# Patient Record
Sex: FEMALE | Race: BLACK OR AFRICAN AMERICAN | NOT HISPANIC OR LATINO | RURAL
[De-identification: names, ages, dates, MRNs, and addresses within clinical notes are randomized per-mention and may not be internally consistent; named-entity substitution may affect disease eponyms.]

---

## 2020-10-15 ENCOUNTER — HISTORICAL (OUTPATIENT)
Dept: ADMINISTRATIVE | Facility: HOSPITAL | Age: 45
End: 2020-10-15

## 2020-10-15 LAB
BASOPHILS # BLD AUTO: 0.05 X10E3/UL (ref 0–0.2)
BASOPHILS NFR BLD AUTO: 1 % (ref 0–1)
EOSINOPHIL # BLD AUTO: 0.08 X10E3/UL (ref 0–0.5)
EOSINOPHIL NFR BLD AUTO: 1.6 % (ref 1–4)
ERYTHROCYTE [DISTWIDTH] IN BLOOD BY AUTOMATED COUNT: 13.3 % (ref 11.5–14.5)
HCT VFR BLD AUTO: 38.8 % (ref 38–47)
HGB BLD-MCNC: 12.5 G/DL (ref 12–16)
IMM GRANULOCYTES # BLD AUTO: 0.01 X10E3/UL (ref 0–0.04)
IMM GRANULOCYTES NFR BLD: 0.2 % (ref 0–0.4)
LYMPHOCYTES # BLD AUTO: 2.16 X10E3/UL (ref 1–4.8)
LYMPHOCYTES NFR BLD AUTO: 44.1 % (ref 27–41)
MCH RBC QN AUTO: 26.9 PG (ref 27–31)
MCHC RBC AUTO-ENTMCNC: 32.2 G/DL (ref 32–36)
MCV RBC AUTO: 83.6 FL (ref 80–96)
MONOCYTES # BLD AUTO: 0.47 X10E3/UL (ref 0–0.8)
MONOCYTES NFR BLD AUTO: 9.6 % (ref 2–6)
MPC BLD CALC-MCNC: 11 FL (ref 9.4–12.4)
NEUTROPHILS # BLD AUTO: 2.13 X10E3/UL (ref 1.8–7.7)
NEUTROPHILS NFR BLD AUTO: 43.5 % (ref 53–65)
NRBC # BLD AUTO: 0 X10E3/UL (ref 0–0)
NRBC, AUTO (.00): 0 /100 (ref 0–0)
PLATELET # BLD AUTO: 320 X10E3/UL (ref 150–400)
RBC # BLD AUTO: 4.64 X10E6/UL (ref 4.2–5.4)
WBC # BLD AUTO: 4.9 X10E3/UL (ref 4.5–11)

## 2020-10-19 ENCOUNTER — HISTORICAL (OUTPATIENT)
Dept: ADMINISTRATIVE | Facility: HOSPITAL | Age: 45
End: 2020-10-19

## 2020-10-19 LAB — OCCULT BLOOD: NEGATIVE

## 2021-02-15 ENCOUNTER — HISTORICAL (OUTPATIENT)
Dept: ADMINISTRATIVE | Facility: HOSPITAL | Age: 46
End: 2021-02-15

## 2021-02-15 LAB
ALBUMIN SERPL BCP-MCNC: 3.9 G/DL (ref 3.5–5)
ALBUMIN/GLOB SERPL: 0.9 {RATIO}
ALP SERPL-CCNC: 77 U/L (ref 39–100)
ALT SERPL W P-5'-P-CCNC: 31 U/L (ref 13–56)
AMYLASE SERPL-CCNC: 57 U/L (ref 25–115)
ANION GAP SERPL CALCULATED.3IONS-SCNC: 12 MMOL/L
AST SERPL W P-5'-P-CCNC: 26 U/L (ref 15–37)
BASOPHILS # BLD AUTO: 0.04 X10E3/UL (ref 0–0.2)
BASOPHILS NFR BLD AUTO: 0.6 % (ref 0–1)
BILIRUB SERPL-MCNC: 0.8 MG/DL (ref 0–1.2)
BILIRUB UR QL STRIP: NEGATIVE MG/DL
BUN SERPL-MCNC: 11 MG/DL (ref 7–18)
BUN/CREAT SERPL: 9.7
CALCIUM SERPL-MCNC: 10 MG/DL (ref 8.5–10.1)
CHLORIDE SERPL-SCNC: 95 MMOL/L (ref 98–107)
CLARITY UR: CLEAR
CO2 SERPL-SCNC: 34 MMOL/L (ref 21–32)
COLOR UR: YELLOW
CREAT SERPL-MCNC: 1.13 MG/DL (ref 0.55–1.02)
EOSINOPHIL # BLD AUTO: 0.16 X10E3/UL (ref 0–0.5)
EOSINOPHIL NFR BLD AUTO: 2.5 % (ref 1–4)
ERYTHROCYTE [DISTWIDTH] IN BLOOD BY AUTOMATED COUNT: 13.8 % (ref 11.5–14.5)
GLOBULIN SER-MCNC: 4.3 G/DL (ref 2–4)
GLUCOSE SERPL-MCNC: 170 MG/DL (ref 74–106)
GLUCOSE UR STRIP-MCNC: NEGATIVE MG/DL
HCT VFR BLD AUTO: 39.7 % (ref 38–47)
HGB BLD-MCNC: 13 G/DL (ref 12–16)
KETONES UR STRIP-SCNC: NEGATIVE MG/DL
LEUKOCYTE ESTERASE UR QL STRIP: NEGATIVE LEU/UL
LIPASE SERPL-CCNC: 95 U/L (ref 73–393)
LYMPHOCYTES # BLD AUTO: 2.08 X10E3/UL (ref 1–4.8)
LYMPHOCYTES NFR BLD AUTO: 32.8 % (ref 27–41)
MCH RBC QN AUTO: 27.8 PG (ref 27–31)
MCHC RBC AUTO-ENTMCNC: 32.7 G/DL (ref 32–36)
MCV RBC AUTO: 85 FL (ref 80–96)
MONOCYTES # BLD AUTO: 0.64 X10E3/UL (ref 0–0.8)
MONOCYTES NFR BLD AUTO: 10.1 % (ref 2–6)
MPC BLD CALC-MCNC: 10.3 FL (ref 9.4–12.4)
NEUTROPHILS # BLD AUTO: 3.42 X10E3/UL (ref 1.8–7.7)
NEUTROPHILS NFR BLD AUTO: 54 % (ref 53–65)
NITRITE UR QL STRIP: NEGATIVE
PH UR STRIP: 7 PH UNITS (ref 5–8)
PLATELET # BLD AUTO: 329 X10E3/UL (ref 150–400)
POTASSIUM SERPL-SCNC: 4 MMOL/L (ref 3.5–5.1)
PROT SERPL-MCNC: 8.2 G/DL (ref 6.4–8.2)
PROT UR QL STRIP: NEGATIVE MG/DL
RBC # BLD AUTO: 4.67 X10E6/UL (ref 4.2–5.4)
RBC # UR STRIP: NEGATIVE ERY/UL
SODIUM SERPL-SCNC: 137 MMOL/L (ref 136–145)
SP GR UR STRIP: 1.01 (ref 1–1.03)
UROBILINOGEN UR STRIP-ACNC: 0.2 MG/DL
WBC # BLD AUTO: 6.34 X10E3/UL (ref 4.5–11)

## 2021-04-19 ENCOUNTER — OFFICE VISIT (OUTPATIENT)
Dept: GASTROENTEROLOGY | Facility: CLINIC | Age: 46
End: 2021-04-19
Payer: COMMERCIAL

## 2021-04-19 VITALS
WEIGHT: 200 LBS | HEIGHT: 64 IN | SYSTOLIC BLOOD PRESSURE: 134 MMHG | OXYGEN SATURATION: 100 % | DIASTOLIC BLOOD PRESSURE: 85 MMHG | RESPIRATION RATE: 17 BRPM | HEART RATE: 90 BPM | BODY MASS INDEX: 34.15 KG/M2

## 2021-04-19 DIAGNOSIS — T40.2X5A CONSTIPATION DUE TO OPIOID THERAPY: Primary | ICD-10-CM

## 2021-04-19 DIAGNOSIS — R10.9 ABDOMINAL PAIN, UNSPECIFIED ABDOMINAL LOCATION: ICD-10-CM

## 2021-04-19 DIAGNOSIS — K59.03 CONSTIPATION DUE TO OPIOID THERAPY: Primary | ICD-10-CM

## 2021-04-19 PROCEDURE — 99213 OFFICE O/P EST LOW 20 MIN: CPT | Mod: ,,, | Performed by: NURSE PRACTITIONER

## 2021-04-19 PROCEDURE — 99213 PR OFFICE/OUTPT VISIT, EST, LEVL III, 20-29 MIN: ICD-10-PCS | Mod: ,,, | Performed by: NURSE PRACTITIONER

## 2021-04-19 RX ORDER — GABAPENTIN 300 MG/1
300 CAPSULE ORAL 3 TIMES DAILY
COMMUNITY
Start: 2021-03-25 | End: 2021-08-23 | Stop reason: SDUPTHER

## 2021-04-19 RX ORDER — TRAZODONE HYDROCHLORIDE 100 MG/1
100 TABLET ORAL NIGHTLY
COMMUNITY
Start: 2021-03-11 | End: 2022-02-03 | Stop reason: SDUPTHER

## 2021-04-19 RX ORDER — RISPERIDONE 3 MG/1
3 TABLET ORAL DAILY
COMMUNITY
Start: 2021-03-11 | End: 2022-02-03 | Stop reason: SDUPTHER

## 2021-04-19 RX ORDER — PANTOPRAZOLE SODIUM 40 MG/1
40 TABLET, DELAYED RELEASE ORAL DAILY
Qty: 90 TABLET | Refills: 3 | Status: SHIPPED | OUTPATIENT
Start: 2021-04-19 | End: 2021-08-23 | Stop reason: SDUPTHER

## 2021-04-19 RX ORDER — HYDROXYZINE PAMOATE 25 MG/1
25 CAPSULE ORAL 3 TIMES DAILY
COMMUNITY
Start: 2021-02-16 | End: 2022-02-03 | Stop reason: SDUPTHER

## 2021-04-19 RX ORDER — PHENAZOPYRIDINE HYDROCHLORIDE 100 MG/1
TABLET, FILM COATED ORAL
Status: ON HOLD | COMMUNITY
Start: 2021-02-09 | End: 2021-12-21

## 2021-04-19 RX ORDER — OXYCODONE AND ACETAMINOPHEN 10; 325 MG/1; MG/1
1 TABLET ORAL EVERY 6 HOURS PRN
COMMUNITY
Start: 2021-03-22

## 2021-04-19 RX ORDER — OMEPRAZOLE 20 MG/1
20 CAPSULE, DELAYED RELEASE ORAL DAILY
Status: ON HOLD | COMMUNITY
Start: 2021-02-03 | End: 2021-12-21

## 2021-04-19 RX ORDER — DULOXETIN HYDROCHLORIDE 60 MG/1
60 CAPSULE, DELAYED RELEASE ORAL 2 TIMES DAILY
COMMUNITY
Start: 2021-03-11 | End: 2021-08-23 | Stop reason: SDUPTHER

## 2021-04-19 RX ORDER — LISINOPRIL 5 MG/1
TABLET ORAL
COMMUNITY
Start: 2021-03-27 | End: 2021-06-01 | Stop reason: SDUPTHER

## 2021-04-19 RX ORDER — DIAZEPAM 5 MG/1
5 TABLET ORAL EVERY 12 HOURS PRN
COMMUNITY
Start: 2021-02-16 | End: 2022-04-26

## 2021-04-19 RX ORDER — NALOXEGOL OXALATE 25 MG/1
25 TABLET, FILM COATED ORAL DAILY
Qty: 30 TABLET | Refills: 11 | Status: SHIPPED | OUTPATIENT
Start: 2021-04-19 | End: 2022-02-03 | Stop reason: SDUPTHER

## 2021-04-19 RX ORDER — TIZANIDINE 4 MG/1
4 TABLET ORAL 4 TIMES DAILY
COMMUNITY
Start: 2021-04-12 | End: 2022-02-03 | Stop reason: SDUPTHER

## 2021-04-19 RX ORDER — GLIPIZIDE 10 MG/1
10 TABLET ORAL 2 TIMES DAILY WITH MEALS
COMMUNITY
Start: 2021-03-25 | End: 2021-08-23 | Stop reason: SDUPTHER

## 2021-05-12 ENCOUNTER — TELEPHONE (OUTPATIENT)
Dept: GASTROENTEROLOGY | Facility: CLINIC | Age: 46
End: 2021-05-12

## 2021-06-01 RX ORDER — LISINOPRIL 5 MG/1
5 TABLET ORAL DAILY
Qty: 90 TABLET | Refills: 1 | Status: SHIPPED | OUTPATIENT
Start: 2021-06-01 | End: 2021-08-23 | Stop reason: SDUPTHER

## 2021-06-22 DIAGNOSIS — K59.00 CONSTIPATION, UNSPECIFIED CONSTIPATION TYPE: Primary | ICD-10-CM

## 2021-08-23 ENCOUNTER — OFFICE VISIT (OUTPATIENT)
Dept: FAMILY MEDICINE | Facility: CLINIC | Age: 46
End: 2021-08-23
Payer: COMMERCIAL

## 2021-08-23 VITALS
DIASTOLIC BLOOD PRESSURE: 72 MMHG | HEIGHT: 64 IN | TEMPERATURE: 97 F | BODY MASS INDEX: 34.15 KG/M2 | WEIGHT: 200 LBS | SYSTOLIC BLOOD PRESSURE: 128 MMHG | OXYGEN SATURATION: 99 % | HEART RATE: 97 BPM

## 2021-08-23 DIAGNOSIS — J32.9 SINUSITIS, UNSPECIFIED CHRONICITY, UNSPECIFIED LOCATION: ICD-10-CM

## 2021-08-23 DIAGNOSIS — R07.9 CHRONIC CHEST PAIN: ICD-10-CM

## 2021-08-23 DIAGNOSIS — G89.29 CHRONIC CHEST PAIN: ICD-10-CM

## 2021-08-23 DIAGNOSIS — I10 HYPERTENSION, UNSPECIFIED TYPE: Primary | ICD-10-CM

## 2021-08-23 DIAGNOSIS — K21.9 GASTROESOPHAGEAL REFLUX DISEASE, UNSPECIFIED WHETHER ESOPHAGITIS PRESENT: ICD-10-CM

## 2021-08-23 DIAGNOSIS — E11.40 TYPE 2 DIABETES MELLITUS WITH DIABETIC NEUROPATHY, WITHOUT LONG-TERM CURRENT USE OF INSULIN: ICD-10-CM

## 2021-08-23 LAB
ALBUMIN SERPL BCP-MCNC: 3.4 G/DL (ref 3.5–5)
ALBUMIN/GLOB SERPL: 0.9 {RATIO}
ALP SERPL-CCNC: 57 U/L (ref 39–100)
ALT SERPL W P-5'-P-CCNC: 37 U/L (ref 13–56)
ANION GAP SERPL CALCULATED.3IONS-SCNC: 11 MMOL/L (ref 7–16)
AST SERPL W P-5'-P-CCNC: 18 U/L (ref 15–37)
BASOPHILS # BLD AUTO: 0.06 K/UL (ref 0–0.2)
BASOPHILS NFR BLD AUTO: 0.7 % (ref 0–1)
BILIRUB SERPL-MCNC: 0.4 MG/DL (ref 0–1.2)
BUN SERPL-MCNC: 11 MG/DL (ref 7–18)
BUN/CREAT SERPL: 11 (ref 6–20)
CALCIUM SERPL-MCNC: 9.5 MG/DL (ref 8.5–10.1)
CHLORIDE SERPL-SCNC: 103 MMOL/L (ref 98–107)
CHOLEST SERPL-MCNC: 151 MG/DL (ref 0–200)
CHOLEST/HDLC SERPL: 2.8 {RATIO}
CO2 SERPL-SCNC: 28 MMOL/L (ref 21–32)
CREAT SERPL-MCNC: 0.98 MG/DL (ref 0.55–1.02)
DIFFERENTIAL METHOD BLD: ABNORMAL
EOSINOPHIL # BLD AUTO: 0.12 K/UL (ref 0–0.5)
EOSINOPHIL NFR BLD AUTO: 1.4 % (ref 1–4)
ERYTHROCYTE [DISTWIDTH] IN BLOOD BY AUTOMATED COUNT: 13.8 % (ref 11.5–14.5)
EST. AVERAGE GLUCOSE BLD GHB EST-MCNC: 184 MG/DL
GLOBULIN SER-MCNC: 3.9 G/DL (ref 2–4)
GLUCOSE SERPL-MCNC: 140 MG/DL (ref 74–106)
HBA1C MFR BLD HPLC: 8.1 % (ref 4.5–6.6)
HCT VFR BLD AUTO: 35.8 % (ref 38–47)
HDLC SERPL-MCNC: 54 MG/DL (ref 40–60)
HGB BLD-MCNC: 11.5 G/DL (ref 12–16)
IMM GRANULOCYTES # BLD AUTO: 0.02 K/UL (ref 0–0.04)
IMM GRANULOCYTES NFR BLD: 0.2 % (ref 0–0.4)
LDLC SERPL CALC-MCNC: 85 MG/DL
LDLC/HDLC SERPL: 1.6 {RATIO}
LYMPHOCYTES # BLD AUTO: 2.98 K/UL (ref 1–4.8)
LYMPHOCYTES NFR BLD AUTO: 33.6 % (ref 27–41)
MCH RBC QN AUTO: 27.3 PG (ref 27–31)
MCHC RBC AUTO-ENTMCNC: 32.1 G/DL (ref 32–36)
MCV RBC AUTO: 84.8 FL (ref 80–96)
MONOCYTES # BLD AUTO: 0.72 K/UL (ref 0–0.8)
MONOCYTES NFR BLD AUTO: 8.1 % (ref 2–6)
MPC BLD CALC-MCNC: 11.1 FL (ref 9.4–12.4)
NEUTROPHILS # BLD AUTO: 4.97 K/UL (ref 1.8–7.7)
NEUTROPHILS NFR BLD AUTO: 56 % (ref 53–65)
NONHDLC SERPL-MCNC: 97 MG/DL
NRBC # BLD AUTO: 0 X10E3/UL
NRBC, AUTO (.00): 0 %
PLATELET # BLD AUTO: 341 K/UL (ref 150–400)
POTASSIUM SERPL-SCNC: 4 MMOL/L (ref 3.5–5.1)
PROT SERPL-MCNC: 7.3 G/DL (ref 6.4–8.2)
RBC # BLD AUTO: 4.22 M/UL (ref 4.2–5.4)
SODIUM SERPL-SCNC: 138 MMOL/L (ref 136–145)
TRIGL SERPL-MCNC: 62 MG/DL (ref 35–150)
VLDLC SERPL-MCNC: 12 MG/DL
WBC # BLD AUTO: 8.87 K/UL (ref 4.5–11)

## 2021-08-23 PROCEDURE — 99214 OFFICE O/P EST MOD 30 MIN: CPT | Mod: 25,,, | Performed by: NURSE PRACTITIONER

## 2021-08-23 PROCEDURE — 83036 HEMOGLOBIN A1C: ICD-10-PCS | Mod: ,,, | Performed by: CLINICAL MEDICAL LABORATORY

## 2021-08-23 PROCEDURE — 85025 COMPLETE CBC W/AUTO DIFF WBC: CPT | Mod: ,,, | Performed by: CLINICAL MEDICAL LABORATORY

## 2021-08-23 PROCEDURE — 99214 PR OFFICE/OUTPT VISIT, EST, LEVL IV, 30-39 MIN: ICD-10-PCS | Mod: 25,,, | Performed by: NURSE PRACTITIONER

## 2021-08-23 PROCEDURE — 80053 COMPREHEN METABOLIC PANEL: CPT | Mod: ,,, | Performed by: CLINICAL MEDICAL LABORATORY

## 2021-08-23 PROCEDURE — 85025 CBC WITH DIFFERENTIAL: ICD-10-PCS | Mod: ,,, | Performed by: CLINICAL MEDICAL LABORATORY

## 2021-08-23 PROCEDURE — 96372 THER/PROPH/DIAG INJ SC/IM: CPT | Mod: ,,, | Performed by: NURSE PRACTITIONER

## 2021-08-23 PROCEDURE — 83036 HEMOGLOBIN GLYCOSYLATED A1C: CPT | Mod: ,,, | Performed by: CLINICAL MEDICAL LABORATORY

## 2021-08-23 PROCEDURE — 80061 LIPID PANEL: ICD-10-PCS | Mod: ,,, | Performed by: CLINICAL MEDICAL LABORATORY

## 2021-08-23 PROCEDURE — 80061 LIPID PANEL: CPT | Mod: ,,, | Performed by: CLINICAL MEDICAL LABORATORY

## 2021-08-23 PROCEDURE — 80053 COMPREHENSIVE METABOLIC PANEL: ICD-10-PCS | Mod: ,,, | Performed by: CLINICAL MEDICAL LABORATORY

## 2021-08-23 PROCEDURE — 96372 PR INJECTION,THERAP/PROPH/DIAG2ST, IM OR SUBCUT: ICD-10-PCS | Mod: ,,, | Performed by: NURSE PRACTITIONER

## 2021-08-23 RX ORDER — GABAPENTIN 400 MG/1
400 CAPSULE ORAL 3 TIMES DAILY
Qty: 270 CAPSULE | Refills: 0 | Status: SHIPPED | OUTPATIENT
Start: 2021-08-23 | End: 2022-02-03 | Stop reason: SDUPTHER

## 2021-08-23 RX ORDER — DULOXETIN HYDROCHLORIDE 60 MG/1
60 CAPSULE, DELAYED RELEASE ORAL 2 TIMES DAILY
Qty: 180 CAPSULE | Refills: 1 | Status: SHIPPED | OUTPATIENT
Start: 2021-08-23 | End: 2022-02-03 | Stop reason: SDUPTHER

## 2021-08-23 RX ORDER — PANTOPRAZOLE SODIUM 40 MG/1
40 TABLET, DELAYED RELEASE ORAL DAILY
Qty: 90 TABLET | Refills: 1 | Status: SHIPPED | OUTPATIENT
Start: 2021-08-23 | End: 2022-02-03 | Stop reason: SDUPTHER

## 2021-08-23 RX ORDER — LISINOPRIL 5 MG/1
5 TABLET ORAL DAILY
Qty: 90 TABLET | Refills: 1 | Status: SHIPPED | OUTPATIENT
Start: 2021-08-23 | End: 2022-02-03 | Stop reason: DRUGHIGH

## 2021-08-23 RX ORDER — GLIPIZIDE 10 MG/1
10 TABLET ORAL 2 TIMES DAILY WITH MEALS
Qty: 180 TABLET | Refills: 1 | Status: SHIPPED | OUTPATIENT
Start: 2021-08-23 | End: 2022-02-03 | Stop reason: SDUPTHER

## 2021-08-23 RX ORDER — CEFTRIAXONE 1 G/1
1 INJECTION, POWDER, FOR SOLUTION INTRAMUSCULAR; INTRAVENOUS
Status: COMPLETED | OUTPATIENT
Start: 2021-08-23 | End: 2021-08-23

## 2021-08-23 RX ORDER — GABAPENTIN 300 MG/1
300 CAPSULE ORAL 3 TIMES DAILY
Qty: 270 CAPSULE | Refills: 1 | Status: SHIPPED | OUTPATIENT
Start: 2021-08-23 | End: 2021-08-23 | Stop reason: DRUGHIGH

## 2021-08-23 RX ADMIN — CEFTRIAXONE 1 G: 1 INJECTION, POWDER, FOR SOLUTION INTRAMUSCULAR; INTRAVENOUS at 02:08

## 2021-08-24 ENCOUNTER — TELEPHONE (OUTPATIENT)
Dept: FAMILY MEDICINE | Facility: CLINIC | Age: 46
End: 2021-08-24

## 2021-08-24 RX ORDER — INSULIN GLARGINE 100 [IU]/ML
15 INJECTION, SOLUTION SUBCUTANEOUS NIGHTLY
COMMUNITY
End: 2022-02-03 | Stop reason: SDUPTHER

## 2021-08-24 RX ORDER — SEMAGLUTIDE 1.34 MG/ML
0.5 INJECTION, SOLUTION SUBCUTANEOUS
COMMUNITY
End: 2022-02-03 | Stop reason: SDUPTHER

## 2021-08-25 PROBLEM — E11.40 TYPE 2 DIABETES MELLITUS WITH DIABETIC NEUROPATHY, WITHOUT LONG-TERM CURRENT USE OF INSULIN: Status: ACTIVE | Noted: 2021-08-25

## 2021-08-25 PROBLEM — K21.9 GASTROESOPHAGEAL REFLUX DISEASE: Status: ACTIVE | Noted: 2021-08-25

## 2021-08-25 PROBLEM — I10 HYPERTENSION: Status: ACTIVE | Noted: 2021-08-25

## 2021-09-01 DIAGNOSIS — Z11.59 SCREENING FOR VIRAL DISEASE: Primary | ICD-10-CM

## 2021-09-02 ENCOUNTER — HOSPITAL ENCOUNTER (OUTPATIENT)
Dept: RADIOLOGY | Facility: HOSPITAL | Age: 46
Discharge: HOME | End: 2021-09-02
Attending: FAMILY MEDICINE
Payer: COMMERCIAL

## 2021-09-02 ENCOUNTER — OFFICE VISIT (OUTPATIENT)
Dept: OBSTETRICS AND GYNECOLOGY | Facility: CLINIC | Age: 46
End: 2021-09-02
Payer: COMMERCIAL

## 2021-09-02 VITALS
SYSTOLIC BLOOD PRESSURE: 124 MMHG | WEIGHT: 199.63 LBS | HEIGHT: 64 IN | BODY MASS INDEX: 34.08 KG/M2 | DIASTOLIC BLOOD PRESSURE: 84 MMHG

## 2021-09-02 DIAGNOSIS — N89.8 VAGINA ITCHING: ICD-10-CM

## 2021-09-02 DIAGNOSIS — Z01.419 WELL WOMAN EXAM WITH ROUTINE GYNECOLOGICAL EXAM: Primary | ICD-10-CM

## 2021-09-02 DIAGNOSIS — N90.89 LABIAL LESION: ICD-10-CM

## 2021-09-02 DIAGNOSIS — Z11.3 SCREENING FOR STD (SEXUALLY TRANSMITTED DISEASE): ICD-10-CM

## 2021-09-02 DIAGNOSIS — Z12.31 VISIT FOR SCREENING MAMMOGRAM: ICD-10-CM

## 2021-09-02 DIAGNOSIS — Z12.4 SCREENING FOR MALIGNANT NEOPLASM OF THE CERVIX: ICD-10-CM

## 2021-09-02 LAB
CANDIDA SPECIES: NEGATIVE
GARDNERELLA: POSITIVE
TRICHOMONAS: NEGATIVE

## 2021-09-02 PROCEDURE — 99215 OFFICE O/P EST HI 40 MIN: CPT | Mod: PBBFAC | Performed by: OBSTETRICS & GYNECOLOGY

## 2021-09-02 PROCEDURE — 88142 CYTOPATH C/V THIN LAYER: CPT | Mod: GCY | Performed by: OBSTETRICS & GYNECOLOGY

## 2021-09-02 PROCEDURE — 87510 GARDNER VAG DNA DIR PROBE: CPT | Mod: ,,, | Performed by: CLINICAL MEDICAL LABORATORY

## 2021-09-02 PROCEDURE — 87660 TRICHOMONAS VAGIN DIR PROBE: CPT | Mod: ,,, | Performed by: CLINICAL MEDICAL LABORATORY

## 2021-09-02 PROCEDURE — 87491 CHLMYD TRACH DNA AMP PROBE: CPT | Mod: ,,, | Performed by: CLINICAL MEDICAL LABORATORY

## 2021-09-02 PROCEDURE — 87591 N.GONORRHOEAE DNA AMP PROB: CPT | Mod: ,,, | Performed by: CLINICAL MEDICAL LABORATORY

## 2021-09-02 PROCEDURE — 87624 HPV HI-RISK TYP POOLED RSLT: CPT | Mod: ,,, | Performed by: CLINICAL MEDICAL LABORATORY

## 2021-09-02 PROCEDURE — 87491 CHLAMYDIA/GONORRHOEAE(GC), PCR: ICD-10-PCS | Mod: ,,, | Performed by: CLINICAL MEDICAL LABORATORY

## 2021-09-02 PROCEDURE — 87624 HUMAN PAPILLOMAVIRUS (HPV): ICD-10-PCS | Mod: ,,, | Performed by: CLINICAL MEDICAL LABORATORY

## 2021-09-02 PROCEDURE — 87660 BACTERIAL VAGINOSIS: ICD-10-PCS | Mod: ,,, | Performed by: CLINICAL MEDICAL LABORATORY

## 2021-09-02 PROCEDURE — 87480 BACTERIAL VAGINOSIS: ICD-10-PCS | Mod: ,,, | Performed by: CLINICAL MEDICAL LABORATORY

## 2021-09-02 PROCEDURE — 99396 PR PREVENTIVE VISIT,EST,40-64: ICD-10-PCS | Mod: S$PBB,,, | Performed by: OBSTETRICS & GYNECOLOGY

## 2021-09-02 PROCEDURE — 99396 PREV VISIT EST AGE 40-64: CPT | Mod: S$PBB,,, | Performed by: OBSTETRICS & GYNECOLOGY

## 2021-09-02 PROCEDURE — 87510 BACTERIAL VAGINOSIS: ICD-10-PCS | Mod: ,,, | Performed by: CLINICAL MEDICAL LABORATORY

## 2021-09-02 PROCEDURE — 87591 CHLAMYDIA/GONORRHOEAE(GC), PCR: ICD-10-PCS | Mod: ,,, | Performed by: CLINICAL MEDICAL LABORATORY

## 2021-09-02 PROCEDURE — 87480 CANDIDA DNA DIR PROBE: CPT | Mod: ,,, | Performed by: CLINICAL MEDICAL LABORATORY

## 2021-09-02 RX ORDER — HYDROCHLOROTHIAZIDE 25 MG/1
25 TABLET ORAL DAILY
COMMUNITY
Start: 2021-06-04 | End: 2021-10-13 | Stop reason: SDUPTHER

## 2021-09-03 LAB
CHLAMYDIA BY PCR: NEGATIVE
N. GONORRHOEAE (GC) BY PCR: NEGATIVE

## 2021-09-07 DIAGNOSIS — N90.89 LABIAL LESION: Primary | ICD-10-CM

## 2021-09-08 ENCOUNTER — ANESTHESIA EVENT (OUTPATIENT)
Dept: PAIN MEDICINE | Facility: HOSPITAL | Age: 46
End: 2021-09-08
Payer: COMMERCIAL

## 2021-09-08 ENCOUNTER — ANESTHESIA (OUTPATIENT)
Dept: PAIN MEDICINE | Facility: HOSPITAL | Age: 46
End: 2021-09-08
Payer: COMMERCIAL

## 2021-09-08 ENCOUNTER — HOSPITAL ENCOUNTER (OUTPATIENT)
Facility: HOSPITAL | Age: 46
Discharge: HOME | End: 2021-09-08
Attending: ANESTHESIOLOGY | Admitting: ANESTHESIOLOGY
Payer: COMMERCIAL

## 2021-09-08 VITALS
HEIGHT: 64 IN | BODY MASS INDEX: 34.31 KG/M2 | RESPIRATION RATE: 15 BRPM | DIASTOLIC BLOOD PRESSURE: 93 MMHG | HEART RATE: 83 BPM | OXYGEN SATURATION: 100 % | WEIGHT: 201 LBS | TEMPERATURE: 97 F | SYSTOLIC BLOOD PRESSURE: 153 MMHG

## 2021-09-08 DIAGNOSIS — M47.817 LUMBOSACRAL SPONDYLOSIS WITHOUT MYELOPATHY: ICD-10-CM

## 2021-09-08 LAB
B-HCG UR QL: NEGATIVE
CTP QC/QA: YES
GH SERPL-MCNC: NORMAL NG/ML
GLUCOSE SERPL-MCNC: 106 MG/DL (ref 70–105)
GLUCOSE SERPL-MCNC: 106 MG/DL (ref 70–110)
INSULIN SERPL-ACNC: NORMAL U[IU]/ML
LAB AP CLINICAL INFORMATION: NORMAL
LAB AP GYN INTERPRETATION: NEGATIVE
LAB AP PAP DISCLAIMER COMMENTS: NORMAL
RENIN PLAS-CCNC: NORMAL NG/ML/H

## 2021-09-08 PROCEDURE — 81025 URINE PREGNANCY TEST: CPT | Performed by: ANESTHESIOLOGY

## 2021-09-08 PROCEDURE — D9220A PRA ANESTHESIA: ICD-10-PCS | Mod: ,,, | Performed by: NURSE ANESTHETIST, CERTIFIED REGISTERED

## 2021-09-08 PROCEDURE — 82962 GLUCOSE BLOOD TEST: CPT

## 2021-09-08 PROCEDURE — 37000008 HC ANESTHESIA 1ST 15 MINUTES: Performed by: ANESTHESIOLOGY

## 2021-09-08 PROCEDURE — 37000009 HC ANESTHESIA EA ADD 15 MINS: Performed by: ANESTHESIOLOGY

## 2021-09-08 PROCEDURE — 64495 INJ PARAVERT F JNT L/S 3 LEV: CPT | Mod: 50 | Performed by: ANESTHESIOLOGY

## 2021-09-08 PROCEDURE — 64494 INJ PARAVERT F JNT L/S 2 LEV: CPT | Mod: 50 | Performed by: ANESTHESIOLOGY

## 2021-09-08 PROCEDURE — 27000284 HC CANNULA NASAL: Performed by: NURSE ANESTHETIST, CERTIFIED REGISTERED

## 2021-09-08 PROCEDURE — 27201423 OPTIME MED/SURG SUP & DEVICES STERILE SUPPLY: Performed by: ANESTHESIOLOGY

## 2021-09-08 PROCEDURE — 25000003 PHARM REV CODE 250: Performed by: NURSE ANESTHETIST, CERTIFIED REGISTERED

## 2021-09-08 PROCEDURE — 25000003 PHARM REV CODE 250: Performed by: ANESTHESIOLOGY

## 2021-09-08 PROCEDURE — 63600175 PHARM REV CODE 636 W HCPCS: Performed by: ANESTHESIOLOGY

## 2021-09-08 PROCEDURE — D9220A PRA ANESTHESIA: Mod: ,,, | Performed by: NURSE ANESTHETIST, CERTIFIED REGISTERED

## 2021-09-08 PROCEDURE — 64493 INJ PARAVERT F JNT L/S 1 LEV: CPT | Mod: 50 | Performed by: ANESTHESIOLOGY

## 2021-09-08 PROCEDURE — 63600175 PHARM REV CODE 636 W HCPCS: Performed by: NURSE ANESTHETIST, CERTIFIED REGISTERED

## 2021-09-08 RX ORDER — SODIUM CHLORIDE 9 MG/ML
INJECTION, SOLUTION INTRAVENOUS CONTINUOUS
Status: DISCONTINUED | OUTPATIENT
Start: 2021-09-08 | End: 2021-09-08 | Stop reason: HOSPADM

## 2021-09-08 RX ORDER — PROPOFOL 10 MG/ML
VIAL (ML) INTRAVENOUS
Status: DISCONTINUED | OUTPATIENT
Start: 2021-09-08 | End: 2021-09-08

## 2021-09-08 RX ORDER — TRIAMCINOLONE ACETONIDE 40 MG/ML
INJECTION, SUSPENSION INTRA-ARTICULAR; INTRAMUSCULAR
Status: DISCONTINUED | OUTPATIENT
Start: 2021-09-08 | End: 2021-09-08 | Stop reason: HOSPADM

## 2021-09-08 RX ORDER — BUPIVACAINE HYDROCHLORIDE 2.5 MG/ML
INJECTION, SOLUTION INFILTRATION; PERINEURAL
Status: DISCONTINUED | OUTPATIENT
Start: 2021-09-08 | End: 2021-09-08 | Stop reason: HOSPADM

## 2021-09-08 RX ORDER — LIDOCAINE HYDROCHLORIDE 20 MG/ML
INJECTION, SOLUTION EPIDURAL; INFILTRATION; INTRACAUDAL; PERINEURAL
Status: DISCONTINUED | OUTPATIENT
Start: 2021-09-08 | End: 2021-09-08

## 2021-09-08 RX ADMIN — LIDOCAINE HYDROCHLORIDE 80 MG: 20 INJECTION, SOLUTION INTRAVENOUS at 09:09

## 2021-09-08 RX ADMIN — PROPOFOL 30 MG: 10 INJECTION, EMULSION INTRAVENOUS at 09:09

## 2021-09-08 RX ADMIN — PROPOFOL 70 MG: 10 INJECTION, EMULSION INTRAVENOUS at 09:09

## 2021-09-08 RX ADMIN — SODIUM CHLORIDE: 9 INJECTION, SOLUTION INTRAVENOUS at 09:09

## 2021-09-10 LAB
HPV 16: NEGATIVE
HPV 18: NEGATIVE
HPV OTHER: NEGATIVE

## 2021-10-07 ENCOUNTER — OFFICE VISIT (OUTPATIENT)
Dept: OBSTETRICS AND GYNECOLOGY | Facility: CLINIC | Age: 46
End: 2021-10-07
Payer: COMMERCIAL

## 2021-10-07 VITALS
DIASTOLIC BLOOD PRESSURE: 82 MMHG | BODY MASS INDEX: 35.05 KG/M2 | SYSTOLIC BLOOD PRESSURE: 122 MMHG | WEIGHT: 204.19 LBS

## 2021-10-07 DIAGNOSIS — Z01.818 PRE-OP EXAMINATION: Primary | ICD-10-CM

## 2021-10-07 DIAGNOSIS — Z30.2 ENCOUNTER FOR FEMALE STERILIZATION PROCEDURE: ICD-10-CM

## 2021-10-07 DIAGNOSIS — Z01.810 PREOP CARDIOVASCULAR EXAM: ICD-10-CM

## 2021-10-07 DIAGNOSIS — Z64.1 MULTIPARITY: ICD-10-CM

## 2021-10-07 DIAGNOSIS — Z30.9 ENCOUNTER FOR CONTRACEPTIVE MANAGEMENT, UNSPECIFIED TYPE: Primary | ICD-10-CM

## 2021-10-07 PROCEDURE — 99212 PR OFFICE/OUTPT VISIT, EST, LEVL II, 10-19 MIN: ICD-10-PCS | Mod: S$PBB,,, | Performed by: OBSTETRICS & GYNECOLOGY

## 2021-10-07 PROCEDURE — 99212 OFFICE O/P EST SF 10 MIN: CPT | Mod: S$PBB,,, | Performed by: OBSTETRICS & GYNECOLOGY

## 2021-10-07 PROCEDURE — 99214 OFFICE O/P EST MOD 30 MIN: CPT | Mod: PBBFAC | Performed by: OBSTETRICS & GYNECOLOGY

## 2021-10-07 RX ORDER — KETOCONAZOLE 20 MG/G
CREAM TOPICAL
Status: ON HOLD | COMMUNITY
Start: 2021-09-02 | End: 2021-12-21

## 2021-10-07 RX ORDER — TRIAMCINOLONE ACETONIDE 1 MG/G
1 OINTMENT TOPICAL 2 TIMES DAILY
Status: ON HOLD | COMMUNITY
Start: 2021-09-02 | End: 2021-12-21

## 2021-10-13 RX ORDER — HYDROCHLOROTHIAZIDE 25 MG/1
25 TABLET ORAL DAILY
Qty: 90 TABLET | Refills: 1 | Status: SHIPPED | OUTPATIENT
Start: 2021-10-13 | End: 2021-10-14 | Stop reason: SDUPTHER

## 2021-10-14 RX ORDER — HYDROCHLOROTHIAZIDE 25 MG/1
25 TABLET ORAL DAILY
Qty: 90 TABLET | Refills: 1 | Status: ON HOLD | OUTPATIENT
Start: 2021-10-14 | End: 2021-12-21

## 2021-11-11 DIAGNOSIS — R09.81 HEAD CONGESTION: Primary | ICD-10-CM

## 2021-11-22 ENCOUNTER — HOSPITAL ENCOUNTER (OUTPATIENT)
Dept: RADIOLOGY | Facility: HOSPITAL | Age: 46
Discharge: HOME | End: 2021-11-22
Payer: COMMERCIAL

## 2021-11-22 ENCOUNTER — OFFICE VISIT (OUTPATIENT)
Dept: CARDIOLOGY | Facility: CLINIC | Age: 46
End: 2021-11-22
Payer: COMMERCIAL

## 2021-11-22 VITALS
WEIGHT: 208 LBS | SYSTOLIC BLOOD PRESSURE: 117 MMHG | DIASTOLIC BLOOD PRESSURE: 82 MMHG | HEART RATE: 86 BPM | BODY MASS INDEX: 30.81 KG/M2 | HEIGHT: 69 IN | OXYGEN SATURATION: 95 %

## 2021-11-22 VITALS — HEIGHT: 64 IN | WEIGHT: 204 LBS | BODY MASS INDEX: 34.83 KG/M2

## 2021-11-22 DIAGNOSIS — I10 PRIMARY HYPERTENSION: ICD-10-CM

## 2021-11-22 DIAGNOSIS — R07.9 CHRONIC CHEST PAIN: ICD-10-CM

## 2021-11-22 DIAGNOSIS — G89.29 CHRONIC CHEST PAIN: ICD-10-CM

## 2021-11-22 DIAGNOSIS — G47.39 OTHER SLEEP APNEA: ICD-10-CM

## 2021-11-22 DIAGNOSIS — Z12.31 OTHER SCREENING MAMMOGRAM: ICD-10-CM

## 2021-11-22 DIAGNOSIS — I10 HYPERTENSION, UNSPECIFIED TYPE: Primary | ICD-10-CM

## 2021-11-22 PROCEDURE — 93005 ELECTROCARDIOGRAM TRACING: CPT | Mod: PBBFAC | Performed by: INTERNAL MEDICINE

## 2021-11-22 PROCEDURE — 99215 OFFICE O/P EST HI 40 MIN: CPT | Mod: PBBFAC | Performed by: INTERNAL MEDICINE

## 2021-11-22 PROCEDURE — 77067 SCR MAMMO BI INCL CAD: CPT | Mod: TC

## 2021-11-22 PROCEDURE — 93010 EKG 12-LEAD: ICD-10-PCS | Mod: S$PBB,,, | Performed by: INTERNAL MEDICINE

## 2021-11-22 PROCEDURE — 99205 OFFICE O/P NEW HI 60 MIN: CPT | Mod: S$PBB,,, | Performed by: INTERNAL MEDICINE

## 2021-11-22 PROCEDURE — 99205 PR OFFICE/OUTPT VISIT, NEW, LEVL V, 60-74 MIN: ICD-10-PCS | Mod: S$PBB,,, | Performed by: INTERNAL MEDICINE

## 2021-11-22 PROCEDURE — 93010 ELECTROCARDIOGRAM REPORT: CPT | Mod: S$PBB,,, | Performed by: INTERNAL MEDICINE

## 2021-11-30 PROBLEM — R07.9 CHRONIC CHEST PAIN: Status: ACTIVE | Noted: 2021-11-30

## 2021-11-30 PROBLEM — G47.39 OTHER SLEEP APNEA: Status: ACTIVE | Noted: 2021-11-30

## 2021-11-30 PROBLEM — G89.29 CHRONIC CHEST PAIN: Status: ACTIVE | Noted: 2021-11-30

## 2021-12-02 DIAGNOSIS — Z01.811 PREOP RESPIRATORY EXAM: Primary | ICD-10-CM

## 2021-12-06 ENCOUNTER — HOSPITAL ENCOUNTER (OUTPATIENT)
Dept: CARDIOLOGY | Facility: HOSPITAL | Age: 46
Discharge: HOME | End: 2021-12-06
Attending: INTERNAL MEDICINE
Payer: COMMERCIAL

## 2021-12-06 VITALS — BODY MASS INDEX: 30.81 KG/M2 | WEIGHT: 208 LBS | HEIGHT: 69 IN

## 2021-12-06 VITALS
HEART RATE: 82 BPM | SYSTOLIC BLOOD PRESSURE: 126 MMHG | WEIGHT: 204 LBS | HEIGHT: 64 IN | BODY MASS INDEX: 34.83 KG/M2 | DIASTOLIC BLOOD PRESSURE: 86 MMHG

## 2021-12-06 DIAGNOSIS — R07.9 CHRONIC CHEST PAIN: ICD-10-CM

## 2021-12-06 DIAGNOSIS — G47.39 OTHER SLEEP APNEA: ICD-10-CM

## 2021-12-06 DIAGNOSIS — G89.29 CHRONIC CHEST PAIN: ICD-10-CM

## 2021-12-06 PROCEDURE — 93306 TTE W/DOPPLER COMPLETE: CPT

## 2021-12-06 PROCEDURE — 93306 TTE W/DOPPLER COMPLETE: CPT | Mod: 26,,, | Performed by: INTERNAL MEDICINE

## 2021-12-06 PROCEDURE — 93016 EXERCISE STRESS - EKG (CUPID ONLY): ICD-10-PCS | Mod: ,,, | Performed by: NURSE PRACTITIONER

## 2021-12-06 PROCEDURE — 93017 CV STRESS TEST TRACING ONLY: CPT

## 2021-12-06 PROCEDURE — 93016 CV STRESS TEST SUPVJ ONLY: CPT | Mod: ,,, | Performed by: NURSE PRACTITIONER

## 2021-12-06 PROCEDURE — 93018 CV STRESS TEST I&R ONLY: CPT | Mod: ,,, | Performed by: INTERNAL MEDICINE

## 2021-12-06 PROCEDURE — 93018 EXERCISE STRESS - EKG (CUPID ONLY): ICD-10-PCS | Mod: ,,, | Performed by: INTERNAL MEDICINE

## 2021-12-06 PROCEDURE — 93306 ECHO (CUPID ONLY): ICD-10-PCS | Mod: 26,,, | Performed by: INTERNAL MEDICINE

## 2021-12-07 ENCOUNTER — OFFICE VISIT (OUTPATIENT)
Dept: OBSTETRICS AND GYNECOLOGY | Facility: CLINIC | Age: 46
End: 2021-12-07
Payer: COMMERCIAL

## 2021-12-07 ENCOUNTER — HOSPITAL ENCOUNTER (OUTPATIENT)
Dept: RADIOLOGY | Facility: HOSPITAL | Age: 46
Discharge: HOME | End: 2021-12-07
Attending: OBSTETRICS & GYNECOLOGY
Payer: COMMERCIAL

## 2021-12-07 VITALS
BODY MASS INDEX: 35.58 KG/M2 | WEIGHT: 208.38 LBS | HEIGHT: 64 IN | HEART RATE: 70 BPM | TEMPERATURE: 98 F | DIASTOLIC BLOOD PRESSURE: 82 MMHG | SYSTOLIC BLOOD PRESSURE: 122 MMHG

## 2021-12-07 DIAGNOSIS — Z01.811 PREOP RESPIRATORY EXAM: ICD-10-CM

## 2021-12-07 DIAGNOSIS — Z01.818 PREOP EXAMINATION: Primary | ICD-10-CM

## 2021-12-07 DIAGNOSIS — Z01.818 PRE-OP EXAMINATION: Primary | ICD-10-CM

## 2021-12-07 LAB
AORTIC VALVE CUSP SEPERATION: 17.3 CM
AV INDEX (PROSTH): 0.67
AV MEAN GRADIENT: 2 MMHG
AV PEAK GRADIENT: 4 MMHG
AV VALVE AREA: 2.53 CM2
AV VELOCITY RATIO: 0.7
BSA FOR ECHO PROCEDURE: 2.14 M2
CV ECHO LV RWT: 0.88 CM
CV STRESS BASE HR: 82 BPM
DIASTOLIC BLOOD PRESSURE: 86 MMHG
DOP CALC AO PEAK VEL: 1 M/S
DOP CALC AO VTI: 20.16 CM
DOP CALC LVOT AREA: 3.8 CM2
DOP CALC LVOT DIAMETER: 2.2 CM
DOP CALC LVOT PEAK VEL: 0.7 M/S
DOP CALC LVOT STROKE VOLUME: 51.06 CM3
DOP CALC MV VTI: 16.4 CM
DOP CALCLVOT PEAK VEL VTI: 13.44 CM
E WAVE DECELERATION TIME: 175 MSEC
ECHO EF ESTIMATED: 34 %
ECHO LV POSTERIOR WALL: 1.41 CM (ref 0.6–1.1)
EJECTION FRACTION: 55 %
FRACTIONAL SHORTENING: 16 % (ref 28–44)
HR MV ECHO: 80 BPM
INTERVENTRICULAR SEPTUM: 1.23 CM (ref 0.6–1.1)
LEFT ATRIUM SIZE: 3.1 CM
LEFT INTERNAL DIMENSION IN SYSTOLE: 2.7 CM (ref 2.1–4)
LEFT VENTRICLE DIASTOLIC VOLUME INDEX: 19.5 ML/M2
LEFT VENTRICLE DIASTOLIC VOLUME: 40.96 ML
LEFT VENTRICLE MASS INDEX: 66 G/M2
LEFT VENTRICLE SYSTOLIC VOLUME INDEX: 12.9 ML/M2
LEFT VENTRICLE SYSTOLIC VOLUME: 27.02 ML
LEFT VENTRICULAR INTERNAL DIMENSION IN DIASTOLE: 3.2 CM (ref 3.5–6)
LEFT VENTRICULAR MASS: 139.05 G
LVOT MG: 1 MMHG
MV MEAN GRADIENT: 1 MMHG
MV PEAK E VEL: 0.77 M/S
MV STENOSIS PRESSURE HALF TIME: 71 MS
MV VALVE AREA BY CONTINUITY EQUATION: 3.11 CM2
MV VALVE AREA P 1/2 METHOD: 3.1 CM2
OHS CV CPX 1 MINUTE RECOVERY HEART RATE: 75 BPM
OHS CV CPX 85 PERCENT MAX PREDICTED HEART RATE MALE: 140
OHS CV CPX ESTIMATED METS: 5
OHS CV CPX MAX PREDICTED HEART RATE: 165
OHS CV CPX PATIENT IS FEMALE: 1
OHS CV CPX PATIENT IS MALE: 0
OHS CV CPX PEAK DIASTOLIC BLOOD PRESSURE: 107 MMHG
OHS CV CPX PEAK HEAR RATE: 128 BPM
OHS CV CPX PEAK RATE PRESSURE PRODUCT: NORMAL
OHS CV CPX PEAK SYSTOLIC BLOOD PRESSURE: 147 MMHG
OHS CV CPX PERCENT MAX PREDICTED HEART RATE ACHIEVED: 78
OHS CV CPX RATE PRESSURE PRODUCT PRESENTING: NORMAL
STRESS ECHO POST EXERCISE DUR MIN: 3 MINUTES
SYSTOLIC BLOOD PRESSURE: 126 MMHG

## 2021-12-07 PROCEDURE — 99024 PR POST-OP FOLLOW-UP VISIT: ICD-10-PCS | Mod: ,,, | Performed by: OBSTETRICS & GYNECOLOGY

## 2021-12-07 PROCEDURE — 99214 OFFICE O/P EST MOD 30 MIN: CPT | Mod: PBBFAC,25 | Performed by: OBSTETRICS & GYNECOLOGY

## 2021-12-07 PROCEDURE — 99024 POSTOP FOLLOW-UP VISIT: CPT | Mod: ,,, | Performed by: OBSTETRICS & GYNECOLOGY

## 2021-12-07 PROCEDURE — 71046 XR CHEST PA AND LATERAL: ICD-10-PCS | Mod: 26,,, | Performed by: RADIOLOGY

## 2021-12-07 PROCEDURE — 71046 X-RAY EXAM CHEST 2 VIEWS: CPT | Mod: 26,,, | Performed by: RADIOLOGY

## 2021-12-07 PROCEDURE — 71046 X-RAY EXAM CHEST 2 VIEWS: CPT | Mod: TC

## 2021-12-08 DIAGNOSIS — R07.89 OTHER CHEST PAIN: ICD-10-CM

## 2021-12-09 ENCOUNTER — DOCUMENTATION ONLY (OUTPATIENT)
Dept: CARDIOLOGY | Facility: CLINIC | Age: 46
End: 2021-12-09
Payer: COMMERCIAL

## 2021-12-10 ENCOUNTER — HOSPITAL ENCOUNTER (OUTPATIENT)
Dept: RADIOLOGY | Facility: HOSPITAL | Age: 46
Discharge: HOME | End: 2021-12-10
Attending: INTERNAL MEDICINE
Payer: COMMERCIAL

## 2021-12-10 ENCOUNTER — HOSPITAL ENCOUNTER (OUTPATIENT)
Dept: CARDIOLOGY | Facility: HOSPITAL | Age: 46
Discharge: HOME | End: 2021-12-10
Attending: INTERNAL MEDICINE
Payer: COMMERCIAL

## 2021-12-10 ENCOUNTER — DOCUMENTATION ONLY (OUTPATIENT)
Dept: CARDIOLOGY | Facility: CLINIC | Age: 46
End: 2021-12-10
Payer: COMMERCIAL

## 2021-12-10 VITALS
BODY MASS INDEX: 34.83 KG/M2 | HEIGHT: 64 IN | HEART RATE: 88 BPM | WEIGHT: 204 LBS | DIASTOLIC BLOOD PRESSURE: 80 MMHG | SYSTOLIC BLOOD PRESSURE: 120 MMHG

## 2021-12-10 DIAGNOSIS — R07.89 OTHER CHEST PAIN: ICD-10-CM

## 2021-12-10 PROCEDURE — 93018 NUCLEAR STRESS TEST (CUPID ONLY): ICD-10-PCS | Mod: ,,, | Performed by: INTERNAL MEDICINE

## 2021-12-10 PROCEDURE — 63600175 PHARM REV CODE 636 W HCPCS: Performed by: INTERNAL MEDICINE

## 2021-12-10 PROCEDURE — A9500 TC99M SESTAMIBI: HCPCS

## 2021-12-10 PROCEDURE — 78452 HT MUSCLE IMAGE SPECT MULT: CPT | Mod: TC

## 2021-12-10 PROCEDURE — 78452 NM MYOCARDIAL PERFUSION SPECT MULTI STUDY: ICD-10-PCS | Mod: 26,,, | Performed by: INTERNAL MEDICINE

## 2021-12-10 PROCEDURE — 93017 CV STRESS TEST TRACING ONLY: CPT

## 2021-12-10 PROCEDURE — 93016 NUCLEAR STRESS TEST (CUPID ONLY): ICD-10-PCS | Mod: ,,, | Performed by: NURSE PRACTITIONER

## 2021-12-10 PROCEDURE — 93018 CV STRESS TEST I&R ONLY: CPT | Mod: ,,, | Performed by: INTERNAL MEDICINE

## 2021-12-10 PROCEDURE — 78452 HT MUSCLE IMAGE SPECT MULT: CPT | Mod: 26,,, | Performed by: INTERNAL MEDICINE

## 2021-12-10 PROCEDURE — 93016 CV STRESS TEST SUPVJ ONLY: CPT | Mod: ,,, | Performed by: NURSE PRACTITIONER

## 2021-12-10 RX ORDER — REGADENOSON 0.08 MG/ML
0.4 INJECTION, SOLUTION INTRAVENOUS ONCE
Status: COMPLETED | OUTPATIENT
Start: 2021-12-10 | End: 2021-12-10

## 2021-12-10 RX ADMIN — REGADENOSON 0.4 MG: 0.08 INJECTION, SOLUTION INTRAVENOUS at 10:12

## 2021-12-13 ENCOUNTER — DOCUMENTATION ONLY (OUTPATIENT)
Dept: CARDIOLOGY | Facility: CLINIC | Age: 46
End: 2021-12-13
Payer: COMMERCIAL

## 2021-12-13 ENCOUNTER — OFFICE VISIT (OUTPATIENT)
Dept: CARDIOLOGY | Facility: CLINIC | Age: 46
End: 2021-12-13
Payer: COMMERCIAL

## 2021-12-13 VITALS
BODY MASS INDEX: 35.51 KG/M2 | OXYGEN SATURATION: 97 % | HEIGHT: 64 IN | HEART RATE: 102 BPM | WEIGHT: 208 LBS | SYSTOLIC BLOOD PRESSURE: 126 MMHG | DIASTOLIC BLOOD PRESSURE: 78 MMHG

## 2021-12-13 DIAGNOSIS — R94.39 ABNORMAL STRESS TEST: Primary | ICD-10-CM

## 2021-12-13 DIAGNOSIS — Z01.810 PRE-OPERATIVE CARDIOVASCULAR EXAMINATION: Primary | ICD-10-CM

## 2021-12-13 PROCEDURE — 99205 OFFICE O/P NEW HI 60 MIN: CPT | Mod: S$PBB,,, | Performed by: INTERNAL MEDICINE

## 2021-12-13 PROCEDURE — 99205 PR OFFICE/OUTPT VISIT, NEW, LEVL V, 60-74 MIN: ICD-10-PCS | Mod: S$PBB,,, | Performed by: INTERNAL MEDICINE

## 2021-12-13 PROCEDURE — 99215 OFFICE O/P EST HI 40 MIN: CPT | Mod: PBBFAC | Performed by: INTERNAL MEDICINE

## 2021-12-13 RX ORDER — SODIUM CHLORIDE 0.9 % (FLUSH) 0.9 %
2 SYRINGE (ML) INJECTION
Status: CANCELLED | OUTPATIENT
Start: 2021-12-21

## 2021-12-14 LAB
CV STRESS BASE HR: 88 BPM
DIASTOLIC BLOOD PRESSURE: 80 MMHG
OHS CV CPX 1 MINUTE RECOVERY HEART RATE: 106 BPM
OHS CV CPX 85 PERCENT MAX PREDICTED HEART RATE MALE: 140
OHS CV CPX MAX PREDICTED HEART RATE: 165
OHS CV CPX PATIENT IS FEMALE: 1
OHS CV CPX PATIENT IS MALE: 0
OHS CV CPX PEAK DIASTOLIC BLOOD PRESSURE: 92 MMHG
OHS CV CPX PEAK HEAR RATE: 115 BPM
OHS CV CPX PEAK RATE PRESSURE PRODUCT: NORMAL
OHS CV CPX PEAK SYSTOLIC BLOOD PRESSURE: 141 MMHG
OHS CV CPX PERCENT MAX PREDICTED HEART RATE ACHIEVED: 70
OHS CV CPX RATE PRESSURE PRODUCT PRESENTING: NORMAL
SYSTOLIC BLOOD PRESSURE: 120 MMHG

## 2021-12-21 ENCOUNTER — HOSPITAL ENCOUNTER (OUTPATIENT)
Facility: HOSPITAL | Age: 46
Discharge: HOME | End: 2021-12-21
Attending: INTERNAL MEDICINE | Admitting: INTERNAL MEDICINE
Payer: COMMERCIAL

## 2021-12-21 VITALS
HEART RATE: 109 BPM | DIASTOLIC BLOOD PRESSURE: 82 MMHG | OXYGEN SATURATION: 99 % | RESPIRATION RATE: 18 BRPM | WEIGHT: 209 LBS | HEIGHT: 64 IN | SYSTOLIC BLOOD PRESSURE: 150 MMHG | BODY MASS INDEX: 35.68 KG/M2 | TEMPERATURE: 98 F

## 2021-12-21 DIAGNOSIS — R94.39 ABNORMAL STRESS TEST: ICD-10-CM

## 2021-12-21 LAB
CATH EF QUANTITATIVE: 55 %
GLUCOSE SERPL-MCNC: 133 MG/DL (ref 70–105)

## 2021-12-21 PROCEDURE — 25500020 PHARM REV CODE 255: Performed by: INTERNAL MEDICINE

## 2021-12-21 PROCEDURE — 27100168 OPTIME MED/SURG SUP & DEVICES NON-STERILE SUPPLY: Performed by: INTERNAL MEDICINE

## 2021-12-21 PROCEDURE — 99152 MOD SED SAME PHYS/QHP 5/>YRS: CPT | Performed by: INTERNAL MEDICINE

## 2021-12-21 PROCEDURE — 93458 L HRT ARTERY/VENTRICLE ANGIO: CPT | Performed by: INTERNAL MEDICINE

## 2021-12-21 PROCEDURE — 27000080 OPTIME MED/SURG SUP & DEVICES GENERAL CLASSIFICATION: Performed by: INTERNAL MEDICINE

## 2021-12-21 PROCEDURE — C1894 INTRO/SHEATH, NON-LASER: HCPCS | Performed by: INTERNAL MEDICINE

## 2021-12-21 PROCEDURE — 99153 MOD SED SAME PHYS/QHP EA: CPT | Performed by: INTERNAL MEDICINE

## 2021-12-21 PROCEDURE — 63600175 PHARM REV CODE 636 W HCPCS: Performed by: INTERNAL MEDICINE

## 2021-12-21 PROCEDURE — 27201423 OPTIME MED/SURG SUP & DEVICES STERILE SUPPLY: Performed by: INTERNAL MEDICINE

## 2021-12-21 PROCEDURE — 27800903 OPTIME MED/SURG SUP & DEVICES OTHER IMPLANTS: Performed by: INTERNAL MEDICINE

## 2021-12-21 PROCEDURE — 82962 GLUCOSE BLOOD TEST: CPT

## 2021-12-21 PROCEDURE — 93458 L HRT ARTERY/VENTRICLE ANGIO: CPT | Mod: 26,,, | Performed by: INTERNAL MEDICINE

## 2021-12-21 PROCEDURE — 93458 PR CATH PLACE/CORON ANGIO, IMG SUPER/INTERP,W LEFT HEART VENTRICULOGRAPHY: ICD-10-PCS | Mod: 26,,, | Performed by: INTERNAL MEDICINE

## 2021-12-21 PROCEDURE — C1760 CLOSURE DEV, VASC: HCPCS | Performed by: INTERNAL MEDICINE

## 2021-12-21 PROCEDURE — 25000003 PHARM REV CODE 250: Performed by: INTERNAL MEDICINE

## 2021-12-21 RX ORDER — ONDANSETRON 4 MG/1
8 TABLET, ORALLY DISINTEGRATING ORAL EVERY 8 HOURS PRN
Status: DISCONTINUED | OUTPATIENT
Start: 2021-12-21 | End: 2021-12-21 | Stop reason: HOSPADM

## 2021-12-21 RX ORDER — SODIUM CHLORIDE 450 MG/100ML
125 INJECTION, SOLUTION INTRAVENOUS CONTINUOUS
Status: DISCONTINUED | OUTPATIENT
Start: 2021-12-21 | End: 2021-12-21 | Stop reason: HOSPADM

## 2021-12-21 RX ORDER — HYDRALAZINE HYDROCHLORIDE 20 MG/ML
INJECTION INTRAMUSCULAR; INTRAVENOUS
Status: DISCONTINUED | OUTPATIENT
Start: 2021-12-21 | End: 2021-12-21 | Stop reason: HOSPADM

## 2021-12-21 RX ORDER — MIDAZOLAM HYDROCHLORIDE 1 MG/ML
INJECTION INTRAMUSCULAR; INTRAVENOUS
Status: DISCONTINUED | OUTPATIENT
Start: 2021-12-21 | End: 2021-12-21 | Stop reason: HOSPADM

## 2021-12-21 RX ORDER — FENTANYL CITRATE 50 UG/ML
INJECTION, SOLUTION INTRAMUSCULAR; INTRAVENOUS
Status: DISCONTINUED | OUTPATIENT
Start: 2021-12-21 | End: 2021-12-21 | Stop reason: HOSPADM

## 2021-12-21 RX ORDER — ACETAMINOPHEN 325 MG/1
650 TABLET ORAL EVERY 4 HOURS PRN
Status: DISCONTINUED | OUTPATIENT
Start: 2021-12-21 | End: 2021-12-21 | Stop reason: HOSPADM

## 2021-12-21 RX ORDER — LIDOCAINE HYDROCHLORIDE 10 MG/ML
INJECTION INFILTRATION; PERINEURAL
Status: DISCONTINUED | OUTPATIENT
Start: 2021-12-21 | End: 2021-12-21 | Stop reason: HOSPADM

## 2021-12-21 RX ORDER — SODIUM CHLORIDE 0.9 % (FLUSH) 0.9 %
2 SYRINGE (ML) INJECTION
Status: DISCONTINUED | OUTPATIENT
Start: 2021-12-21 | End: 2021-12-21 | Stop reason: HOSPADM

## 2021-12-21 RX ORDER — SODIUM CHLORIDE 450 MG/100ML
INJECTION, SOLUTION INTRAVENOUS
Status: DISCONTINUED | OUTPATIENT
Start: 2021-12-21 | End: 2021-12-21 | Stop reason: HOSPADM

## 2021-12-23 ENCOUNTER — TELEPHONE (OUTPATIENT)
Dept: MEDSURG UNIT | Facility: HOSPITAL | Age: 46
End: 2021-12-23
Payer: COMMERCIAL

## 2021-12-23 NOTE — TELEPHONE ENCOUNTER
Post Cardiac Cath Follow up      - Were you able to get your prescriptions filled?  - Are you taking your medications as prescribed by your doctor?  - Are you taking any other medications that are not on your discharge list?  - Do you have any questions about your medications?  - Are you aware of your follow up appointments?  - Is there any reason you may not be able to keep your follow up appointment?  - Do you have any questions about the follow up process or any instructions that we have provided?  - Do you know which symptoms to watch for that would indicate you needing to call your doctor right away?    Attempted T/C to patient. No answer. Voice mail box full.

## 2022-01-11 NOTE — PROGRESS NOTES
Cardiology Clinic Note:    PCP: Elisa Mcdaniel MD    REFERRING PHYSICIAN: Elisa Mcdaniel MD    CHIEF COMPLAINT:   No chief complaint on file.       HISTORY OF PRESENT ILLNESS:  Georgiana Zayas is a 47 y.o. female who presents for evaluation of chest pain following LHC, notes her chest pain has resolved, has resumed activites of daily living without provocation of chest pain, pressure or shortness of breath..             Review of Systems   Constitutional: Negative for diaphoresis, malaise/fatigue, night sweats and weight gain.   HENT: Negative for congestion, ear pain, hearing loss, nosebleeds and sore throat.    Eyes: Negative for blurred vision, double vision, pain, photophobia and visual disturbance.   Cardiovascular: Positive for chest pain and palpitations. Negative for claudication, dyspnea on exertion, irregular heartbeat, leg swelling, near-syncope, orthopnea and syncope.   Respiratory: Positive for shortness of breath. Negative for cough, sleep disturbances due to breathing, snoring and wheezing.    Endocrine: Negative for cold intolerance, heat intolerance, polydipsia, polyphagia and polyuria.   Hematologic/Lymphatic: Negative for bleeding problem. Does not bruise/bleed easily.   Skin: Negative for dry skin, flushing, itching, rash and skin cancer.   Musculoskeletal: Positive for back pain. Negative for arthritis, falls, joint pain, muscle cramps, muscle weakness and myalgias.   Gastrointestinal: Negative for abdominal pain, change in bowel habit, constipation, diarrhea, dysphagia, heartburn, nausea and vomiting.   Genitourinary: Negative for bladder incontinence, dysuria, flank pain, frequency and nocturia.   Neurological: Positive for headaches. Negative for dizziness, focal weakness, light-headedness, loss of balance, numbness, paresthesias and seizures.   Psychiatric/Behavioral: Negative for depression, memory loss and substance abuse. The patient is not nervous/anxious.     Allergic/Immunologic: Negative for environmental allergies.          PAST MEDICAL HISTORY:  Past Medical History:   Diagnosis Date    Arthritis     Diabetes mellitus     Hearing difficulty     Hypertension     Liver disease     Sleep apnea        PAST SURGICAL HISTORY:  Past Surgical History:   Procedure Laterality Date    CARPAL TUNNEL RELEASE Bilateral      SECTION      INJECTION OF ANESTHETIC AGENT AROUND MEDIAL BRANCH NERVES INNERVATING LUMBAR FACET JOINT Bilateral 2021    Procedure: BLOCK, NERVE, FACET JOINT, LUMBAR, MEDIAL BRANCH;  Surgeon: Amari Razo MD;  Location: [facility] PAIN MGMT;  Service: Pain Management;  Laterality: Bilateral;  Bilateral L3-S1 Facet Injection    LEFT HEART CATHETERIZATION Left 2021    Procedure: Left heart cath;  Surgeon: Jeremy Rojo DO;  Location: [facility] CATH LAB;  Service: Cardiology;  Laterality: Left;    STAPEDES SURGERY Bilateral     TONSILLECTOMY         SOCIAL HISTORY:  Social History     Socioeconomic History    Marital status: Single   Tobacco Use    Smoking status: Never Smoker    Smokeless tobacco: Never Used   Substance and Sexual Activity    Alcohol use: Not Currently    Drug use: Never    Sexual activity: Yes     Birth control/protection: Condom       FAMILY HISTORY:  Family History   Problem Relation Age of Onset    Cancer Mother     Diabetes Mother     Hypertension Mother        ALLERGIES:  Allergies as of 2022 - Reviewed 2021   Allergen Reaction Noted    Codeine Itching 2021         MEDICATIONS:  Current Outpatient Medications on File Prior to Visit   Medication Sig Dispense Refill    carvediloL (COREG) 3.125 MG tablet Take 3.125 mg by mouth 2 (two) times daily.      diazePAM (VALIUM) 5 MG tablet Take 5 mg by mouth every 12 (twelve) hours as needed.       DULoxetine (CYMBALTA) 60 MG capsule Take 1 capsule (60 mg total) by mouth 2 (two) times daily. 180 capsule 1    gabapentin (NEURONTIN)  400 MG capsule Take 1 capsule (400 mg total) by mouth 3 (three) times daily. 270 capsule 0    glipiZIDE (GLUCOTROL) 10 MG tablet Take 1 tablet (10 mg total) by mouth 2 (two) times daily with meals. 180 tablet 1    hydrOXYzine pamoate (VISTARIL) 25 MG Cap 25 mg 3 (three) times daily.       insulin (LANTUS SOLOSTAR U-100 INSULIN) glargine 100 units/mL (3mL) SubQ pen Inject 15 Units into the skin every evening.      lisinopriL (PRINIVIL,ZESTRIL) 5 MG tablet Take 1 tablet (5 mg total) by mouth once daily. (Patient taking differently: Take 2.5 mg by mouth once daily.) 90 tablet 1    naloxegoL (MOVANTIK) 25 mg tablet Take 25 mg by mouth once daily. 30 tablet 11    nitroGLYCERIN (NITROSTAT) 0.4 MG SL tablet Place 0.4 mg under the tongue every 5 (five) minutes as needed for Chest pain.      oxyCODONE-acetaminophen (PERCOCET)  mg per tablet Take 1 tablet by mouth every 6 (six) hours as needed.       pantoprazole (PROTONIX) 40 MG tablet Take 1 tablet (40 mg total) by mouth once daily. 90 tablet 1    risperiDONE (RISPERDAL) 3 MG Tab 3 mg once daily.       semaglutide (OZEMPIC) 0.25 mg or 0.5 mg(2 mg/1.5 mL) pen injector Inject 0.5 mg into the skin every 7 days.      tiZANidine (ZANAFLEX) 4 MG tablet Take 4 mg by mouth 4 (four) times daily.       traZODone (DESYREL) 100 MG tablet Take 100 mg by mouth every evening.        No current facility-administered medications on file prior to visit.          PHYSICAL EXAM:  Last menstrual period 12/21/2021.  Wt Readings from Last 3 Encounters:   12/21/21 94.8 kg (209 lb)   12/13/21 94.3 kg (208 lb)   12/10/21 92.5 kg (204 lb)      There is no height or weight on file to calculate BMI.    Physical Exam  Vitals and nursing note reviewed.   Constitutional:       Appearance: Normal appearance. She is normal weight.   HENT:      Head: Normocephalic and atraumatic.      Right Ear: External ear normal.      Left Ear: External ear normal.   Eyes:      General: No scleral  icterus.        Right eye: No discharge.         Left eye: No discharge.      Extraocular Movements: Extraocular movements intact.      Conjunctiva/sclera: Conjunctivae normal.      Pupils: Pupils are equal, round, and reactive to light.   Cardiovascular:      Rate and Rhythm: Normal rate and regular rhythm.      Pulses: Normal pulses.      Heart sounds: Normal heart sounds. No murmur heard.  No friction rub. No gallop.    Pulmonary:      Effort: Pulmonary effort is normal.      Breath sounds: Normal breath sounds. No wheezing, rhonchi or rales.   Chest:      Chest wall: No tenderness.   Abdominal:      General: Abdomen is flat. Bowel sounds are normal. There is no distension.      Palpations: Abdomen is soft.      Tenderness: There is no abdominal tenderness. There is no guarding or rebound.   Musculoskeletal:         General: No swelling or tenderness. Normal range of motion.      Cervical back: Normal range of motion and neck supple.   Skin:     General: Skin is warm and dry.      Findings: No erythema or rash.   Neurological:      General: No focal deficit present.      Mental Status: She is alert and oriented to person, place, and time.      Cranial Nerves: No cranial nerve deficit.      Motor: No weakness.      Gait: Gait normal.   Psychiatric:         Mood and Affect: Mood normal.         Behavior: Behavior normal.         Thought Content: Thought content normal.         Judgment: Judgment normal.          LABS REVIEWED:  Lab Results   Component Value Date    WBC 4.75 12/13/2021    RBC 4.47 12/13/2021    HGB 12.3 12/13/2021    HCT 39.2 12/13/2021    MCV 87.7 12/13/2021    MCH 27.5 12/13/2021    MCHC 31.4 (L) 12/13/2021    RDW 13.3 12/13/2021     12/13/2021    MPV 10.8 12/13/2021    NRBC 0.0 12/13/2021     Lab Results   Component Value Date     12/13/2021    K 4.5 12/13/2021     12/13/2021    CO2 30 12/13/2021    BUN 10 12/13/2021     Lab Results   Component Value Date    AST 12 (L)  2021    ALT 23 2021     Lab Results   Component Value Date     (H) 2021    HGBA1C 8.1 (H) 2021     Lab Results   Component Value Date    CHOL 151 2021    HDL 54 2021    TRIG 62 2021    CHOLHDL 2.8 2021       CARDIAC STUDIES REVIEWED:  EK21- Normal sinus rhythm, sinus arrhythmia  Stress test 12/10/21: Moderate size anteroseptal partially reperfusing defect consistent with scar and otoniel-infarct ischemia.   ECHO 21:  The left ventricle is normal in size with concentric remodeling and normal systolic function. EF 55%.                                Right ventricle not well visualized due to poor acoustic window. Right atrium is normal size.   LHC 21 -  non-obstructive coronary artery disease..      OTHER IMAGING STUDIES REVIEWED:    ASSESSMENT:   There are no diagnoses linked to this encounter.  PLAN:   1.  Chest pain, LHC showed nonobstructive CAD.         on lisinopril to 2.5 mg daily.   Coreg 3.125 mg BID. Nitroglycerin 0.4 mg 1 qs for chest pain.  aspirin 81 mg daily, will discontinue NTG..    3.  Hypertension, controlled on current meds.   4.  Diabetes mellitus, uncontrolled, A1c 8.1 21, discussed healthy lifestyle changes.   5.  GERD, controlled   6.  Morbid obesity, dicussed lifestyle changes.

## 2022-01-12 ENCOUNTER — HOSPITAL ENCOUNTER (OUTPATIENT)
Dept: RADIOLOGY | Facility: HOSPITAL | Age: 47
Discharge: HOME | End: 2022-01-12
Attending: INTERNAL MEDICINE
Payer: COMMERCIAL

## 2022-01-12 ENCOUNTER — OFFICE VISIT (OUTPATIENT)
Dept: CARDIOLOGY | Facility: CLINIC | Age: 47
End: 2022-01-12
Payer: COMMERCIAL

## 2022-01-12 VITALS
WEIGHT: 198 LBS | SYSTOLIC BLOOD PRESSURE: 126 MMHG | DIASTOLIC BLOOD PRESSURE: 90 MMHG | BODY MASS INDEX: 33.8 KG/M2 | HEIGHT: 64 IN | HEART RATE: 98 BPM | OXYGEN SATURATION: 98 %

## 2022-01-12 DIAGNOSIS — K21.9 GASTROESOPHAGEAL REFLUX DISEASE, UNSPECIFIED WHETHER ESOPHAGITIS PRESENT: ICD-10-CM

## 2022-01-12 DIAGNOSIS — I10 ESSENTIAL HYPERTENSION: ICD-10-CM

## 2022-01-12 DIAGNOSIS — R07.9 CHEST PAIN, UNSPECIFIED TYPE: ICD-10-CM

## 2022-01-12 DIAGNOSIS — I72.4 FEMORAL ARTERY PSEUDO-ANEURYSM, RIGHT: Primary | ICD-10-CM

## 2022-01-12 DIAGNOSIS — E11.9 DIABETES MELLITUS WITHOUT COMPLICATION: ICD-10-CM

## 2022-01-12 DIAGNOSIS — E66.01 MORBID OBESITY: ICD-10-CM

## 2022-01-12 DIAGNOSIS — I72.4 FEMORAL ARTERY PSEUDO-ANEURYSM, RIGHT: ICD-10-CM

## 2022-01-12 PROCEDURE — 99214 OFFICE O/P EST MOD 30 MIN: CPT | Mod: S$PBB,,, | Performed by: INTERNAL MEDICINE

## 2022-01-12 PROCEDURE — 99214 PR OFFICE/OUTPT VISIT, EST, LEVL IV, 30-39 MIN: ICD-10-PCS | Mod: S$PBB,,, | Performed by: INTERNAL MEDICINE

## 2022-01-12 PROCEDURE — 93926 LOWER EXTREMITY STUDY: CPT | Mod: TC,RT

## 2022-01-12 PROCEDURE — 93926 US PSEUDOANEURYSM EVALUATION RIGHT: ICD-10-PCS | Mod: 26,RT,, | Performed by: RADIOLOGY

## 2022-01-12 PROCEDURE — 99215 OFFICE O/P EST HI 40 MIN: CPT | Mod: PBBFAC,25 | Performed by: INTERNAL MEDICINE

## 2022-01-12 PROCEDURE — 93926 LOWER EXTREMITY STUDY: CPT | Mod: 26,RT,, | Performed by: RADIOLOGY

## 2022-01-14 ENCOUNTER — TELEPHONE (OUTPATIENT)
Dept: CARDIOLOGY | Facility: CLINIC | Age: 47
End: 2022-01-14
Payer: COMMERCIAL

## 2022-01-17 PROBLEM — E66.01 MORBID OBESITY: Status: ACTIVE | Noted: 2022-01-17

## 2022-01-17 PROBLEM — I72.4 FEMORAL ARTERY PSEUDO-ANEURYSM, RIGHT: Status: ACTIVE | Noted: 2022-01-17

## 2022-01-17 PROBLEM — E11.9 DIABETES MELLITUS WITHOUT COMPLICATION: Status: ACTIVE | Noted: 2022-01-17

## 2022-02-03 ENCOUNTER — OFFICE VISIT (OUTPATIENT)
Dept: FAMILY MEDICINE | Facility: CLINIC | Age: 47
End: 2022-02-03
Payer: COMMERCIAL

## 2022-02-03 VITALS
DIASTOLIC BLOOD PRESSURE: 76 MMHG | HEART RATE: 107 BPM | RESPIRATION RATE: 20 BRPM | TEMPERATURE: 97 F | OXYGEN SATURATION: 99 % | SYSTOLIC BLOOD PRESSURE: 110 MMHG | BODY MASS INDEX: 34.62 KG/M2 | HEIGHT: 64 IN | WEIGHT: 202.81 LBS

## 2022-02-03 DIAGNOSIS — F41.9 ANXIETY: ICD-10-CM

## 2022-02-03 DIAGNOSIS — E11.40 TYPE 2 DIABETES MELLITUS WITH DIABETIC NEUROPATHY, WITH LONG-TERM CURRENT USE OF INSULIN: Primary | ICD-10-CM

## 2022-02-03 DIAGNOSIS — M79.674 GREAT TOE PAIN, RIGHT: ICD-10-CM

## 2022-02-03 DIAGNOSIS — M62.838 MUSCLE SPASM: ICD-10-CM

## 2022-02-03 DIAGNOSIS — F32.89 OTHER DEPRESSION: ICD-10-CM

## 2022-02-03 DIAGNOSIS — K59.09 CHRONIC CONSTIPATION: ICD-10-CM

## 2022-02-03 DIAGNOSIS — I10 ESSENTIAL HYPERTENSION: ICD-10-CM

## 2022-02-03 DIAGNOSIS — K21.9 GASTROESOPHAGEAL REFLUX DISEASE WITHOUT ESOPHAGITIS: ICD-10-CM

## 2022-02-03 DIAGNOSIS — Z23 IMMUNIZATION DUE: ICD-10-CM

## 2022-02-03 DIAGNOSIS — I25.10 CORONARY ARTERY DISEASE INVOLVING NATIVE CORONARY ARTERY OF NATIVE HEART, UNSPECIFIED WHETHER ANGINA PRESENT: ICD-10-CM

## 2022-02-03 DIAGNOSIS — Z79.4 TYPE 2 DIABETES MELLITUS WITH DIABETIC NEUROPATHY, WITH LONG-TERM CURRENT USE OF INSULIN: Primary | ICD-10-CM

## 2022-02-03 DIAGNOSIS — M54.31 SCIATICA OF RIGHT SIDE: ICD-10-CM

## 2022-02-03 LAB
ALBUMIN SERPL BCP-MCNC: 3.5 G/DL (ref 3.5–5)
ALBUMIN/GLOB SERPL: 0.9 {RATIO}
ALP SERPL-CCNC: 68 U/L (ref 39–100)
ALT SERPL W P-5'-P-CCNC: 25 U/L (ref 13–56)
ANION GAP SERPL CALCULATED.3IONS-SCNC: 8 MMOL/L (ref 7–16)
AST SERPL W P-5'-P-CCNC: 15 U/L (ref 15–37)
BASOPHILS # BLD AUTO: 0.06 K/UL (ref 0–0.2)
BASOPHILS NFR BLD AUTO: 1.2 % (ref 0–1)
BILIRUB SERPL-MCNC: 0.6 MG/DL (ref 0–1.2)
BUN SERPL-MCNC: 17 MG/DL (ref 7–18)
BUN/CREAT SERPL: 19 (ref 6–20)
CALCIUM SERPL-MCNC: 9 MG/DL (ref 8.5–10.1)
CHLORIDE SERPL-SCNC: 98 MMOL/L (ref 98–107)
CHOLEST SERPL-MCNC: 152 MG/DL (ref 0–200)
CHOLEST/HDLC SERPL: 3.2 {RATIO}
CO2 SERPL-SCNC: 30 MMOL/L (ref 21–32)
CREAT SERPL-MCNC: 0.89 MG/DL (ref 0.55–1.02)
CREAT UR-MCNC: 283 MG/DL (ref 28–219)
DIFFERENTIAL METHOD BLD: ABNORMAL
EOSINOPHIL # BLD AUTO: 0.15 K/UL (ref 0–0.5)
EOSINOPHIL NFR BLD AUTO: 3.1 % (ref 1–4)
ERYTHROCYTE [DISTWIDTH] IN BLOOD BY AUTOMATED COUNT: 14.1 % (ref 11.5–14.5)
EST. AVERAGE GLUCOSE BLD GHB EST-MCNC: 257 MG/DL
GLOBULIN SER-MCNC: 3.9 G/DL (ref 2–4)
GLUCOSE SERPL-MCNC: 253 MG/DL (ref 74–106)
HBA1C MFR BLD HPLC: 10.3 % (ref 4.5–6.6)
HCT VFR BLD AUTO: 38.4 % (ref 38–47)
HDLC SERPL-MCNC: 47 MG/DL (ref 40–60)
HGB BLD-MCNC: 12.1 G/DL (ref 12–16)
IMM GRANULOCYTES # BLD AUTO: 0.01 K/UL (ref 0–0.04)
IMM GRANULOCYTES NFR BLD: 0.2 % (ref 0–0.4)
LDLC SERPL CALC-MCNC: 91 MG/DL
LDLC/HDLC SERPL: 1.9 {RATIO}
LYMPHOCYTES # BLD AUTO: 2.08 K/UL (ref 1–4.8)
LYMPHOCYTES NFR BLD AUTO: 42.8 % (ref 27–41)
MCH RBC QN AUTO: 27 PG (ref 27–31)
MCHC RBC AUTO-ENTMCNC: 31.5 G/DL (ref 32–36)
MCV RBC AUTO: 85.7 FL (ref 80–96)
MICROALBUMIN UR-MCNC: 2.5 MG/DL (ref 0–2.8)
MICROALBUMIN/CREAT RATIO PNL UR: 8.8 MG/G (ref 0–30)
MONOCYTES # BLD AUTO: 0.52 K/UL (ref 0–0.8)
MONOCYTES NFR BLD AUTO: 10.7 % (ref 2–6)
MPC BLD CALC-MCNC: 10.7 FL (ref 9.4–12.4)
NEUTROPHILS # BLD AUTO: 2.04 K/UL (ref 1.8–7.7)
NEUTROPHILS NFR BLD AUTO: 42 % (ref 53–65)
NONHDLC SERPL-MCNC: 105 MG/DL
NRBC # BLD AUTO: 0 X10E3/UL
NRBC, AUTO (.00): 0 %
PLATELET # BLD AUTO: 336 K/UL (ref 150–400)
POTASSIUM SERPL-SCNC: 4.2 MMOL/L (ref 3.5–5.1)
PROT SERPL-MCNC: 7.4 G/DL (ref 6.4–8.2)
RBC # BLD AUTO: 4.48 M/UL (ref 4.2–5.4)
SODIUM SERPL-SCNC: 132 MMOL/L (ref 136–145)
TRIGL SERPL-MCNC: 72 MG/DL (ref 35–150)
URATE SERPL-MCNC: 4.2 MG/DL (ref 2.6–6)
VLDLC SERPL-MCNC: 14 MG/DL
WBC # BLD AUTO: 4.86 K/UL (ref 4.5–11)

## 2022-02-03 PROCEDURE — 83036 HEMOGLOBIN A1C: ICD-10-PCS | Mod: ,,, | Performed by: CLINICAL MEDICAL LABORATORY

## 2022-02-03 PROCEDURE — 90471 IMMUNIZATION ADMIN: CPT | Mod: 59,,, | Performed by: FAMILY MEDICINE

## 2022-02-03 PROCEDURE — 80061 LIPID PANEL: ICD-10-PCS | Mod: ,,, | Performed by: CLINICAL MEDICAL LABORATORY

## 2022-02-03 PROCEDURE — 96372 THER/PROPH/DIAG INJ SC/IM: CPT | Mod: ,,, | Performed by: FAMILY MEDICINE

## 2022-02-03 PROCEDURE — 84550 URIC ACID: ICD-10-PCS | Mod: ,,, | Performed by: CLINICAL MEDICAL LABORATORY

## 2022-02-03 PROCEDURE — 99214 OFFICE O/P EST MOD 30 MIN: CPT | Mod: 25,,, | Performed by: FAMILY MEDICINE

## 2022-02-03 PROCEDURE — 82043 UR ALBUMIN QUANTITATIVE: CPT | Mod: ,,, | Performed by: CLINICAL MEDICAL LABORATORY

## 2022-02-03 PROCEDURE — 96372 PR INJECTION,THERAP/PROPH/DIAG2ST, IM OR SUBCUT: ICD-10-PCS | Mod: ,,, | Performed by: FAMILY MEDICINE

## 2022-02-03 PROCEDURE — 80053 COMPREHENSIVE METABOLIC PANEL: ICD-10-PCS | Mod: ,,, | Performed by: CLINICAL MEDICAL LABORATORY

## 2022-02-03 PROCEDURE — 90471 PNEUMOCOCCAL CONJUGATE VACCINE 13-VALENT LESS THAN 5YO & GREATER THAN: ICD-10-PCS | Mod: 59,,, | Performed by: FAMILY MEDICINE

## 2022-02-03 PROCEDURE — 80061 LIPID PANEL: CPT | Mod: ,,, | Performed by: CLINICAL MEDICAL LABORATORY

## 2022-02-03 PROCEDURE — 80053 COMPREHEN METABOLIC PANEL: CPT | Mod: ,,, | Performed by: CLINICAL MEDICAL LABORATORY

## 2022-02-03 PROCEDURE — 90670 PCV13 VACCINE IM: CPT | Mod: ,,, | Performed by: FAMILY MEDICINE

## 2022-02-03 PROCEDURE — 85025 CBC WITH DIFFERENTIAL: ICD-10-PCS | Mod: ,,, | Performed by: CLINICAL MEDICAL LABORATORY

## 2022-02-03 PROCEDURE — 85025 COMPLETE CBC W/AUTO DIFF WBC: CPT | Mod: ,,, | Performed by: CLINICAL MEDICAL LABORATORY

## 2022-02-03 PROCEDURE — 82043 MICROALBUMIN / CREATININE RATIO URINE: ICD-10-PCS | Mod: ,,, | Performed by: CLINICAL MEDICAL LABORATORY

## 2022-02-03 PROCEDURE — 99214 PR OFFICE/OUTPT VISIT, EST, LEVL IV, 30-39 MIN: ICD-10-PCS | Mod: 25,,, | Performed by: FAMILY MEDICINE

## 2022-02-03 PROCEDURE — 82570 MICROALBUMIN / CREATININE RATIO URINE: ICD-10-PCS | Mod: ,,, | Performed by: CLINICAL MEDICAL LABORATORY

## 2022-02-03 PROCEDURE — 90670 PNEUMOCOCCAL CONJUGATE VACCINE 13-VALENT LESS THAN 5YO & GREATER THAN: ICD-10-PCS | Mod: ,,, | Performed by: FAMILY MEDICINE

## 2022-02-03 PROCEDURE — 83036 HEMOGLOBIN GLYCOSYLATED A1C: CPT | Mod: ,,, | Performed by: CLINICAL MEDICAL LABORATORY

## 2022-02-03 PROCEDURE — 84550 ASSAY OF BLOOD/URIC ACID: CPT | Mod: ,,, | Performed by: CLINICAL MEDICAL LABORATORY

## 2022-02-03 PROCEDURE — 82570 ASSAY OF URINE CREATININE: CPT | Mod: ,,, | Performed by: CLINICAL MEDICAL LABORATORY

## 2022-02-03 RX ORDER — TIZANIDINE 4 MG/1
4 TABLET ORAL 4 TIMES DAILY
Qty: 360 TABLET | Refills: 1 | Status: SHIPPED | OUTPATIENT
Start: 2022-02-03 | End: 2022-04-26 | Stop reason: ALTCHOICE

## 2022-02-03 RX ORDER — PANTOPRAZOLE SODIUM 40 MG/1
40 TABLET, DELAYED RELEASE ORAL 2 TIMES DAILY
Qty: 180 TABLET | Refills: 1 | Status: SHIPPED | OUTPATIENT
Start: 2022-02-03 | End: 2022-04-11

## 2022-02-03 RX ORDER — NALOXEGOL OXALATE 25 MG/1
25 TABLET, FILM COATED ORAL DAILY
Qty: 90 TABLET | Refills: 1 | Status: SHIPPED | OUTPATIENT
Start: 2022-02-03 | End: 2022-02-22 | Stop reason: CLARIF

## 2022-02-03 RX ORDER — GLIPIZIDE 10 MG/1
10 TABLET ORAL 2 TIMES DAILY WITH MEALS
Qty: 180 TABLET | Refills: 1 | Status: SHIPPED | OUTPATIENT
Start: 2022-02-03 | End: 2022-07-07 | Stop reason: SDUPTHER

## 2022-02-03 RX ORDER — CARVEDILOL 3.12 MG/1
3.12 TABLET ORAL 2 TIMES DAILY
Qty: 180 TABLET | Refills: 1 | Status: SHIPPED | OUTPATIENT
Start: 2022-02-03 | End: 2022-07-07 | Stop reason: SDUPTHER

## 2022-02-03 RX ORDER — INSULIN GLARGINE 100 [IU]/ML
15 INJECTION, SOLUTION SUBCUTANEOUS NIGHTLY
Qty: 15 ML | Refills: 1 | Status: SHIPPED | OUTPATIENT
Start: 2022-02-03 | End: 2022-02-07 | Stop reason: SDUPTHER

## 2022-02-03 RX ORDER — SEMAGLUTIDE 1.34 MG/ML
0.5 INJECTION, SOLUTION SUBCUTANEOUS
Qty: 3 PEN | Refills: 1 | Status: SHIPPED | OUTPATIENT
Start: 2022-02-03 | End: 2022-02-07 | Stop reason: DRUGHIGH

## 2022-02-03 RX ORDER — HYDROXYZINE PAMOATE 25 MG/1
25 CAPSULE ORAL 3 TIMES DAILY
Qty: 270 CAPSULE | Refills: 1 | Status: SHIPPED | OUTPATIENT
Start: 2022-02-03 | End: 2023-01-19 | Stop reason: SDUPTHER

## 2022-02-03 RX ORDER — LISINOPRIL 2.5 MG/1
2.5 TABLET ORAL DAILY
Qty: 90 TABLET | Refills: 1 | Status: SHIPPED | OUTPATIENT
Start: 2022-02-03 | End: 2022-07-07 | Stop reason: SDUPTHER

## 2022-02-03 RX ORDER — DULOXETIN HYDROCHLORIDE 60 MG/1
60 CAPSULE, DELAYED RELEASE ORAL 2 TIMES DAILY
Qty: 180 CAPSULE | Refills: 1 | Status: SHIPPED | OUTPATIENT
Start: 2022-02-03 | End: 2022-07-07 | Stop reason: SDUPTHER

## 2022-02-03 RX ORDER — TRAZODONE HYDROCHLORIDE 100 MG/1
100 TABLET ORAL NIGHTLY
Qty: 90 TABLET | Refills: 1 | Status: SHIPPED | OUTPATIENT
Start: 2022-02-03 | End: 2023-01-19 | Stop reason: SDUPTHER

## 2022-02-03 RX ORDER — RISPERIDONE 3 MG/1
3 TABLET ORAL DAILY
Qty: 90 TABLET | Refills: 1 | Status: SHIPPED | OUTPATIENT
Start: 2022-02-03 | End: 2023-01-19 | Stop reason: SDUPTHER

## 2022-02-03 RX ORDER — KETOROLAC TROMETHAMINE 30 MG/ML
30 INJECTION, SOLUTION INTRAMUSCULAR; INTRAVENOUS
Status: COMPLETED | OUTPATIENT
Start: 2022-02-03 | End: 2022-02-03

## 2022-02-03 RX ORDER — GABAPENTIN 400 MG/1
400 CAPSULE ORAL 3 TIMES DAILY
Qty: 270 CAPSULE | Refills: 1 | Status: SHIPPED | OUTPATIENT
Start: 2022-02-03 | End: 2022-04-26 | Stop reason: DRUGHIGH

## 2022-02-03 RX ORDER — POLYETHYLENE GLYCOL 3350 17 G/17G
POWDER, FOR SOLUTION ORAL
Qty: 1530 G | Refills: 3 | Status: SHIPPED | OUTPATIENT
Start: 2022-02-03 | End: 2022-04-26 | Stop reason: CLARIF

## 2022-02-03 RX ORDER — LISINOPRIL 2.5 MG/1
2.5 TABLET ORAL DAILY
COMMUNITY
Start: 2022-01-07 | End: 2022-02-03 | Stop reason: DRUGHIGH

## 2022-02-03 RX ADMIN — KETOROLAC TROMETHAMINE 30 MG: 30 INJECTION, SOLUTION INTRAMUSCULAR; INTRAVENOUS at 08:02

## 2022-02-03 NOTE — PROGRESS NOTES
VIS given. Pt waited 15 minutes after receiving  immunization. No reaction noted at time of discharge.  Pt tolerated well.

## 2022-02-03 NOTE — PROGRESS NOTES
Clinic Note    Patient Name: Georgiana Zayas  : 1975  MRN: [id number]    HPI:    Chief Complaint   Patient presents with    Medication Refill    Follow-up    Leg Pain     Right leg pain- burning/stinging (7 on 0-10 scale)     Ms. Georgiana Zayas is a 47 y.o. female who present to clinic today with CC of follow up on chronic disease processes including Type II DM, HTN, MATT, migraines, neuroapthy, GERD, anxiety, and chronic pain.  Patient reports chronic issues are well controlled on current medication regimen. Reports blood glucose was elevated yesterday but states that she ate something she should not have eaten.  Patient denies any problems or side effects with medications.  Patient reports RLE pain - burning and stinging. She follows with Pain Management (Dr. Bonner/Nola Kenny, [facility]). Reports, however, she has not been to see them recently.   Patient reports she recently seen her Cardiologist (Dr. Rojo) at [place]. Reports he changed up some of her medications.  Patient also reports a lot of issues with acid reflux. She requests referral to GI. She has never had an EGD.  Patient reports pain in R great toe. Reports her brother has gout, and she would like to make sure she does not have this issue.  Patient is, otherwise, without complaints.     Medications:  Current Outpatient Medications on File Prior to Visit   Medication Sig Dispense Refill    carvediloL (COREG) 3.125 MG tablet Take 3.125 mg by mouth 2 (two) times daily.      diazePAM (VALIUM) 5 MG tablet Take 5 mg by mouth every 12 (twelve) hours as needed.       DULoxetine (CYMBALTA) 60 MG capsule Take 1 capsule (60 mg total) by mouth 2 (two) times daily. 180 capsule 1    gabapentin (NEURONTIN) 400 MG capsule Take 1 capsule (400 mg total) by mouth 3 (three) times daily. 270 capsule 0    glipiZIDE (GLUCOTROL) 10 MG tablet Take 1 tablet (10 mg total) by mouth 2 (two) times daily with meals. 180 tablet 1    hydrOXYzine pamoate (VISTARIL) 25 MG  Cap 25 mg 3 (three) times daily.       insulin (LANTUS SOLOSTAR U-100 INSULIN) glargine 100 units/mL (3mL) SubQ pen Inject 15 Units into the skin every evening.      lisinopriL (PRINIVIL,ZESTRIL) 2.5 MG tablet Take 2.5 mg by mouth once daily.      naloxegoL (MOVANTIK) 25 mg tablet Take 25 mg by mouth once daily. 30 tablet 11    nitroGLYCERIN (NITROSTAT) 0.4 MG SL tablet Place 0.4 mg under the tongue every 5 (five) minutes as needed for Chest pain.      oxyCODONE-acetaminophen (PERCOCET)  mg per tablet Take 1 tablet by mouth every 6 (six) hours as needed.       pantoprazole (PROTONIX) 40 MG tablet Take 1 tablet (40 mg total) by mouth once daily. 90 tablet 1    risperiDONE (RISPERDAL) 3 MG Tab 3 mg once daily.       semaglutide (OZEMPIC) 0.25 mg or 0.5 mg(2 mg/1.5 mL) pen injector Inject 0.5 mg into the skin every 7 days.      tiZANidine (ZANAFLEX) 4 MG tablet Take 4 mg by mouth 4 (four) times daily.       traZODone (DESYREL) 100 MG tablet Take 100 mg by mouth every evening.       [DISCONTINUED] lisinopriL (PRINIVIL,ZESTRIL) 5 MG tablet Take 1 tablet (5 mg total) by mouth once daily. (Patient not taking: Reported on 2/3/2022) 90 tablet 1     No current facility-administered medications on file prior to visit.         Allergies: Codeine      Past Medical History:    Past Medical History:   Diagnosis Date    Arthritis     Diabetes mellitus     Hearing difficulty     Hypertension     Liver disease     Sleep apnea        Past Surgical History:    Past Surgical History:   Procedure Laterality Date    CARPAL TUNNEL RELEASE Bilateral      SECTION      INJECTION OF ANESTHETIC AGENT AROUND MEDIAL BRANCH NERVES INNERVATING LUMBAR FACET JOINT Bilateral 2021    Procedure: BLOCK, NERVE, FACET JOINT, LUMBAR, MEDIAL BRANCH;  Surgeon: Amari Razo MD;  Location: [facility];  Service: Pain Management;  Laterality: Bilateral;  Bilateral L3-S1 Facet Injection    LEFT HEART  "CATHETERIZATION Left 12/21/2021    Procedure: Left heart cath;  Surgeon: Jeremy Rojo DO;  Location: [facility] CATH LAB;  Service: Cardiology;  Laterality: Left;    STAPEDES SURGERY Bilateral     TONSILLECTOMY           Social History:    Social History     Tobacco Use   Smoking Status Never Smoker   Smokeless Tobacco Never Used     Social History     Substance and Sexual Activity   Alcohol Use Not Currently     Social History     Substance and Sexual Activity   Drug Use Never         Family History:    Family History   Problem Relation Age of Onset    Cancer Mother     Diabetes Mother     Hypertension Mother        Review of Systems:    Review of Systems   Constitutional: Positive for fatigue. Negative for appetite change, chills, fever and unexpected weight change.   Eyes: Positive for visual disturbance.   Respiratory: Negative for cough and shortness of breath.    Cardiovascular: Negative for chest pain and leg swelling.   Gastrointestinal: Positive for constipation. Negative for abdominal pain, change in bowel habit, diarrhea, nausea, vomiting and change in bowel habit.        Reports chronic, intermittent issues with constipation - reports she was previously prescribed linzess but could not tolerate it because it caused cramping and diarrhea; reports chronic issues with acid reflux   Musculoskeletal: Positive for arthralgias.   Integumentary:  Negative for rash.   Neurological: Negative for dizziness and headaches.        + neuropathy/nerve pain  Reports chronic, intermittent issues with HA - denies HA currently   Psychiatric/Behavioral: The patient is not nervous/anxious.         Vitals:    Vitals:    02/03/22 0750   BP: 110/76   BP Location: Left arm   Patient Position: Sitting   BP Method: Large (Manual)   Pulse: 107   Resp: 20   Temp: 97.4 °F (36.3 °C)   TempSrc: Temporal   SpO2: 99%   Weight: 92 kg (202 lb 12.8 oz)   Height: 5' 4" (1.626 m)          Physical Exam:    Physical Exam  Constitutional: "       General: She is not in acute distress.     Appearance: Normal appearance.   HENT:      Nose: Nose normal.      Mouth/Throat:      Mouth: Mucous membranes are moist.      Pharynx: Oropharynx is clear.   Eyes:      Conjunctiva/sclera: Conjunctivae normal.   Cardiovascular:      Rate and Rhythm: Normal rate and regular rhythm.      Heart sounds: Normal heart sounds. No murmur heard.      Pulmonary:      Effort: Pulmonary effort is normal. No respiratory distress.      Breath sounds: Normal breath sounds. No wheezing, rhonchi or rales.   Abdominal:      General: Bowel sounds are normal.      Palpations: Abdomen is soft.      Tenderness: There is no abdominal tenderness.   Musculoskeletal:         General: No swelling, tenderness, deformity or signs of injury. Normal range of motion.      Cervical back: Neck supple.      Right lower leg: No edema.      Left lower leg: No edema.      Right foot: Normal range of motion. No deformity, bunion, Charcot foot, foot drop or prominent metatarsal heads.      Left foot: Normal range of motion. No deformity, bunion, Charcot foot, foot drop or prominent metatarsal heads.   Feet:      Right foot:      Protective Sensation: 10 sites tested. 10 sites sensed.      Skin integrity: No ulcer, blister, skin breakdown, erythema, warmth, callus, dry skin or fissure.      Toenail Condition: Right toenails are normal.      Left foot:      Protective Sensation: 10 sites tested. 10 sites sensed.      Skin integrity: No ulcer, blister, skin breakdown, erythema, warmth, callus, dry skin or fissure.      Toenail Condition: Left toenails are normal.   Skin:     Findings: No rash.   Neurological:      General: No focal deficit present.      Mental Status: She is alert. Mental status is at baseline.   Psychiatric:         Mood and Affect: Mood normal.     Protective Sensation (w/ 10 gram monofilament):  Right: Intact  Left: Intact    Visual Inspection:  Normal -  Bilateral    Pedal Pulses:    Right: Present  Left: Present    Posterior tibialis:   Right:Present  Left: Present        Assessment/Plan:   Type 2 diabetes mellitus with diabetic neuropathy, with long-term current use of insulin  -     DULoxetine (CYMBALTA) 60 MG capsule; Take 1 capsule (60 mg total) by mouth 2 (two) times daily.  Dispense: 180 capsule; Refill: 1  -     gabapentin (NEURONTIN) 400 MG capsule; Take 1 capsule (400 mg total) by mouth 3 (three) times daily.  Dispense: 270 capsule; Refill: 1  -     glipiZIDE (GLUCOTROL) 10 MG tablet; Take 1 tablet (10 mg total) by mouth 2 (two) times daily with meals.  Dispense: 180 tablet; Refill: 1  -     insulin (LANTUS SOLOSTAR U-100 INSULIN) glargine 100 units/mL (3mL) SubQ pen; Inject 15 Units into the skin every evening.  Dispense: 15 mL; Refill: 1  -     semaglutide (OZEMPIC) 0.25 mg or 0.5 mg(2 mg/1.5 mL) pen injector; Inject 0.5 mg into the skin every 7 days.  Dispense: 3 pen; Refill: 1  -     Comprehensive Metabolic Panel; Future; Expected date: 02/03/2022  -     CBC Auto Differential; Future; Expected date: 02/03/2022  -     Lipid Panel; Future; Expected date: 02/03/2022  -     Hemoglobin A1C; Future; Expected date: 02/03/2022  -     Microalbumin/Creatinine Ratio, Urine    Gastroesophageal reflux disease without esophagitis   -     pantoprazole (PROTONIX) 40 MG tablet; Take 1 tablet (40 mg total) by mouth 2 (two) times daily.  Dispense: 180 tablet; Refill: 1 - increase to BID from daily for now. GI referral placed.  -     Ambulatory referral/consult to Gastroenterology; Future; Expected date: 02/10/2022    Great toe pain, right  -     Uric Acid; Future; Expected date: 02/03/2022    Essential hypertension  -     carvediloL (COREG) 3.125 MG tablet; Take 1 tablet (3.125 mg total) by mouth 2 (two) times daily.  Dispense: 180 tablet; Refill: 1  -     lisinopriL (PRINIVIL,ZESTRIL) 2.5 MG tablet; Take 1 tablet (2.5 mg total) by mouth once daily.  Dispense: 90 tablet; Refill: 1  -      Comprehensive Metabolic Panel; Future; Expected date: 02/03/2022  -     CBC Auto Differential; Future; Expected date: 02/03/2022  -     Lipid Panel; Future; Expected date: 02/03/2022  -     Microalbumin/Creatinine Ratio, Urine    Coronary artery disease involving native coronary artery of native heart, unspecified whether angina present       - Continue current medications and plan. Follow up with Cardiology (Dr. Rojo) as scheduled.    Sciatica of right side  -     ketorolac injection 30 mg    Muscle spasm  -     tiZANidine (ZANAFLEX) 4 MG tablet; Take 1 tablet (4 mg total) by mouth 4 (four) times daily.  Dispense: 360 tablet; Refill: 1    Chronic constipation  -     naloxegoL (MOVANTIK) 25 mg tablet; Take 25 mg by mouth once daily.  Dispense: 90 tablet; Refill: 1  -     Ambulatory referral/consult to Gastroenterology; Future; Expected date: 02/10/2022  -     polyethylene glycol (GLYCOLAX) 17 gram/dose powder; Mix 1 capful (17 grams) with 8 ounces of water and drink daily as needed for constipation.  Dispense: 1530 g; Refill: 3  UPDATE: insurance would not cover movantik so patient was changed to symproic as this is on pharmacy formulary. However, patient called back to report she could not tolerate this medication due to cramping. She has also previously tried linzess but could not tolerate due to cramping and diarrhea. She requests to try movantik as she states she has never tried this one before.     Anxiety  -     hydrOXYzine pamoate (VISTARIL) 25 MG Cap; Take 1 capsule (25 mg total) by mouth 3 (three) times daily.  Dispense: 270 capsule; Refill: 1  -     risperiDONE (RISPERDAL) 3 MG Tab; Take 1 tablet (3 mg total) by mouth once daily.  Dispense: 90 tablet; Refill: 1  -     traZODone (DESYREL) 100 MG tablet; Take 1 tablet (100 mg total) by mouth every evening.  Dispense: 90 tablet; Refill: 1    Other depression  -     risperiDONE (RISPERDAL) 3 MG Tab; Take 1 tablet (3 mg total) by mouth once daily.  Dispense:  90 tablet; Refill: 1  -     traZODone (DESYREL) 100 MG tablet; Take 1 tablet (100 mg total) by mouth every evening.  Dispense: 90 tablet; Refill: 1    Immunization due  -     (In Office Administered) Pneumococcal Conjugate Vaccine (13 Valent) (IM)    RTC in 3 months for follow up on chronic disease processes.  RTC sooner if needed.   Patient voiced understanding and is agreeable to plan.      Lisandra Mcdaniel MD    Family Medicine

## 2022-02-07 DIAGNOSIS — E78.49 OTHER HYPERLIPIDEMIA: Primary | ICD-10-CM

## 2022-02-07 DIAGNOSIS — E11.40 TYPE 2 DIABETES MELLITUS WITH DIABETIC NEUROPATHY, WITH LONG-TERM CURRENT USE OF INSULIN: ICD-10-CM

## 2022-02-07 DIAGNOSIS — Z79.4 TYPE 2 DIABETES MELLITUS WITH DIABETIC NEUROPATHY, WITH LONG-TERM CURRENT USE OF INSULIN: ICD-10-CM

## 2022-02-07 RX ORDER — PRAVASTATIN SODIUM 20 MG/1
20 TABLET ORAL NIGHTLY
Qty: 90 TABLET | Refills: 1 | Status: SHIPPED | OUTPATIENT
Start: 2022-02-07 | End: 2022-07-06

## 2022-02-07 RX ORDER — INSULIN GLARGINE 100 [IU]/ML
20 INJECTION, SOLUTION SUBCUTANEOUS NIGHTLY
Qty: 18 ML | Refills: 1 | Status: SHIPPED | OUTPATIENT
Start: 2022-02-07 | End: 2022-04-11

## 2022-02-07 RX ORDER — PRAVASTATIN SODIUM 20 MG/1
20 TABLET ORAL NIGHTLY
COMMUNITY
End: 2022-02-07 | Stop reason: SDUPTHER

## 2022-02-07 RX ORDER — SEMAGLUTIDE 1.34 MG/ML
1 INJECTION, SOLUTION SUBCUTANEOUS
Qty: 2 PEN | Refills: 5 | Status: SHIPPED | OUTPATIENT
Start: 2022-02-07 | End: 2022-07-07 | Stop reason: SDUPTHER

## 2022-02-07 RX ORDER — SEMAGLUTIDE 1.34 MG/ML
1 INJECTION, SOLUTION SUBCUTANEOUS
Qty: 1.5 PEN | Refills: 3 | Status: CANCELLED | OUTPATIENT
Start: 2022-02-07

## 2022-02-14 ENCOUNTER — TELEPHONE (OUTPATIENT)
Dept: FAMILY MEDICINE | Facility: CLINIC | Age: 47
End: 2022-02-14
Payer: COMMERCIAL

## 2022-02-14 NOTE — TELEPHONE ENCOUNTER
Spoke with Insurance Rep, regarding Ozempic. Stated that this medication should be approved due to pt hx of other medications tried. Called and spoke with pharmacy, stated the problem was pharmacy was running co-pay first and not the primary insurance. Stated that was now straightened out and Ozempic no longer requires PA.     Also, asked about Movantik, stated it was a non-formulary drug, due to pt having issues with other medications listed on plan, stated he would fax over an appeal form.

## 2022-02-21 ENCOUNTER — TELEPHONE (OUTPATIENT)
Dept: FAMILY MEDICINE | Facility: CLINIC | Age: 47
End: 2022-02-21
Payer: COMMERCIAL

## 2022-02-21 DIAGNOSIS — K59.09 CHRONIC CONSTIPATION: Primary | ICD-10-CM

## 2022-02-21 NOTE — TELEPHONE ENCOUNTER
[facility] sent back information regarding pt PA request. PA request is for Movantik 25mg tablets. PT has already tried two of the required drugs, Linzess and Mirlax.     They wanted to know if pt has tried and failed symproic?   If pt has not tried this they want her to try this before, they will look at the Movantik.

## 2022-02-22 RX ORDER — NALDEMEDINE 0.2 MG/1
0.2 TABLET ORAL DAILY
COMMUNITY
End: 2022-02-22 | Stop reason: SDUPTHER

## 2022-02-22 RX ORDER — NALDEMEDINE 0.2 MG/1
0.2 TABLET ORAL DAILY
Qty: 90 TABLET | Refills: 1 | Status: SHIPPED | OUTPATIENT
Start: 2022-02-22 | End: 2022-07-07

## 2022-02-22 RX ORDER — NALDEMEDINE 0.2 MG/1
0.2 TABLET ORAL DAILY
Qty: 30 TABLET | Refills: 0 | Status: SHIPPED | OUTPATIENT
Start: 2022-02-22 | End: 2022-04-26

## 2022-02-22 NOTE — TELEPHONE ENCOUNTER
Pt requested for small supply be sent to [facility] so she could start taking it now  and then rest of medication be sent to mail service

## 2022-03-09 ENCOUNTER — TELEPHONE (OUTPATIENT)
Dept: FAMILY MEDICINE | Facility: CLINIC | Age: 47
End: 2022-03-09
Payer: COMMERCIAL

## 2022-03-09 NOTE — TELEPHONE ENCOUNTER
Patient called stated she cannot take new  medicine Symproic for constipation , makes her have extremely bad stomach cramps. She tried linzess  before caused stomach cramps. Patient states needs something else, she take oxycodone cause bad constipation. PATIENT also mention that you suggested another medication, but insurance will not cover it.

## 2022-03-24 RX ORDER — NALOXEGOL OXALATE 25 MG/1
25 TABLET, FILM COATED ORAL DAILY
Qty: 30 TABLET | Refills: 0 | Status: SHIPPED | OUTPATIENT
Start: 2022-03-24 | End: 2022-07-07

## 2022-03-24 RX ORDER — NALOXEGOL OXALATE 25 MG/1
25 TABLET, FILM COATED ORAL DAILY
COMMUNITY
End: 2022-03-24 | Stop reason: SDUPTHER

## 2022-03-24 RX ORDER — NALOXEGOL OXALATE 25 MG/1
25 TABLET, FILM COATED ORAL DAILY
Qty: 90 TABLET | Refills: 1 | Status: SHIPPED | OUTPATIENT
Start: 2022-03-24 | End: 2022-04-26 | Stop reason: SDUPTHER

## 2022-04-26 ENCOUNTER — OFFICE VISIT (OUTPATIENT)
Dept: FAMILY MEDICINE | Facility: CLINIC | Age: 47
End: 2022-04-26
Payer: COMMERCIAL

## 2022-04-26 VITALS
TEMPERATURE: 97 F | RESPIRATION RATE: 18 BRPM | OXYGEN SATURATION: 99 % | WEIGHT: 208.63 LBS | HEART RATE: 94 BPM | BODY MASS INDEX: 35.62 KG/M2 | SYSTOLIC BLOOD PRESSURE: 138 MMHG | HEIGHT: 64 IN | DIASTOLIC BLOOD PRESSURE: 62 MMHG

## 2022-04-26 DIAGNOSIS — G89.29 CHRONIC BILATERAL LOW BACK PAIN WITHOUT SCIATICA: Primary | Chronic | ICD-10-CM

## 2022-04-26 DIAGNOSIS — M62.838 MUSCLE SPASM: ICD-10-CM

## 2022-04-26 DIAGNOSIS — M54.50 CHRONIC BILATERAL LOW BACK PAIN WITHOUT SCIATICA: Primary | Chronic | ICD-10-CM

## 2022-04-26 PROBLEM — G47.39 OTHER SLEEP APNEA: Chronic | Status: ACTIVE | Noted: 2021-11-30

## 2022-04-26 PROBLEM — K21.9 GASTROESOPHAGEAL REFLUX DISEASE: Chronic | Status: ACTIVE | Noted: 2021-08-25

## 2022-04-26 PROBLEM — E11.40 TYPE 2 DIABETES MELLITUS WITH DIABETIC NEUROPATHY, WITHOUT LONG-TERM CURRENT USE OF INSULIN: Chronic | Status: ACTIVE | Noted: 2021-08-25

## 2022-04-26 PROBLEM — I10 ESSENTIAL HYPERTENSION: Chronic | Status: ACTIVE | Noted: 2021-08-25

## 2022-04-26 PROBLEM — I72.4 FEMORAL ARTERY PSEUDO-ANEURYSM, RIGHT: Chronic | Status: ACTIVE | Noted: 2022-01-17

## 2022-04-26 PROBLEM — E66.01 MORBID OBESITY: Chronic | Status: ACTIVE | Noted: 2022-01-17

## 2022-04-26 PROBLEM — E11.9 DIABETES MELLITUS WITHOUT COMPLICATION: Status: RESOLVED | Noted: 2022-01-17 | Resolved: 2022-04-26

## 2022-04-26 PROBLEM — M47.817 LUMBOSACRAL SPONDYLOSIS WITHOUT MYELOPATHY: Chronic | Status: ACTIVE | Noted: 2021-09-08

## 2022-04-26 PROCEDURE — 99213 PR OFFICE/OUTPT VISIT, EST, LEVL III, 20-29 MIN: ICD-10-PCS | Mod: 25,,, | Performed by: FAMILY MEDICINE

## 2022-04-26 PROCEDURE — 96372 THER/PROPH/DIAG INJ SC/IM: CPT | Mod: ,,, | Performed by: FAMILY MEDICINE

## 2022-04-26 PROCEDURE — 96372 PR INJECTION,THERAP/PROPH/DIAG2ST, IM OR SUBCUT: ICD-10-PCS | Mod: ,,, | Performed by: FAMILY MEDICINE

## 2022-04-26 PROCEDURE — 99213 OFFICE O/P EST LOW 20 MIN: CPT | Mod: 25,,, | Performed by: FAMILY MEDICINE

## 2022-04-26 RX ORDER — METHOCARBAMOL 500 MG/1
500 TABLET, FILM COATED ORAL 4 TIMES DAILY
Qty: 45 TABLET | Refills: 1 | Status: SHIPPED | OUTPATIENT
Start: 2022-04-26 | End: 2022-05-06

## 2022-04-26 RX ORDER — KETOROLAC TROMETHAMINE 30 MG/ML
30 INJECTION, SOLUTION INTRAMUSCULAR; INTRAVENOUS
Status: COMPLETED | OUTPATIENT
Start: 2022-04-26 | End: 2022-04-26

## 2022-04-26 RX ORDER — MOMETASONE FUROATE 50 UG/1
2 SPRAY, METERED NASAL DAILY
COMMUNITY
Start: 2022-04-14 | End: 2022-07-07 | Stop reason: SDUPTHER

## 2022-04-26 RX ORDER — IBUPROFEN 600 MG/1
600 TABLET ORAL EVERY 8 HOURS PRN
Qty: 30 TABLET | Refills: 1 | Status: SHIPPED | OUTPATIENT
Start: 2022-04-26 | End: 2022-07-07 | Stop reason: SDUPTHER

## 2022-04-26 RX ORDER — GABAPENTIN 600 MG/1
600 TABLET ORAL 3 TIMES DAILY
Qty: 90 TABLET | Refills: 1 | Status: SHIPPED | OUTPATIENT
Start: 2022-04-26 | End: 2022-06-08 | Stop reason: SDUPTHER

## 2022-04-26 RX ADMIN — KETOROLAC TROMETHAMINE 30 MG: 30 INJECTION, SOLUTION INTRAMUSCULAR; INTRAVENOUS at 04:04

## 2022-04-26 NOTE — PROGRESS NOTES
Clinic Note    Patient Name: Georgiana Zayas  : 1975  MRN: [id number]    HPI:    Chief Complaint   Patient presents with    Back Pain     MsMatthew Zayas is a 47 y.o. female who present to clinic today with CC of back pain. She has chronic back pain but reports flare started 2 weeks ago. Reports this is from an old injury. Reports this is just like her chronic pain but is worse. Reports she has not had an injection with pain management in years but is scheduled for one with Dr. Bonner in the next few weeks. States she needs something to help her get by until she can have this injection. Reports she has muscle spasms and pain in her mid to lower back as well as nerve stinging/burning pain. Denies loss of control of bowel or bladder. Denies numbness. Denies dysuria.   Otherwise, without complaints.     Medications:  Current Outpatient Medications on File Prior to Visit   Medication Sig Dispense Refill    carvediloL (COREG) 3.125 MG tablet Take 1 tablet (3.125 mg total) by mouth 2 (two) times daily. 180 tablet 1    DULoxetine (CYMBALTA) 60 MG capsule Take 1 capsule (60 mg total) by mouth 2 (two) times daily. 180 capsule 1    gabapentin (NEURONTIN) 400 MG capsule Take 1 capsule (400 mg total) by mouth 3 (three) times daily. 270 capsule 1    glipiZIDE (GLUCOTROL) 10 MG tablet Take 1 tablet (10 mg total) by mouth 2 (two) times daily with meals. 180 tablet 1    hydroCHLOROthiazide (HYDRODIURIL) 25 MG tablet Take 1 tablet (25 mg total) by mouth once daily. 90 tablet 1    hydrOXYzine pamoate (VISTARIL) 25 MG Cap Take 1 capsule (25 mg total) by mouth 3 (three) times daily. 270 capsule 1    insulin (LANTUS SOLOSTAR U-100 INSULIN) glargine 100 units/mL (3mL) SubQ pen Inject 10 Units into the skin every evening. 15 each 1    lisinopriL (PRINIVIL,ZESTRIL) 2.5 MG tablet Take 1 tablet (2.5 mg total) by mouth once daily. 90 tablet 1    mometasone (NASONEX) 50 mcg/actuation nasal spray 2 sprays by Nasal route  once daily.      naldemedine (SYMPROIC) 0.2 mg Tab Take 0.2 mg by mouth once daily. 90 tablet 1    naloxegoL (MOVANTIK) 25 mg tablet Take 25 mg by mouth once daily. 30 tablet 0    oxyCODONE-acetaminophen (PERCOCET)  mg per tablet Take 1 tablet by mouth every 6 (six) hours as needed.       pantoprazole (PROTONIX) 40 MG tablet TAKE 1 TABLET (40 MG TOTAL) BY MOUTH 2 (TWO) TIMES DAILY. 180 tablet 1    pravastatin (PRAVACHOL) 20 MG tablet Take 1 tablet (20 mg total) by mouth every evening. 90 tablet 1    risperiDONE (RISPERDAL) 3 MG Tab Take 1 tablet (3 mg total) by mouth once daily. 90 tablet 1    semaglutide (OZEMPIC) 1 mg/dose (2 mg/1.5 mL) PnIj Inject 1 mg into the skin every 7 days. 2 pen 5    tiZANidine (ZANAFLEX) 4 MG tablet Take 1 tablet (4 mg total) by mouth 4 (four) times daily. 360 tablet 1    traZODone (DESYREL) 100 MG tablet Take 1 tablet (100 mg total) by mouth every evening. 90 tablet 1    diazePAM (VALIUM) 5 MG tablet Take 5 mg by mouth every 12 (twelve) hours as needed.       hydroCHLOROthiazide (HYDRODIURIL) 25 MG tablet Take 1 tablet (25 mg total) by mouth once daily. (Patient not taking: Reported on 4/26/2022) 30 tablet 0    LANTUS SOLOSTAR U-100 INSULIN glargine 100 units/mL (3mL) SubQ pen Inject 10 units Sub-Q once daily at bedtime or as directed for diabetes (Patient not taking: Reported on 4/26/2022) 15 each 0    naloxegoL (MOVANTIK) 25 mg tablet Take 25 mg by mouth once daily. (Patient not taking: Reported on 4/26/2022) 90 tablet 1    nitroGLYCERIN (NITROSTAT) 0.4 MG SL tablet Place 0.4 mg under the tongue every 5 (five) minutes as needed for Chest pain.      polyethylene glycol (GLYCOLAX) 17 gram/dose powder Mix 1 capful (17 grams) with 8 ounces of water and drink daily as needed for constipation. (Patient not taking: Reported on 4/26/2022) 1530 g 3    [DISCONTINUED] naldemedine (SYMPROIC) 0.2 mg Tab Take 0.2 mg by mouth once daily. 30 tablet 0     No current  facility-administered medications on file prior to visit.         Allergies: Codeine      Past Medical History:    Past Medical History:   Diagnosis Date    Arthritis     Diabetes mellitus     Hearing difficulty     Hypertension     Liver disease     Sleep apnea        Past Surgical History:    Past Surgical History:   Procedure Laterality Date    CARPAL TUNNEL RELEASE Bilateral      SECTION      INJECTION OF ANESTHETIC AGENT AROUND MEDIAL BRANCH NERVES INNERVATING LUMBAR FACET JOINT Bilateral 2021    Procedure: BLOCK, NERVE, FACET JOINT, LUMBAR, MEDIAL BRANCH;  Surgeon: Amari Razo MD;  Location: [facility]IN [facility];  Service: Pain Management;  Laterality: Bilateral;  Bilateral L3-S1 Facet Injection    LEFT HEART CATHETERIZATION Left 2021    Procedure: Left heart cath;  Surgeon: Jeremy Rojo DO;  Location: [facility] CATH LAB;  Service: Cardiology;  Laterality: Left;    STAPEDES SURGERY Bilateral     TONSILLECTOMY           Social History:    Social History     Tobacco Use   Smoking Status Never Smoker   Smokeless Tobacco Never Used     Social History     Substance and Sexual Activity   Alcohol Use Not Currently     Social History     Substance and Sexual Activity   Drug Use Never         Family History:    Family History   Problem Relation Age of Onset    Cancer Mother     Diabetes Mother     Hypertension Mother        Review of Systems:    Review of Systems   Constitutional: Positive for fatigue. Negative for appetite change, chills, fever and unexpected weight change.   Eyes: Negative for visual disturbance.   Respiratory: Negative for cough and shortness of breath.    Cardiovascular: Negative for chest pain and leg swelling.   Gastrointestinal: Positive for constipation. Negative for abdominal pain, change in bowel habit, diarrhea, nausea, vomiting and change in bowel habit.        Chronic constipation - improved with movantik   Musculoskeletal: Positive for back pain.  "Negative for arthralgias.   Integumentary:  Negative for rash.   Neurological: Negative for dizziness.        Reports chronic, intermittent issues with headaches    Psychiatric/Behavioral: The patient is not nervous/anxious.         Vitals:    /62 (BP Location: Left arm, Patient Position: Sitting, BP Method: Large (Manual))   Pulse 94   Temp 97 °F (36.1 °C) (Oral)   Resp 18   Ht 5' 4" (1.626 m)   Wt 94.6 kg (208 lb 9.6 oz)   SpO2 99%   BMI 35.81 kg/m²        Physical Exam:    Physical Exam  Constitutional:       General: She is not in acute distress.     Appearance: Normal appearance. She is obese.   HENT:      Nose: Nose normal.      Mouth/Throat:      Mouth: Mucous membranes are moist.      Pharynx: Oropharynx is clear.   Eyes:      Conjunctiva/sclera: Conjunctivae normal.   Cardiovascular:      Rate and Rhythm: Normal rate and regular rhythm.      Heart sounds: Normal heart sounds. No murmur heard.  Pulmonary:      Effort: Pulmonary effort is normal. No respiratory distress.      Breath sounds: Normal breath sounds. No wheezing, rhonchi or rales.   Abdominal:      General: Bowel sounds are normal.      Palpations: Abdomen is soft.      Tenderness: There is no abdominal tenderness.   Musculoskeletal:         General: Tenderness present. No swelling, deformity or signs of injury. Normal range of motion.      Cervical back: Neck supple.      Right lower leg: No edema.      Left lower leg: No edema.      Comments: + muscle spasm   Skin:     Findings: No rash.   Neurological:      General: No focal deficit present.      Mental Status: She is alert. Mental status is at baseline.   Psychiatric:         Mood and Affect: Mood normal.         Assessment/Plan:   Chronic bilateral low back pain without sciatica  -     ketorolac injection 30 mg  -     gabapentin (NEURONTIN) 600 MG tablet; Take 1 tablet (600 mg total) by mouth 3 (three) times daily.  Dispense: 90 tablet; Refill: 1 - dose increase  -     ibuprofen " (ADVIL,MOTRIN) 600 MG tablet; Take 1 tablet (600 mg total) by mouth every 8 (eight) hours as needed for Pain.  Dispense: 30 tablet; Refill: 1 - new medication    Muscle spasm  -     methocarbamoL (ROBAXIN) 500 MG Tab; Take 1 tablet (500 mg total) by mouth 4 (four) times daily. for 10 days  Dispense: 45 tablet; Refill: 1 - medication change from tizanidine.    RTC as scheduled for follow up on chronic disease processes.  RTC sooner if needed.   Patient voiced understanding and is agreeable to plan.      Lisandra Mcdaniel MD    Family Medicine

## 2022-05-02 DIAGNOSIS — Z11.59 SCREENING FOR VIRAL DISEASE: Primary | ICD-10-CM

## 2022-05-04 ENCOUNTER — ANESTHESIA EVENT (OUTPATIENT)
Dept: PAIN MEDICINE | Facility: HOSPITAL | Age: 47
End: 2022-05-04
Payer: COMMERCIAL

## 2022-05-04 ENCOUNTER — ANESTHESIA (OUTPATIENT)
Dept: PAIN MEDICINE | Facility: HOSPITAL | Age: 47
End: 2022-05-04
Payer: COMMERCIAL

## 2022-05-04 ENCOUNTER — HOSPITAL ENCOUNTER (OUTPATIENT)
Facility: HOSPITAL | Age: 47
Discharge: HOME | End: 2022-05-04
Attending: ANESTHESIOLOGY | Admitting: ANESTHESIOLOGY
Payer: COMMERCIAL

## 2022-05-04 VITALS
DIASTOLIC BLOOD PRESSURE: 89 MMHG | OXYGEN SATURATION: 100 % | WEIGHT: 202 LBS | BODY MASS INDEX: 34.49 KG/M2 | RESPIRATION RATE: 17 BRPM | HEIGHT: 64 IN | TEMPERATURE: 99 F | SYSTOLIC BLOOD PRESSURE: 144 MMHG | HEART RATE: 95 BPM

## 2022-05-04 DIAGNOSIS — M47.816 LUMBAR SPONDYLOSIS: ICD-10-CM

## 2022-05-04 LAB
GLUCOSE SERPL-MCNC: 194 MG/DL (ref 70–105)
GLUCOSE SERPL-MCNC: 194 MG/DL (ref 70–110)

## 2022-05-04 PROCEDURE — 27201423 OPTIME MED/SURG SUP & DEVICES STERILE SUPPLY: Performed by: ANESTHESIOLOGY

## 2022-05-04 PROCEDURE — 82962 GLUCOSE BLOOD TEST: CPT

## 2022-05-04 PROCEDURE — 25000003 PHARM REV CODE 250: Performed by: ANESTHESIOLOGY

## 2022-05-04 PROCEDURE — 27000284 HC CANNULA NASAL: Performed by: NURSE ANESTHETIST, CERTIFIED REGISTERED

## 2022-05-04 PROCEDURE — D9220A PRA ANESTHESIA: ICD-10-PCS | Mod: ,,, | Performed by: NURSE ANESTHETIST, CERTIFIED REGISTERED

## 2022-05-04 PROCEDURE — 64494 INJ PARAVERT F JNT L/S 2 LEV: CPT | Mod: 50 | Performed by: ANESTHESIOLOGY

## 2022-05-04 PROCEDURE — 64493 INJ PARAVERT F JNT L/S 1 LEV: CPT | Mod: 50 | Performed by: ANESTHESIOLOGY

## 2022-05-04 PROCEDURE — D9220A PRA ANESTHESIA: Mod: ,,, | Performed by: NURSE ANESTHETIST, CERTIFIED REGISTERED

## 2022-05-04 PROCEDURE — 37000008 HC ANESTHESIA 1ST 15 MINUTES: Performed by: ANESTHESIOLOGY

## 2022-05-04 PROCEDURE — 64495 INJ PARAVERT F JNT L/S 3 LEV: CPT | Mod: 50 | Performed by: ANESTHESIOLOGY

## 2022-05-04 PROCEDURE — 63600175 PHARM REV CODE 636 W HCPCS: Performed by: ANESTHESIOLOGY

## 2022-05-04 PROCEDURE — 63600175 PHARM REV CODE 636 W HCPCS: Performed by: NURSE ANESTHETIST, CERTIFIED REGISTERED

## 2022-05-04 RX ORDER — PROPOFOL 10 MG/ML
VIAL (ML) INTRAVENOUS
Status: DISCONTINUED | OUTPATIENT
Start: 2022-05-04 | End: 2022-05-04

## 2022-05-04 RX ORDER — SODIUM CHLORIDE 9 MG/ML
500 INJECTION, SOLUTION INTRAVENOUS CONTINUOUS
Status: DISCONTINUED | OUTPATIENT
Start: 2022-05-04 | End: 2022-05-04 | Stop reason: HOSPADM

## 2022-05-04 RX ORDER — BUPIVACAINE HYDROCHLORIDE 2.5 MG/ML
INJECTION, SOLUTION INFILTRATION; PERINEURAL
Status: DISCONTINUED | OUTPATIENT
Start: 2022-05-04 | End: 2022-05-04 | Stop reason: HOSPADM

## 2022-05-04 RX ORDER — TRIAMCINOLONE ACETONIDE 40 MG/ML
INJECTION, SUSPENSION INTRA-ARTICULAR; INTRAMUSCULAR
Status: DISCONTINUED | OUTPATIENT
Start: 2022-05-04 | End: 2022-05-04 | Stop reason: HOSPADM

## 2022-05-04 RX ADMIN — SODIUM CHLORIDE: 9 INJECTION, SOLUTION INTRAVENOUS at 09:05

## 2022-05-04 RX ADMIN — PROPOFOL 20 MG: 10 INJECTION, EMULSION INTRAVENOUS at 09:05

## 2022-05-04 RX ADMIN — PROPOFOL 25 MG: 10 INJECTION, EMULSION INTRAVENOUS at 09:05

## 2022-05-04 NOTE — PLAN OF CARE
Plan:  D/c pt via wheelchair at 1010  Informed pt if does not void in 8 hours to go to ER. Notify if redness, drainage, from injection site or fever over next 3-4 days. Rest and drink plenty of fluids for the remainder of the day. No lifting over 5 lbs. For the remainder of the day. Continue regular medications as prescribed. May take pain medications as prescribed.     Pain improved 100%

## 2022-05-04 NOTE — ANESTHESIA POSTPROCEDURE EVALUATION
Anesthesia Post Evaluation    Patient: Georgiana Zayas    Procedure(s) Performed: Procedure(s) (LRB):  BLOCK, NERVE, FACET JOINT, LUMBAR, MEDIAL BRANCH (Bilateral)    Final Anesthesia Type: general      Patient location during evaluation: PACU  Patient participation: Yes- Able to Participate  Level of consciousness: awake and alert  Post-procedure vital signs: reviewed and stable  Pain management: adequate  Airway patency: patent  MATT mitigation strategies: Multimodal analgesia  PONV status at discharge: No PONV  Anesthetic complications: no      Cardiovascular status: stable  Respiratory status: unassisted  Hydration status: euvolemic  Follow-up not needed.          Vitals Value Taken Time   /92 05/04/22 0950   Temp 37c 05/04/22 0951   Pulse 93 05/04/22 0950   Resp 12 05/04/22 0950   SpO2 100 % 05/04/22 0950   Vitals shown include unvalidated device data.      No case tracking events are documented in the log.      Pain/Ralph Score: Ralph Score: 10 (5/4/2022  9:50 AM)

## 2022-05-04 NOTE — ANESTHESIA PREPROCEDURE EVALUATION
2022  Georgiana Zayas is a 47 y.o., female.    Past Medical History:   Diagnosis Date    Arthritis     Diabetes mellitus     Hearing difficulty     Hypertension     Liver disease     Sleep apnea        Past Surgical History:   Procedure Laterality Date    CARPAL TUNNEL RELEASE Bilateral      SECTION      INJECTION OF ANESTHETIC AGENT AROUND MEDIAL BRANCH NERVES INNERVATING LUMBAR FACET JOINT Bilateral 2021    Procedure: BLOCK, NERVE, FACET JOINT, LUMBAR, MEDIAL BRANCH;  Surgeon: Amari Razo MD;  Location: [facility] PAIN [facility];  Service: Pain Management;  Laterality: Bilateral;  Bilateral L3-S1 Facet Injection    LEFT HEART CATHETERIZATION Left 2021    Procedure: Left heart cath;  Surgeon: Jeremy Rojo DO;  [facility] CATH LAB;  Service: Cardiology;  Laterality: Left;    STAPEDES SURGERY Bilateral     TONSILLECTOMY         Family History   Problem Relation Age of Onset    Cancer Mother     Diabetes Mother     Hypertension Mother        Social History     Socioeconomic History    Marital status: Single   Tobacco Use    Smoking status: Never Smoker    Smokeless tobacco: Never Used   Substance and Sexual Activity    Alcohol use: Not Currently    Drug use: Never    Sexual activity: Yes     Birth control/protection: Condom       Current Facility-Administered Medications   Medication Dose Route Frequency Provider Last Rate Last Admin    0.9%  NaCl infusion  500 mL Intravenous Continuous Amari Razo MD           Review of patient's allergies indicates:   Allergen Reactions    Codeine Itching       Pre-op Assessment    I have reviewed the Patient Summary Reports.     I have reviewed the Nursing Notes. I have reviewed the NPO Status.   I have reviewed the Medications.     Review of Systems  Anesthesia Hx:  No problems with previous Anesthesia     Cardiovascular:   Hypertension    Pulmonary:   Sleep Apnea    Hepatic/GI:   GERD Liver Disease,    Musculoskeletal:   Arthritis     Endocrine:   Diabetes           Anesthesia Plan  Type of Anesthesia, risks & benefits discussed:    Anesthesia Type: Gen Natural Airway  Intra-op Monitoring Plan: Standard ASA Monitors  Post Op Pain Control Plan: multimodal analgesia  Induction:  IV  Informed Consent: Informed consent signed with the Patient and all parties understand the risks and agree with anesthesia plan.  All questions answered. Patient consented to blood products? Yes  ASA Score: 3  Day of Surgery Review of History & Physical: H&P Update referred to the surgeon/provider.    Ready For Surgery From Anesthesia Perspective.     .

## 2022-05-04 NOTE — OP NOTE
PREOPERATIVE DIAGNOSIS:     Lumbar Spondylosis without Myelopathy                                                                        POSTOPERATIVE DIAGNOSIS:   Lumbar Spondylosis without Myelopathy                                                                        PROCEDURE:  L3-4, L4-5 and L5-S1 Bilateral  Lumbar Facet Injections under Fluoroscopic Guidance         COMPLICATIONS:  None                              DRAINS AND PACKS:  None    ANESTHESIA:  MAC                                      BLOOD LOSS:  None         The patient was identified in the holding area.  The risk and benefits were again explained to the patient.  The patient agreed and consent obtained.   The site was marked with a skin pen.  The patient was taken to the procedure room and placed in the prone position on the C-Arm table.  All pressure points were checked and padded comfortably while the patient was awake.  The patients back was prepped and draped in the usual sterile fashion.  Anesthesia was initiated.   The patients facet joints at L3-4, L4-5 and L5-S1 were identified under direct fluoroscopic guidance.  A skin wheal was raised over each of the targeted areas with 0.25% Marcaine. A 22 gauge 3 ½ inch needle was advanced into the interarticular surface of each of those levels on the left side.  Then 1.5 milliliters of a solution that contained 40 milligrams of Kenalog diluted into 11 milliliters of 0.25% Marcaine was injected at each level for a total of 9 milliliters.  The needles were removed with its tip intact.  The procedure was repeated on the right side. There was adequate hemostasis at the conclusion of the procedure.  The patient tolerated the procedure well with no adverse events.  There was no paresthesia with needle placement or injection.  The patient was taken in stable condition to the holding area and was monitored for the appropriate time of convalescence and discharged to the care of the patients .       Preoperative pain was   9 /10    Postoperative pain was   /10.Pipa   %

## 2022-05-23 ENCOUNTER — OFFICE VISIT (OUTPATIENT)
Dept: GASTROENTEROLOGY | Facility: CLINIC | Age: 47
End: 2022-05-23
Payer: COMMERCIAL

## 2022-05-23 VITALS
OXYGEN SATURATION: 95 % | SYSTOLIC BLOOD PRESSURE: 143 MMHG | WEIGHT: 204 LBS | DIASTOLIC BLOOD PRESSURE: 86 MMHG | HEART RATE: 95 BPM | BODY MASS INDEX: 34.83 KG/M2 | RESPIRATION RATE: 16 BRPM | HEIGHT: 64 IN

## 2022-05-23 DIAGNOSIS — K58.1 IRRITABLE BOWEL SYNDROME WITH CONSTIPATION: Primary | ICD-10-CM

## 2022-05-23 DIAGNOSIS — K21.9 GASTROESOPHAGEAL REFLUX DISEASE, UNSPECIFIED WHETHER ESOPHAGITIS PRESENT: ICD-10-CM

## 2022-05-23 DIAGNOSIS — Z12.11 ENCOUNTER FOR SCREENING COLONOSCOPY: ICD-10-CM

## 2022-05-23 PROCEDURE — 99214 OFFICE O/P EST MOD 30 MIN: CPT | Mod: ,,, | Performed by: NURSE PRACTITIONER

## 2022-05-23 PROCEDURE — 99214 PR OFFICE/OUTPT VISIT, EST, LEVL IV, 30-39 MIN: ICD-10-PCS | Mod: ,,, | Performed by: NURSE PRACTITIONER

## 2022-05-23 RX ORDER — OMEPRAZOLE 40 MG/1
40 CAPSULE, DELAYED RELEASE ORAL
Qty: 180 CAPSULE | Refills: 3 | Status: SHIPPED | OUTPATIENT
Start: 2022-05-23 | End: 2022-05-23 | Stop reason: SDUPTHER

## 2022-05-23 RX ORDER — OMEPRAZOLE 40 MG/1
40 CAPSULE, DELAYED RELEASE ORAL
Qty: 60 CAPSULE | Refills: 0 | Status: SHIPPED | OUTPATIENT
Start: 2022-05-23 | End: 2022-07-07 | Stop reason: SDUPTHER

## 2022-05-23 RX ORDER — TENAPANOR HYDROCHLORIDE 53.2 MG/1
50 TABLET ORAL 2 TIMES DAILY
Qty: 180 TABLET | Refills: 3 | Status: SHIPPED | OUTPATIENT
Start: 2022-05-23 | End: 2022-05-24 | Stop reason: SINTOL

## 2022-05-23 NOTE — PROGRESS NOTES
Pt here for f/u of GERD and constipation.  Still having reflux/burning in throat/chest/CP. Pt has seen cardiology and was told she had a 30% blockage but cannot do anything about it until at 70%. She was on Prilosec and changed to Protonix. Pt states the Prilosec worked better. Has never had EGD. Hx MATT.  Still having constipation. Has tried Symproic and Linzess 290&145 for OIC and CIC but gave cramps/diarrhea. Having a lot of abd pain/bloating with the constipation. H/o diarrhea in past. Has never tried Trulance.     Past Medical History:   Diagnosis Date    Arthritis     Diabetes mellitus     Hearing difficulty     Hypertension     Liver disease     Sleep apnea        Review of Systems   Constitutional: Negative for chills and fever.   Respiratory: Negative for shortness of breath.    Cardiovascular: Negative for chest pain.   Gastrointestinal: Positive for abdominal pain and constipation. Negative for blood in stool, diarrhea, melena, nausea and vomiting.   Skin: Negative for rash.   Neurological: Negative for weakness.     Physical Exam  Vitals reviewed. Exam conducted with a chaperone present.   Constitutional:       General: She is not in acute distress.     Appearance: Normal appearance.   HENT:      Head: Normocephalic.   Eyes:      General: No scleral icterus.     Pupils: Pupils are equal, round, and reactive to light.   Cardiovascular:      Rate and Rhythm: Normal rate.   Pulmonary:      Effort: Pulmonary effort is normal.   Abdominal:      General: There is no distension.      Palpations: Abdomen is soft.      Tenderness: There is no abdominal tenderness. There is no guarding or rebound.   Skin:     General: Skin is warm and dry.   Neurological:      General: No focal deficit present.      Mental Status: She is alert and oriented to person, place, and time. Mental status is at baseline.   Psychiatric:         Mood and Affect: Mood normal.         Behavior: Behavior normal.         Thought Content:  Thought content normal.         Judgment: Judgment normal.       Plan  -antireflux measures  -Prilosec 40 mg BID (pt requests [employer] mail order and 1st month at local pharmacy so she can get today)  -EGD for chronic GERD  -screening colonoscopy  -Ibsrela 50 mg BID for constipation  -RTC 3 months, sooner PRN

## 2022-05-24 ENCOUNTER — TELEPHONE (OUTPATIENT)
Dept: GASTROENTEROLOGY | Facility: CLINIC | Age: 47
End: 2022-05-24
Payer: COMMERCIAL

## 2022-05-24 RX ORDER — PLECANATIDE 3 MG/1
3 TABLET ORAL DAILY
Qty: 90 TABLET | Refills: 3 | Status: SHIPPED | OUTPATIENT
Start: 2022-05-24 | End: 2022-07-07

## 2022-05-24 NOTE — TELEPHONE ENCOUNTER
Pt wants to retry trulance and until it can get approved, she will do miralax daily. Instructed pt to not take both together. Pt voiced understanding.

## 2022-05-24 NOTE — TELEPHONE ENCOUNTER
Pt called and states after taking ibsrela twice, she had severe diarrhea, she is now weak and very dehydrated. Instructed pt to push fluids, she voiced understanding. Pt states ibsrela did help the abd pain but she cant deal with the diarrhea, what do you recommend ?

## 2022-07-07 ENCOUNTER — OFFICE VISIT (OUTPATIENT)
Dept: FAMILY MEDICINE | Facility: CLINIC | Age: 47
End: 2022-07-07
Payer: COMMERCIAL

## 2022-07-07 VITALS
HEIGHT: 64 IN | SYSTOLIC BLOOD PRESSURE: 134 MMHG | OXYGEN SATURATION: 99 % | HEART RATE: 93 BPM | TEMPERATURE: 97 F | RESPIRATION RATE: 20 BRPM | BODY MASS INDEX: 34.38 KG/M2 | DIASTOLIC BLOOD PRESSURE: 86 MMHG | WEIGHT: 201.38 LBS

## 2022-07-07 DIAGNOSIS — Z79.4 TYPE 2 DIABETES MELLITUS WITH DIABETIC NEUROPATHY, WITH LONG-TERM CURRENT USE OF INSULIN: Primary | Chronic | ICD-10-CM

## 2022-07-07 DIAGNOSIS — J30.2 SEASONAL ALLERGIES: Chronic | ICD-10-CM

## 2022-07-07 DIAGNOSIS — M54.41 CHRONIC BILATERAL LOW BACK PAIN WITH RIGHT-SIDED SCIATICA: Chronic | ICD-10-CM

## 2022-07-07 DIAGNOSIS — G89.29 CHRONIC BILATERAL LOW BACK PAIN WITH RIGHT-SIDED SCIATICA: Chronic | ICD-10-CM

## 2022-07-07 DIAGNOSIS — E78.49 OTHER HYPERLIPIDEMIA: Chronic | ICD-10-CM

## 2022-07-07 DIAGNOSIS — M62.838 MUSCLE SPASM: Chronic | ICD-10-CM

## 2022-07-07 DIAGNOSIS — E11.40 TYPE 2 DIABETES MELLITUS WITH DIABETIC NEUROPATHY, WITH LONG-TERM CURRENT USE OF INSULIN: Primary | Chronic | ICD-10-CM

## 2022-07-07 DIAGNOSIS — I10 ESSENTIAL HYPERTENSION: Chronic | ICD-10-CM

## 2022-07-07 DIAGNOSIS — K21.9 GASTROESOPHAGEAL REFLUX DISEASE WITHOUT ESOPHAGITIS: Chronic | ICD-10-CM

## 2022-07-07 LAB
ALBUMIN SERPL BCP-MCNC: 3.8 G/DL (ref 3.5–5)
ALBUMIN/GLOB SERPL: 0.8 {RATIO}
ALP SERPL-CCNC: 67 U/L (ref 39–100)
ALT SERPL W P-5'-P-CCNC: 27 U/L (ref 13–56)
ANION GAP SERPL CALCULATED.3IONS-SCNC: 10 MMOL/L (ref 7–16)
AST SERPL W P-5'-P-CCNC: 15 U/L (ref 15–37)
BASOPHILS # BLD AUTO: 0.05 K/UL (ref 0–0.2)
BASOPHILS NFR BLD AUTO: 0.8 % (ref 0–1)
BILIRUB SERPL-MCNC: 0.4 MG/DL (ref 0–1.2)
BUN SERPL-MCNC: 10 MG/DL (ref 7–18)
BUN/CREAT SERPL: 11 (ref 6–20)
CALCIUM SERPL-MCNC: 9.6 MG/DL (ref 8.5–10.1)
CHLORIDE SERPL-SCNC: 98 MMOL/L (ref 98–107)
CHOLEST SERPL-MCNC: 139 MG/DL (ref 0–200)
CHOLEST/HDLC SERPL: 2.1 {RATIO}
CO2 SERPL-SCNC: 31 MMOL/L (ref 21–32)
CREAT SERPL-MCNC: 0.88 MG/DL (ref 0.55–1.02)
DIFFERENTIAL METHOD BLD: ABNORMAL
EOSINOPHIL # BLD AUTO: 0.13 K/UL (ref 0–0.5)
EOSINOPHIL NFR BLD AUTO: 2 % (ref 1–4)
ERYTHROCYTE [DISTWIDTH] IN BLOOD BY AUTOMATED COUNT: 14.3 % (ref 11.5–14.5)
EST. AVERAGE GLUCOSE BLD GHB EST-MCNC: 204 MG/DL
GLOBULIN SER-MCNC: 4.6 G/DL (ref 2–4)
GLUCOSE SERPL-MCNC: 98 MG/DL (ref 74–106)
HBA1C MFR BLD HPLC: 8.7 % (ref 4.5–6.6)
HCT VFR BLD AUTO: 40.9 % (ref 38–47)
HDLC SERPL-MCNC: 65 MG/DL (ref 40–60)
HGB BLD-MCNC: 13 G/DL (ref 12–16)
IMM GRANULOCYTES # BLD AUTO: 0.02 K/UL (ref 0–0.04)
IMM GRANULOCYTES NFR BLD: 0.3 % (ref 0–0.4)
LDLC SERPL CALC-MCNC: 58 MG/DL
LDLC/HDLC SERPL: 0.9 {RATIO}
LYMPHOCYTES # BLD AUTO: 2.88 K/UL (ref 1–4.8)
LYMPHOCYTES NFR BLD AUTO: 43.8 % (ref 27–41)
MCH RBC QN AUTO: 27.5 PG (ref 27–31)
MCHC RBC AUTO-ENTMCNC: 31.8 G/DL (ref 32–36)
MCV RBC AUTO: 86.5 FL (ref 80–96)
MONOCYTES # BLD AUTO: 0.56 K/UL (ref 0–0.8)
MONOCYTES NFR BLD AUTO: 8.5 % (ref 2–6)
MPC BLD CALC-MCNC: 10.4 FL (ref 9.4–12.4)
NEUTROPHILS # BLD AUTO: 2.93 K/UL (ref 1.8–7.7)
NEUTROPHILS NFR BLD AUTO: 44.6 % (ref 53–65)
NONHDLC SERPL-MCNC: 74 MG/DL
NRBC # BLD AUTO: 0 X10E3/UL
NRBC, AUTO (.00): 0 %
PLATELET # BLD AUTO: 380 K/UL (ref 150–400)
POTASSIUM SERPL-SCNC: 4.8 MMOL/L (ref 3.5–5.1)
PROT SERPL-MCNC: 8.4 G/DL (ref 6.4–8.2)
RBC # BLD AUTO: 4.73 M/UL (ref 4.2–5.4)
SODIUM SERPL-SCNC: 134 MMOL/L (ref 136–145)
TRIGL SERPL-MCNC: 82 MG/DL (ref 35–150)
VLDLC SERPL-MCNC: 16 MG/DL
WBC # BLD AUTO: 6.57 K/UL (ref 4.5–11)

## 2022-07-07 PROCEDURE — 80061 LIPID PANEL: ICD-10-PCS | Mod: ,,, | Performed by: CLINICAL MEDICAL LABORATORY

## 2022-07-07 PROCEDURE — 96372 THER/PROPH/DIAG INJ SC/IM: CPT | Mod: ,,, | Performed by: FAMILY MEDICINE

## 2022-07-07 PROCEDURE — 80053 COMPREHEN METABOLIC PANEL: CPT | Mod: ,,, | Performed by: CLINICAL MEDICAL LABORATORY

## 2022-07-07 PROCEDURE — 80061 LIPID PANEL: CPT | Mod: ,,, | Performed by: CLINICAL MEDICAL LABORATORY

## 2022-07-07 PROCEDURE — 85025 COMPLETE CBC W/AUTO DIFF WBC: CPT | Mod: ,,, | Performed by: CLINICAL MEDICAL LABORATORY

## 2022-07-07 PROCEDURE — 83036 HEMOGLOBIN A1C: ICD-10-PCS | Mod: ,,, | Performed by: CLINICAL MEDICAL LABORATORY

## 2022-07-07 PROCEDURE — 99214 PR OFFICE/OUTPT VISIT, EST, LEVL IV, 30-39 MIN: ICD-10-PCS | Mod: 25,,, | Performed by: FAMILY MEDICINE

## 2022-07-07 PROCEDURE — 83036 HEMOGLOBIN GLYCOSYLATED A1C: CPT | Mod: ,,, | Performed by: CLINICAL MEDICAL LABORATORY

## 2022-07-07 PROCEDURE — 85025 CBC WITH DIFFERENTIAL: ICD-10-PCS | Mod: ,,, | Performed by: CLINICAL MEDICAL LABORATORY

## 2022-07-07 PROCEDURE — 96372 PR INJECTION,THERAP/PROPH/DIAG2ST, IM OR SUBCUT: ICD-10-PCS | Mod: ,,, | Performed by: FAMILY MEDICINE

## 2022-07-07 PROCEDURE — 99214 OFFICE O/P EST MOD 30 MIN: CPT | Mod: 25,,, | Performed by: FAMILY MEDICINE

## 2022-07-07 PROCEDURE — 80053 COMPREHENSIVE METABOLIC PANEL: ICD-10-PCS | Mod: ,,, | Performed by: CLINICAL MEDICAL LABORATORY

## 2022-07-07 RX ORDER — OMEPRAZOLE 40 MG/1
40 CAPSULE, DELAYED RELEASE ORAL
Qty: 180 CAPSULE | Refills: 1 | Status: SHIPPED | OUTPATIENT
Start: 2022-07-07 | End: 2022-08-04 | Stop reason: SDUPTHER

## 2022-07-07 RX ORDER — HYDROCHLOROTHIAZIDE 25 MG/1
25 TABLET ORAL DAILY
Qty: 90 TABLET | Refills: 1 | Status: SHIPPED | OUTPATIENT
Start: 2022-07-07 | End: 2022-08-04 | Stop reason: SDUPTHER

## 2022-07-07 RX ORDER — CARVEDILOL 3.12 MG/1
3.12 TABLET ORAL 2 TIMES DAILY
Qty: 180 TABLET | Refills: 1 | Status: SHIPPED | OUTPATIENT
Start: 2022-07-07 | End: 2022-10-13 | Stop reason: SDUPTHER

## 2022-07-07 RX ORDER — METHOCARBAMOL 500 MG/1
500 TABLET, FILM COATED ORAL 3 TIMES DAILY
COMMUNITY
Start: 2022-06-06 | End: 2022-07-07 | Stop reason: SDUPTHER

## 2022-07-07 RX ORDER — LISINOPRIL 2.5 MG/1
2.5 TABLET ORAL DAILY
Qty: 90 TABLET | Refills: 1 | Status: SHIPPED | OUTPATIENT
Start: 2022-07-07 | End: 2022-10-13 | Stop reason: SDUPTHER

## 2022-07-07 RX ORDER — INSULIN GLARGINE 100 [IU]/ML
10 INJECTION, SOLUTION SUBCUTANEOUS NIGHTLY
Qty: 15 EACH | Refills: 1 | Status: SHIPPED | OUTPATIENT
Start: 2022-07-07 | End: 2022-10-13 | Stop reason: SDUPTHER

## 2022-07-07 RX ORDER — IBUPROFEN 600 MG/1
600 TABLET ORAL EVERY 8 HOURS PRN
Qty: 90 TABLET | Refills: 2 | Status: SHIPPED | OUTPATIENT
Start: 2022-07-07 | End: 2022-10-13 | Stop reason: SDUPTHER

## 2022-07-07 RX ORDER — PANTOPRAZOLE SODIUM 40 MG/1
40 TABLET, DELAYED RELEASE ORAL 2 TIMES DAILY
COMMUNITY
Start: 2022-07-06 | End: 2022-07-07

## 2022-07-07 RX ORDER — DULOXETIN HYDROCHLORIDE 60 MG/1
60 CAPSULE, DELAYED RELEASE ORAL 2 TIMES DAILY
Qty: 180 CAPSULE | Refills: 1 | Status: SHIPPED | OUTPATIENT
Start: 2022-07-07 | End: 2022-10-13 | Stop reason: SDUPTHER

## 2022-07-07 RX ORDER — SEMAGLUTIDE 1.34 MG/ML
1 INJECTION, SOLUTION SUBCUTANEOUS
Qty: 2 PEN | Refills: 5 | Status: SHIPPED | OUTPATIENT
Start: 2022-07-07 | End: 2022-10-13 | Stop reason: SDUPTHER

## 2022-07-07 RX ORDER — PRAVASTATIN SODIUM 20 MG/1
20 TABLET ORAL NIGHTLY
Qty: 90 TABLET | Refills: 1 | Status: SHIPPED | OUTPATIENT
Start: 2022-07-07 | End: 2022-10-13 | Stop reason: SDUPTHER

## 2022-07-07 RX ORDER — KETOROLAC TROMETHAMINE 30 MG/ML
30 INJECTION, SOLUTION INTRAMUSCULAR; INTRAVENOUS
Status: COMPLETED | OUTPATIENT
Start: 2022-07-07 | End: 2022-07-07

## 2022-07-07 RX ORDER — GLIPIZIDE 10 MG/1
10 TABLET ORAL 2 TIMES DAILY WITH MEALS
Qty: 180 TABLET | Refills: 1 | Status: SHIPPED | OUTPATIENT
Start: 2022-07-07 | End: 2022-10-13 | Stop reason: SDUPTHER

## 2022-07-07 RX ORDER — METHOCARBAMOL 500 MG/1
500 TABLET, FILM COATED ORAL 3 TIMES DAILY PRN
Qty: 180 TABLET | Refills: 1 | Status: SHIPPED | OUTPATIENT
Start: 2022-07-07 | End: 2022-10-13 | Stop reason: SDUPTHER

## 2022-07-07 RX ORDER — MOMETASONE FUROATE 50 UG/1
2 SPRAY, METERED NASAL DAILY
Qty: 17 G | Refills: 3 | Status: SHIPPED | OUTPATIENT
Start: 2022-07-07 | End: 2022-10-13 | Stop reason: SDUPTHER

## 2022-07-07 RX ORDER — GABAPENTIN 600 MG/1
600 TABLET ORAL 3 TIMES DAILY
Qty: 270 TABLET | Refills: 1 | Status: SHIPPED | OUTPATIENT
Start: 2022-07-07 | End: 2022-10-13 | Stop reason: DRUGHIGH

## 2022-07-07 RX ADMIN — KETOROLAC TROMETHAMINE 30 MG: 30 INJECTION, SOLUTION INTRAMUSCULAR; INTRAVENOUS at 10:07

## 2022-07-07 NOTE — PROGRESS NOTES
"Clinic Note    Patient Name: Georgiana Zayas  : 1975  MRN: [id number]    HPI:    Chief Complaint   Patient presents with    Medication Refill     Lab work    Back Pain     Lower back pain radiating to right leg. Requesting a "pain shot"       Ms. Georgiana Zayas is a 47 y.o. female who present to clinic today with CC of follow up on chronic disease processes including Type II DM, HTN, HLD, GERD, MATT, and arthritis/chronic pain.  Patient reports chronic issues are well controlled on current medication regimen as long as she takes it. Patient admits she sometimes forgets to take her medications as prescribed.   Denies problems or side effects with medications.  Patient does report flare with lower back pain stating she has some pain radiating down RLE. Requesting shot for pain.  Patient is, otherwise, without complaints.     Medications:  Current Outpatient Medications on File Prior to Visit   Medication Sig Dispense Refill    hydrOXYzine pamoate (VISTARIL) 25 MG Cap Take 1 capsule (25 mg total) by mouth 3 (three) times daily. 270 capsule 1    nitroGLYCERIN (NITROSTAT) 0.4 MG SL tablet Place 0.4 mg under the tongue every 5 (five) minutes as needed for Chest pain.      oxyCODONE-acetaminophen (PERCOCET)  mg per tablet Take 1 tablet by mouth every 6 (six) hours as needed.       risperiDONE (RISPERDAL) 3 MG Tab Take 1 tablet (3 mg total) by mouth once daily. 90 tablet 1    traZODone (DESYREL) 100 MG tablet Take 1 tablet (100 mg total) by mouth every evening. 90 tablet 1    [DISCONTINUED] carvediloL (COREG) 3.125 MG tablet Take 1 tablet (3.125 mg total) by mouth 2 (two) times daily. 180 tablet 1    [DISCONTINUED] DULoxetine (CYMBALTA) 60 MG capsule Take 1 capsule (60 mg total) by mouth 2 (two) times daily. 180 capsule 1    [DISCONTINUED] gabapentin (NEURONTIN) 600 MG tablet Take 1 tablet (600 mg total) by mouth 3 (three) times daily. 90 tablet 1    [DISCONTINUED] glipiZIDE (GLUCOTROL) 10 MG tablet " Take 1 tablet (10 mg total) by mouth 2 (two) times daily with meals. 180 tablet 1    [DISCONTINUED] hydroCHLOROthiazide (HYDRODIURIL) 25 MG tablet Take 1 tablet (25 mg total) by mouth once daily. 30 tablet 0    [DISCONTINUED] ibuprofen (ADVIL,MOTRIN) 600 MG tablet Take 1 tablet (600 mg total) by mouth every 8 (eight) hours as needed for Pain. 30 tablet 1    [DISCONTINUED] insulin (LANTUS SOLOSTAR U-100 INSULIN) glargine 100 units/mL (3mL) SubQ pen Inject 10 Units into the skin every evening. 15 each 1    [DISCONTINUED] lisinopriL (PRINIVIL,ZESTRIL) 2.5 MG tablet Take 1 tablet (2.5 mg total) by mouth once daily. 90 tablet 1    [DISCONTINUED] methocarbamoL (ROBAXIN) 500 MG Tab Take 500 mg by mouth 3 (three) times daily.      [DISCONTINUED] mometasone (NASONEX) 50 mcg/actuation nasal spray 2 sprays by Nasal route once daily.      [DISCONTINUED] omeprazole (PRILOSEC) 40 MG capsule Take 1 capsule (40 mg total) by mouth 2 (two) times daily before meals. 60 capsule 0    [DISCONTINUED] pantoprazole (PROTONIX) 40 MG tablet Take 40 mg by mouth 2 (two) times daily.      [DISCONTINUED] pravastatin (PRAVACHOL) 20 MG tablet TAKE 1 TABLET (20 MG TOTAL) BY MOUTH EVERY EVENING. 90 tablet 1    [DISCONTINUED] semaglutide (OZEMPIC) 1 mg/dose (2 mg/1.5 mL) PnIj Inject 1 mg into the skin every 7 days. 2 pen 5    [DISCONTINUED] naldemedine (SYMPROIC) 0.2 mg Tab Take 0.2 mg by mouth once daily. (Patient not taking: No sig reported) 90 tablet 1    [DISCONTINUED] naloxegoL (MOVANTIK) 25 mg tablet Take 25 mg by mouth once daily. (Patient not taking: Reported on 7/7/2022) 30 tablet 0    [DISCONTINUED] plecanatide (TRULANCE) 3 mg Tab Take 3 mg by mouth once daily at 6am. (Patient not taking: Reported on 7/7/2022) 90 tablet 3     No current facility-administered medications on file prior to visit.         Allergies: Codeine      Past Medical History:    Past Medical History:   Diagnosis Date    Arthritis     Diabetes mellitus      Diabetes mellitus, type 2     GERD (gastroesophageal reflux disease)     Hearing difficulty     Hypertension     Liver disease     Sleep apnea        Past Surgical History:    Past Surgical History:   Procedure Laterality Date    CARPAL TUNNEL RELEASE Bilateral      SECTION      INJECTION OF ANESTHETIC AGENT AROUND MEDIAL BRANCH NERVES INNERVATING LUMBAR FACET JOINT Bilateral 2021    Procedure: BLOCK, NERVE, FACET JOINT, LUMBAR, MEDIAL BRANCH;  Surgeon: Amari Razo MD;  Location: [facility] PAIN [facility];  Service: Pain Management;  Laterality: Bilateral;  Bilateral L3-S1 Facet Injection    INJECTION OF ANESTHETIC AGENT AROUND MEDIAL BRANCH NERVES INNERVATING LUMBAR FACET JOINT Bilateral 2022    Procedure: BLOCK, NERVE, FACET JOINT, LUMBAR, MEDIAL BRANCH;  Surgeon: Amari Razo MD;  Location: [facility] PAIN [facility]MT;  Service: Pain Management;  Laterality: Bilateral;  Bilateral L3-S1 FI    LEFT HEART CATHETERIZATION Left 2021    Procedure: Left heart cath;  Surgeon: Jeremy Rojo DO;  Location: [facility] CATH LAB;  Service: Cardiology;  Laterality: Left;    STAPEDES SURGERY Bilateral     TONSILLECTOMY           Social History:    Social History     Tobacco Use   Smoking Status Never Smoker   Smokeless Tobacco Never Used     Social History     Substance and Sexual Activity   Alcohol Use Not Currently     Social History     Substance and Sexual Activity   Drug Use Never         Family History:    Family History   Problem Relation Age of Onset    Cancer Mother     Diabetes Mother     Hypertension Mother        Review of Systems:    Review of Systems   Constitutional: Positive for fatigue. Negative for appetite change, chills, fever and unexpected weight change.   Eyes: Positive for visual disturbance.   Respiratory: Negative for cough and shortness of breath.    Cardiovascular: Negative for chest pain and leg swelling.        Denies any chest pain currently but reports she  "has had chest pain recently. She has been evaluated by Cardiology and was cleared. States she was told it was likely related to GERD. She is scheduled for EGD later this month   Gastrointestinal: Positive for constipation. Negative for abdominal pain, change in bowel habit, diarrhea, nausea, vomiting and change in bowel habit.        + GERD   Musculoskeletal: Positive for arthralgias and back pain.   Integumentary:  Negative for rash.   Neurological: Negative for dizziness and headaches.   Psychiatric/Behavioral: The patient is not nervous/anxious.         Vitals:    /86 (BP Location: Right arm, Patient Position: Sitting, BP Method: Medium (Manual))   Pulse 93   Temp 97.4 °F (36.3 °C) (Skin)   Resp 20   Ht 5' 4" (1.626 m)   Wt 91.4 kg (201 lb 6.4 oz)   SpO2 99%   BMI 34.57 kg/m²        Physical Exam:    Physical Exam  Constitutional:       General: She is not in acute distress.     Appearance: Normal appearance. She is obese.   HENT:      Nose: Nose normal.      Mouth/Throat:      Mouth: Mucous membranes are moist.      Pharynx: Oropharynx is clear.   Eyes:      Conjunctiva/sclera: Conjunctivae normal.   Cardiovascular:      Rate and Rhythm: Normal rate and regular rhythm.      Heart sounds: Normal heart sounds. No murmur heard.  Pulmonary:      Effort: Pulmonary effort is normal. No respiratory distress.      Breath sounds: Normal breath sounds. No wheezing, rhonchi or rales.   Abdominal:      General: Bowel sounds are normal.      Palpations: Abdomen is soft.      Tenderness: There is no abdominal tenderness.   Musculoskeletal:      Cervical back: Neck supple.   Skin:     Findings: No rash.   Neurological:      General: No focal deficit present.      Mental Status: She is alert. Mental status is at baseline.   Psychiatric:         Mood and Affect: Mood normal.         Assessment/Plan:   Type 2 diabetes mellitus with diabetic neuropathy, with long-term current use of insulin  -     Comprehensive " Metabolic Panel; Future; Expected date: 07/07/2022  -     CBC Auto Differential; Future; Expected date: 07/07/2022  -     Lipid Panel; Future; Expected date: 07/07/2022  -     Hemoglobin A1C; Future; Expected date: 07/07/2022  -     glipiZIDE (GLUCOTROL) 10 MG tablet; Take 1 tablet (10 mg total) by mouth 2 (two) times daily with meals.  Dispense: 180 tablet; Refill: 1  -     insulin (LANTUS SOLOSTAR U-100 INSULIN) glargine 100 units/mL SubQ pen; Inject 10 Units into the skin every evening.  Dispense: 15 each; Refill: 1  -     semaglutide (OZEMPIC) 1 mg/dose (2 mg/1.5 mL) PnIj; Inject 1 mg into the skin every 7 days.  Dispense: 2 pen; Refill: 5    Essential hypertension  -     Comprehensive Metabolic Panel; Future; Expected date: 07/07/2022  -     CBC Auto Differential; Future; Expected date: 07/07/2022  -     Lipid Panel; Future; Expected date: 07/07/2022  -     carvediloL (COREG) 3.125 MG tablet; Take 1 tablet (3.125 mg total) by mouth 2 (two) times daily.  Dispense: 180 tablet; Refill: 1  -     hydroCHLOROthiazide (HYDRODIURIL) 25 MG tablet; Take 1 tablet (25 mg total) by mouth once daily.  Dispense: 90 tablet; Refill: 1  -     lisinopriL (PRINIVIL,ZESTRIL) 2.5 MG tablet; Take 1 tablet (2.5 mg total) by mouth once daily.  Dispense: 90 tablet; Refill: 1    Chronic bilateral low back pain with right-sided sciatica  -     DULoxetine (CYMBALTA) 60 MG capsule; Take 1 capsule (60 mg total) by mouth 2 (two) times daily.  Dispense: 180 capsule; Refill: 1  -     gabapentin (NEURONTIN) 600 MG tablet; Take 1 tablet (600 mg total) by mouth 3 (three) times daily.  Dispense: 270 tablet; Refill: 1  -     ibuprofen (ADVIL,MOTRIN) 600 MG tablet; Take 1 tablet (600 mg total) by mouth every 8 (eight) hours as needed for Pain.  Dispense: 90 tablet; Refill: 2  -     ketorolac injection 30 mg    Other hyperlipidemia  -     Comprehensive Metabolic Panel; Future; Expected date: 07/07/2022  -     CBC Auto Differential; Future; Expected  date: 07/07/2022  -     Lipid Panel; Future; Expected date: 07/07/2022  -     pravastatin (PRAVACHOL) 20 MG tablet; Take 1 tablet (20 mg total) by mouth every evening.  Dispense: 90 tablet; Refill: 1    Muscle spasm  -     methocarbamoL (ROBAXIN) 500 MG Tab; Take 1 tablet (500 mg total) by mouth 3 (three) times daily as needed (muscle spasm/back pain. May cause drowsiness.).  Dispense: 180 tablet; Refill: 1    Seasonal allergies  -     mometasone (NASONEX) 50 mcg/actuation nasal spray; 2 sprays by Nasal route once daily.  Dispense: 17 g; Refill: 3    Gastroesophageal reflux disease without esophagitis  -     omeprazole (PRILOSEC) 40 MG capsule; Take 1 capsule (40 mg total) by mouth 2 (two) times daily before meals.  Dispense: 180 capsule; Refill: 1         RTC in 3 months for chronic follow up.  RTC sooner if needed.   Patient voiced understanding and is agreeable to plan.      Lisandra Mcdaniel MD    Family Medicine

## 2022-07-13 NOTE — PROGRESS NOTES
Notified and advised patient of lab results, states understanding the importance of getting her numbers under control.

## 2022-07-21 ENCOUNTER — ANESTHESIA (OUTPATIENT)
Dept: GASTROENTEROLOGY | Facility: HOSPITAL | Age: 47
End: 2022-07-21
Payer: COMMERCIAL

## 2022-07-21 ENCOUNTER — ANESTHESIA EVENT (OUTPATIENT)
Dept: GASTROENTEROLOGY | Facility: HOSPITAL | Age: 47
End: 2022-07-21
Payer: COMMERCIAL

## 2022-07-21 ENCOUNTER — HOSPITAL ENCOUNTER (OUTPATIENT)
Dept: GASTROENTEROLOGY | Facility: HOSPITAL | Age: 47
Discharge: HOME | End: 2022-07-21
Attending: NURSE PRACTITIONER
Payer: COMMERCIAL

## 2022-07-21 VITALS
HEIGHT: 64 IN | BODY MASS INDEX: 34.83 KG/M2 | OXYGEN SATURATION: 96 % | RESPIRATION RATE: 13 BRPM | DIASTOLIC BLOOD PRESSURE: 98 MMHG | HEART RATE: 80 BPM | WEIGHT: 204 LBS | TEMPERATURE: 98 F | SYSTOLIC BLOOD PRESSURE: 143 MMHG

## 2022-07-21 DIAGNOSIS — K21.9 GASTROESOPHAGEAL REFLUX DISEASE, UNSPECIFIED WHETHER ESOPHAGITIS PRESENT: ICD-10-CM

## 2022-07-21 LAB
B-HCG UR QL: NEGATIVE
CTP QC/QA: YES
GLUCOSE SERPL-MCNC: 136 MG/DL (ref 70–105)
GLUCOSE SERPL-MCNC: 136 MG/DL (ref 70–110)

## 2022-07-21 PROCEDURE — 27201423 OPTIME MED/SURG SUP & DEVICES STERILE SUPPLY

## 2022-07-21 PROCEDURE — 43239 PR EGD, FLEX, W/BIOPSY, SGL/MULTI: ICD-10-PCS | Mod: 59,,, | Performed by: STUDENT IN AN ORGANIZED HEALTH CARE EDUCATION/TRAINING PROGRAM

## 2022-07-21 PROCEDURE — 43248 EGD GUIDE WIRE INSERTION: CPT | Mod: ,,, | Performed by: STUDENT IN AN ORGANIZED HEALTH CARE EDUCATION/TRAINING PROGRAM

## 2022-07-21 PROCEDURE — 82962 GLUCOSE BLOOD TEST: CPT

## 2022-07-21 PROCEDURE — 43248 EGD GUIDE WIRE INSERTION: CPT

## 2022-07-21 PROCEDURE — 25000003 PHARM REV CODE 250: Performed by: STUDENT IN AN ORGANIZED HEALTH CARE EDUCATION/TRAINING PROGRAM

## 2022-07-21 PROCEDURE — D9220A PRA ANESTHESIA: ICD-10-PCS | Mod: ,,, | Performed by: NURSE ANESTHETIST, CERTIFIED REGISTERED

## 2022-07-21 PROCEDURE — 43239 EGD BIOPSY SINGLE/MULTIPLE: CPT | Mod: 59

## 2022-07-21 PROCEDURE — 37000008 HC ANESTHESIA 1ST 15 MINUTES

## 2022-07-21 PROCEDURE — 63600175 PHARM REV CODE 636 W HCPCS: Performed by: NURSE ANESTHETIST, CERTIFIED REGISTERED

## 2022-07-21 PROCEDURE — 43239 EGD BIOPSY SINGLE/MULTIPLE: CPT | Mod: 59,,, | Performed by: STUDENT IN AN ORGANIZED HEALTH CARE EDUCATION/TRAINING PROGRAM

## 2022-07-21 PROCEDURE — D9220A PRA ANESTHESIA: Mod: ,,, | Performed by: NURSE ANESTHETIST, CERTIFIED REGISTERED

## 2022-07-21 PROCEDURE — 43248 PR EGD, FLEX, W/DILATION OVER GUIDEWIRE: ICD-10-PCS | Mod: ,,, | Performed by: STUDENT IN AN ORGANIZED HEALTH CARE EDUCATION/TRAINING PROGRAM

## 2022-07-21 PROCEDURE — C1889 IMPLANT/INSERT DEVICE, NOC: HCPCS

## 2022-07-21 PROCEDURE — 81025 URINE PREGNANCY TEST: CPT | Performed by: STUDENT IN AN ORGANIZED HEALTH CARE EDUCATION/TRAINING PROGRAM

## 2022-07-21 PROCEDURE — 25000003 PHARM REV CODE 250: Performed by: NURSE ANESTHETIST, CERTIFIED REGISTERED

## 2022-07-21 RX ORDER — SODIUM CHLORIDE 9 MG/ML
INJECTION, SOLUTION INTRAVENOUS CONTINUOUS
Status: DISCONTINUED | OUTPATIENT
Start: 2022-07-21 | End: 2022-07-22 | Stop reason: HOSPADM

## 2022-07-21 RX ORDER — LIDOCAINE HYDROCHLORIDE 20 MG/ML
INJECTION, SOLUTION EPIDURAL; INFILTRATION; INTRACAUDAL; PERINEURAL
Status: DISCONTINUED | OUTPATIENT
Start: 2022-07-21 | End: 2022-07-21

## 2022-07-21 RX ORDER — PROPOFOL 10 MG/ML
VIAL (ML) INTRAVENOUS
Status: DISCONTINUED | OUTPATIENT
Start: 2022-07-21 | End: 2022-07-21

## 2022-07-21 RX ORDER — PROCHLORPERAZINE EDISYLATE 5 MG/ML
5 INJECTION INTRAMUSCULAR; INTRAVENOUS ONCE AS NEEDED
Status: DISCONTINUED | OUTPATIENT
Start: 2022-07-21 | End: 2022-07-22 | Stop reason: HOSPADM

## 2022-07-21 RX ORDER — ONDANSETRON 2 MG/ML
4 INJECTION INTRAMUSCULAR; INTRAVENOUS ONCE AS NEEDED
Status: DISCONTINUED | OUTPATIENT
Start: 2022-07-21 | End: 2022-07-22 | Stop reason: HOSPADM

## 2022-07-21 RX ORDER — SODIUM CHLORIDE 0.9 % (FLUSH) 0.9 %
10 SYRINGE (ML) INJECTION
Status: DISCONTINUED | OUTPATIENT
Start: 2022-07-21 | End: 2022-07-22 | Stop reason: HOSPADM

## 2022-07-21 RX ADMIN — SODIUM CHLORIDE: 9 INJECTION, SOLUTION INTRAVENOUS at 10:07

## 2022-07-21 RX ADMIN — LIDOCAINE HYDROCHLORIDE 50 MG: 20 INJECTION, SOLUTION INTRAVENOUS at 10:07

## 2022-07-21 RX ADMIN — PROPOFOL 25 MG: 10 INJECTION, EMULSION INTRAVENOUS at 10:07

## 2022-07-21 RX ADMIN — PROPOFOL 100 MG: 10 INJECTION, EMULSION INTRAVENOUS at 10:07

## 2022-07-21 NOTE — ANESTHESIA POSTPROCEDURE EVALUATION
Anesthesia Post Evaluation    Patient: Georgiana Zayas    Procedure(s) Performed: *egd *    Final Anesthesia Type: general      Patient location during evaluation: GI PACU  Patient participation: Yes- Able to Participate  Level of consciousness: awake and alert  Post-procedure vital signs: reviewed and stable  Pain management: adequate  Airway patency: patent    PONV status at discharge: No PONV  Anesthetic complications: no      Cardiovascular status: blood pressure returned to baseline and hemodynamically stable  Respiratory status: spontaneous ventilation  Hydration status: euvolemic  Follow-up not needed.          Vitals Value Taken Time   /98 07/21/22 1140   Temp 36.8 °C (98.2 °F) 07/21/22 1106   Pulse 85 07/21/22 1135   Resp 13 07/21/22 1140   SpO2 96 % 07/21/22 1135   Vitals shown include unvalidated device data.      Event Time   Out of Recovery 11:45:22         Pain/Ralph Score: Ralph Score: 10 (7/21/2022 11:14 AM)

## 2022-07-21 NOTE — ANESTHESIA PREPROCEDURE EVALUATION
2022  Georgiana Zayas is a 47 y.o., female.      Pre-op Assessment    I have reviewed the Patient Summary Reports.     I have reviewed the Nursing Notes. I have reviewed the NPO Status.   I have reviewed the Medications.     Review of Systems  Anesthesia Hx:  No problems with previous Anesthesia      Past Medical History:   Diagnosis Date    Arthritis     Diabetes mellitus     Diabetes mellitus, type 2     GERD (gastroesophageal reflux disease)     Hearing difficulty     Hypertension     Liver disease     Sleep apnea        Past Surgical History:   Procedure Laterality Date    CARPAL TUNNEL RELEASE Bilateral      SECTION      INJECTION OF ANESTHETIC AGENT AROUND MEDIAL BRANCH NERVES INNERVATING LUMBAR FACET JOINT Bilateral 2021    Procedure: BLOCK, NERVE, FACET JOINT, LUMBAR, MEDIAL BRANCH;  Surgeon: Amari Razo MD;  Location: [facility] PAIN [facility];  Service: Pain Management;  Laterality: Bilateral;  Bilateral L3-S1 Facet Injection    INJECTION OF ANESTHETIC AGENT AROUND MEDIAL BRANCH NERVES INNERVATING LUMBAR FACET JOINT Bilateral 2022    Procedure: BLOCK, NERVE, FACET JOINT, LUMBAR, MEDIAL BRANCH;  Surgeon: Amari Razo MD;  Location: [facility] PAIN [facility]MT;  Service: Pain Management;  Laterality: Bilateral;  Bilateral L3-S1 FI    LEFT HEART CATHETERIZATION Left 2021    Procedure: Left heart cath;  Surgeon: Jeremy Rojo DO;  Location: [facility] CATH LAB;  Service: Cardiology;  Laterality: Left;    STAPEDES SURGERY Bilateral     TONSILLECTOMY         Family History   Problem Relation Age of Onset    Cancer Mother     Diabetes Mother     Hypertension Mother        Social History     Socioeconomic History    Marital status: Single   Tobacco Use    Smoking status: Never Smoker    Smokeless tobacco: Never Used   Substance and Sexual Activity     Alcohol use: Not Currently    Drug use: Never    Sexual activity: Yes     Birth control/protection: Condom       Current Outpatient Medications   Medication Sig Dispense Refill    carvediloL (COREG) 3.125 MG tablet Take 1 tablet (3.125 mg total) by mouth 2 (two) times daily. 180 tablet 1    DULoxetine (CYMBALTA) 60 MG capsule Take 1 capsule (60 mg total) by mouth 2 (two) times daily. 180 capsule 1    gabapentin (NEURONTIN) 600 MG tablet Take 1 tablet (600 mg total) by mouth 3 (three) times daily. 270 tablet 1    glipiZIDE (GLUCOTROL) 10 MG tablet Take 1 tablet (10 mg total) by mouth 2 (two) times daily with meals. 180 tablet 1    hydroCHLOROthiazide (HYDRODIURIL) 25 MG tablet Take 1 tablet (25 mg total) by mouth once daily. 90 tablet 1    hydrOXYzine pamoate (VISTARIL) 25 MG Cap Take 1 capsule (25 mg total) by mouth 3 (three) times daily. 270 capsule 1    ibuprofen (ADVIL,MOTRIN) 600 MG tablet Take 1 tablet (600 mg total) by mouth every 8 (eight) hours as needed for Pain. 90 tablet 2    insulin (LANTUS SOLOSTAR U-100 INSULIN) glargine 100 units/mL SubQ pen Inject 10 Units into the skin every evening. 15 each 1    lisinopriL (PRINIVIL,ZESTRIL) 2.5 MG tablet Take 1 tablet (2.5 mg total) by mouth once daily. 90 tablet 1    methocarbamoL (ROBAXIN) 500 MG Tab Take 1 tablet (500 mg total) by mouth 3 (three) times daily as needed (muscle spasm/back pain. May cause drowsiness.). 180 tablet 1    mometasone (NASONEX) 50 mcg/actuation nasal spray 2 sprays by Nasal route once daily. 17 g 3    oxyCODONE-acetaminophen (PERCOCET)  mg per tablet Take 1 tablet by mouth every 6 (six) hours as needed.       pravastatin (PRAVACHOL) 20 MG tablet Take 1 tablet (20 mg total) by mouth every evening. 90 tablet 1    risperiDONE (RISPERDAL) 3 MG Tab Take 1 tablet (3 mg total) by mouth once daily. 90 tablet 1    semaglutide (OZEMPIC) 1 mg/dose (2 mg/1.5 mL) PnIj Inject 1 mg into the skin every 7 days. 2 pen 5     "traZODone (DESYREL) 100 MG tablet Take 1 tablet (100 mg total) by mouth every evening. 90 tablet 1    nitroGLYCERIN (NITROSTAT) 0.4 MG SL tablet Place 0.4 mg under the tongue every 5 (five) minutes as needed for Chest pain.      omeprazole (PRILOSEC) 40 MG capsule Take 1 capsule (40 mg total) by mouth 2 (two) times daily before meals. 180 capsule 1     Current Facility-Administered Medications   Medication Dose Route Frequency Provider Last Rate Last Admin    0.9%  NaCl infusion   Intravenous Continuous Jaime Miranda MD        ondansetron injection 4 mg  4 mg Intravenous Once PRN Jaime Miranda MD        prochlorperazine injection Soln 5 mg  5 mg Intravenous Once PRN Jaime Miranda MD        sodium chloride 0.9% flush 10 mL  10 mL Intravenous PRN Jaime Miranda MD           Review of patient's allergies indicates:   Allergen Reactions    Codeine Itching       Physical Exam  General: Well nourished, Cooperative, Alert and Oriented    Airway:  Mallampati: II   Mouth Opening: Normal  TM Distance: Normal  Tongue: Normal  Neck ROM: Normal ROM    Dental:  Intact        Anesthesia Plan  Type of Anesthesia, risks & benefits discussed:    Anesthesia Type: Gen Natural Airway  Intra-op Monitoring Plan: Standard ASA Monitors  Post Op Pain Control Plan: multimodal analgesia  Induction:  IV  Informed Consent: Informed consent signed with the Patient and all parties understand the risks and agree with anesthesia plan.  All questions answered. Patient consented to blood products? Yes  ASA Score: 3  Day of Surgery Review of History & Physical: I have interviewed and examined the patient. I have reviewed the patient's H&P dated:   Anesthesia Plan Notes: Heart cath in 2021 revealed "no coronary obstruction".  LVEF 55%.    Ready For Surgery From Anesthesia Perspective.     .   Latest Reference Range & Units 07/21/22 09:58   POC Glucose 70 - 105 mg/dL 136 (H)   (H): Data is abnormally high   Latest Reference Range & Units " 07/21/22 09:57   Preg Test, Ur Negative  Negative

## 2022-07-21 NOTE — DISCHARGE INSTRUCTIONS
Procedure Date  7/21/22     Impression  Overall Impression: Relatively normal EGD. Random esophageal biopsies obtained. Empiric guidewire dilation to 17mm performed successfully.     Recommendation  Await pathology results  THE NURSE WILL CALL YOU WITH YOUR BIOPSY RESULTS IN A FEW DAYS.   NO DRIVING, OPERATING EQUIPMENT, OR SIGNING LEGAL DOCUMENTS FOR 24 HOURS.

## 2022-07-21 NOTE — H&P
History of Present Illness    Georgiana Zayas is a 47 y.o. female that  has a past medical history of Arthritis, Diabetes mellitus, Diabetes mellitus, type 2, GERD (gastroesophageal reflux disease), Hearing difficulty, Hypertension, Liver disease, and Sleep apnea.     Follows in GI clinic for GERD and constipation.   Prilosec changed to protonix then back to prilosec.  Tried Symproic and Linzess but had cramps. Started on IBSrela.  EGD ordered for chronic GERD and screening colonoscopy ordered.  Also on Ozempic, Cymbalta, risperidone, trazodone, hydroxyzine, oxycodone.  Hgb 13. A1c 8.7.  CT  with fatty liver.  Relatively normal stress test in .    Patient otherwise denies any:  - black stools  - bloody stools  - nausea  - vomiting  - diarrhea  - constipation  - abdominal pain  - family history of GI related malignancies    ROS  - 12 point review of systems is negative except as otherwise stated in HPI.    Past Medical History:   Diagnosis Date    Arthritis     Diabetes mellitus     Diabetes mellitus, type 2     GERD (gastroesophageal reflux disease)     Hearing difficulty     Hypertension     Liver disease     Sleep apnea        Past Surgical History:   Procedure Laterality Date    CARPAL TUNNEL RELEASE Bilateral      SECTION      INJECTION OF ANESTHETIC AGENT AROUND MEDIAL BRANCH NERVES INNERVATING LUMBAR FACET JOINT Bilateral 2021    Procedure: BLOCK, NERVE, FACET JOINT, LUMBAR, MEDIAL BRANCH;  Surgeon: Amari Razo MD;  Location: [facility];  Service: Pain Management;  Laterality: Bilateral;  Bilateral L3-S1 Facet Injection    INJECTION OF ANESTHETIC AGENT AROUND MEDIAL BRANCH NERVES INNERVATING LUMBAR FACET JOINT Bilateral 2022    Procedure: BLOCK, NERVE, FACET JOINT, LUMBAR, MEDIAL BRANCH;  Surgeon: Amari Razo MD;  Location: [facility];  Service: Pain Management;  Laterality: Bilateral;  Bilateral L3-S1 FI    LEFT HEART CATHETERIZATION Left  12/21/2021    Procedure: Left heart cath;  Surgeon: Jeremy Rojo DO;  Location: [facility] CATH LAB;  Service: Cardiology;  Laterality: Left;    STAPEDES SURGERY Bilateral     TONSILLECTOMY         Family History   Problem Relation Age of Onset    Cancer Mother     Diabetes Mother     Hypertension Mother        Social History     Socioeconomic History    Marital status: Single   Tobacco Use    Smoking status: Never Smoker    Smokeless tobacco: Never Used   Substance and Sexual Activity    Alcohol use: Not Currently    Drug use: Never    Sexual activity: Yes     Birth control/protection: Condom       Current Outpatient Medications   Medication Sig Dispense Refill    carvediloL (COREG) 3.125 MG tablet Take 1 tablet (3.125 mg total) by mouth 2 (two) times daily. 180 tablet 1    DULoxetine (CYMBALTA) 60 MG capsule Take 1 capsule (60 mg total) by mouth 2 (two) times daily. 180 capsule 1    gabapentin (NEURONTIN) 600 MG tablet Take 1 tablet (600 mg total) by mouth 3 (three) times daily. 270 tablet 1    glipiZIDE (GLUCOTROL) 10 MG tablet Take 1 tablet (10 mg total) by mouth 2 (two) times daily with meals. 180 tablet 1    hydroCHLOROthiazide (HYDRODIURIL) 25 MG tablet Take 1 tablet (25 mg total) by mouth once daily. 90 tablet 1    hydrOXYzine pamoate (VISTARIL) 25 MG Cap Take 1 capsule (25 mg total) by mouth 3 (three) times daily. 270 capsule 1    ibuprofen (ADVIL,MOTRIN) 600 MG tablet Take 1 tablet (600 mg total) by mouth every 8 (eight) hours as needed for Pain. 90 tablet 2    insulin (LANTUS SOLOSTAR U-100 INSULIN) glargine 100 units/mL SubQ pen Inject 10 Units into the skin every evening. 15 each 1    lisinopriL (PRINIVIL,ZESTRIL) 2.5 MG tablet Take 1 tablet (2.5 mg total) by mouth once daily. 90 tablet 1    methocarbamoL (ROBAXIN) 500 MG Tab Take 1 tablet (500 mg total) by mouth 3 (three) times daily as needed (muscle spasm/back pain. May cause drowsiness.). 180 tablet 1    mometasone (NASONEX)  50 mcg/actuation nasal spray 2 sprays by Nasal route once daily. 17 g 3    nitroGLYCERIN (NITROSTAT) 0.4 MG SL tablet Place 0.4 mg under the tongue every 5 (five) minutes as needed for Chest pain.      omeprazole (PRILOSEC) 40 MG capsule Take 1 capsule (40 mg total) by mouth 2 (two) times daily before meals. 180 capsule 1    oxyCODONE-acetaminophen (PERCOCET)  mg per tablet Take 1 tablet by mouth every 6 (six) hours as needed.       pravastatin (PRAVACHOL) 20 MG tablet Take 1 tablet (20 mg total) by mouth every evening. 90 tablet 1    risperiDONE (RISPERDAL) 3 MG Tab Take 1 tablet (3 mg total) by mouth once daily. 90 tablet 1    semaglutide (OZEMPIC) 1 mg/dose (2 mg/1.5 mL) PnIj Inject 1 mg into the skin every 7 days. 2 pen 5    traZODone (DESYREL) 100 MG tablet Take 1 tablet (100 mg total) by mouth every evening. 90 tablet 1     No current facility-administered medications for this encounter.       Review of patient's allergies indicates:   Allergen Reactions    Codeine Itching       Objective:  There were no vitals filed for this visit.     Constitutional:       General: no acute distress.     Appearance: Normal appearance. Non toxic-appearing.   HENT:      Head: Normocephalic and atraumatic.      Mouth/Throat:      Pharynx: Oropharynx is clear. No posterior oropharyngeal erythema.   Eyes:      General: No scleral icterus.     Conjunctiva/sclera: Conjunctivae normal.   Cardiovascular:      Rate and Rhythm: Normal rate and regular rhythm.      Heart sounds: Normal heart sounds.   Pulmonary:      Effort: Pulmonary effort is normal.      Breath sounds: Normal breath sounds.   Abdominal:      General: Abdomen is flat. There is no distension.      Palpations: Abdomen is soft. There is no mass.      Tenderness: There is no abdominal tenderness. There is no guarding.   Musculoskeletal:         General: No swelling or deformity.      Cervical back: Normal range of motion and neck supple.   Skin:     General:  Skin is warm and dry.   Neurological:      General: No focal deficit present.      Mental Status: alert and oriented to person, place, and time.     Assessment and Plan:  Proceed with:  EGD for chronic GERD.    Jorge Miranda MD  Gastroenterology

## 2022-07-21 NOTE — TRANSFER OF CARE
"Anesthesia Transfer of Care Note    Patient: Georgiana Zayas    Procedure(s) Performed: * egd, dilation *    Patient location: GI    Anesthesia Type: general    Transport from OR: Transported from OR on room air with adequate spontaneous ventilation    Post pain: adequate analgesia    Post assessment: no apparent anesthetic complications    Post vital signs: stable    Level of consciousness: responds to stimulation    Nausea/Vomiting: no nausea/vomiting    Complications: none    Transfer of care protocol was followed      Last vitals:   Visit Vitals  BP (!) 155/88   Pulse 85   Temp 36.8 °C (98.2 °F) (Oral)   Resp 20   Ht 5' 4" (1.626 m)   Wt 92.5 kg (204 lb)   SpO2 99%   Breastfeeding No   BMI 35.02 kg/m²     "

## 2022-07-22 LAB
ESTROGEN SERPL-MCNC: NORMAL PG/ML
INSULIN SERPL-ACNC: NORMAL U[IU]/ML
LAB AP GROSS DESCRIPTION: NORMAL
LAB AP LABORATORY NOTES: NORMAL
T3RU NFR SERPL: NORMAL %

## 2022-08-04 ENCOUNTER — OFFICE VISIT (OUTPATIENT)
Dept: FAMILY MEDICINE | Facility: CLINIC | Age: 47
End: 2022-08-04
Payer: COMMERCIAL

## 2022-08-04 VITALS
RESPIRATION RATE: 20 BRPM | TEMPERATURE: 97 F | SYSTOLIC BLOOD PRESSURE: 118 MMHG | BODY MASS INDEX: 35.28 KG/M2 | HEIGHT: 64 IN | OXYGEN SATURATION: 98 % | WEIGHT: 206.63 LBS | DIASTOLIC BLOOD PRESSURE: 70 MMHG | HEART RATE: 110 BPM

## 2022-08-04 DIAGNOSIS — I10 ESSENTIAL HYPERTENSION: Chronic | ICD-10-CM

## 2022-08-04 DIAGNOSIS — K21.9 GASTROESOPHAGEAL REFLUX DISEASE WITHOUT ESOPHAGITIS: Chronic | ICD-10-CM

## 2022-08-04 DIAGNOSIS — R42 DIZZINESS: Primary | ICD-10-CM

## 2022-08-04 PROCEDURE — 99213 PR OFFICE/OUTPT VISIT, EST, LEVL III, 20-29 MIN: ICD-10-PCS | Mod: ,,, | Performed by: NURSE PRACTITIONER

## 2022-08-04 PROCEDURE — 99213 OFFICE O/P EST LOW 20 MIN: CPT | Mod: ,,, | Performed by: NURSE PRACTITIONER

## 2022-08-04 RX ORDER — HYDROCHLOROTHIAZIDE 25 MG/1
25 TABLET ORAL DAILY
Qty: 90 TABLET | Refills: 1 | Status: SHIPPED | OUTPATIENT
Start: 2022-08-04 | End: 2022-10-13 | Stop reason: SDUPTHER

## 2022-08-04 RX ORDER — OMEPRAZOLE 40 MG/1
40 CAPSULE, DELAYED RELEASE ORAL
Qty: 180 CAPSULE | Refills: 1 | Status: SHIPPED | OUTPATIENT
Start: 2022-08-04 | End: 2022-10-13 | Stop reason: SDUPTHER

## 2022-08-04 NOTE — TELEPHONE ENCOUNTER
----- Message from Luis Rangel sent at 8/4/2022  9:34 AM CDT -----  Regarding: med refill  Pt needs this medhydroCHLOROthiazide sent to [facility] (MAIL ORDER) 90 day supply pt was here today

## 2022-08-04 NOTE — PROGRESS NOTES
New clinic note    Georgiana Zayas is a 47 y.o. female     Chief Complaint:   Chief Complaint   Patient presents with    Hypertension     BP medication drops BP to low causing dizziness -(lowest at home readings- 86/69)     Medication Refill        Subjective:    Patient complains of dizziness and episodes of blood pressure dropping too low. Patient reports last week she began having episodes of hypotension and dizziness. Patient reports 6 days ago she went to urgent care. Reports blood pressure was 86/64. Patient states bp at home has been 80/60s at times. Dizziness worse with position changes. Denies drinking a lot of water. Admits to drinking sodas.   Patient reports only recent med change was increasing Neurontin 600mg po tid. Admits to med change 2 weeks ago. Patient states she was on 400mg tablet. However, patient reports she was instructed to switch to lyrica and d/c Neurontin. Patient states she has not switched yet but was planning to soon.          Allergies:   Review of patient's allergies indicates:   Allergen Reactions    Codeine Itching        Past Medical History:  Past Medical History:   Diagnosis Date    Arthritis     Diabetes mellitus     Diabetes mellitus, type 2     GERD (gastroesophageal reflux disease)     Hearing difficulty     Hypertension     Liver disease     Sleep apnea         Current Medications:    Current Outpatient Medications:     carvediloL (COREG) 3.125 MG tablet, Take 1 tablet (3.125 mg total) by mouth 2 (two) times daily., Disp: 180 tablet, Rfl: 1    DULoxetine (CYMBALTA) 60 MG capsule, Take 1 capsule (60 mg total) by mouth 2 (two) times daily., Disp: 180 capsule, Rfl: 1    gabapentin (NEURONTIN) 600 MG tablet, Take 1 tablet (600 mg total) by mouth 3 (three) times daily., Disp: 270 tablet, Rfl: 1    glipiZIDE (GLUCOTROL) 10 MG tablet, Take 1 tablet (10 mg total) by mouth 2 (two) times daily with meals., Disp: 180 tablet, Rfl: 1    hydrOXYzine pamoate (VISTARIL)  25 MG Cap, Take 1 capsule (25 mg total) by mouth 3 (three) times daily., Disp: 270 capsule, Rfl: 1    ibuprofen (ADVIL,MOTRIN) 600 MG tablet, Take 1 tablet (600 mg total) by mouth every 8 (eight) hours as needed for Pain., Disp: 90 tablet, Rfl: 2    insulin (LANTUS SOLOSTAR U-100 INSULIN) glargine 100 units/mL SubQ pen, Inject 10 Units into the skin every evening., Disp: 15 each, Rfl: 1    lisinopriL (PRINIVIL,ZESTRIL) 2.5 MG tablet, Take 1 tablet (2.5 mg total) by mouth once daily., Disp: 90 tablet, Rfl: 1    methocarbamoL (ROBAXIN) 500 MG Tab, Take 1 tablet (500 mg total) by mouth 3 (three) times daily as needed (muscle spasm/back pain. May cause drowsiness.)., Disp: 180 tablet, Rfl: 1    mometasone (NASONEX) 50 mcg/actuation nasal spray, 2 sprays by Nasal route once daily., Disp: 17 g, Rfl: 3    nitroGLYCERIN (NITROSTAT) 0.4 MG SL tablet, Place 0.4 mg under the tongue every 5 (five) minutes as needed for Chest pain., Disp: , Rfl:     oxyCODONE-acetaminophen (PERCOCET)  mg per tablet, Take 1 tablet by mouth every 6 (six) hours as needed. , Disp: , Rfl:     pravastatin (PRAVACHOL) 20 MG tablet, Take 1 tablet (20 mg total) by mouth every evening., Disp: 90 tablet, Rfl: 1    risperiDONE (RISPERDAL) 3 MG Tab, Take 1 tablet (3 mg total) by mouth once daily., Disp: 90 tablet, Rfl: 1    semaglutide (OZEMPIC) 1 mg/dose (2 mg/1.5 mL) PnIj, Inject 1 mg into the skin every 7 days., Disp: 2 pen, Rfl: 5    traZODone (DESYREL) 100 MG tablet, Take 1 tablet (100 mg total) by mouth every evening., Disp: 90 tablet, Rfl: 1    hydroCHLOROthiazide (HYDRODIURIL) 25 MG tablet, Take 1 tablet (25 mg total) by mouth once daily., Disp: 90 tablet, Rfl: 1    omeprazole (PRILOSEC) 40 MG capsule, Take 1 capsule (40 mg total) by mouth 2 (two) times daily before meals., Disp: 180 capsule, Rfl: 1       Review of Systems   Constitutional: Negative for fever.   HENT: Negative for nasal congestion and sinus pressure/congestion.   "  Respiratory: Negative for cough and shortness of breath.    Cardiovascular: Negative for chest pain and palpitations.   Gastrointestinal: Negative for abdominal pain.   Neurological: Positive for dizziness.          Objective:    /70 (BP Location: Left arm, Patient Position: Sitting, BP Method: Large (Manual))   Pulse 110   Temp 97.4 °F (36.3 °C) (Temporal)   Resp 20   Ht 5' 4" (1.626 m)   Wt 93.7 kg (206 lb 9.6 oz)   SpO2 98%   BMI 35.46 kg/m²      Physical Exam  Constitutional:       Appearance: Normal appearance.   Eyes:      Extraocular Movements: Extraocular movements intact.      Pupils: Pupils are equal, round, and reactive to light.   Cardiovascular:      Rate and Rhythm: Normal rate and regular rhythm.      Pulses: Normal pulses.      Heart sounds: Normal heart sounds.   Pulmonary:      Effort: Pulmonary effort is normal.      Breath sounds: Normal breath sounds.   Abdominal:      Palpations: Abdomen is soft.      Tenderness: There is no abdominal tenderness.   Neurological:      Mental Status: She is alert and oriented to person, place, and time.          Assessment and Plan:    1. Dizziness    2. Gastroesophageal reflux disease without esophagitis         Dizziness   -bp normal today, no dizziness   -orthostatics negative   -will hold lisinopril due to symptoms and monitor bp.   -also discussed with patient Neurontin could be causing symptoms   -patient is to switch to lyrica and d/c Neurontin per previous visit.    -keep bp log and f/u in 2 weeks or sooner if needed    Gastroesophageal reflux disease without esophagitis  -     omeprazole (PRILOSEC) 40 MG capsule; Take 1 capsule (40 mg total) by mouth 2 (two) times daily before meals.  Dispense: 180 capsule; Refill: 1           Patient Instructions   Hold lisinopril 2.5mg  Monitor blood pressure twice a day and record on a log  Make switch from Neurontin to lyrica as instructed next week  Drink plenty of water and stay hydrated  Change " positions slowly  Follow up in 2 weeks and bring bp log or sooner if needed     Follow up in about 2 weeks (around 8/18/2022), or if symptoms worsen or fail to improve.

## 2022-08-04 NOTE — PATIENT INSTRUCTIONS
Hold lisinopril 2.5mg  Monitor blood pressure twice a day and record on a log  Make switch from Neurontin to lyrica as instructed next week  Drink plenty of water and stay hydrated  Change positions slowly  Follow up in 2 weeks and bring bp log or sooner if needed

## 2022-10-06 ENCOUNTER — TELEPHONE (OUTPATIENT)
Dept: GASTROENTEROLOGY | Facility: CLINIC | Age: 47
End: 2022-10-06
Payer: COMMERCIAL

## 2022-10-06 NOTE — TELEPHONE ENCOUNTER
Pt was contacted to RS appt with A Blayne.  Pt states she will have to check her calender and will call back to schedule appt.

## 2022-10-13 ENCOUNTER — OFFICE VISIT (OUTPATIENT)
Dept: FAMILY MEDICINE | Facility: CLINIC | Age: 47
End: 2022-10-13
Payer: COMMERCIAL

## 2022-10-13 VITALS
OXYGEN SATURATION: 99 % | HEART RATE: 92 BPM | TEMPERATURE: 99 F | BODY MASS INDEX: 35.68 KG/M2 | WEIGHT: 209 LBS | HEIGHT: 64 IN | RESPIRATION RATE: 20 BRPM | SYSTOLIC BLOOD PRESSURE: 112 MMHG | DIASTOLIC BLOOD PRESSURE: 80 MMHG

## 2022-10-13 DIAGNOSIS — Z79.4 TYPE 2 DIABETES MELLITUS WITH DIABETIC NEUROPATHY, WITH LONG-TERM CURRENT USE OF INSULIN: Primary | ICD-10-CM

## 2022-10-13 DIAGNOSIS — E78.49 OTHER HYPERLIPIDEMIA: ICD-10-CM

## 2022-10-13 DIAGNOSIS — K21.9 GASTROESOPHAGEAL REFLUX DISEASE WITHOUT ESOPHAGITIS: Chronic | ICD-10-CM

## 2022-10-13 DIAGNOSIS — E11.40 TYPE 2 DIABETES MELLITUS WITH DIABETIC NEUROPATHY, WITH LONG-TERM CURRENT USE OF INSULIN: Primary | ICD-10-CM

## 2022-10-13 DIAGNOSIS — M62.838 MUSCLE SPASM: Chronic | ICD-10-CM

## 2022-10-13 DIAGNOSIS — M54.41 CHRONIC BILATERAL LOW BACK PAIN WITH RIGHT-SIDED SCIATICA: Chronic | ICD-10-CM

## 2022-10-13 DIAGNOSIS — J30.2 SEASONAL ALLERGIES: Chronic | ICD-10-CM

## 2022-10-13 DIAGNOSIS — M79.2 NERVE PAIN: ICD-10-CM

## 2022-10-13 DIAGNOSIS — I10 ESSENTIAL HYPERTENSION: ICD-10-CM

## 2022-10-13 DIAGNOSIS — G89.29 CHRONIC BILATERAL LOW BACK PAIN WITH RIGHT-SIDED SCIATICA: Chronic | ICD-10-CM

## 2022-10-13 LAB
CHOLEST SERPL-MCNC: 114 MG/DL (ref 0–200)
CHOLEST/HDLC SERPL: 2.2 {RATIO}
CREAT UR-MCNC: 119 MG/DL (ref 28–219)
HDLC SERPL-MCNC: 53 MG/DL (ref 40–60)
LDLC SERPL CALC-MCNC: 50 MG/DL
MICROALBUMIN UR-MCNC: 0.7 MG/DL (ref 0–2.8)
MICROALBUMIN/CREAT RATIO PNL UR: 5.9 MG/G (ref 0–30)
NONHDLC SERPL-MCNC: 61 MG/DL
TRIGL SERPL-MCNC: 57 MG/DL (ref 35–150)
VLDLC SERPL-MCNC: 11 MG/DL

## 2022-10-13 PROCEDURE — 80061 LIPID PANEL: ICD-10-PCS | Mod: ,,, | Performed by: CLINICAL MEDICAL LABORATORY

## 2022-10-13 PROCEDURE — 99214 PR OFFICE/OUTPT VISIT, EST, LEVL IV, 30-39 MIN: ICD-10-PCS | Mod: ,,, | Performed by: FAMILY MEDICINE

## 2022-10-13 PROCEDURE — 80053 COMPREHENSIVE METABOLIC PANEL: ICD-10-PCS | Mod: ,,, | Performed by: CLINICAL MEDICAL LABORATORY

## 2022-10-13 PROCEDURE — 82043 UR ALBUMIN QUANTITATIVE: CPT | Mod: ,,, | Performed by: CLINICAL MEDICAL LABORATORY

## 2022-10-13 PROCEDURE — 99214 OFFICE O/P EST MOD 30 MIN: CPT | Mod: ,,, | Performed by: FAMILY MEDICINE

## 2022-10-13 PROCEDURE — 82043 MICROALBUMIN / CREATININE RATIO URINE: ICD-10-PCS | Mod: ,,, | Performed by: CLINICAL MEDICAL LABORATORY

## 2022-10-13 PROCEDURE — 82570 MICROALBUMIN / CREATININE RATIO URINE: ICD-10-PCS | Mod: ,,, | Performed by: CLINICAL MEDICAL LABORATORY

## 2022-10-13 PROCEDURE — 80061 LIPID PANEL: CPT | Mod: ,,, | Performed by: CLINICAL MEDICAL LABORATORY

## 2022-10-13 PROCEDURE — 82570 ASSAY OF URINE CREATININE: CPT | Mod: ,,, | Performed by: CLINICAL MEDICAL LABORATORY

## 2022-10-13 PROCEDURE — 80053 COMPREHEN METABOLIC PANEL: CPT | Mod: ,,, | Performed by: CLINICAL MEDICAL LABORATORY

## 2022-10-13 RX ORDER — IBUPROFEN 600 MG/1
600 TABLET ORAL EVERY 8 HOURS PRN
Qty: 90 TABLET | Refills: 2 | Status: SHIPPED | OUTPATIENT
Start: 2022-10-13 | End: 2023-01-19 | Stop reason: SDUPTHER

## 2022-10-13 RX ORDER — CARVEDILOL 3.12 MG/1
3.12 TABLET ORAL 2 TIMES DAILY
Qty: 180 TABLET | Refills: 1 | Status: SHIPPED | OUTPATIENT
Start: 2022-10-13 | End: 2023-01-23

## 2022-10-13 RX ORDER — SEMAGLUTIDE 1.34 MG/ML
1 INJECTION, SOLUTION SUBCUTANEOUS
Qty: 2 PEN | Refills: 5 | Status: SHIPPED | OUTPATIENT
Start: 2022-10-13 | End: 2023-01-19 | Stop reason: SDUPTHER

## 2022-10-13 RX ORDER — INSULIN GLARGINE 100 [IU]/ML
10 INJECTION, SOLUTION SUBCUTANEOUS NIGHTLY
Qty: 15 EACH | Refills: 1 | Status: SHIPPED | OUTPATIENT
Start: 2022-10-13 | End: 2023-01-19 | Stop reason: SDUPTHER

## 2022-10-13 RX ORDER — MOMETASONE FUROATE 50 UG/1
2 SPRAY, METERED NASAL DAILY
Qty: 17 G | Refills: 3 | Status: SHIPPED | OUTPATIENT
Start: 2022-10-13 | End: 2023-01-19 | Stop reason: SDUPTHER

## 2022-10-13 RX ORDER — PRAVASTATIN SODIUM 20 MG/1
20 TABLET ORAL NIGHTLY
Qty: 90 TABLET | Refills: 1 | Status: SHIPPED | OUTPATIENT
Start: 2022-10-13 | End: 2023-01-19 | Stop reason: SDUPTHER

## 2022-10-13 RX ORDER — GABAPENTIN 800 MG/1
800 TABLET ORAL 3 TIMES DAILY
Qty: 270 TABLET | Refills: 0 | Status: SHIPPED | OUTPATIENT
Start: 2022-10-13 | End: 2022-11-05 | Stop reason: SDUPTHER

## 2022-10-13 RX ORDER — HYDROCHLOROTHIAZIDE 25 MG/1
25 TABLET ORAL DAILY
Qty: 90 TABLET | Refills: 1 | Status: SHIPPED | OUTPATIENT
Start: 2022-10-13 | End: 2023-01-10

## 2022-10-13 RX ORDER — METHOCARBAMOL 500 MG/1
500 TABLET, FILM COATED ORAL 3 TIMES DAILY PRN
Qty: 45 TABLET | Refills: 2 | Status: SHIPPED | OUTPATIENT
Start: 2022-10-13 | End: 2023-01-19 | Stop reason: SDUPTHER

## 2022-10-13 RX ORDER — GABAPENTIN 800 MG/1
800 TABLET ORAL 3 TIMES DAILY
Qty: 90 TABLET | Refills: 2 | Status: SHIPPED | OUTPATIENT
Start: 2022-10-13 | End: 2022-10-13

## 2022-10-13 RX ORDER — OMEPRAZOLE 40 MG/1
40 CAPSULE, DELAYED RELEASE ORAL
Qty: 180 CAPSULE | Refills: 1 | Status: SHIPPED | OUTPATIENT
Start: 2022-10-13 | End: 2023-01-19 | Stop reason: SDUPTHER

## 2022-10-13 RX ORDER — DULOXETIN HYDROCHLORIDE 60 MG/1
60 CAPSULE, DELAYED RELEASE ORAL 2 TIMES DAILY
Qty: 180 CAPSULE | Refills: 1 | Status: SHIPPED | OUTPATIENT
Start: 2022-10-13 | End: 2022-11-11

## 2022-10-13 RX ORDER — SEMAGLUTIDE 1.34 MG/ML
1 INJECTION, SOLUTION SUBCUTANEOUS
COMMUNITY
Start: 2022-09-19 | End: 2023-01-19 | Stop reason: SDUPTHER

## 2022-10-13 RX ORDER — LISINOPRIL 2.5 MG/1
2.5 TABLET ORAL DAILY
Qty: 90 TABLET | Refills: 1 | Status: SHIPPED | OUTPATIENT
Start: 2022-10-13 | End: 2023-01-19 | Stop reason: SDUPTHER

## 2022-10-13 RX ORDER — GLIPIZIDE 10 MG/1
10 TABLET ORAL 2 TIMES DAILY WITH MEALS
Qty: 180 TABLET | Refills: 1 | Status: SHIPPED | OUTPATIENT
Start: 2022-10-13 | End: 2023-01-19 | Stop reason: SDUPTHER

## 2022-10-13 NOTE — PROGRESS NOTES
Clinic Note    Patient Name: Georgiana Zayas  : 1975  MRN: [id number]    Chief Complaint   Patient presents with    Follow-up     Three months follow up with refills       HPI:    Ms. Georgiana Zayas is a 47 y.o. female who presents to clinic today with CC of follow up on chronic disease processes including DM, HTN, HLD, GERD, chronic back pain/muscle spasm, and seasonal allergies.   Patient reports chronic issues are well controlled on current medication regimen.  Denies problems or side effects with medications.  Patient reports some nerve pain in back that is a chronic issue. Reports, however, it has worsened. States gabapentin is helping but symptoms are not at goal. Denies any problems or side effects with gabapentin.  Reports she also has a mole on her R side that she would like me to look at reporting that it has been there for years and is unchanged.   Patient is, otherwise, without complaints.     Medications:  Medication List with Changes/Refills   New Medications    GABAPENTIN (NEURONTIN) 800 MG TABLET    Take 1 tablet (800 mg total) by mouth 3 (three) times daily.   Current Medications    HYDROXYZINE PAMOATE (VISTARIL) 25 MG CAP    Take 1 capsule (25 mg total) by mouth 3 (three) times daily.    NITROGLYCERIN (NITROSTAT) 0.4 MG SL TABLET    Place 0.4 mg under the tongue every 5 (five) minutes as needed for Chest pain.    OXYCODONE-ACETAMINOPHEN (PERCOCET)  MG PER TABLET    Take 1 tablet by mouth every 6 (six) hours as needed.     OZEMPIC 1 MG/DOSE (4 MG/3 ML)    Inject into the skin.    RISPERIDONE (RISPERDAL) 3 MG TAB    Take 1 tablet (3 mg total) by mouth once daily.    TRAZODONE (DESYREL) 100 MG TABLET    Take 1 tablet (100 mg total) by mouth every evening.   Changed and/or Refilled Medications    Modified Medication Previous Medication    CARVEDILOL (COREG) 3.125 MG TABLET carvediloL (COREG) 3.125 MG tablet       Take 1 tablet (3.125 mg total) by mouth 2 (two) times daily.    Take 1  tablet (3.125 mg total) by mouth 2 (two) times daily.    DULOXETINE (CYMBALTA) 60 MG CAPSULE DULoxetine (CYMBALTA) 60 MG capsule       Take 1 capsule (60 mg total) by mouth 2 (two) times daily.    Take 1 capsule (60 mg total) by mouth 2 (two) times daily.    GLIPIZIDE (GLUCOTROL) 10 MG TABLET glipiZIDE (GLUCOTROL) 10 MG tablet       Take 1 tablet (10 mg total) by mouth 2 (two) times daily with meals.    Take 1 tablet (10 mg total) by mouth 2 (two) times daily with meals.    HYDROCHLOROTHIAZIDE (HYDRODIURIL) 25 MG TABLET hydroCHLOROthiazide (HYDRODIURIL) 25 MG tablet       Take 1 tablet (25 mg total) by mouth once daily.    Take 1 tablet (25 mg total) by mouth once daily.    IBUPROFEN (ADVIL,MOTRIN) 600 MG TABLET ibuprofen (ADVIL,MOTRIN) 600 MG tablet       Take 1 tablet (600 mg total) by mouth every 8 (eight) hours as needed for Pain.    Take 1 tablet (600 mg total) by mouth every 8 (eight) hours as needed for Pain.    INSULIN (LANTUS SOLOSTAR U-100 INSULIN) GLARGINE 100 UNITS/ML SUBQ PEN insulin (LANTUS SOLOSTAR U-100 INSULIN) glargine 100 units/mL SubQ pen       Inject 10 Units into the skin every evening.    Inject 10 Units into the skin every evening.    LISINOPRIL (PRINIVIL,ZESTRIL) 2.5 MG TABLET lisinopriL (PRINIVIL,ZESTRIL) 2.5 MG tablet       Take 1 tablet (2.5 mg total) by mouth once daily.    Take 1 tablet (2.5 mg total) by mouth once daily.    METHOCARBAMOL (ROBAXIN) 500 MG TAB methocarbamoL (ROBAXIN) 500 MG Tab       Take 1 tablet (500 mg total) by mouth 3 (three) times daily as needed (muscle spasm/back pain. May cause drowsiness.).    Take 1 tablet (500 mg total) by mouth 3 (three) times daily as needed (muscle spasm/back pain. May cause drowsiness.).    MOMETASONE (NASONEX) 50 MCG/ACTUATION NASAL SPRAY mometasone (NASONEX) 50 mcg/actuation nasal spray       2 sprays by Nasal route once daily.    2 sprays by Nasal route once daily.    OMEPRAZOLE (PRILOSEC) 40 MG CAPSULE omeprazole (PRILOSEC) 40 MG  capsule       Take 1 capsule (40 mg total) by mouth 2 (two) times daily before meals.    Take 1 capsule (40 mg total) by mouth 2 (two) times daily before meals.    PRAVASTATIN (PRAVACHOL) 20 MG TABLET pravastatin (PRAVACHOL) 20 MG tablet       Take 1 tablet (20 mg total) by mouth every evening.    Take 1 tablet (20 mg total) by mouth every evening.    SEMAGLUTIDE (OZEMPIC) 1 MG/DOSE (2 MG/1.5 ML) PNIJ semaglutide (OZEMPIC) 1 mg/dose (2 mg/1.5 mL) PnIj       Inject 1 mg into the skin every 7 days.    Inject 1 mg into the skin every 7 days.   Discontinued Medications    GABAPENTIN (NEURONTIN) 600 MG TABLET    Take 1 tablet (600 mg total) by mouth 3 (three) times daily.        Allergies: Codeine      Past Medical History:    Past Medical History:   Diagnosis Date    Arthritis     Diabetes mellitus     Diabetes mellitus, type 2     GERD (gastroesophageal reflux disease)     Hearing difficulty     Hypertension     Liver disease     Sleep apnea        Past Surgical History:    Past Surgical History:   Procedure Laterality Date    CARPAL TUNNEL RELEASE Bilateral      SECTION      INJECTION OF ANESTHETIC AGENT AROUND MEDIAL BRANCH NERVES INNERVATING LUMBAR FACET JOINT Bilateral 2021    Procedure: BLOCK, NERVE, FACET JOINT, LUMBAR, MEDIAL BRANCH;  Surgeon: Amari Razo MD;  Location: [facility];  Service: Pain Management;  Laterality: Bilateral;  Bilateral L3-S1 Facet Injection    INJECTION OF ANESTHETIC AGENT AROUND MEDIAL BRANCH NERVES INNERVATING LUMBAR FACET JOINT Bilateral 2022    Procedure: BLOCK, NERVE, FACET JOINT, LUMBAR, MEDIAL BRANCH;  Surgeon: Amari Razo MD;  Location: [facility];  Service: Pain Management;  Laterality: Bilateral;  Bilateral L3-S1 FI    LEFT HEART CATHETERIZATION Left 2021    Procedure: Left heart cath;  Surgeon: Jeremy Rojo DO;  Location: [facility] CATH LAB;  Service: Cardiology;  Laterality: Left;    STAPEDES SURGERY Bilateral      "TONSILLECTOMY           Social History:    Social History     Tobacco Use   Smoking Status Never   Smokeless Tobacco Never     Social History     Substance and Sexual Activity   Alcohol Use Not Currently     Social History     Substance and Sexual Activity   Drug Use Never         Family History:    Family History   Problem Relation Age of Onset    Cancer Mother     Diabetes Mother     Hypertension Mother        Review of Systems:    Review of Systems   Constitutional:  Negative for appetite change, chills, fatigue, fever and unexpected weight change.   Eyes:  Negative for visual disturbance.   Respiratory:  Negative for cough and shortness of breath.    Cardiovascular:  Negative for chest pain and leg swelling.        Reports chronic, intermittent issues with chest pain. Reports she has had a cardiac work up with normal results. Reports, however, she does have an appt with a new Cardiologist for a 2nd opinion. Denies any chest pain currently.   Gastrointestinal:  Negative for abdominal pain, change in bowel habit, diarrhea, nausea, vomiting and change in bowel habit.        Reports chronic, intermittent issues with constipation which she attributes to chronic use of pain medication   Musculoskeletal:  Positive for back pain. Negative for arthralgias.   Integumentary:  Negative for rash.   Neurological:  Negative for dizziness and headaches.   Psychiatric/Behavioral:  The patient is not nervous/anxious.       Vitals:    Vitals:    10/13/22 0806   BP: 112/80   BP Location: Left arm   Patient Position: Sitting   BP Method: Large (Manual)   Pulse: 92   Resp: 20   Temp: 98.6 °F (37 °C)   TempSrc: Oral   SpO2: 99%   Weight: 94.8 kg (209 lb)   Height: 5' 4" (1.626 m)       Body mass index is 35.87 kg/m².    Wt Readings from Last 3 Encounters:   10/13/22 0806 94.8 kg (209 lb)   08/04/22 0800 93.7 kg (206 lb 9.6 oz)   07/21/22 0953 92.5 kg (204 lb)        Physical Exam:    Physical Exam  Constitutional:       General: She " is not in acute distress.     Appearance: Normal appearance. She is obese.   HENT:      Nose: Nose normal.      Mouth/Throat:      Mouth: Mucous membranes are moist.      Pharynx: Oropharynx is clear.   Eyes:      Conjunctiva/sclera: Conjunctivae normal.   Cardiovascular:      Rate and Rhythm: Normal rate and regular rhythm.      Heart sounds: Normal heart sounds. No murmur heard.  Pulmonary:      Effort: Pulmonary effort is normal. No respiratory distress.      Breath sounds: Normal breath sounds. No wheezing, rhonchi or rales.   Abdominal:      General: Bowel sounds are normal.      Palpations: Abdomen is soft.      Tenderness: There is no abdominal tenderness.   Musculoskeletal:      Cervical back: Neck supple.   Skin:     Findings: No rash.   Neurological:      General: No focal deficit present.      Mental Status: She is alert. Mental status is at baseline.   Psychiatric:         Mood and Affect: Mood normal.         Assessment/Plan:   Type 2 diabetes mellitus with diabetic neuropathy, with long-term current use of insulin  -     glipiZIDE (GLUCOTROL) 10 MG tablet; Take 1 tablet (10 mg total) by mouth 2 (two) times daily with meals.  Dispense: 180 tablet; Refill: 1  -     insulin (LANTUS SOLOSTAR U-100 INSULIN) glargine 100 units/mL SubQ pen; Inject 10 Units into the skin every evening.  Dispense: 15 each; Refill: 1  -     semaglutide (OZEMPIC) 1 mg/dose (2 mg/1.5 mL) PnIj; Inject 1 mg into the skin every 7 days.  Dispense: 2 pen; Refill: 5  -     CBC Auto Differential; Future; Expected date: 10/13/2022  -     Comprehensive Metabolic Panel; Future; Expected date: 10/13/2022  -     Lipid Panel; Future; Expected date: 10/13/2022  -     Hemoglobin A1C; Future; Expected date: 10/13/2022  -     Microalbumin/Creatinine Ratio, Urine    Essential hypertension  -     carvediloL (COREG) 3.125 MG tablet; Take 1 tablet (3.125 mg total) by mouth 2 (two) times daily.  Dispense: 180 tablet; Refill: 1  -      hydroCHLOROthiazide (HYDRODIURIL) 25 MG tablet; Take 1 tablet (25 mg total) by mouth once daily.  Dispense: 90 tablet; Refill: 1  -     lisinopriL (PRINIVIL,ZESTRIL) 2.5 MG tablet; Take 1 tablet (2.5 mg total) by mouth once daily.  Dispense: 90 tablet; Refill: 1  -     CBC Auto Differential; Future; Expected date: 10/13/2022  -     Comprehensive Metabolic Panel; Future; Expected date: 10/13/2022  -     Lipid Panel; Future; Expected date: 10/13/2022  -     Microalbumin/Creatinine Ratio, Urine    Chronic bilateral low back pain with right-sided sciatica  -     DULoxetine (CYMBALTA) 60 MG capsule; Take 1 capsule (60 mg total) by mouth 2 (two) times daily.  Dispense: 180 capsule; Refill: 1  -     ibuprofen (ADVIL,MOTRIN) 600 MG tablet; Take 1 tablet (600 mg total) by mouth every 8 (eight) hours as needed for Pain.  Dispense: 90 tablet; Refill: 2    Seasonal allergies  -     mometasone (NASONEX) 50 mcg/actuation nasal spray; 2 sprays by Nasal route once daily.  Dispense: 17 g; Refill: 3    Gastroesophageal reflux disease without esophagitis  -     omeprazole (PRILOSEC) 40 MG capsule; Take 1 capsule (40 mg total) by mouth 2 (two) times daily before meals.  Dispense: 180 capsule; Refill: 1    Other hyperlipidemia  -     pravastatin (PRAVACHOL) 20 MG tablet; Take 1 tablet (20 mg total) by mouth every evening.  Dispense: 90 tablet; Refill: 1  -     CBC Auto Differential; Future; Expected date: 10/13/2022  -     Comprehensive Metabolic Panel; Future; Expected date: 10/13/2022  -     Lipid Panel; Future; Expected date: 10/13/2022    Muscle spasm  -     methocarbamoL (ROBAXIN) 500 MG Tab; Take 1 tablet (500 mg total) by mouth 3 (three) times daily as needed (muscle spasm/back pain. May cause drowsiness.).  Dispense: 45 tablet; Refill: 2    Nerve pain  -     gabapentin (NEURONTIN) 800 MG tablet; Take 1 tablet (800 mg total) by mouth 3 (three) times daily.  Dispense: 270 tablet; Refill: 0       Active Problem List with Overview  Notes    Diagnosis Date Noted    Chronic bilateral low back pain with right-sided sciatica 07/07/2022    Other hyperlipidemia 07/07/2022    Seasonal allergies 07/07/2022    Muscle spasm 07/07/2022    Morbid obesity 01/17/2022    Femoral artery pseudo-aneurysm, right 01/17/2022    Chest pain 11/30/2021    Other sleep apnea 11/30/2021    Lumbosacral spondylosis without myelopathy 09/08/2021    Gastroesophageal reflux disease 08/25/2021    Type 2 diabetes mellitus with diabetic neuropathy, with long-term current use of insulin 08/25/2021    Essential hypertension 08/25/2021        Health Maintenance:  Health Maintenance   Topic Date Due    Eye Exam  Never done    Hemoglobin A1c  10/07/2022    Mammogram  11/22/2022    Hepatitis C Screening  02/03/2023 (Originally 1975)    TETANUS VACCINE  02/03/2023 (Originally 1/1/1992)    Foot Exam  02/03/2023    Lipid Panel  07/07/2023       RTC in 3 months for chronic follow up.  RTC sooner if needed.   Patient voiced understanding and is agreeable to plan.      Lisandra Mcdaniel MD    Family Medicine

## 2022-10-14 LAB
ALBUMIN SERPL BCP-MCNC: 3.4 G/DL (ref 3.5–5)
ALBUMIN/GLOB SERPL: 0.9 {RATIO}
ALP SERPL-CCNC: 63 U/L (ref 39–100)
ALT SERPL W P-5'-P-CCNC: 27 U/L (ref 13–56)
ANION GAP SERPL CALCULATED.3IONS-SCNC: 14 MMOL/L (ref 7–16)
AST SERPL W P-5'-P-CCNC: 21 U/L (ref 15–37)
BILIRUB SERPL-MCNC: 0.4 MG/DL (ref ?–1.2)
BUN SERPL-MCNC: 12 MG/DL (ref 7–18)
BUN/CREAT SERPL: 14 (ref 6–20)
CALCIUM SERPL-MCNC: 9.3 MG/DL (ref 8.5–10.1)
CHLORIDE SERPL-SCNC: 102 MMOL/L (ref 98–107)
CO2 SERPL-SCNC: 21 MMOL/L (ref 21–32)
CREAT SERPL-MCNC: 0.87 MG/DL (ref 0.55–1.02)
EGFR (NO RACE VARIABLE) (RUSH/TITUS): 82 ML/MIN/1.73M²
GLOBULIN SER-MCNC: 3.9 G/DL (ref 2–4)
GLUCOSE SERPL-MCNC: 172 MG/DL (ref 74–106)
POTASSIUM SERPL-SCNC: 4.6 MMOL/L (ref 3.5–5.1)
PROT SERPL-MCNC: 7.3 G/DL (ref 6.4–8.2)
SODIUM SERPL-SCNC: 132 MMOL/L (ref 136–145)

## 2022-11-08 ENCOUNTER — TELEPHONE (OUTPATIENT)
Dept: FAMILY MEDICINE | Facility: CLINIC | Age: 47
End: 2022-11-08
Payer: COMMERCIAL

## 2022-11-08 DIAGNOSIS — D64.9 ANEMIA, UNSPECIFIED TYPE: Primary | ICD-10-CM

## 2022-11-08 NOTE — TELEPHONE ENCOUNTER
----- Message from Georgia Ellsworth RN sent at 11/7/2022 11:37 AM CST -----  Attempted to contact pt regarding lab results, no voicemail set up at this time. Spoke with Sister, emergency contact, asked her to let pt know we have been trying to get in touch with her regarding labs, verbalized understanding.   ----- Message -----  From: Georgia Ellsworth RN  Sent: 11/2/2022   8:55 AM CST  To: Georgia Ellsworth RN    Attempted to contact pt regarding labs. No voice mailbox set up at this time.    ----- Message -----  From: Georgia Ellsworth RN  Sent: 11/1/2022   2:08 PM CDT  To: Georgia Ellsworth RN    Attempted to contact pt regarding labs. No voice mailbox set up at this time.    ----- Message -----  From: Elisa Mcdaniel MD  Sent: 10/27/2022   2:05 PM CDT  To: Georgia Ellsworth RN    Please call patient regarding lab results. HbA1C is 10.3 up from 8.7. DM is poorly controlled. If patient is tolerating ozempic without problems or side effects, recommend increasing ozempic from 1 mg to 2 mg PO daily. Recommend increasing lantus from 10 units SC daily to 15 units SC daily. Low carb diet, exercise, and home blood glucose monitoring. Call with any issues or persistently elevated blood glucose reading so that additional medication adjustments can be made over the telephone before follow up/additional labs are drawn. Patient is very mildly anemic. This is, however, slightly lower than her baseline. Recommend repeat CBC (Lab only in 2 weeks). Labs, otherwise, ok/stable. Thanks!        
Follow up bilirubin level 1 day after discharge

## 2023-01-11 ENCOUNTER — ANESTHESIA EVENT (OUTPATIENT)
Dept: PAIN MEDICINE | Facility: HOSPITAL | Age: 48
End: 2023-01-11
Payer: COMMERCIAL

## 2023-01-11 ENCOUNTER — ANESTHESIA (OUTPATIENT)
Dept: PAIN MEDICINE | Facility: HOSPITAL | Age: 48
End: 2023-01-11
Payer: COMMERCIAL

## 2023-01-11 ENCOUNTER — HOSPITAL ENCOUNTER (OUTPATIENT)
Facility: HOSPITAL | Age: 48
Discharge: HOME | End: 2023-01-11
Attending: ANESTHESIOLOGY | Admitting: ANESTHESIOLOGY
Payer: COMMERCIAL

## 2023-01-11 VITALS
HEIGHT: 64 IN | BODY MASS INDEX: 34.15 KG/M2 | WEIGHT: 200 LBS | DIASTOLIC BLOOD PRESSURE: 91 MMHG | RESPIRATION RATE: 17 BRPM | OXYGEN SATURATION: 98 % | HEART RATE: 91 BPM | SYSTOLIC BLOOD PRESSURE: 166 MMHG | TEMPERATURE: 97 F

## 2023-01-11 DIAGNOSIS — M47.817 LUMBOSACRAL SPONDYLOSIS WITHOUT MYELOPATHY: ICD-10-CM

## 2023-01-11 LAB
B-HCG UR QL: NEGATIVE
CTP QC/QA: YES
GLUCOSE SERPL-MCNC: 227 MG/DL (ref 70–105)

## 2023-01-11 PROCEDURE — 64494 INJ PARAVERT F JNT L/S 2 LEV: CPT | Mod: RT | Performed by: ANESTHESIOLOGY

## 2023-01-11 PROCEDURE — 63600175 PHARM REV CODE 636 W HCPCS: Performed by: NURSE ANESTHETIST, CERTIFIED REGISTERED

## 2023-01-11 PROCEDURE — 82962 GLUCOSE BLOOD TEST: CPT

## 2023-01-11 PROCEDURE — 25000003 PHARM REV CODE 250: Performed by: NURSE ANESTHETIST, CERTIFIED REGISTERED

## 2023-01-11 PROCEDURE — 81025 URINE PREGNANCY TEST: CPT | Performed by: ANESTHESIOLOGY

## 2023-01-11 PROCEDURE — 37000008 HC ANESTHESIA 1ST 15 MINUTES: Performed by: ANESTHESIOLOGY

## 2023-01-11 PROCEDURE — D9220A PRA ANESTHESIA: ICD-10-PCS | Mod: ,,, | Performed by: NURSE ANESTHETIST, CERTIFIED REGISTERED

## 2023-01-11 PROCEDURE — 25000003 PHARM REV CODE 250: Performed by: ANESTHESIOLOGY

## 2023-01-11 PROCEDURE — D9220A PRA ANESTHESIA: Mod: ,,, | Performed by: NURSE ANESTHETIST, CERTIFIED REGISTERED

## 2023-01-11 PROCEDURE — 27000284 HC CANNULA NASAL: Performed by: NURSE ANESTHETIST, CERTIFIED REGISTERED

## 2023-01-11 PROCEDURE — 64493 INJ PARAVERT F JNT L/S 1 LEV: CPT | Mod: LT | Performed by: ANESTHESIOLOGY

## 2023-01-11 PROCEDURE — 63600175 PHARM REV CODE 636 W HCPCS: Performed by: ANESTHESIOLOGY

## 2023-01-11 RX ORDER — SODIUM CHLORIDE 9 MG/ML
INJECTION, SOLUTION INTRAVENOUS CONTINUOUS PRN
Status: DISCONTINUED | OUTPATIENT
Start: 2023-01-11 | End: 2023-01-11

## 2023-01-11 RX ORDER — LIDOCAINE HYDROCHLORIDE 20 MG/ML
INJECTION, SOLUTION EPIDURAL; INFILTRATION; INTRACAUDAL; PERINEURAL
Status: DISCONTINUED | OUTPATIENT
Start: 2023-01-11 | End: 2023-01-11

## 2023-01-11 RX ORDER — PROPOFOL 10 MG/ML
VIAL (ML) INTRAVENOUS
Status: DISCONTINUED | OUTPATIENT
Start: 2023-01-11 | End: 2023-01-11

## 2023-01-11 RX ORDER — TRIAMCINOLONE ACETONIDE 40 MG/ML
INJECTION, SUSPENSION INTRA-ARTICULAR; INTRAMUSCULAR CODE/TRAUMA/SEDATION MEDICATION
Status: DISCONTINUED | OUTPATIENT
Start: 2023-01-11 | End: 2023-01-11 | Stop reason: HOSPADM

## 2023-01-11 RX ORDER — BUPIVACAINE HYDROCHLORIDE 2.5 MG/ML
INJECTION, SOLUTION INFILTRATION; PERINEURAL CODE/TRAUMA/SEDATION MEDICATION
Status: DISCONTINUED | OUTPATIENT
Start: 2023-01-11 | End: 2023-01-11 | Stop reason: HOSPADM

## 2023-01-11 RX ADMIN — LIDOCAINE HYDROCHLORIDE 100 MG: 20 INJECTION, SOLUTION INTRAVENOUS at 11:01

## 2023-01-11 RX ADMIN — PROPOFOL 50 MG: 10 INJECTION, EMULSION INTRAVENOUS at 11:01

## 2023-01-11 RX ADMIN — SODIUM CHLORIDE: 9 INJECTION, SOLUTION INTRAVENOUS at 11:01

## 2023-01-11 NOTE — TRANSFER OF CARE
"Anesthesia Transfer of Care Note    Patient: Georgiana Zayas    Procedure(s) Performed: Procedure(s) (LRB):  BLOCK, NERVE, FACET JOINT, LUMBAR, MEDIAL BRANCH (Bilateral)    Patient location: PACU    Anesthesia Type: general    Transport from OR: Transported from OR on room air with adequate spontaneous ventilation    Post pain: adequate analgesia    Post assessment: no apparent anesthetic complications    Post vital signs: stable    Level of consciousness: responds to stimulation    Nausea/Vomiting: no nausea/vomiting    Complications: none    Transfer of care protocol was followed      Last vitals:   Visit Vitals  BP (!) 166/101 (BP Location: Left arm, Patient Position: Lying)   Pulse 104   Temp 36.1 °C (97 °F) (Oral)   Resp 20   Ht 5' 4" (1.626 m)   Wt 90.7 kg (200 lb)   SpO2 99%   BMI 34.33 kg/m²     "

## 2023-01-11 NOTE — OP NOTE
01/11/2023    PREOPERATIVE DIAGNOSIS:     Lumbar Spondylosis without Myelopathy                                                                 POSTOPERATIVE DIAGNOSIS:   Lumbar Spondylosis without Myelopathy                                                                 PROCEDURE: L4-5 and L5-S1 Bilateral  Lumbar Facet Injections under Fluoroscopic Guidance       Surgeon: Dr. Amari Razo    COMPLICATIONS:  None                              DRAINS AND PACKS:  None    ANESTHESIA:  MAC                                      BLOOD LOSS:  None     The patient was identified in the holding area.  The risk and benefits were again explained to the patient.  The patient agreed and consent obtained.   The site was marked with a skin pen.  The patient was taken to the procedure room and placed in the prone position on the C-Arm table.  All pressure points were checked and padded comfortably while the patient was awake.  The patients back was prepped and draped in the usual sterile fashion.  Anesthesia was initiated.   The patients facet joints at L3-4, L4-5 and L5-S1 were identified under direct fluoroscopic guidance.  A skin wheal was raised over each of the targeted areas with 0.25% Sensorcaine (2.5mg/ml) 1cc.  A 22 gauge 3 ½ inch needle was advanced into the interarticular surface of each of those levels on the left side.  Then 1.5 milliliters of a solution that contained  [id number]/ml 1/2ml diluted into 9 milliliters of 0.25% Sensorcaine (2.5mg/ml) was injected at each level .  The needles were removed with its tip intact.  The procedure was repeated on the right side as described above. There was adequate hemostasis at the conclusion of the procedure.  The patient tolerated the procedure well with no adverse events.  There was no paresthesia with needle placement or injection.  The patient was taken in stable condition to the holding area and was monitored for the appropriate time of convalescence and discharged  to the care of the patients .        Preoperative pain was    8/10    Postoperative pain was   /10.Pipa   %

## 2023-01-11 NOTE — PLAN OF CARE
Plan:  D/c pt via wheelchair at 1158    Informed pt if does not void in 8 hours to go to ER. Notify if redness, drainage, from injection site or fever over next 3-4 days. Rest and drink plenty of fluids for the remainder of the day. No lifting over 5 lbs. For the remainder of the day. Continue regular medications as prescribed. May take pain medications as prescribed.     Pain improved 100%

## 2023-01-11 NOTE — ANESTHESIA POSTPROCEDURE EVALUATION
Anesthesia Post Evaluation    Patient: Georgiana Zayas    Procedure(s) Performed: Procedure(s) (LRB):  BLOCK, NERVE, FACET JOINT, LUMBAR, MEDIAL BRANCH (Bilateral)    Final Anesthesia Type: general      Patient location during evaluation: PACU  Patient participation: Yes- Able to Participate  Level of consciousness: awake and alert  Post-procedure vital signs: reviewed and stable  Pain management: adequate  Airway patency: patent    PONV status at discharge: No PONV  Anesthetic complications: no      Cardiovascular status: blood pressure returned to baseline and hemodynamically stable  Respiratory status: spontaneous ventilation and unassisted  Hydration status: euvolemic  Follow-up not needed.          Vitals Value Taken Time   /101 01/11/23 1120   Temp 36.1 °C (97 °F) 01/11/23 1120   Pulse 104 01/11/23 1120   Resp 20 01/11/23 1120   SpO2 99 % 01/11/23 1120         No case tracking events are documented in the log.      Pain/Ralph Score: No data recorded

## 2023-01-11 NOTE — ANESTHESIA PREPROCEDURE EVALUATION
2023  Georgiana Zayas is a 48 y.o., female.  Social History     Socioeconomic History    Marital status: Single   Tobacco Use    Smoking status: Never    Smokeless tobacco: Never   Substance and Sexual Activity    Alcohol use: Not Currently    Drug use: Never    Sexual activity: Yes     Birth control/protection: Condom       Pre-op Assessment    I have reviewed the Patient Summary Reports.     I have reviewed the Nursing Notes. I have reviewed the NPO Status.   I have reviewed the Medications.     Review of Systems  Anesthesia Hx:  No problems with previous Anesthesia    Social:  Non-Smoker    Cardiovascular:   Hypertension    Pulmonary:   Sleep Apnea, CPAP    Hepatic/GI:   GERD Liver Disease,    Musculoskeletal:   Arthritis     Neurological:   Neuromuscular Disease,    Endocrine:   Diabetes  Metabolic Disorders, Obesity / BMI > 30    Past Medical History:   Diagnosis Date    Arthritis     Diabetes mellitus     Diabetes mellitus, type 2     GERD (gastroesophageal reflux disease)     Hearing difficulty     Hypertension     Liver disease     Sleep apnea      Past Surgical History:   Procedure Laterality Date    CARPAL TUNNEL RELEASE Bilateral      SECTION      INJECTION OF ANESTHETIC AGENT AROUND MEDIAL BRANCH NERVES INNERVATING LUMBAR FACET JOINT Bilateral 2021    Procedure: BLOCK, NERVE, FACET JOINT, LUMBAR, MEDIAL BRANCH;  Surgeon: Amari Razo MD;  Location: [facility];  Service: Pain Management;  Laterality: Bilateral;  Bilateral L3-S1 Facet Injection    INJECTION OF ANESTHETIC AGENT AROUND MEDIAL BRANCH NERVES INNERVATING LUMBAR FACET JOINT Bilateral 2022    Procedure: BLOCK, NERVE, FACET JOINT, LUMBAR, MEDIAL BRANCH;  Surgeon: Amari Razo MD;  Location: [facility];  Service: Pain Management;  Laterality: Bilateral;  Bilateral L3-S1  FI    LEFT HEART CATHETERIZATION Left 12/21/2021    Procedure: Left heart cath;  Surgeon: Jeremy Rojo DO;  Location: [facility] CATH LAB;  Service: Cardiology;  Laterality: Left;    STAPEDES SURGERY Bilateral     TONSILLECTOMY         Physical Exam  General: Well nourished, Cooperative, Alert and Oriented    Airway:  Mallampati: II       Chest/Lungs:  Clear to auscultation    Heart:  Rate: Normal        Anesthesia Plan  Type of Anesthesia, risks & benefits discussed:    Anesthesia Type: Gen Natural Airway, MAC  Intra-op Monitoring Plan: Standard ASA Monitors  Post Op Pain Control Plan: multimodal analgesia and IV/PO Opioids PRN  Induction:  IV  Informed Consent: Informed consent signed with the Patient and all parties understand the risks and agree with anesthesia plan.  All questions answered. Patient consented to blood products? Yes  ASA Score: 3  Day of Surgery Review of History & Physical: I have interviewed and examined the patient. I have reviewed the patient's H&P dated: There are no significant changes.     Ready For Surgery From Anesthesia Perspective.     .

## 2023-01-18 NOTE — DISCHARGE SUMMARY
Patient underwent  L4-5 and L5-S1 Bilateral  Lumbar Facet Injections under Fluoroscopic Guidance  procedure /2023. The pt will follow up in clinic. Discharge Dx: Lumbar Spondylosis without Myelopathy

## 2023-01-19 ENCOUNTER — OFFICE VISIT (OUTPATIENT)
Dept: FAMILY MEDICINE | Facility: CLINIC | Age: 48
End: 2023-01-19
Payer: COMMERCIAL

## 2023-01-19 VITALS
HEART RATE: 76 BPM | DIASTOLIC BLOOD PRESSURE: 78 MMHG | BODY MASS INDEX: 34.31 KG/M2 | RESPIRATION RATE: 20 BRPM | SYSTOLIC BLOOD PRESSURE: 118 MMHG | OXYGEN SATURATION: 99 % | WEIGHT: 201 LBS | TEMPERATURE: 98 F | HEIGHT: 64 IN

## 2023-01-19 DIAGNOSIS — K21.9 GASTROESOPHAGEAL REFLUX DISEASE WITHOUT ESOPHAGITIS: Chronic | ICD-10-CM

## 2023-01-19 DIAGNOSIS — H92.02 LEFT EAR PAIN: ICD-10-CM

## 2023-01-19 DIAGNOSIS — G89.29 CHRONIC BILATERAL LOW BACK PAIN WITH RIGHT-SIDED SCIATICA: Chronic | ICD-10-CM

## 2023-01-19 DIAGNOSIS — M25.50 PAIN IN JOINTS: ICD-10-CM

## 2023-01-19 DIAGNOSIS — M79.2 NERVE PAIN: ICD-10-CM

## 2023-01-19 DIAGNOSIS — Z79.4 TYPE 2 DIABETES MELLITUS WITH DIABETIC NEUROPATHY, WITH LONG-TERM CURRENT USE OF INSULIN: Primary | Chronic | ICD-10-CM

## 2023-01-19 DIAGNOSIS — J30.2 SEASONAL ALLERGIES: Chronic | ICD-10-CM

## 2023-01-19 DIAGNOSIS — I10 ESSENTIAL HYPERTENSION: Chronic | ICD-10-CM

## 2023-01-19 DIAGNOSIS — F41.9 ANXIETY: Chronic | ICD-10-CM

## 2023-01-19 DIAGNOSIS — H60.392 OTHER INFECTIVE ACUTE OTITIS EXTERNA OF LEFT EAR: ICD-10-CM

## 2023-01-19 DIAGNOSIS — M54.41 CHRONIC BILATERAL LOW BACK PAIN WITH RIGHT-SIDED SCIATICA: Chronic | ICD-10-CM

## 2023-01-19 DIAGNOSIS — M62.838 MUSCLE SPASM: Chronic | ICD-10-CM

## 2023-01-19 DIAGNOSIS — H61.22 IMPACTED CERUMEN OF LEFT EAR: ICD-10-CM

## 2023-01-19 DIAGNOSIS — E11.40 TYPE 2 DIABETES MELLITUS WITH DIABETIC NEUROPATHY, WITH LONG-TERM CURRENT USE OF INSULIN: Primary | Chronic | ICD-10-CM

## 2023-01-19 DIAGNOSIS — E78.49 OTHER HYPERLIPIDEMIA: Chronic | ICD-10-CM

## 2023-01-19 DIAGNOSIS — F32.89 OTHER DEPRESSION: Chronic | ICD-10-CM

## 2023-01-19 LAB
ALBUMIN SERPL BCP-MCNC: 4 G/DL (ref 3.5–5)
ALBUMIN/GLOB SERPL: 1.1 {RATIO}
ALP SERPL-CCNC: 69 U/L (ref 39–100)
ALT SERPL W P-5'-P-CCNC: 24 U/L (ref 13–56)
ANION GAP SERPL CALCULATED.3IONS-SCNC: 11 MMOL/L (ref 7–16)
AST SERPL W P-5'-P-CCNC: 15 U/L (ref 15–37)
BASOPHILS # BLD AUTO: 0.05 K/UL (ref 0–0.2)
BASOPHILS NFR BLD AUTO: 0.9 % (ref 0–1)
BILIRUB SERPL-MCNC: 0.6 MG/DL (ref ?–1.2)
BUN SERPL-MCNC: 12 MG/DL (ref 7–18)
BUN/CREAT SERPL: 15 (ref 6–20)
CALCIUM SERPL-MCNC: 9.9 MG/DL (ref 8.5–10.1)
CHLORIDE SERPL-SCNC: 97 MMOL/L (ref 98–107)
CHOLEST SERPL-MCNC: 142 MG/DL (ref 0–200)
CHOLEST/HDLC SERPL: 2.1 {RATIO}
CO2 SERPL-SCNC: 31 MMOL/L (ref 21–32)
CREAT SERPL-MCNC: 0.78 MG/DL (ref 0.55–1.02)
CRP SERPL-MCNC: 0.57 MG/DL (ref 0–0.8)
DIFFERENTIAL METHOD BLD: ABNORMAL
EGFR (NO RACE VARIABLE) (RUSH/TITUS): 93 ML/MIN/1.73M²
EOSINOPHIL # BLD AUTO: 0.09 K/UL (ref 0–0.5)
EOSINOPHIL NFR BLD AUTO: 1.5 % (ref 1–4)
ERYTHROCYTE [DISTWIDTH] IN BLOOD BY AUTOMATED COUNT: 13.8 % (ref 11.5–14.5)
ERYTHROCYTE [SEDIMENTATION RATE] IN BLOOD BY WESTERGREN METHOD: 11 MM/HR (ref 0–20)
EST. AVERAGE GLUCOSE BLD GHB EST-MCNC: 304 MG/DL
GLOBULIN SER-MCNC: 3.5 G/DL (ref 2–4)
GLUCOSE SERPL-MCNC: 220 MG/DL (ref 74–106)
HBA1C MFR BLD HPLC: 11.7 % (ref 4.5–6.6)
HCT VFR BLD AUTO: 41.2 % (ref 38–47)
HDLC SERPL-MCNC: 69 MG/DL (ref 40–60)
HGB BLD-MCNC: 12.9 G/DL (ref 12–16)
IMM GRANULOCYTES # BLD AUTO: 0.01 K/UL (ref 0–0.04)
IMM GRANULOCYTES NFR BLD: 0.2 % (ref 0–0.4)
LDLC SERPL CALC-MCNC: 60 MG/DL
LYMPHOCYTES # BLD AUTO: 2.47 K/UL (ref 1–4.8)
LYMPHOCYTES NFR BLD AUTO: 42 % (ref 27–41)
MCH RBC QN AUTO: 26.9 PG (ref 27–31)
MCHC RBC AUTO-ENTMCNC: 31.3 G/DL (ref 32–36)
MCV RBC AUTO: 85.8 FL (ref 80–96)
MONOCYTES # BLD AUTO: 0.53 K/UL (ref 0–0.8)
MONOCYTES NFR BLD AUTO: 9 % (ref 2–6)
MPC BLD CALC-MCNC: 10.4 FL (ref 9.4–12.4)
NEUTROPHILS # BLD AUTO: 2.73 K/UL (ref 1.8–7.7)
NEUTROPHILS NFR BLD AUTO: 46.4 % (ref 53–65)
NONHDLC SERPL-MCNC: 73 MG/DL
NRBC # BLD AUTO: 0 X10E3/UL
NRBC, AUTO (.00): 0 %
PLATELET # BLD AUTO: 365 K/UL (ref 150–400)
POTASSIUM SERPL-SCNC: 4.6 MMOL/L (ref 3.5–5.1)
PROT SERPL-MCNC: 7.5 G/DL (ref 6.4–8.2)
RBC # BLD AUTO: 4.8 M/UL (ref 4.2–5.4)
RHEUMATOID FACT SER NEPH-ACNC: NEGATIVE [IU]/ML
SODIUM SERPL-SCNC: 134 MMOL/L (ref 136–145)
TRIGL SERPL-MCNC: 63 MG/DL (ref 35–150)
URATE SERPL-MCNC: 3 MG/DL (ref 2.6–6)
VLDLC SERPL-MCNC: 13 MG/DL
WBC # BLD AUTO: 5.88 K/UL (ref 4.5–11)

## 2023-01-19 PROCEDURE — 85651 SEDIMENTATION RATE, AUTOMATED: ICD-10-PCS | Mod: ,,, | Performed by: CLINICAL MEDICAL LABORATORY

## 2023-01-19 PROCEDURE — 86430 RHEUMATOID FACTOR SCREEN: ICD-10-PCS | Mod: ,,, | Performed by: CLINICAL MEDICAL LABORATORY

## 2023-01-19 PROCEDURE — 80053 COMPREHENSIVE METABOLIC PANEL: ICD-10-PCS | Mod: ,,, | Performed by: CLINICAL MEDICAL LABORATORY

## 2023-01-19 PROCEDURE — 84550 URIC ACID: ICD-10-PCS | Mod: ,,, | Performed by: CLINICAL MEDICAL LABORATORY

## 2023-01-19 PROCEDURE — 80053 COMPREHEN METABOLIC PANEL: CPT | Mod: ,,, | Performed by: CLINICAL MEDICAL LABORATORY

## 2023-01-19 PROCEDURE — 99214 PR OFFICE/OUTPT VISIT, EST, LEVL IV, 30-39 MIN: ICD-10-PCS | Mod: ,,, | Performed by: FAMILY MEDICINE

## 2023-01-19 PROCEDURE — 86140 C-REACTIVE PROTEIN: CPT | Mod: ,,, | Performed by: CLINICAL MEDICAL LABORATORY

## 2023-01-19 PROCEDURE — 85025 COMPLETE CBC W/AUTO DIFF WBC: CPT | Mod: ,,, | Performed by: CLINICAL MEDICAL LABORATORY

## 2023-01-19 PROCEDURE — 83036 HEMOGLOBIN GLYCOSYLATED A1C: CPT | Mod: ,,, | Performed by: CLINICAL MEDICAL LABORATORY

## 2023-01-19 PROCEDURE — 82570 MICROALBUMIN / CREATININE RATIO URINE: ICD-10-PCS | Mod: ,,, | Performed by: CLINICAL MEDICAL LABORATORY

## 2023-01-19 PROCEDURE — 83036 HEMOGLOBIN A1C: ICD-10-PCS | Mod: ,,, | Performed by: CLINICAL MEDICAL LABORATORY

## 2023-01-19 PROCEDURE — 86038 ANTINUCLEAR ANTIBODIES: CPT | Mod: ,,, | Performed by: CLINICAL MEDICAL LABORATORY

## 2023-01-19 PROCEDURE — 86140 C-REACTIVE PROTEIN: ICD-10-PCS | Mod: ,,, | Performed by: CLINICAL MEDICAL LABORATORY

## 2023-01-19 PROCEDURE — 80061 LIPID PANEL: ICD-10-PCS | Mod: ,,, | Performed by: CLINICAL MEDICAL LABORATORY

## 2023-01-19 PROCEDURE — 82043 UR ALBUMIN QUANTITATIVE: CPT | Mod: ,,, | Performed by: CLINICAL MEDICAL LABORATORY

## 2023-01-19 PROCEDURE — 99214 OFFICE O/P EST MOD 30 MIN: CPT | Mod: ,,, | Performed by: FAMILY MEDICINE

## 2023-01-19 PROCEDURE — 82043 MICROALBUMIN / CREATININE RATIO URINE: ICD-10-PCS | Mod: ,,, | Performed by: CLINICAL MEDICAL LABORATORY

## 2023-01-19 PROCEDURE — 85025 CBC WITH DIFFERENTIAL: ICD-10-PCS | Mod: ,,, | Performed by: CLINICAL MEDICAL LABORATORY

## 2023-01-19 PROCEDURE — 84550 ASSAY OF BLOOD/URIC ACID: CPT | Mod: ,,, | Performed by: CLINICAL MEDICAL LABORATORY

## 2023-01-19 PROCEDURE — 80061 LIPID PANEL: CPT | Mod: ,,, | Performed by: CLINICAL MEDICAL LABORATORY

## 2023-01-19 PROCEDURE — 85651 RBC SED RATE NONAUTOMATED: CPT | Mod: ,,, | Performed by: CLINICAL MEDICAL LABORATORY

## 2023-01-19 PROCEDURE — 86430 RHEUMATOID FACTOR TEST QUAL: CPT | Mod: ,,, | Performed by: CLINICAL MEDICAL LABORATORY

## 2023-01-19 PROCEDURE — 82570 ASSAY OF URINE CREATININE: CPT | Mod: ,,, | Performed by: CLINICAL MEDICAL LABORATORY

## 2023-01-19 PROCEDURE — 86038 ANA EIA W/REFLEX DSDNA/ENA: ICD-10-PCS | Mod: ,,, | Performed by: CLINICAL MEDICAL LABORATORY

## 2023-01-19 RX ORDER — DULOXETIN HYDROCHLORIDE 60 MG/1
60 CAPSULE, DELAYED RELEASE ORAL 2 TIMES DAILY
Qty: 180 CAPSULE | Refills: 1 | Status: SHIPPED | OUTPATIENT
Start: 2023-01-19 | End: 2023-05-22 | Stop reason: SDUPTHER

## 2023-01-19 RX ORDER — RISPERIDONE 3 MG/1
3 TABLET ORAL DAILY
Qty: 90 TABLET | Refills: 1 | Status: SHIPPED | OUTPATIENT
Start: 2023-01-19 | End: 2023-01-19

## 2023-01-19 RX ORDER — HYDROCHLOROTHIAZIDE 25 MG/1
25 TABLET ORAL DAILY
Qty: 90 TABLET | Refills: 1 | Status: SHIPPED | OUTPATIENT
Start: 2023-01-19 | End: 2023-01-19

## 2023-01-19 RX ORDER — GABAPENTIN 800 MG/1
800 TABLET ORAL 3 TIMES DAILY
Qty: 270 TABLET | Refills: 1 | Status: SHIPPED | OUTPATIENT
Start: 2023-01-19 | End: 2023-06-13 | Stop reason: SDUPTHER

## 2023-01-19 RX ORDER — METHOCARBAMOL 500 MG/1
500 TABLET, FILM COATED ORAL 3 TIMES DAILY PRN
Qty: 45 TABLET | Refills: 2 | Status: SHIPPED | OUTPATIENT
Start: 2023-01-19 | End: 2023-01-19

## 2023-01-19 RX ORDER — TRAZODONE HYDROCHLORIDE 100 MG/1
100 TABLET ORAL NIGHTLY
Qty: 90 TABLET | Refills: 1 | Status: SHIPPED | OUTPATIENT
Start: 2023-01-19 | End: 2023-01-19

## 2023-01-19 RX ORDER — PRAVASTATIN SODIUM 20 MG/1
20 TABLET ORAL NIGHTLY
Qty: 90 TABLET | Refills: 1 | Status: SHIPPED | OUTPATIENT
Start: 2023-01-19 | End: 2023-01-19

## 2023-01-19 RX ORDER — HYDROXYZINE PAMOATE 25 MG/1
25 CAPSULE ORAL 3 TIMES DAILY
Qty: 270 CAPSULE | Refills: 1 | Status: SHIPPED | OUTPATIENT
Start: 2023-01-19 | End: 2023-01-19

## 2023-01-19 RX ORDER — OMEPRAZOLE 40 MG/1
40 CAPSULE, DELAYED RELEASE ORAL
Qty: 180 CAPSULE | Refills: 1 | Status: SHIPPED | OUTPATIENT
Start: 2023-01-19 | End: 2023-01-19

## 2023-01-19 RX ORDER — INSULIN GLARGINE 100 [IU]/ML
15 INJECTION, SOLUTION SUBCUTANEOUS NIGHTLY
Qty: 18 ML | Refills: 1 | Status: SHIPPED | OUTPATIENT
Start: 2023-01-19 | End: 2023-01-19

## 2023-01-19 RX ORDER — IBUPROFEN 600 MG/1
600 TABLET ORAL EVERY 8 HOURS PRN
Qty: 90 TABLET | Refills: 2 | Status: SHIPPED | OUTPATIENT
Start: 2023-01-19 | End: 2023-04-05 | Stop reason: ALTCHOICE

## 2023-01-19 RX ORDER — SEMAGLUTIDE 1.34 MG/ML
1 INJECTION, SOLUTION SUBCUTANEOUS
Qty: 2 PEN | Refills: 5 | Status: SHIPPED | OUTPATIENT
Start: 2023-01-19 | End: 2023-01-19

## 2023-01-19 RX ORDER — INSULIN GLARGINE 100 [IU]/ML
15 INJECTION, SOLUTION SUBCUTANEOUS NIGHTLY
Qty: 18 ML | Refills: 1 | Status: SHIPPED | OUTPATIENT
Start: 2023-01-19 | End: 2023-06-13 | Stop reason: SDUPTHER

## 2023-01-19 RX ORDER — HYDROXYZINE PAMOATE 25 MG/1
25 CAPSULE ORAL 3 TIMES DAILY
Qty: 270 CAPSULE | Refills: 1 | Status: SHIPPED | OUTPATIENT
Start: 2023-01-19 | End: 2023-06-13 | Stop reason: SDUPTHER

## 2023-01-19 RX ORDER — LISINOPRIL 2.5 MG/1
2.5 TABLET ORAL DAILY
Qty: 90 TABLET | Refills: 1 | Status: SHIPPED | OUTPATIENT
Start: 2023-01-19 | End: 2023-06-13 | Stop reason: SDUPTHER

## 2023-01-19 RX ORDER — TRAZODONE HYDROCHLORIDE 100 MG/1
100 TABLET ORAL NIGHTLY
Qty: 90 TABLET | Refills: 1 | Status: SHIPPED | OUTPATIENT
Start: 2023-01-19

## 2023-01-19 RX ORDER — RISPERIDONE 3 MG/1
3 TABLET ORAL DAILY
Qty: 90 TABLET | Refills: 1 | Status: SHIPPED | OUTPATIENT
Start: 2023-01-19

## 2023-01-19 RX ORDER — DULOXETIN HYDROCHLORIDE 60 MG/1
60 CAPSULE, DELAYED RELEASE ORAL 2 TIMES DAILY
Qty: 180 CAPSULE | Refills: 1 | Status: SHIPPED | OUTPATIENT
Start: 2023-01-19 | End: 2023-01-19

## 2023-01-19 RX ORDER — IBUPROFEN 600 MG/1
600 TABLET ORAL EVERY 8 HOURS PRN
Qty: 90 TABLET | Refills: 2 | Status: SHIPPED | OUTPATIENT
Start: 2023-01-19 | End: 2023-01-19

## 2023-01-19 RX ORDER — SEMAGLUTIDE 1.34 MG/ML
1 INJECTION, SOLUTION SUBCUTANEOUS
Qty: 2 PEN | Refills: 5 | Status: SHIPPED | OUTPATIENT
Start: 2023-01-19 | End: 2023-04-05

## 2023-01-19 RX ORDER — GABAPENTIN 800 MG/1
800 TABLET ORAL 3 TIMES DAILY
Qty: 270 TABLET | Refills: 1 | Status: SHIPPED | OUTPATIENT
Start: 2023-01-19 | End: 2023-01-19

## 2023-01-19 RX ORDER — GLIPIZIDE 10 MG/1
10 TABLET ORAL 2 TIMES DAILY WITH MEALS
Qty: 180 TABLET | Refills: 1 | Status: SHIPPED | OUTPATIENT
Start: 2023-01-19 | End: 2023-01-19

## 2023-01-19 RX ORDER — METHOCARBAMOL 500 MG/1
500 TABLET, FILM COATED ORAL 3 TIMES DAILY PRN
Qty: 45 TABLET | Refills: 2 | Status: SHIPPED | OUTPATIENT
Start: 2023-01-19 | End: 2023-04-05 | Stop reason: ALTCHOICE

## 2023-01-19 RX ORDER — CIPROFLOXACIN 0.5 MG/.25ML
4 SOLUTION/ DROPS AURICULAR (OTIC) 2 TIMES DAILY
Qty: 20 EACH | Refills: 0 | Status: SHIPPED | OUTPATIENT
Start: 2023-01-19 | End: 2023-01-19

## 2023-01-19 RX ORDER — HYDROCHLOROTHIAZIDE 25 MG/1
25 TABLET ORAL DAILY
Qty: 90 TABLET | Refills: 1 | Status: SHIPPED | OUTPATIENT
Start: 2023-01-19 | End: 2023-11-29 | Stop reason: SDUPTHER

## 2023-01-19 RX ORDER — MOMETASONE FUROATE 50 UG/1
2 SPRAY, METERED NASAL DAILY
Qty: 17 G | Refills: 3 | Status: SHIPPED | OUTPATIENT
Start: 2023-01-19 | End: 2023-01-19

## 2023-01-19 RX ORDER — LISINOPRIL 2.5 MG/1
2.5 TABLET ORAL DAILY
Qty: 90 TABLET | Refills: 1 | Status: SHIPPED | OUTPATIENT
Start: 2023-01-19 | End: 2023-01-19

## 2023-01-19 RX ORDER — OMEPRAZOLE 40 MG/1
40 CAPSULE, DELAYED RELEASE ORAL
Qty: 180 CAPSULE | Refills: 1 | Status: SHIPPED | OUTPATIENT
Start: 2023-01-19 | End: 2023-05-22 | Stop reason: SDUPTHER

## 2023-01-19 RX ORDER — PRAVASTATIN SODIUM 20 MG/1
20 TABLET ORAL NIGHTLY
Qty: 90 TABLET | Refills: 1 | Status: SHIPPED | OUTPATIENT
Start: 2023-01-19 | End: 2023-10-19 | Stop reason: SDUPTHER

## 2023-01-19 RX ORDER — MOMETASONE FUROATE 50 UG/1
2 SPRAY, METERED NASAL DAILY
Qty: 17 G | Refills: 3 | Status: SHIPPED | OUTPATIENT
Start: 2023-01-19 | End: 2023-11-29 | Stop reason: SDUPTHER

## 2023-01-19 RX ORDER — GLIPIZIDE 10 MG/1
10 TABLET ORAL 2 TIMES DAILY WITH MEALS
Qty: 180 TABLET | Refills: 1 | Status: SHIPPED | OUTPATIENT
Start: 2023-01-19 | End: 2023-06-13 | Stop reason: SDUPTHER

## 2023-01-19 RX ORDER — ROSUVASTATIN CALCIUM 20 MG/1
20 TABLET, COATED ORAL NIGHTLY
COMMUNITY
Start: 2022-10-27 | End: 2023-01-19

## 2023-01-19 RX ORDER — CIPROFLOXACIN 0.5 MG/.25ML
4 SOLUTION/ DROPS AURICULAR (OTIC) 2 TIMES DAILY
Qty: 20 EACH | Refills: 0 | Status: SHIPPED | OUTPATIENT
Start: 2023-01-19 | End: 2023-01-29

## 2023-01-19 NOTE — PROGRESS NOTES
Clinic Note    Patient Name: Georgiana Zayas  : 1975  MRN: [id number]    Chief Complaint   Patient presents with    Follow-up     3 month follow up     Medication Refill    Knee Pain     Elbow and knee pain     Otalgia     Left        HPI:    Ms. Georgiana Zayas is a 48 y.o. female who presents to clinic today with CC of follow up on chronic disease processes including Type II DM, HTN, HLD, obesity, GERD, MATT, chronic pain, and seasonal allergies.   Patient reports chronic issues are well controlled on current medication regimen.  Denies problems or side effects with medications.  Patient reports elbow and knee pain. Reports this is a chronic, intermittent issue. Reports pain is bilateral but R knee and R elbow are worse than L. Reports she wears sleeve/braces that does help some. Reports chronic pain in bilateral hands. Denies joint swelling. She reports she has never seen Rheumatology.   Reports L ear pain X 2 days. Denies fever. Reports h/o was buildup. Denies cough but reports some clear rhinorrhea.  Patient is, otherwise, without complaints.     Medications:  Medication List with Changes/Refills   New Medications    CIPROFLOXACIN HCL 0.2 % OTIC SOLUTION    Place 4 drops into the left ear 2 (two) times daily. for 10 days   Current Medications    NITROGLYCERIN (NITROSTAT) 0.4 MG SL TABLET    Place 0.4 mg under the tongue every 5 (five) minutes as needed for Chest pain.    OXYCODONE-ACETAMINOPHEN (PERCOCET)  MG PER TABLET    Take 1 tablet by mouth every 6 (six) hours as needed.    Changed and/or Refilled Medications    Modified Medication Previous Medication    DULOXETINE (CYMBALTA) 60 MG CAPSULE DULoxetine (CYMBALTA) 60 MG capsule       Take 1 capsule (60 mg total) by mouth 2 (two) times daily.    TAKE 1 CAPSULE (60 MG TOTAL) BY MOUTH 2 (TWO) TIMES DAILY.    GABAPENTIN (NEURONTIN) 800 MG TABLET gabapentin (NEURONTIN) 800 MG tablet       Take 1 tablet (800 mg total) by mouth 3 (three) times daily.     TAKE 1 TABLET (800 MG TOTAL) BY MOUTH 3 (THREE) TIMES DAILY.    GLIPIZIDE (GLUCOTROL) 10 MG TABLET glipiZIDE (GLUCOTROL) 10 MG tablet       Take 1 tablet (10 mg total) by mouth 2 (two) times daily with meals.    Take 1 tablet (10 mg total) by mouth 2 (two) times daily with meals.    HYDROCHLOROTHIAZIDE (HYDRODIURIL) 25 MG TABLET hydroCHLOROthiazide (HYDRODIURIL) 25 MG tablet       Take 1 tablet (25 mg total) by mouth once daily.    TAKE 1 TABLET (25 MG TOTAL) BY MOUTH ONCE DAILY.    HYDROXYZINE PAMOATE (VISTARIL) 25 MG CAP hydrOXYzine pamoate (VISTARIL) 25 MG Cap       Take 1 capsule (25 mg total) by mouth 3 (three) times daily.    Take 1 capsule (25 mg total) by mouth 3 (three) times daily.    IBUPROFEN (ADVIL,MOTRIN) 600 MG TABLET ibuprofen (ADVIL,MOTRIN) 600 MG tablet       Take 1 tablet (600 mg total) by mouth every 8 (eight) hours as needed for Pain.    Take 1 tablet (600 mg total) by mouth every 8 (eight) hours as needed for Pain.    INSULIN (LANTUS SOLOSTAR U-100 INSULIN) GLARGINE 100 UNITS/ML SUBQ PEN insulin (LANTUS SOLOSTAR U-100 INSULIN) glargine 100 units/mL SubQ pen       Inject 15 Units into the skin every evening.    Inject 10 Units into the skin every evening.    LISINOPRIL (PRINIVIL,ZESTRIL) 2.5 MG TABLET lisinopriL (PRINIVIL,ZESTRIL) 2.5 MG tablet       Take 1 tablet (2.5 mg total) by mouth once daily.    Take 1 tablet (2.5 mg total) by mouth once daily.    METHOCARBAMOL (ROBAXIN) 500 MG TAB methocarbamoL (ROBAXIN) 500 MG Tab       Take 1 tablet (500 mg total) by mouth 3 (three) times daily as needed (muscle spasm/back pain. May cause drowsiness.).    Take 1 tablet (500 mg total) by mouth 3 (three) times daily as needed (muscle spasm/back pain. May cause drowsiness.).    MOMETASONE (NASONEX) 50 MCG/ACTUATION NASAL SPRAY mometasone (NASONEX) 50 mcg/actuation nasal spray       2 sprays by Nasal route once daily.    2 sprays by Nasal route once daily.    OMEPRAZOLE (PRILOSEC) 40 MG CAPSULE omeprazole  (PRILOSEC) 40 MG capsule       Take 1 capsule (40 mg total) by mouth 2 (two) times daily before meals.    Take 1 capsule (40 mg total) by mouth 2 (two) times daily before meals.    PRAVASTATIN (PRAVACHOL) 20 MG TABLET pravastatin (PRAVACHOL) 20 MG tablet       Take 1 tablet (20 mg total) by mouth every evening.    Take 1 tablet (20 mg total) by mouth every evening.    RISPERIDONE (RISPERDAL) 3 MG TAB risperiDONE (RISPERDAL) 3 MG Tab       Take 1 tablet (3 mg total) by mouth once daily.    Take 1 tablet (3 mg total) by mouth once daily.    SEMAGLUTIDE (OZEMPIC) 1 MG/DOSE (2 MG/1.5 ML) PNIJ semaglutide (OZEMPIC) 1 mg/dose (2 mg/1.5 mL) PnIj       Inject 1 mg into the skin every 7 days.    Inject 1 mg into the skin every 7 days.    TRAZODONE (DESYREL) 100 MG TABLET traZODone (DESYREL) 100 MG tablet       Take 1 tablet (100 mg total) by mouth every evening.    Take 1 tablet (100 mg total) by mouth every evening.   Discontinued Medications    CARVEDILOL (COREG) 3.125 MG TABLET    Take 1 tablet (3.125 mg total) by mouth 2 (two) times daily.    OZEMPIC 1 MG/DOSE (4 MG/3 ML)    Inject 1 mg into the skin every 7 days.    ROSUVASTATIN (CRESTOR) 20 MG TABLET    Take 20 mg by mouth every evening.        Allergies: Codeine      Past Medical History:    Past Medical History:   Diagnosis Date    Arthritis     Diabetes mellitus     Diabetes mellitus, type 2     GERD (gastroesophageal reflux disease)     Hearing difficulty     Hypertension     Liver disease     Sleep apnea        Past Surgical History:    Past Surgical History:   Procedure Laterality Date    CARPAL TUNNEL RELEASE Bilateral      SECTION      INJECTION OF ANESTHETIC AGENT AROUND MEDIAL BRANCH NERVES INNERVATING LUMBAR FACET JOINT Bilateral 2021    Procedure: BLOCK, NERVE, FACET JOINT, LUMBAR, MEDIAL BRANCH;  Surgeon: Amari Razo MD;  Location: [facility];  Service: Pain Management;  Laterality: Bilateral;  Bilateral L3-S1 Facet Injection     INJECTION OF ANESTHETIC AGENT AROUND MEDIAL BRANCH NERVES INNERVATING LUMBAR FACET JOINT Bilateral 5/4/2022    Procedure: BLOCK, NERVE, FACET JOINT, LUMBAR, MEDIAL BRANCH;  Surgeon: Amari Razo MD;  Location: [facility];  Service: Pain Management;  Laterality: Bilateral;  Bilateral L3-S1 FI    INJECTION OF ANESTHETIC AGENT AROUND MEDIAL BRANCH NERVES INNERVATING LUMBAR FACET JOINT Bilateral 1/11/2023    Procedure: BLOCK, NERVE, FACET JOINT, LUMBAR, MEDIAL BRANCH;  Surgeon: Amari Razo MD;  Location: [facility];  Service: Pain Management;  Laterality: Bilateral;  Bilateral L3-S1 FI    LEFT HEART CATHETERIZATION Left 12/21/2021    Procedure: Left heart cath;  Surgeon: Jeremy Rojo DO;  Location: [facility] CATH LAB;  Service: Cardiology;  Laterality: Left;    STAPEDES SURGERY Bilateral     TONSILLECTOMY           Social History:    Social History     Tobacco Use   Smoking Status Never   Smokeless Tobacco Never     Social History     Substance and Sexual Activity   Alcohol Use Not Currently     Social History     Substance and Sexual Activity   Drug Use Never         Family History:    Family History   Problem Relation Age of Onset    Cancer Mother     Diabetes Mother     Hypertension Mother        Review of Systems:    Review of Systems   Constitutional:  Positive for fatigue. Negative for appetite change, chills, fever and unexpected weight change.   HENT:  Positive for ear pain and rhinorrhea.    Eyes:  Positive for visual disturbance.   Respiratory:  Negative for cough and shortness of breath.    Cardiovascular:  Negative for chest pain and leg swelling.   Gastrointestinal:  Negative for change in bowel habit, nausea, vomiting and change in bowel habit.        + IBS - alternating diarrhea/constipation - reports constipation is predominant symptom - at baseline   Musculoskeletal:  Positive for arthralgias.   Integumentary:  Negative for rash.   Neurological:  Positive for  "headaches. Negative for dizziness.   Psychiatric/Behavioral:  The patient is not nervous/anxious.       Vitals:    Vitals:    01/19/23 0936   BP: 118/78   BP Location: Left arm   Patient Position: Sitting   BP Method: Large (Manual)   Pulse: 76   Resp: 20   Temp: 97.6 °F (36.4 °C)   TempSrc: Temporal   SpO2: 99%   Weight: 91.2 kg (201 lb)   Height: 5' 4" (1.626 m)       Body mass index is 34.5 kg/m².    Wt Readings from Last 3 Encounters:   01/19/23 0936 91.2 kg (201 lb)   01/11/23 0951 90.7 kg (200 lb)   10/13/22 0806 94.8 kg (209 lb)        Physical Exam:    Physical Exam  Constitutional:       General: She is not in acute distress.     Appearance: Normal appearance. She is obese.   HENT:      Right Ear: Tympanic membrane normal.      Left Ear: Tympanic membrane normal. There is impacted cerumen.      Ears:      Comments: + Otitis Externa L ear     Nose: Nose normal.      Mouth/Throat:      Mouth: Mucous membranes are moist.      Pharynx: Oropharynx is clear.   Eyes:      Conjunctiva/sclera: Conjunctivae normal.   Cardiovascular:      Rate and Rhythm: Normal rate and regular rhythm.      Heart sounds: Normal heart sounds. No murmur heard.  Pulmonary:      Effort: Pulmonary effort is normal. No respiratory distress.      Breath sounds: Normal breath sounds. No wheezing, rhonchi or rales.   Abdominal:      General: Bowel sounds are normal.      Palpations: Abdomen is soft.      Tenderness: There is no abdominal tenderness.   Musculoskeletal:      Cervical back: Neck supple.      Right lower leg: No edema.      Left lower leg: No edema.   Skin:     Findings: No rash.   Neurological:      General: No focal deficit present.      Mental Status: She is alert. Mental status is at baseline.   Psychiatric:         Mood and Affect: Mood normal.         Assessment/Plan:   Type 2 diabetes mellitus with diabetic neuropathy, with long-term current use of insulin  -     CBC Auto Differential; Future; Expected date: 01/19/2023  -  "    Comprehensive Metabolic Panel; Future; Expected date: 01/19/2023  -     Lipid Panel; Future; Expected date: 01/19/2023  -     Hemoglobin A1C; Future; Expected date: 01/19/2023  -     Microalbumin/Creatinine Ratio, Urine  -     glipiZIDE (GLUCOTROL) 10 MG tablet; Take 1 tablet (10 mg total) by mouth 2 (two) times daily with meals.  Dispense: 180 tablet; Refill: 1  -     insulin (LANTUS SOLOSTAR U-100 INSULIN) glargine 100 units/mL SubQ pen; Inject 15 Units into the skin every evening.  Dispense: 18 mL; Refill: 1  -     semaglutide (OZEMPIC) 1 mg/dose (2 mg/1.5 mL) PnIj; Inject 1 mg into the skin every 7 days.  Dispense: 2 pen; Refill: 5    Chronic bilateral low back pain with right-sided sciatica  -     DULoxetine (CYMBALTA) 60 MG capsule; Take 1 capsule (60 mg total) by mouth 2 (two) times daily.  Dispense: 180 capsule; Refill: 1  -     ibuprofen (ADVIL,MOTRIN) 600 MG tablet; Take 1 tablet (600 mg total) by mouth every 8 (eight) hours as needed for Pain.  Dispense: 90 tablet; Refill: 2    Nerve pain  -     gabapentin (NEURONTIN) 800 MG tablet; Take 1 tablet (800 mg total) by mouth 3 (three) times daily.  Dispense: 270 tablet; Refill: 1    Essential hypertension  -     CBC Auto Differential; Future; Expected date: 01/19/2023  -     Comprehensive Metabolic Panel; Future; Expected date: 01/19/2023  -     Lipid Panel; Future; Expected date: 01/19/2023  -     Microalbumin/Creatinine Ratio, Urine  -     hydroCHLOROthiazide (HYDRODIURIL) 25 MG tablet; Take 1 tablet (25 mg total) by mouth once daily.  Dispense: 90 tablet; Refill: 1  -     lisinopriL (PRINIVIL,ZESTRIL) 2.5 MG tablet; Take 1 tablet (2.5 mg total) by mouth once daily.  Dispense: 90 tablet; Refill: 1    Anxiety  -     hydrOXYzine pamoate (VISTARIL) 25 MG Cap; Take 1 capsule (25 mg total) by mouth 3 (three) times daily.  Dispense: 270 capsule; Refill: 1  -     risperiDONE (RISPERDAL) 3 MG Tab; Take 1 tablet (3 mg total) by mouth once daily.  Dispense: 90  tablet; Refill: 1  -     traZODone (DESYREL) 100 MG tablet; Take 1 tablet (100 mg total) by mouth every evening.  Dispense: 90 tablet; Refill: 1    Muscle spasm  -     methocarbamoL (ROBAXIN) 500 MG Tab; Take 1 tablet (500 mg total) by mouth 3 (three) times daily as needed (muscle spasm/back pain. May cause drowsiness.).  Dispense: 45 tablet; Refill: 2    Seasonal allergies  -     mometasone (NASONEX) 50 mcg/actuation nasal spray; 2 sprays by Nasal route once daily.  Dispense: 17 g; Refill: 3    Gastroesophageal reflux disease without esophagitis  -     omeprazole (PRILOSEC) 40 MG capsule; Take 1 capsule (40 mg total) by mouth 2 (two) times daily before meals.  Dispense: 180 capsule; Refill: 1    Other hyperlipidemia  -     CBC Auto Differential; Future; Expected date: 01/19/2023  -     Comprehensive Metabolic Panel; Future; Expected date: 01/19/2023  -     Lipid Panel; Future; Expected date: 01/19/2023  -     pravastatin (PRAVACHOL) 20 MG tablet; Take 1 tablet (20 mg total) by mouth every evening.  Dispense: 90 tablet; Refill: 1    Other depression  -     risperiDONE (RISPERDAL) 3 MG Tab; Take 1 tablet (3 mg total) by mouth once daily.  Dispense: 90 tablet; Refill: 1  -     traZODone (DESYREL) 100 MG tablet; Take 1 tablet (100 mg total) by mouth every evening.  Dispense: 90 tablet; Refill: 1    Impacted cerumen of left ear  -     Remove impacted ear wax - cerumen removed with curette by physician. Patient tolerated procedure well.    Other infective acute otitis externa of left ear  -     ciprofloxacin HCl 0.2 % otic solution; Place 4 drops into the left ear 2 (two) times daily. for 10 days  Dispense: 20 each; Refill: 0    Pain in joints  -     Uric Acid; Future; Expected date: 01/19/2023  -     PAULA EIA w/ Reflex to dsDNA/AVELINA; Future; Expected date: 01/19/2023  -     Rheumatoid Factor Screen; Future; Expected date: 01/19/2023  -     C-Reactive Protein; Future; Expected date: 01/19/2023  -     Sedimentation Rate;  Future; Expected date: 01/19/2023    Left ear pain         -  Cerumen removal       Active Problem List with Overview Notes    Diagnosis Date Noted    Type 2 diabetes mellitus with diabetic neuropathy, with long-term current use of insulin 08/25/2021    Essential hypertension 08/25/2021    Anxiety 01/23/2023    Other hyperlipidemia 07/07/2022    Morbid obesity 01/17/2022    Depression 01/23/2023    Femoral artery pseudo-aneurysm, right 01/17/2022    Other sleep apnea 11/30/2021    Lumbosacral spondylosis without myelopathy 09/08/2021    Gastroesophageal reflux disease 08/25/2021    Chronic bilateral low back pain with right-sided sciatica 07/07/2022    Seasonal allergies 07/07/2022    Muscle spasm 07/07/2022        Health Maintenance:  Health Maintenance   Topic Date Due    Eye Exam  Never done    Mammogram  11/22/2022    Foot Exam  02/03/2023    Hepatitis C Screening  02/03/2023 (Originally 1975)    TETANUS VACCINE  02/03/2023 (Originally 1/1/1993)    Hemoglobin A1c  04/19/2023    Lipid Panel  01/19/2024       RTC in 3-4 months for chronic follow up.  RTC sooner if needed.   Patient voiced understanding and is agreeable to plan.      Lisandra Mcdaniel MD    Family Medicine

## 2023-01-20 LAB
CREAT UR-MCNC: 78 MG/DL (ref 28–219)
MICROALBUMIN UR-MCNC: 0.9 MG/DL (ref 0–2.8)
MICROALBUMIN/CREAT RATIO PNL UR: 11.5 MG/G (ref 0–30)

## 2023-01-23 PROBLEM — F41.9 ANXIETY: Chronic | Status: ACTIVE | Noted: 2023-01-23

## 2023-01-23 PROBLEM — R07.9 CHEST PAIN: Status: RESOLVED | Noted: 2021-11-30 | Resolved: 2023-01-23

## 2023-01-23 PROBLEM — Z79.4 TYPE 2 DIABETES MELLITUS WITH DIABETIC NEUROPATHY, WITH LONG-TERM CURRENT USE OF INSULIN: Chronic | Status: ACTIVE | Noted: 2021-08-25

## 2023-01-23 PROBLEM — F32.A DEPRESSION: Chronic | Status: ACTIVE | Noted: 2023-01-23

## 2023-01-24 LAB — ANA SER QL: NEGATIVE

## 2023-02-10 ENCOUNTER — HOSPITAL ENCOUNTER (EMERGENCY)
Facility: HOSPITAL | Age: 48
Discharge: HOME | End: 2023-02-10
Payer: COMMERCIAL

## 2023-02-10 VITALS
TEMPERATURE: 99 F | WEIGHT: 200 LBS | OXYGEN SATURATION: 99 % | RESPIRATION RATE: 18 BRPM | DIASTOLIC BLOOD PRESSURE: 98 MMHG | HEART RATE: 109 BPM | SYSTOLIC BLOOD PRESSURE: 146 MMHG | HEIGHT: 64 IN | BODY MASS INDEX: 34.15 KG/M2
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
## 2023-02-10 PROCEDURE — 99284 PR EMERGENCY DEPT VISIT,LEVEL IV: ICD-10-PCS | Mod: ,,, | Performed by: NURSE PRACTITIONER

## 2023-02-10 PROCEDURE — 63600175 PHARM REV CODE 636 W HCPCS: Performed by: NURSE PRACTITIONER

## 2023-02-10 PROCEDURE — 96372 THER/PROPH/DIAG INJ SC/IM: CPT | Performed by: NURSE PRACTITIONER

## 2023-02-10 PROCEDURE — 99284 EMERGENCY DEPT VISIT MOD MDM: CPT | Mod: ,,, | Performed by: NURSE PRACTITIONER

## 2023-02-10 PROCEDURE — 99285 EMERGENCY DEPT VISIT HI MDM: CPT | Mod: 25

## 2023-02-10 RX ORDER — ARIPIPRAZOLE 2 MG/1
2 TABLET ORAL
COMMUNITY
Start: 2023-01-27 | End: 2023-07-20 | Stop reason: SDUPTHER

## 2023-02-10 RX ORDER — NAPROXEN 500 MG/1
500 TABLET ORAL 2 TIMES DAILY WITH MEALS
Qty: 60 TABLET | Refills: 0 | Status: SHIPPED | OUTPATIENT
Start: 2023-02-10 | End: 2023-04-05 | Stop reason: SDUPTHER

## 2023-02-10 RX ORDER — TIZANIDINE 4 MG/1
4 TABLET ORAL 3 TIMES DAILY PRN
Qty: 30 TABLET | Refills: 0 | Status: SHIPPED | OUTPATIENT
Start: 2023-02-10 | End: 2023-02-20

## 2023-02-10 RX ORDER — METHYLPREDNISOLONE ACETATE 40 MG/ML
40 INJECTION, SUSPENSION INTRA-ARTICULAR; INTRALESIONAL; INTRAMUSCULAR; SOFT TISSUE
Status: COMPLETED | OUTPATIENT
Start: 2023-02-10 | End: 2023-02-10

## 2023-02-10 RX ORDER — KETOROLAC TROMETHAMINE 30 MG/ML
60 INJECTION, SOLUTION INTRAMUSCULAR; INTRAVENOUS
Status: COMPLETED | OUTPATIENT
Start: 2023-02-10 | End: 2023-02-10

## 2023-02-10 RX ORDER — DEXAMETHASONE SODIUM PHOSPHATE 4 MG/ML
4 INJECTION, SOLUTION INTRA-ARTICULAR; INTRALESIONAL; INTRAMUSCULAR; INTRAVENOUS; SOFT TISSUE
Status: COMPLETED | OUTPATIENT
Start: 2023-02-10 | End: 2023-02-10

## 2023-02-10 RX ADMIN — DEXAMETHASONE SODIUM PHOSPHATE 4 MG: 4 INJECTION, SOLUTION INTRA-ARTICULAR; INTRALESIONAL; INTRAMUSCULAR; INTRAVENOUS; SOFT TISSUE at 04:02

## 2023-02-10 RX ADMIN — METHYLPREDNISOLONE ACETATE 40 MG: 40 INJECTION, SUSPENSION INTRA-ARTICULAR; INTRALESIONAL; INTRAMUSCULAR; SOFT TISSUE at 04:02

## 2023-02-10 RX ADMIN — KETOROLAC TROMETHAMINE 60 MG: 30 INJECTION, SOLUTION INTRAMUSCULAR at 04:02

## 2023-02-10 NOTE — DISCHARGE INSTRUCTIONS
Take Muscle relaxers as needed. Take Naproxen BID as needed. Follow up with PCP. Return to the ED as needed.

## 2023-02-10 NOTE — ED PROVIDER NOTES
Encounter Date: 2/10/2023       History     Chief Complaint   Patient presents with    Neck Pain    Tailbone Pain     38 y/o Bf presents to the ED with complaints of falling Tuesday and having pain to her tailbone and neck. Pt takes percocet at home and reports her pain is still not relieved.    Review of patient's allergies indicates:   Allergen Reactions    Codeine Itching     Past Medical History:   Diagnosis Date    Arthritis     Diabetes mellitus     Diabetes mellitus, type 2     GERD (gastroesophageal reflux disease)     Hearing difficulty     Hypertension     Liver disease     Sleep apnea      Past Surgical History:   Procedure Laterality Date    CARPAL TUNNEL RELEASE Bilateral      SECTION      INJECTION OF ANESTHETIC AGENT AROUND MEDIAL BRANCH NERVES INNERVATING LUMBAR FACET JOINT Bilateral 2021    Procedure: BLOCK, NERVE, FACET JOINT, LUMBAR, MEDIAL BRANCH;  Surgeon: Amari Razo MD;  Location: [facility] [place];  Service: Pain Management;  Laterality: Bilateral;  Bilateral L3-S1 Facet Injection    INJECTION OF ANESTHETIC AGENT AROUND MEDIAL BRANCH NERVES INNERVATING LUMBAR FACET JOINT Bilateral 2022    Procedure: BLOCK, NERVE, FACET JOINT, LUMBAR, MEDIAL BRANCH;  Surgeon: Amari Razo MD;  Location: [facility] [place];  Service: Pain Management;  Laterality: Bilateral;  Bilateral L3-S1 FI    INJECTION OF ANESTHETIC AGENT AROUND MEDIAL BRANCH NERVES INNERVATING LUMBAR FACET JOINT Bilateral 2023    Procedure: BLOCK, NERVE, FACET JOINT, LUMBAR, MEDIAL BRANCH;  Surgeon: Amari Razo MD;  Location: [facility];  Service: Pain Management;  Laterality: Bilateral;  Bilateral L3-S1 FI    LEFT HEART CATHETERIZATION Left 2021    Procedure: Left heart cath;  Surgeon: Jeremy Rojo DO;  Location: [facility] CATH LAB;  Service: Cardiology;  Laterality: Left;    STAPEDES SURGERY Bilateral     TONSILLECTOMY       Family History   Problem Relation Age of Onset     Cancer Mother     Diabetes Mother     Hypertension Mother      Social History     Tobacco Use    Smoking status: Never    Smokeless tobacco: Never   Substance Use Topics    Alcohol use: Not Currently    Drug use: Never     Review of Systems   Constitutional: Negative.    HENT: Negative.     Eyes: Negative.    Respiratory: Negative.  Negative for shortness of breath.    Cardiovascular: Negative.  Negative for chest pain, palpitations and leg swelling.   Gastrointestinal: Negative.  Negative for diarrhea, nausea and vomiting.   Endocrine: Negative.    Genitourinary: Negative.    Musculoskeletal:  Positive for arthralgias (tailbone pain) and neck pain. Negative for back pain.   Skin: Negative.    Neurological: Negative.    Psychiatric/Behavioral: Negative.     All other systems reviewed and are negative.    Physical Exam     Initial Vitals [02/10/23 1531]   BP Pulse Resp Temp SpO2   (!) 146/98 109 18 98.7 °F (37.1 °C) 99 %      MAP       --         Physical Exam    Nursing note and vitals reviewed.  Constitutional: Vital signs are normal. She appears well-developed and well-nourished.   HENT:   Head: Normocephalic and atraumatic.   Eyes: Conjunctivae, EOM and lids are normal. Pupils are equal, round, and reactive to light.   Neck: Trachea normal. Neck supple.   Normal range of motion.  Cardiovascular:  Normal rate, regular rhythm, S1 normal, S2 normal and normal heart sounds.           Pulmonary/Chest: Breath sounds normal. No respiratory distress. She has no wheezes. She has no rhonchi. She has no rales.   Abdominal: Abdomen is soft. Bowel sounds are normal.   Musculoskeletal:         General: Tenderness (over cervical spine and sacrum) present.      Cervical back: Normal range of motion and neck supple.      Comments: FROM     Neurological: She is alert and oriented to person, place, and time. She has normal strength.   Skin: Skin is warm and dry.   Psychiatric: She has a normal mood and affect.       Medical  Screening Exam   See Full Note    ED Course   Procedures  Labs Reviewed - No data to display       Imaging Results              CT Sacrum Without Contrast (Final result)  Result time 02/10/23 16:31:37      Final result by Binu Doll DO (02/10/23 16:31:37)                   Impression:      No acute fracture or dislocation.      Electronically signed by: Binu Doll  Date:    02/10/2023  Time:    16:31               Narrative:    EXAMINATION:  CT SACRUM WITHOUT CONTRAST    CLINICAL HISTORY:  Fall    TECHNIQUE:  CT SACRUM WITHOUT CONTRAST    COMPARISON:  2021    FINDINGS:  No acute fracture or dislocation.    No hematoma.    The urinary bladder, uterus and colon are normal.    No lymphadenopathy.                                       CT Cervical Spine Without Contrast (Final result)  Result time 02/10/23 16:24:48      Final result by Binu Doll DO (02/10/23 16:24:48)                   Impression:      No acute traumatic injury to the cervical spine.      Electronically signed by: Binu Doll  Date:    02/10/2023  Time:    16:24               Narrative:    EXAMINATION:  CT CERVICAL SPINE WITHOUT CONTRAST    CLINICAL HISTORY:  Neck pain, acute, no red flags;    TECHNIQUE:  Multiplanar CT of the cervical spine without contrast.    COMPARISON:  2019    FINDINGS:  Mild multilevel degenerative change of the cervical spine.    The vertebral height and alignment is normal.    There is no evidence for acute fracture or subluxation.    No prevertebral soft tissue swelling is suggested.    The atlantoaxial and atlantooccipital articulations are normal.    Skull base appears unremarkable.    The lung apices are clear.    The thyroid gland appears normal.                                       Medications   ketorolac injection 60 mg (60 mg Intramuscular Given 2/10/23 1650)   methylPREDNISolone acetate injection 40 mg (40 mg Intramuscular Given 2/10/23 1650)   dexAMETHasone injection 4 mg (4 mg  Intramuscular Given 2/10/23 1650)     Medical Decision Making:   Initial Assessment:   Pt ambulated without difficulty and did not appear to be in any distress  Differential Diagnosis:   Cspine FX  Sacral FX  Soft tissue injury  Clinical Tests:   Radiological Study: Ordered and Reviewed  ED Management:  CT cervical spine shows no acute abnormality    Ct Sacral spine shows no acute abnormality  Pt is a pain treatment pt of Dr. Mares and has an appt Monday. Toradol injection given in the ED. Will send RX for Naproxen and Tizanidine. Pt advised to follow up with Pain management and if symptoms persist, she may need an MRI.                  Clinical Impression:   Final diagnoses:  [W19.XXXA] Fall, initial encounter (Primary)        ED Disposition Condition    Discharge Stable          ED Prescriptions       Medication Sig Dispense Start Date End Date Auth. Provider    tiZANidine (ZANAFLEX) 4 MG tablet Take 1 tablet (4 mg total) by mouth 3 (three) times daily as needed (muscle spasms). 30 tablet 2/10/2023 2/20/2023 DERIK Onofre    naproxen (NAPROSYN) 500 MG tablet Take 1 tablet (500 mg total) by mouth 2 (two) times daily with meals. 60 tablet 2/10/2023 -- DERIK Onofre          Follow-up Information       Follow up With Specialties Details Why Contact Info    Elisa Mcdaniel MD Family Medicine In 3 days  [address]  [facility] [place] [address]  [phone]               DERIK Onofre  02/10/23 5802

## 2023-02-10 NOTE — ED NOTES
"Pt c/o tailbone and cervical spine pain after falling on concrete. Pt states "it felt like I bounced, like whiplash." Denies LOC.   "

## 2023-02-24 ENCOUNTER — OFFICE VISIT (OUTPATIENT)
Dept: FAMILY MEDICINE | Facility: CLINIC | Age: 48
End: 2023-02-24
Payer: COMMERCIAL

## 2023-02-24 VITALS
WEIGHT: 200 LBS | OXYGEN SATURATION: 97 % | HEIGHT: 64 IN | DIASTOLIC BLOOD PRESSURE: 86 MMHG | RESPIRATION RATE: 20 BRPM | TEMPERATURE: 99 F | SYSTOLIC BLOOD PRESSURE: 132 MMHG | BODY MASS INDEX: 34.15 KG/M2 | HEART RATE: 100 BPM

## 2023-02-24 DIAGNOSIS — R52 BODY ACHES: ICD-10-CM

## 2023-02-24 DIAGNOSIS — R05.9 COUGH, UNSPECIFIED TYPE: ICD-10-CM

## 2023-02-24 DIAGNOSIS — U07.1 COVID: Primary | ICD-10-CM

## 2023-02-24 DIAGNOSIS — R09.81 NASAL CONGESTION: ICD-10-CM

## 2023-02-24 LAB
CTP QC/QA: YES
SARS-COV-2 AG RESP QL IA.RAPID: POSITIVE

## 2023-02-24 PROCEDURE — 87426 SARSCOV CORONAVIRUS AG IA: CPT | Mod: QW,,, | Performed by: NURSE PRACTITIONER

## 2023-02-24 PROCEDURE — 87426 SARS CORONAVIRUS 2 ANTIGEN POCT: ICD-10-PCS | Mod: QW,,, | Performed by: NURSE PRACTITIONER

## 2023-02-24 PROCEDURE — 99213 OFFICE O/P EST LOW 20 MIN: CPT | Mod: ,,, | Performed by: NURSE PRACTITIONER

## 2023-02-24 PROCEDURE — 99213 PR OFFICE/OUTPT VISIT, EST, LEVL III, 20-29 MIN: ICD-10-PCS | Mod: ,,, | Performed by: NURSE PRACTITIONER

## 2023-02-24 NOTE — PROGRESS NOTES
New clinic note    Georgiana Zayas is a 48 y.o. female     Chief Complaint:   Chief Complaint   Patient presents with    positive covid test      Symptoms started yesterday     Sore Throat    Fatigue    Headache    Generalized Body Aches    Nasal Congestion        Subjective:    Patient complains of body aches, headache, sore throat, congestion, and fatigue. Symptoms X 2 days. Denies fever. Patient reports she had 2 positive covid test at work. Admits to hoarse voice. Occasional cough.    Sore Throat   Associated symptoms include congestion, coughing and headaches. Pertinent negatives include no shortness of breath.   Fatigue  Associated symptoms include congestion, coughing, fatigue, headaches and a sore throat. Pertinent negatives include no fever.   Headache   Associated symptoms include coughing, sinus pressure and a sore throat. Pertinent negatives include no fever.      Allergies:   Review of patient's allergies indicates:   Allergen Reactions    Codeine Itching        Past Medical History:  Past Medical History:   Diagnosis Date    Arthritis     Diabetes mellitus     Diabetes mellitus, type 2     GERD (gastroesophageal reflux disease)     Hearing difficulty     Hypertension     Liver disease     Sleep apnea         Current Medications:    Current Outpatient Medications:     ARIPiprazole (ABILIFY) 2 MG Tab, Take 2 mg by mouth., Disp: , Rfl:     DULoxetine (CYMBALTA) 60 MG capsule, Take 1 capsule (60 mg total) by mouth 2 (two) times daily., Disp: 180 capsule, Rfl: 1    gabapentin (NEURONTIN) 800 MG tablet, Take 1 tablet (800 mg total) by mouth 3 (three) times daily., Disp: 270 tablet, Rfl: 1    glipiZIDE (GLUCOTROL) 10 MG tablet, Take 1 tablet (10 mg total) by mouth 2 (two) times daily with meals., Disp: 180 tablet, Rfl: 1    hydroCHLOROthiazide (HYDRODIURIL) 25 MG tablet, Take 1 tablet (25 mg total) by mouth once daily., Disp: 90 tablet, Rfl: 1    hydrOXYzine pamoate (VISTARIL) 25 MG Cap, Take 1 capsule (25  mg total) by mouth 3 (three) times daily., Disp: 270 capsule, Rfl: 1    ibuprofen (ADVIL,MOTRIN) 600 MG tablet, Take 1 tablet (600 mg total) by mouth every 8 (eight) hours as needed for Pain., Disp: 90 tablet, Rfl: 2    insulin (LANTUS SOLOSTAR U-100 INSULIN) glargine 100 units/mL SubQ pen, Inject 15 Units into the skin every evening., Disp: 18 mL, Rfl: 1    lisinopriL (PRINIVIL,ZESTRIL) 2.5 MG tablet, Take 1 tablet (2.5 mg total) by mouth once daily., Disp: 90 tablet, Rfl: 1    methocarbamoL (ROBAXIN) 500 MG Tab, Take 1 tablet (500 mg total) by mouth 3 (three) times daily as needed (muscle spasm/back pain. May cause drowsiness.)., Disp: 45 tablet, Rfl: 2    mometasone (NASONEX) 50 mcg/actuation nasal spray, 2 sprays by Nasal route once daily., Disp: 17 g, Rfl: 3    naproxen (NAPROSYN) 500 MG tablet, Take 1 tablet (500 mg total) by mouth 2 (two) times daily with meals., Disp: 60 tablet, Rfl: 0    nitroGLYCERIN (NITROSTAT) 0.4 MG SL tablet, Place 0.4 mg under the tongue every 5 (five) minutes as needed for Chest pain., Disp: , Rfl:     omeprazole (PRILOSEC) 40 MG capsule, Take 1 capsule (40 mg total) by mouth 2 (two) times daily before meals., Disp: 180 capsule, Rfl: 1    oxyCODONE-acetaminophen (PERCOCET)  mg per tablet, Take 1 tablet by mouth every 6 (six) hours as needed. , Disp: , Rfl:     pravastatin (PRAVACHOL) 20 MG tablet, Take 1 tablet (20 mg total) by mouth every evening., Disp: 90 tablet, Rfl: 1    risperiDONE (RISPERDAL) 3 MG Tab, Take 1 tablet (3 mg total) by mouth once daily., Disp: 90 tablet, Rfl: 1    semaglutide (OZEMPIC) 1 mg/dose (2 mg/1.5 mL) PnIj, Inject 1 mg into the skin every 7 days., Disp: 2 pen, Rfl: 5    traZODone (DESYREL) 100 MG tablet, Take 1 tablet (100 mg total) by mouth every evening., Disp: 90 tablet, Rfl: 1    chlorpheniramine-phenyleph-DM 4-10-10 mg Tab, Take 1 tablet by mouth every 6 (six) hours as needed., Disp: 20 tablet, Rfl: 0    nirmatrelvir-ritonavir 300 mg (150 mg x  "2)-100 mg copackaged tablets (EUA), Take 3 tablets by mouth 2 (two) times daily. Each dose contains 2 nirmatrelvir (pink tablets) and 1 ritonavir (white tablet). Take all 3 tablets together, Disp: 30 tablet, Rfl: 0       Review of Systems   Constitutional:  Positive for fatigue. Negative for fever.   HENT:  Positive for nasal congestion, sinus pressure/congestion, sore throat and voice change.    Respiratory:  Positive for cough. Negative for shortness of breath.    Neurological:  Positive for headaches.        Objective:    /86 (BP Location: Left arm, Patient Position: Sitting, BP Method: Large (Manual))   Pulse 100   Temp 98.6 °F (37 °C) (Temporal)   Resp 20   Ht 5' 4" (1.626 m)   Wt 90.7 kg (200 lb)   LMP 01/27/2023   SpO2 97%   BMI 34.33 kg/m²      Physical Exam  Constitutional:       Appearance: She is obese.   Eyes:      Extraocular Movements: Extraocular movements intact.   Cardiovascular:      Rate and Rhythm: Normal rate and regular rhythm.      Pulses: Normal pulses.      Heart sounds: Normal heart sounds.   Pulmonary:      Effort: Pulmonary effort is normal.      Breath sounds: Normal breath sounds.   Neurological:      Mental Status: She is alert and oriented to person, place, and time.        Assessment and Plan:    1. COVID    2. Nasal congestion    3. Cough, unspecified type    4. Body aches         COVID  -     nirmatrelvir-ritonavir 300 mg (150 mg x 2)-100 mg copackaged tablets (EUA); Take 3 tablets by mouth 2 (two) times daily. Each dose contains 2 nirmatrelvir (pink tablets) and 1 ritonavir (white tablet). Take all 3 tablets together  Dispense: 30 tablet; Refill: 0  -     chlorpheniramine-phenyleph-DM 4-10-10 mg Tab; Take 1 tablet by mouth every 6 (six) hours as needed.  Dispense: 20 tablet; Refill: 0  -covid risk score 3  -discussed viral illness and quarantine measures  -fact sheet given on paxlovid. Discussed precautions of meds  -hold pravastatin X 10 days  -discussed otc " vitamins  -rtw given      Nasal congestion  -     SARS Coronavirus 2 Antigen, POCT    Cough, unspecified type  -     SARS Coronavirus 2 Antigen, POCT    Body aches  -     SARS Coronavirus 2 Antigen, POCT      Results for orders placed or performed in visit on 02/24/23   SARS Coronavirus 2 Antigen, POCT   Result Value Ref Range    SARS Coronavirus 2 Antigen Positive (A) Negative     Acceptable Yes        Patient Instructions   Hold pravastatin X 10 days   Follow up if symptoms worsen or fail to improve.

## 2023-02-24 NOTE — LETTER
February 24, 2023      [facility]  [address] [place] [address]  Phone: [phone]  Fax: [phone]       Patient: Georgiana Zayas   YOB: 1975  Date of Visit: 02/24/2023    To Whom It May Concern:    Nadia Zayas  was at [facility] on 02/24/2023. Self quarantine for five days. The patient may return to work on Wednesday 03/01/2023 , as long as symptoms have started to improve and you have been fever free for 24 hours. Wear a good fitting mask for an additional five days after quarantine. If you have any questions or concerns, or if I can be of further assistance, please do not hesitate to contact me.    Sincerely,    DERIK Lewis

## 2023-04-05 ENCOUNTER — OFFICE VISIT (OUTPATIENT)
Dept: FAMILY MEDICINE | Facility: CLINIC | Age: 48
End: 2023-04-05
Payer: COMMERCIAL

## 2023-04-05 VITALS
HEART RATE: 107 BPM | BODY MASS INDEX: 33.09 KG/M2 | SYSTOLIC BLOOD PRESSURE: 123 MMHG | WEIGHT: 193.81 LBS | TEMPERATURE: 99 F | RESPIRATION RATE: 18 BRPM | DIASTOLIC BLOOD PRESSURE: 85 MMHG | OXYGEN SATURATION: 99 % | HEIGHT: 64 IN

## 2023-04-05 DIAGNOSIS — M54.31 SCIATICA OF RIGHT SIDE: Primary | ICD-10-CM

## 2023-04-05 PROCEDURE — 99213 OFFICE O/P EST LOW 20 MIN: CPT | Mod: ,,, | Performed by: FAMILY MEDICINE

## 2023-04-05 PROCEDURE — 99213 PR OFFICE/OUTPT VISIT, EST, LEVL III, 20-29 MIN: ICD-10-PCS | Mod: ,,, | Performed by: FAMILY MEDICINE

## 2023-04-05 RX ORDER — NAPROXEN 500 MG/1
500 TABLET ORAL 2 TIMES DAILY WITH MEALS
Qty: 60 TABLET | Refills: 0 | Status: SHIPPED | OUTPATIENT
Start: 2023-04-05 | End: 2023-07-17

## 2023-04-05 RX ORDER — SEMAGLUTIDE 1.34 MG/ML
4 INJECTION, SOLUTION SUBCUTANEOUS
COMMUNITY
Start: 2023-04-03 | End: 2023-06-13 | Stop reason: SDUPTHER

## 2023-04-05 RX ORDER — KETOROLAC TROMETHAMINE 30 MG/ML
1 INJECTION, SOLUTION INTRAMUSCULAR; INTRAVENOUS
Status: COMPLETED | OUTPATIENT
Start: 2023-04-05 | End: 2023-04-05

## 2023-04-05 RX ORDER — CYCLOBENZAPRINE HCL 10 MG
10 TABLET ORAL 3 TIMES DAILY PRN
Qty: 30 TABLET | Refills: 0 | Status: SHIPPED | OUTPATIENT
Start: 2023-04-05 | End: 2023-04-15

## 2023-04-05 RX ADMIN — KETOROLAC TROMETHAMINE 30 MG: 30 INJECTION, SOLUTION INTRAMUSCULAR; INTRAVENOUS at 03:04

## 2023-04-05 NOTE — PROGRESS NOTES
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       HPI:    Ms. Georgiana Zayas is a 48 y.o. female who presents to clinic today with CC of low back pain with radiation down RLE X 4 days.  Reports potential injury to back while moving a patient at work. She has chronic back pain and is scheduled for nerve burn pain with pain management next week.   Reports she has chronic back pain but this is different. Reports when she bent over at work 4-5 days ago she felt something pop and a sharp pain ran down her leg. Reports pain has been constant since that time but severity varies. Reports she has been taking her routine pain medication as prescribed by the pain clinic. Reports she has also been taking robaxin as previously prescribed and ibuprofen. States pain is not improved. She reports she also takes cymbalta day for chronic pain and nerves. She is also already on neurontin.  Patient is, otherwise, without complaints.     Medications:  Medication List with Changes/Refills   Current Medications    ARIPIPRAZOLE (ABILIFY) 2 MG TAB    Take 2 mg by mouth.    CHLORPHENIRAMINE-PHENYLEPH-DM 4-10-10 MG TAB    Take 1 tablet by mouth every 6 (six) hours as needed.    DULOXETINE (CYMBALTA) 60 MG CAPSULE    Take 1 capsule (60 mg total) by mouth 2 (two) times daily.    GABAPENTIN (NEURONTIN) 800 MG TABLET    Take 1 tablet (800 mg total) by mouth 3 (three) times daily.    GLIPIZIDE (GLUCOTROL) 10 MG TABLET    Take 1 tablet (10 mg total) by mouth 2 (two) times daily with meals.    HYDROCHLOROTHIAZIDE (HYDRODIURIL) 25 MG TABLET    Take 1 tablet (25 mg total) by mouth once daily.    HYDROXYZINE PAMOATE (VISTARIL) 25 MG CAP    Take 1 capsule (25 mg total) by mouth 3 (three) times daily.    IBUPROFEN (ADVIL,MOTRIN) 600 MG TABLET    Take 1  tablet (600 mg total) by mouth every 8 (eight) hours as needed for Pain.    INSULIN (LANTUS SOLOSTAR U-100 INSULIN) GLARGINE 100 UNITS/ML SUBQ PEN    Inject 15 Units into the skin every evening.    LISINOPRIL (PRINIVIL,ZESTRIL) 2.5 MG TABLET    Take 1 tablet (2.5 mg total) by mouth once daily.    METHOCARBAMOL (ROBAXIN) 500 MG TAB    Take 1 tablet (500 mg total) by mouth 3 (three) times daily as needed (muscle spasm/back pain. May cause drowsiness.).    MOMETASONE (NASONEX) 50 MCG/ACTUATION NASAL SPRAY    2 sprays by Nasal route once daily.    NAPROXEN (NAPROSYN) 500 MG TABLET    Take 1 tablet (500 mg total) by mouth 2 (two) times daily with meals.    NITROGLYCERIN (NITROSTAT) 0.4 MG SL TABLET    Place 0.4 mg under the tongue every 5 (five) minutes as needed for Chest pain.    OMEPRAZOLE (PRILOSEC) 40 MG CAPSULE    Take 1 capsule (40 mg total) by mouth 2 (two) times daily before meals.    OXYCODONE-ACETAMINOPHEN (PERCOCET)  MG PER TABLET    Take 1 tablet by mouth every 6 (six) hours as needed.     OZEMPIC 1 MG/DOSE (4 MG/3 ML)    Inject 4 mg into the skin every 7 days.    PRAVASTATIN (PRAVACHOL) 20 MG TABLET    Take 1 tablet (20 mg total) by mouth every evening.    RISPERIDONE (RISPERDAL) 3 MG TAB    Take 1 tablet (3 mg total) by mouth once daily.    TRAZODONE (DESYREL) 100 MG TABLET    Take 1 tablet (100 mg total) by mouth every evening.   Discontinued Medications    SEMAGLUTIDE (OZEMPIC) 1 MG/DOSE (2 MG/1.5 ML) PNIJ    Inject 1 mg into the skin every 7 days.        Allergies: Codeine      Past Medical History:    Past Medical History:   Diagnosis Date    Arthritis     Diabetes mellitus     Diabetes mellitus, type 2     GERD (gastroesophageal reflux disease)     Hearing difficulty     Hypertension     Liver disease     Sleep apnea        Past Surgical History:    Past Surgical History:   Procedure Laterality Date    CARPAL TUNNEL RELEASE Bilateral      SECTION      INJECTION OF ANESTHETIC AGENT AROUND  MEDIAL BRANCH NERVES INNERVATING LUMBAR FACET JOINT Bilateral 9/8/2021    Procedure: BLOCK, NERVE, FACET JOINT, LUMBAR, MEDIAL BRANCH;  Surgeon: Amari Razo MD;  Location: [facility];  Service: Pain Management;  Laterality: Bilateral;  Bilateral L3-S1 Facet Injection    INJECTION OF ANESTHETIC AGENT AROUND MEDIAL BRANCH NERVES INNERVATING LUMBAR FACET JOINT Bilateral 5/4/2022    Procedure: BLOCK, NERVE, FACET JOINT, LUMBAR, MEDIAL BRANCH;  Surgeon: Amari Razo MD;  Location: [facility];  Service: Pain Management;  Laterality: Bilateral;  Bilateral L3-S1 FI    INJECTION OF ANESTHETIC AGENT AROUND MEDIAL BRANCH NERVES INNERVATING LUMBAR FACET JOINT Bilateral 1/11/2023    Procedure: BLOCK, NERVE, FACET JOINT, LUMBAR, MEDIAL BRANCH;  Surgeon: Amari Razo MD;  Location: [facility];  Service: Pain Management;  Laterality: Bilateral;  Bilateral L3-S1 FI    LEFT HEART CATHETERIZATION Left 12/21/2021    Procedure: Left heart cath;  Surgeon: Jeremy Rojo DO;  Location: [facility] CATH LAB;  Service: Cardiology;  Laterality: Left;    STAPEDES SURGERY Bilateral     TONSILLECTOMY           Social History:    Social History     Tobacco Use   Smoking Status Never   Smokeless Tobacco Never     Social History     Substance and Sexual Activity   Alcohol Use Not Currently     Social History     Substance and Sexual Activity   Drug Use Never         Family History:    Family History   Problem Relation Age of Onset    Cancer Mother     Diabetes Mother     Hypertension Mother        Review of Systems:    Review of Systems   Constitutional:  Negative for appetite change, chills, fatigue, fever and unexpected weight change.   Eyes:  Negative for visual disturbance.   Respiratory:  Negative for cough and shortness of breath.    Cardiovascular:  Negative for chest pain and leg swelling.   Gastrointestinal:  Negative for abdominal pain, change in bowel habit, constipation, diarrhea, nausea,  "vomiting and change in bowel habit.   Musculoskeletal:  Positive for arthralgias.   Integumentary:  Negative for rash.   Neurological:  Negative for dizziness and headaches.   Psychiatric/Behavioral:  The patient is not nervous/anxious.       Vitals:    Vitals:    04/05/23 1534   BP: 123/85   BP Location: Left arm   Patient Position: Sitting   BP Method: Large (Automatic)   Pulse: 107   Resp: 18   Temp: 98.5 °F (36.9 °C)   TempSrc: Oral   SpO2: 99%   Weight: 87.9 kg (193 lb 12.8 oz)   Height: 5' 4" (1.626 m)       Body mass index is 33.27 kg/m².    Wt Readings from Last 3 Encounters:   04/05/23 1534 87.9 kg (193 lb 12.8 oz)   02/24/23 0834 90.7 kg (200 lb)   02/10/23 1531 90.7 kg (200 lb)        Physical Exam:    Physical Exam  Constitutional:       General: She is not in acute distress.     Appearance: Normal appearance.   HENT:      Nose: Nose normal.      Mouth/Throat:      Mouth: Mucous membranes are moist.      Pharynx: Oropharynx is clear.   Eyes:      Conjunctiva/sclera: Conjunctivae normal.   Cardiovascular:      Rate and Rhythm: Normal rate and regular rhythm.      Heart sounds: Normal heart sounds. No murmur heard.  Pulmonary:      Effort: Pulmonary effort is normal. No respiratory distress.      Breath sounds: Normal breath sounds. No wheezing, rhonchi or rales.   Abdominal:      General: Bowel sounds are normal.      Palpations: Abdomen is soft.      Tenderness: There is no abdominal tenderness.   Musculoskeletal:         General: Tenderness present. No swelling. Normal range of motion.      Cervical back: Neck supple.      Right lower leg: No edema.      Left lower leg: No edema.   Skin:     Findings: No rash.   Neurological:      General: No focal deficit present.      Mental Status: She is alert. Mental status is at baseline.   Psychiatric:         Mood and Affect: Mood normal.     Assessment/Plan:   1. Sciatica of right side  -     naproxen (NAPROSYN) 500 MG tablet; Take 1 tablet (500 mg total) by " mouth 2 (two) times daily with meals. As needed for pain.  Dispense: 60 tablet; Refill: 0  -     cyclobenzaprine (FLEXERIL) 10 MG tablet; Take 1 tablet (10 mg total) by mouth 3 (three) times daily as needed for Muscle spasms (or pain. May cause drowsiness.).  Dispense: 30 tablet; Refill: 0  -     ketorolac Syrg 30 mg         Active Problem List with Overview Notes    Diagnosis Date Noted    Type 2 diabetes mellitus with diabetic neuropathy, with long-term current use of insulin 08/25/2021    Essential hypertension 08/25/2021    Anxiety 01/23/2023    Other hyperlipidemia 07/07/2022    Morbid obesity 01/17/2022    Depression 01/23/2023    Femoral artery pseudo-aneurysm, right 01/17/2022    Other sleep apnea 11/30/2021    Lumbosacral spondylosis without myelopathy 09/08/2021    Gastroesophageal reflux disease 08/25/2021    Chronic bilateral low back pain with right-sided sciatica 07/07/2022    Seasonal allergies 07/07/2022    Muscle spasm 07/07/2022        RTC as scheduled for chronic follow up. RTC sooner if needed.  Patient voiced understanding and is agreeable to plan.      Lisandra Mcdaniel MD    Family Medicine

## 2023-04-10 RX ORDER — METHOCARBAMOL 500 MG/1
500 TABLET, FILM COATED ORAL 3 TIMES DAILY
COMMUNITY
End: 2023-06-13 | Stop reason: SDUPTHER

## 2023-04-12 ENCOUNTER — ANESTHESIA (OUTPATIENT)
Dept: PAIN MEDICINE | Facility: HOSPITAL | Age: 48
End: 2023-04-12
Payer: COMMERCIAL

## 2023-04-12 ENCOUNTER — HOSPITAL ENCOUNTER (OUTPATIENT)
Facility: HOSPITAL | Age: 48
Discharge: HOME | End: 2023-04-12
Attending: ANESTHESIOLOGY | Admitting: ANESTHESIOLOGY
Payer: COMMERCIAL

## 2023-04-12 ENCOUNTER — ANESTHESIA EVENT (OUTPATIENT)
Dept: PAIN MEDICINE | Facility: HOSPITAL | Age: 48
End: 2023-04-12
Payer: COMMERCIAL

## 2023-04-12 VITALS
OXYGEN SATURATION: 100 % | HEIGHT: 64 IN | DIASTOLIC BLOOD PRESSURE: 109 MMHG | TEMPERATURE: 99 F | HEART RATE: 91 BPM | RESPIRATION RATE: 12 BRPM | SYSTOLIC BLOOD PRESSURE: 163 MMHG | BODY MASS INDEX: 33.12 KG/M2 | WEIGHT: 194 LBS

## 2023-04-12 DIAGNOSIS — M47.816 LUMBAR SPONDYLOSIS: ICD-10-CM

## 2023-04-12 LAB
B-HCG UR QL: NEGATIVE
CTP QC/QA: YES
GLUCOSE SERPL-MCNC: 181 MG/DL (ref 70–105)

## 2023-04-12 PROCEDURE — 64635 DESTROY LUMB/SAC FACET JNT: CPT | Mod: 50 | Performed by: ANESTHESIOLOGY

## 2023-04-12 PROCEDURE — 81025 URINE PREGNANCY TEST: CPT | Performed by: ANESTHESIOLOGY

## 2023-04-12 PROCEDURE — 63600175 PHARM REV CODE 636 W HCPCS: Performed by: ANESTHESIOLOGY

## 2023-04-12 PROCEDURE — 82962 GLUCOSE BLOOD TEST: CPT

## 2023-04-12 PROCEDURE — D9220A PRA ANESTHESIA: ICD-10-PCS | Mod: ,,, | Performed by: NURSE ANESTHETIST, CERTIFIED REGISTERED

## 2023-04-12 PROCEDURE — 25000003 PHARM REV CODE 250: Performed by: NURSE ANESTHETIST, CERTIFIED REGISTERED

## 2023-04-12 PROCEDURE — D9220A PRA ANESTHESIA: Mod: ,,, | Performed by: NURSE ANESTHETIST, CERTIFIED REGISTERED

## 2023-04-12 PROCEDURE — 37000008 HC ANESTHESIA 1ST 15 MINUTES: Performed by: ANESTHESIOLOGY

## 2023-04-12 PROCEDURE — 27000284 HC CANNULA NASAL: Performed by: NURSE ANESTHETIST, CERTIFIED REGISTERED

## 2023-04-12 PROCEDURE — 25000003 PHARM REV CODE 250: Performed by: ANESTHESIOLOGY

## 2023-04-12 PROCEDURE — 37000009 HC ANESTHESIA EA ADD 15 MINS: Performed by: ANESTHESIOLOGY

## 2023-04-12 PROCEDURE — 63600175 PHARM REV CODE 636 W HCPCS: Performed by: NURSE ANESTHETIST, CERTIFIED REGISTERED

## 2023-04-12 RX ORDER — SODIUM CHLORIDE 9 MG/ML
500 INJECTION, SOLUTION INTRAVENOUS CONTINUOUS
Status: DISCONTINUED | OUTPATIENT
Start: 2023-04-12 | End: 2023-04-12 | Stop reason: HOSPADM

## 2023-04-12 RX ORDER — LIDOCAINE HYDROCHLORIDE 20 MG/ML
INJECTION, SOLUTION EPIDURAL; INFILTRATION; INTRACAUDAL; PERINEURAL
Status: DISCONTINUED | OUTPATIENT
Start: 2023-04-12 | End: 2023-04-12

## 2023-04-12 RX ORDER — BUPIVACAINE HYDROCHLORIDE 2.5 MG/ML
INJECTION, SOLUTION INFILTRATION; PERINEURAL CODE/TRAUMA/SEDATION MEDICATION
Status: DISCONTINUED | OUTPATIENT
Start: 2023-04-12 | End: 2023-04-12 | Stop reason: HOSPADM

## 2023-04-12 RX ORDER — FENTANYL CITRATE 50 UG/ML
INJECTION, SOLUTION INTRAMUSCULAR; INTRAVENOUS
Status: DISCONTINUED | OUTPATIENT
Start: 2023-04-12 | End: 2023-04-12

## 2023-04-12 RX ORDER — ORPHENADRINE CITRATE 30 MG/ML
INJECTION INTRAMUSCULAR; INTRAVENOUS
Status: DISCONTINUED | OUTPATIENT
Start: 2023-04-12 | End: 2023-04-12

## 2023-04-12 RX ORDER — TRIAMCINOLONE ACETONIDE 40 MG/ML
INJECTION, SUSPENSION INTRA-ARTICULAR; INTRAMUSCULAR CODE/TRAUMA/SEDATION MEDICATION
Status: DISCONTINUED | OUTPATIENT
Start: 2023-04-12 | End: 2023-04-12 | Stop reason: HOSPADM

## 2023-04-12 RX ORDER — PROPOFOL 10 MG/ML
VIAL (ML) INTRAVENOUS
Status: DISCONTINUED | OUTPATIENT
Start: 2023-04-12 | End: 2023-04-12

## 2023-04-12 RX ADMIN — PROPOFOL 50 MG: 10 INJECTION, EMULSION INTRAVENOUS at 09:04

## 2023-04-12 RX ADMIN — PROPOFOL 20 MG: 10 INJECTION, EMULSION INTRAVENOUS at 09:04

## 2023-04-12 RX ADMIN — FENTANYL CITRATE 25 MCG: 50 INJECTION INTRAMUSCULAR; INTRAVENOUS at 09:04

## 2023-04-12 RX ADMIN — LIDOCAINE HYDROCHLORIDE 20 MG: 20 INJECTION, SOLUTION INTRAVENOUS at 09:04

## 2023-04-12 RX ADMIN — ORPHENADRINE CITRATE 60 MG: 30 INJECTION INTRAMUSCULAR; INTRAVENOUS at 09:04

## 2023-04-12 RX ADMIN — FENTANYL CITRATE 50 MCG: 50 INJECTION INTRAMUSCULAR; INTRAVENOUS at 09:04

## 2023-04-12 RX ADMIN — SODIUM CHLORIDE: 9 INJECTION, SOLUTION INTRAVENOUS at 09:04

## 2023-04-12 NOTE — ANESTHESIA PREPROCEDURE EVALUATION
2023  Georgiana Zayas is a 48 y.o., female.    Past Medical History:   Diagnosis Date    Arthritis     Diabetes mellitus     Diabetes mellitus, type 2     GERD (gastroesophageal reflux disease)     Hearing difficulty     Hypertension     Liver disease     Sleep apnea        Past Surgical History:   Procedure Laterality Date    CARPAL TUNNEL RELEASE Bilateral      SECTION      INJECTION OF ANESTHETIC AGENT AROUND MEDIAL BRANCH NERVES INNERVATING LUMBAR FACET JOINT Bilateral 2021    Procedure: BLOCK, NERVE, FACET JOINT, LUMBAR, MEDIAL BRANCH;  Surgeon: Amari Razo MD;  [facility]: [facility];  Service: Pain Management;  Laterality: Bilateral;  Bilateral L3-S1 Facet Injection    INJECTION OF ANESTHETIC AGENT AROUND MEDIAL BRANCH NERVES INNERVATING LUMBAR FACET JOINT Bilateral 2022    Procedure: BLOCK, NERVE, FACET JOINT, LUMBAR, MEDIAL BRANCH;  Surgeon: Amari Razo MD;  Location: [facility];  Service: Pain Management;  Laterality: Bilateral;  Bilateral L3-S1 FI    INJECTION OF ANESTHETIC AGENT AROUND MEDIAL BRANCH NERVES INNERVATING LUMBAR FACET JOINT Bilateral 2023    Procedure: BLOCK, NERVE, FACET JOINT, LUMBAR, MEDIAL BRANCH;  Surgeon: Amari Razo MD;  Location: [facility];  Service: Pain Management;  Laterality: Bilateral;  Bilateral L3-S1 FI    LEFT HEART CATHETERIZATION Left 2021    Procedure: Left heart cath;  Surgeon: Jeremy Rojo DO;  Location: [facility] C[facility] LAB;  Service: Cardiology;  Laterality: Left;    STAPEDES SURGERY Bilateral     TONSILLECTOMY         Family History   Problem Relation Age of Onset    Cancer Mother     Diabetes Mother     Hypertension Mother        Social History     Socioeconomic History    Marital status: Single   Tobacco Use    Smoking status: Never    Smokeless tobacco:  Never   Substance and Sexual Activity    Alcohol use: Not Currently    Drug use: Never    Sexual activity: Yes     Birth control/protection: Condom       Current Facility-Administered Medications   Medication Dose Route Frequency Provider Last Rate Last Admin    0.9%  NaCl infusion  500 mL Intravenous Continuous Amari Razo MD           Review of patient's allergies indicates:   Allergen Reactions    Codeine Itching       Pre-op Assessment    I have reviewed the Patient Summary Reports.     I have reviewed the Nursing Notes. I have reviewed the NPO Status.   I have reviewed the Medications.     Review of Systems  Anesthesia Hx:  No problems with previous Anesthesia    Cardiovascular:   Hypertension    Pulmonary:   Sleep Apnea    Education provided regarding risk of obstructive sleep apnea     Hepatic/GI:   GERD Liver Disease,    Musculoskeletal:   Arthritis     Neurological:   Neuromuscular Disease,    Endocrine:   Diabetes    Psych:   Psychiatric History          Physical Exam    Airway:  Mallampati: III   Mouth Opening: Normal  TM Distance: Normal  Tongue: Normal  Neck ROM: Normal ROM        Anesthesia Plan  Type of Anesthesia, risks & benefits discussed:    Anesthesia Type: Gen Natural Airway  Intra-op Monitoring Plan: Standard ASA Monitors  Post Op Pain Control Plan: multimodal analgesia  Induction:  IV  Informed Consent: Informed consent signed with the Patient and all parties understand the risks and agree with anesthesia plan.  All questions answered. Patient consented to blood products? Yes  ASA Score: 3  Day of Surgery Review of History & Physical: H&P Update referred to the surgeon/provider.I have interviewed and examined the patient. I have reviewed the patient's H&P dated: There are no significant changes.     Ready For Surgery From Anesthesia Perspective.     .

## 2023-04-12 NOTE — ANESTHESIA POSTPROCEDURE EVALUATION
Anesthesia Post Evaluation    Patient: Georgiana Zayas    Procedure(s) Performed: Procedure(s) (LRB):  RADIOFREQUENCY ABLATION, NERVE, SPINAL, LUMBAR, MEDIAL BRANCH, 1 LEVEL (Bilateral)    Final Anesthesia Type: general      Patient location: Pain Treatment.  Patient participation: Yes- Able to Participate  Level of consciousness: awake and alert  Post-procedure vital signs: reviewed and stable  Pain management: adequate  Airway patency: patent    PONV status at discharge: No PONV  Anesthetic complications: no      Cardiovascular status: stable  Respiratory status: unassisted  Hydration status: euvolemic  Follow-up not needed.          Vitals Value Taken Time   /109 04/12/23 1010   Temp 98.6F 04/12/23 1255   Pulse 87 04/12/23 1011   Resp 12 04/12/23 1011   SpO2 100 % 04/12/23 1011   Vitals shown include unvalidated device data.      Event Time   Out of Recovery 10:15:00         Pain/Ralph Score: Ralph Score: 10 (4/12/2023 10:10 AM)

## 2023-04-12 NOTE — TRANSFER OF CARE
"Anesthesia Transfer of Care Note    Patient: Georgiana Zayas    Procedure(s) Performed: Procedure(s) (LRB):  RADIOFREQUENCY ABLATION, NERVE, SPINAL, LUMBAR, MEDIAL BRANCH, 1 LEVEL (Bilateral)    Patient location: Other: Pain Tx    Anesthesia Type: general    Transport from OR: Transported from OR on room air with adequate spontaneous ventilation    Post pain: adequate analgesia    Post assessment: no apparent anesthetic complications and tolerated procedure well    Post vital signs: stable    Level of consciousness: awake and oriented    Nausea/Vomiting: no nausea/vomiting    Complications: none    Transfer of care protocol was followed      Last vitals:   Visit Vitals  BP (!) 160/99 (BP Location: Right arm, Patient Position: Sitting)   Pulse 85   Temp 37 °C (98.6 °F) (Oral)   Resp 16   Ht 5' 4" (1.626 m)   Wt 88 kg (194 lb)   SpO2 100%   BMI 33.30 kg/m²     "

## 2023-04-12 NOTE — PLAN OF CARE
Plan:  D/c pt via wheelchair at 1040  Informed pt if does not void in 8 hours to go to ER. Notify if redness, drainage, from injection site or fever over next 3-4 days. Rest and drink plenty of fluids for the remainder of the day. No lifting over 5 lbs. For the remainder of the day. Continue regular medications as prescribed. May take pain medications as prescribed.     Pain improved 50%

## 2023-04-12 NOTE — DISCHARGE SUMMARY
Patient underwent  Bilateral L4 -L5 Radiofrequency Thermocoagulation of the Medial Branch Nerves  procedure /2023. The pt will follow up in clinic. Discharge Dx:Lumbar Spondylosis without Myelopathy

## 2023-04-12 NOTE — OP NOTE
04/12/2023    PREOPERATIVE DIAGNOSIS:         Lumbar Spondylosis without Myelopathy                                                              Low Back Pain  POSTOPERATIVE DIAGNOSIS:      Lumbar Spondylosis without Myelopathy                                                              Low Back Pain     PROCEDURE:  Bilateral L4 -L5 Radiofrequency Thermocoagulation of the Medial Branch Nerves  SURGEON: Dr. Amari Razo  ANESTHESIA:  MAC              COMPLICATIONS:  None  DRAINS AND PACKS:  None            BLOOD LOSS:  None  The patient was identified in the holding area.  The risks and benefits of the procedure were again explained to the patient and the patient agreed to proceed.  The patient was brought in stable condition to the operating room and placed in the prone position on the C-Arm table.  All pressure points were checked and padded comfortably with the patient awake.  Standard ASA monitors were applied.  The patients back was prepped and draped in the usual sterile fashion.  Time out was completed.  Anesthesia was initiated and a skin wheal was raised over the target areas using Bupivacaine 0.25% (2.5mg/ml) 1ml on the left side at  L4 and L5.  Under direct fluoroscopic guidance through anesthetized skin a 150 millimeter 10mm 18gage curved active tip radiofrequency thermocoagulation needle was advanced down to the target area at the junction between the superior articular process and transverse process of the corresponding levels.  Stimulation of the medial branch nerve was carried out and was less than 1.0 at all levels and motor was greater than 2.0 at each level.  The patient then has a 1 milliliter allotment of 0.25 % Bupivacaine (2.5mg/ml)  was injected at each level.  The patient then had a lesioning process of 80 degrees celsius for 105 five second times two runs.  The patient then through each cannula received a 1 milliliter allotment of a solution that contained Kenalog 40mg/ml  diluted in 9  milliliters of 0.25%  Bupivacaine (2.5mg/ml).  The stylettes were removed with the tips intact. The procedure was repeated on the right as described above. There was adequate hemostasis at the conclusion of the procedure. The patient tolerated the procedure well with no adverse events and no complications. The patient was taken in stable condition to the holding area and monitored for the appropriate time of convalescence.  Preoperative pain score was 8/10. Postoperative pain score was   5 /10.

## 2023-05-22 DIAGNOSIS — K21.9 GASTROESOPHAGEAL REFLUX DISEASE WITHOUT ESOPHAGITIS: Chronic | ICD-10-CM

## 2023-05-22 DIAGNOSIS — M54.41 CHRONIC BILATERAL LOW BACK PAIN WITH RIGHT-SIDED SCIATICA: Chronic | ICD-10-CM

## 2023-05-22 DIAGNOSIS — G89.29 CHRONIC BILATERAL LOW BACK PAIN WITH RIGHT-SIDED SCIATICA: Chronic | ICD-10-CM

## 2023-05-22 RX ORDER — OMEPRAZOLE 40 MG/1
40 CAPSULE, DELAYED RELEASE ORAL DAILY
Qty: 90 CAPSULE | Refills: 1 | Status: SHIPPED | OUTPATIENT
Start: 2023-05-22 | End: 2023-06-13 | Stop reason: SDUPTHER

## 2023-05-22 RX ORDER — DULOXETIN HYDROCHLORIDE 60 MG/1
60 CAPSULE, DELAYED RELEASE ORAL 2 TIMES DAILY
Qty: 180 CAPSULE | Refills: 1 | Status: SHIPPED | OUTPATIENT
Start: 2023-05-22 | End: 2023-06-13 | Stop reason: SDUPTHER

## 2023-05-22 NOTE — TELEPHONE ENCOUNTER
Goal Outcome Evaluation:           Progress: no change  Outcome Evaluation: Patient given medication for sleep. Rested well through the night.  used for communication. Vital signs stable.   Insurance will not approve 40 mg bid. Discussed with pt and she is willing to do the once a day dose and see how she does. She will let us know if she has problems.

## 2023-06-13 ENCOUNTER — OFFICE VISIT (OUTPATIENT)
Dept: FAMILY MEDICINE | Facility: CLINIC | Age: 48
End: 2023-06-13
Payer: COMMERCIAL

## 2023-06-13 VITALS
TEMPERATURE: 100 F | DIASTOLIC BLOOD PRESSURE: 84 MMHG | BODY MASS INDEX: 32.95 KG/M2 | RESPIRATION RATE: 18 BRPM | SYSTOLIC BLOOD PRESSURE: 128 MMHG | HEIGHT: 64 IN | WEIGHT: 193 LBS | OXYGEN SATURATION: 99 % | HEART RATE: 100 BPM

## 2023-06-13 DIAGNOSIS — M54.41 CHRONIC BILATERAL LOW BACK PAIN WITH RIGHT-SIDED SCIATICA: Chronic | ICD-10-CM

## 2023-06-13 DIAGNOSIS — M79.2 NERVE PAIN: ICD-10-CM

## 2023-06-13 DIAGNOSIS — E11.40 TYPE 2 DIABETES MELLITUS WITH DIABETIC NEUROPATHY, WITH LONG-TERM CURRENT USE OF INSULIN: Chronic | ICD-10-CM

## 2023-06-13 DIAGNOSIS — Z79.4 TYPE 2 DIABETES MELLITUS WITH DIABETIC NEUROPATHY, WITH LONG-TERM CURRENT USE OF INSULIN: Chronic | ICD-10-CM

## 2023-06-13 DIAGNOSIS — R10.84 GENERALIZED ABDOMINAL PAIN: ICD-10-CM

## 2023-06-13 DIAGNOSIS — A08.4 VIRAL GASTROENTERITIS: Primary | ICD-10-CM

## 2023-06-13 DIAGNOSIS — K21.9 GASTROESOPHAGEAL REFLUX DISEASE WITHOUT ESOPHAGITIS: Chronic | ICD-10-CM

## 2023-06-13 DIAGNOSIS — I10 ESSENTIAL HYPERTENSION: Chronic | ICD-10-CM

## 2023-06-13 DIAGNOSIS — G89.29 CHRONIC BILATERAL LOW BACK PAIN WITH RIGHT-SIDED SCIATICA: Chronic | ICD-10-CM

## 2023-06-13 DIAGNOSIS — R11.0 NAUSEA: ICD-10-CM

## 2023-06-13 DIAGNOSIS — F41.9 ANXIETY: Chronic | ICD-10-CM

## 2023-06-13 PROCEDURE — 82150 ASSAY OF AMYLASE: CPT | Mod: ,,, | Performed by: CLINICAL MEDICAL LABORATORY

## 2023-06-13 PROCEDURE — 80053 COMPREHENSIVE METABOLIC PANEL: ICD-10-PCS | Mod: ,,, | Performed by: CLINICAL MEDICAL LABORATORY

## 2023-06-13 PROCEDURE — 85025 CBC WITH DIFFERENTIAL: ICD-10-PCS | Mod: ,,, | Performed by: CLINICAL MEDICAL LABORATORY

## 2023-06-13 PROCEDURE — 83690 LIPASE: ICD-10-PCS | Mod: ,,, | Performed by: CLINICAL MEDICAL LABORATORY

## 2023-06-13 PROCEDURE — 83690 ASSAY OF LIPASE: CPT | Mod: ,,, | Performed by: CLINICAL MEDICAL LABORATORY

## 2023-06-13 PROCEDURE — 80053 COMPREHEN METABOLIC PANEL: CPT | Mod: ,,, | Performed by: CLINICAL MEDICAL LABORATORY

## 2023-06-13 PROCEDURE — 99214 OFFICE O/P EST MOD 30 MIN: CPT | Mod: ,,, | Performed by: FAMILY MEDICINE

## 2023-06-13 PROCEDURE — 85025 COMPLETE CBC W/AUTO DIFF WBC: CPT | Mod: ,,, | Performed by: CLINICAL MEDICAL LABORATORY

## 2023-06-13 PROCEDURE — 99214 PR OFFICE/OUTPT VISIT, EST, LEVL IV, 30-39 MIN: ICD-10-PCS | Mod: ,,, | Performed by: FAMILY MEDICINE

## 2023-06-13 PROCEDURE — 82150 AMYLASE: ICD-10-PCS | Mod: ,,, | Performed by: CLINICAL MEDICAL LABORATORY

## 2023-06-13 RX ORDER — GABAPENTIN 800 MG/1
800 TABLET ORAL 3 TIMES DAILY
Qty: 270 TABLET | Refills: 1 | Status: SHIPPED | OUTPATIENT
Start: 2023-06-13 | End: 2023-11-29 | Stop reason: SDUPTHER

## 2023-06-13 RX ORDER — NITROGLYCERIN 0.4 MG/1
0.4 TABLET SUBLINGUAL EVERY 5 MIN PRN
Status: CANCELLED | OUTPATIENT
Start: 2023-06-13

## 2023-06-13 RX ORDER — INSULIN GLARGINE 100 [IU]/ML
15 INJECTION, SOLUTION SUBCUTANEOUS NIGHTLY
Qty: 18 ML | Refills: 1 | Status: SHIPPED | OUTPATIENT
Start: 2023-06-13 | End: 2023-11-29 | Stop reason: SDUPTHER

## 2023-06-13 RX ORDER — SEMAGLUTIDE 1.34 MG/ML
1 INJECTION, SOLUTION SUBCUTANEOUS
Qty: 3 ML | Refills: 5 | Status: SHIPPED | OUTPATIENT
Start: 2023-06-13 | End: 2023-11-29 | Stop reason: SDUPTHER

## 2023-06-13 RX ORDER — LISINOPRIL 2.5 MG/1
2.5 TABLET ORAL DAILY
Qty: 90 TABLET | Refills: 1 | Status: SHIPPED | OUTPATIENT
Start: 2023-06-13 | End: 2023-11-29 | Stop reason: SDUPTHER

## 2023-06-13 RX ORDER — ONDANSETRON 4 MG/1
4 TABLET, FILM COATED ORAL EVERY 6 HOURS PRN
Qty: 30 TABLET | Refills: 0 | Status: SHIPPED | OUTPATIENT
Start: 2023-06-13 | End: 2023-07-20 | Stop reason: SDUPTHER

## 2023-06-13 RX ORDER — DULOXETIN HYDROCHLORIDE 60 MG/1
60 CAPSULE, DELAYED RELEASE ORAL 2 TIMES DAILY
Qty: 180 CAPSULE | Refills: 1 | Status: SHIPPED | OUTPATIENT
Start: 2023-06-13 | End: 2023-11-29 | Stop reason: SDUPTHER

## 2023-06-13 RX ORDER — OMEPRAZOLE 40 MG/1
40 CAPSULE, DELAYED RELEASE ORAL DAILY
Qty: 90 CAPSULE | Refills: 1 | Status: SHIPPED | OUTPATIENT
Start: 2023-06-13 | End: 2023-11-29 | Stop reason: SDUPTHER

## 2023-06-13 RX ORDER — METHOCARBAMOL 500 MG/1
500 TABLET, FILM COATED ORAL 3 TIMES DAILY PRN
Qty: 90 TABLET | Refills: 2 | Status: SHIPPED | OUTPATIENT
Start: 2023-06-13 | End: 2023-07-17

## 2023-06-13 RX ORDER — HYDROXYZINE PAMOATE 25 MG/1
25 CAPSULE ORAL 3 TIMES DAILY
Qty: 270 CAPSULE | Refills: 1 | Status: SHIPPED | OUTPATIENT
Start: 2023-06-13

## 2023-06-13 RX ORDER — GLIPIZIDE 10 MG/1
10 TABLET ORAL 2 TIMES DAILY WITH MEALS
Qty: 180 TABLET | Refills: 1 | Status: SHIPPED | OUTPATIENT
Start: 2023-06-13 | End: 2023-11-29 | Stop reason: SDUPTHER

## 2023-06-13 NOTE — PROGRESS NOTES
Clinic Note    Patient Name: Georgiana Zayas  : 1975  MRN: [id number]    Chief Complaint   Patient presents with    Abdominal Pain     Started yesterday morning; took ibuprofen and pepto; denies diarrhea    Nausea     No vomiting.        HPI:    Ms. Georgiana Zayas is a 48 y.o. female who presents to clinic today with CC of sharp, cramping abdominal pain X 2 days. Reports she initially thought it was from taking ibuprofen. Reports she did take some peptobismol which provided some temporary relief. Reports she did have 3 loose stools yesterday but denies any diarrhea today. Reports nausea but denies vomiting. Denies any known sick contacts. Denies dysuria. Reports chronic back pain. Denies urinary frequency.   Patient is, otherwise, without complaints.     Medications:  Medication List with Changes/Refills   New Medications    ONDANSETRON (ZOFRAN) 4 MG TABLET    Take 1 tablet (4 mg total) by mouth every 6 (six) hours as needed for Nausea.   Current Medications    ARIPIPRAZOLE (ABILIFY) 2 MG TAB    Take 2 mg by mouth.    CHLORPHENIRAMINE-PHENYLEPH-DM 4-10-10 MG TAB    Take 1 tablet by mouth every 6 (six) hours as needed.    HYDROCHLOROTHIAZIDE (HYDRODIURIL) 25 MG TABLET    Take 1 tablet (25 mg total) by mouth once daily.    MOMETASONE (NASONEX) 50 MCG/ACTUATION NASAL SPRAY    2 sprays by Nasal route once daily.    NAPROXEN (NAPROSYN) 500 MG TABLET    Take 1 tablet (500 mg total) by mouth 2 (two) times daily with meals. As needed for pain.    NITROGLYCERIN (NITROSTAT) 0.4 MG SL TABLET    Place 0.4 mg under the tongue every 5 (five) minutes as needed for Chest pain.    OXYCODONE-ACETAMINOPHEN (PERCOCET)  MG PER TABLET    Take 1 tablet by mouth every 6 (six) hours as needed.     PRAVASTATIN (PRAVACHOL) 20 MG TABLET    Take 1 tablet (20 mg total) by mouth every evening.    RISPERIDONE (RISPERDAL) 3 MG TAB    Take 1 tablet (3 mg total) by mouth once daily.    TRAZODONE (DESYREL) 100 MG TABLET    Take 1 tablet  (100 mg total) by mouth every evening.   Changed and/or Refilled Medications    Modified Medication Previous Medication    DULOXETINE (CYMBALTA) 60 MG CAPSULE DULoxetine (CYMBALTA) 60 MG capsule       Take 1 capsule (60 mg total) by mouth 2 (two) times daily.    Take 1 capsule (60 mg total) by mouth 2 (two) times daily.    GABAPENTIN (NEURONTIN) 800 MG TABLET gabapentin (NEURONTIN) 800 MG tablet       Take 1 tablet (800 mg total) by mouth 3 (three) times daily.    Take 1 tablet (800 mg total) by mouth 3 (three) times daily.    GLIPIZIDE (GLUCOTROL) 10 MG TABLET glipiZIDE (GLUCOTROL) 10 MG tablet       Take 1 tablet (10 mg total) by mouth 2 (two) times daily with meals.    Take 1 tablet (10 mg total) by mouth 2 (two) times daily with meals.    HYDROXYZINE PAMOATE (VISTARIL) 25 MG CAP hydrOXYzine pamoate (VISTARIL) 25 MG Cap       Take 1 capsule (25 mg total) by mouth 3 (three) times daily.    Take 1 capsule (25 mg total) by mouth 3 (three) times daily.    INSULIN (LANTUS SOLOSTAR U-100 INSULIN) GLARGINE 100 UNITS/ML SUBQ PEN insulin (LANTUS SOLOSTAR U-100 INSULIN) glargine 100 units/mL SubQ pen       Inject 15 Units into the skin every evening.    Inject 15 Units into the skin every evening.    LISINOPRIL (PRINIVIL,ZESTRIL) 2.5 MG TABLET lisinopriL (PRINIVIL,ZESTRIL) 2.5 MG tablet       Take 1 tablet (2.5 mg total) by mouth once daily.    Take 1 tablet (2.5 mg total) by mouth once daily.    METHOCARBAMOL (ROBAXIN) 500 MG TAB methocarbamoL (ROBAXIN) 500 MG Tab       Take 1 tablet (500 mg total) by mouth 3 (three) times daily as needed (muscle spasm/pain).    Take 500 mg by mouth 3 (three) times daily.    OMEPRAZOLE (PRILOSEC) 40 MG CAPSULE omeprazole (PRILOSEC) 40 MG capsule       Take 1 capsule (40 mg total) by mouth Daily.    Take 1 capsule (40 mg total) by mouth Daily.    OZEMPIC 1 MG/DOSE (4 MG/3 ML) OZEMPIC 1 mg/dose (4 mg/3 mL)       Inject 1 mg into the skin every 7 days.    Inject 4 mg into the skin every 7  days.        Allergies: Codeine      Past Medical History:    Past Medical History:   Diagnosis Date    Arthritis     Diabetes mellitus     Diabetes mellitus, type 2     GERD (gastroesophageal reflux disease)     Hearing difficulty     Hypertension     Liver disease     Sleep apnea        Past Surgical History:    Past Surgical History:   Procedure Laterality Date    CARPAL TUNNEL RELEASE Bilateral      SECTION      INJECTION OF ANESTHETIC AGENT AROUND MEDIAL BRANCH NERVES INNERVATING LUMBAR FACET JOINT Bilateral 2021    Procedure: BLOCK, NERVE, FACET JOINT, LUMBAR, MEDIAL BRANCH;  Surgeon: Amari Razo MD;  Location: [facility]ending sale to [facility];  Service: Pain Management;  Laterality: Bilateral;  Bilateral L3-S1 Facet Injection    INJECTION OF ANESTHETIC AGENT AROUND MEDIAL BRANCH NERVES INNERVATING LUMBAR FACET JOINT Bilateral 2022    Procedure: BLOCK, NERVE, FACET JOINT, LUMBAR, MEDIAL BRANCH;  Surgeon: mAari Razo MD;  Location: [facility] to [facility];  Service: Pain Management;  Laterality: Bilateral;  Bilateral L3-S1 FI    INJECTION OF ANESTHETIC AGENT AROUND MEDIAL BRANCH NERVES INNERVATING LUMBAR FACET JOINT Bilateral 2023    Procedure: BLOCK, NERVE, FACET JOINT, LUMBAR, MEDIAL BRANCH;  Surgeon: Amari Razo MD;  Location: [facility] to [facility];  Service: Pain Management;  Laterality: Bilateral;  Bilateral L3-S1 FI    LEFT HEART CATHETERIZATION Left 2021    Procedure: Left heart cath;  Surgeon: Jeremy Rojo DO;  Location: [facility] CATH LAB;  Service: Cardiology;  Laterality: Left;    RADIOFREQUENCY ABLATION OF LUMBAR MEDIAL BRANCH NERVE AT SINGLE LEVEL Bilateral 2023    Procedure: RADIOFREQUENCY ABLATION, NERVE, SPINAL, LUMBAR, MEDIAL BRANCH, 1 LEVEL;  Surgeon: Amari Razo MD;  Location: [facility]ending sale to [facility];  Service: Pain Management;  Laterality: Bilateral;  Bilateral L3-5 RFTC    STAPEDES SURGERY Bilateral     TONSILLECTOMY           Social  "History:    Social History     Tobacco Use   Smoking Status Never   Smokeless Tobacco Never     Social History     Substance and Sexual Activity   Alcohol Use Not Currently     Social History     Substance and Sexual Activity   Drug Use Never         Family History:    Family History   Problem Relation Age of Onset    Cancer Mother     Diabetes Mother     Hypertension Mother        Review of Systems:    Review of Systems   Constitutional:  Negative for appetite change, chills, fatigue, fever and unexpected weight change.   Eyes:  Negative for visual disturbance.   Respiratory:  Negative for cough and shortness of breath.    Cardiovascular:  Negative for chest pain and leg swelling.   Gastrointestinal:  Positive for abdominal pain, diarrhea and nausea. Negative for blood in stool, change in bowel habit, constipation, vomiting and change in bowel habit.   Genitourinary:  Negative for dysuria and frequency.   Musculoskeletal:  Positive for arthralgias and back pain.   Integumentary:  Negative for rash.   Neurological:  Negative for dizziness and headaches.   Psychiatric/Behavioral:  The patient is not nervous/anxious.       Vitals:    Vitals:    06/13/23 1413 06/13/23 1423   BP: (!) 137/93 128/84   BP Location: Left arm Left arm   Patient Position: Sitting Sitting   BP Method: Large (Manual) Large (Automatic)   Pulse: (!) 115 100   Resp: 18    Temp: 99.6 °F (37.6 °C)    TempSrc: Oral    SpO2: 99%    Weight: 87.5 kg (193 lb)    Height: 5' 4" (1.626 m)        Body mass index is 33.13 kg/m².    Wt Readings from Last 3 Encounters:   06/13/23 1413 87.5 kg (193 lb)   04/12/23 0829 88 kg (194 lb)   04/05/23 1534 87.9 kg (193 lb 12.8 oz)        Physical Exam:    Physical Exam  Constitutional:       General: She is not in acute distress.     Appearance: Normal appearance.   HENT:      Nose: Nose normal.      Mouth/Throat:      Mouth: Mucous membranes are moist.      Pharynx: Oropharynx is clear.   Eyes:      Conjunctiva/sclera: " Conjunctivae normal.   Cardiovascular:      Rate and Rhythm: Normal rate and regular rhythm.      Heart sounds: Normal heart sounds. No murmur heard.  Pulmonary:      Effort: Pulmonary effort is normal. No respiratory distress.      Breath sounds: Normal breath sounds. No wheezing, rhonchi or rales.   Abdominal:      General: Bowel sounds are normal.      Palpations: Abdomen is soft.      Tenderness: There is no abdominal tenderness. There is no right CVA tenderness, left CVA tenderness, guarding or rebound.   Musculoskeletal:      Cervical back: Neck supple.      Right lower leg: No edema.      Left lower leg: No edema.   Skin:     Findings: No rash.   Neurological:      General: No focal deficit present.      Mental Status: She is alert. Mental status is at baseline.   Psychiatric:         Mood and Affect: Mood normal.       Assessment/Plan:   1. Viral gastroenteritis  -     ondansetron (ZOFRAN) 4 MG tablet; Take 1 tablet (4 mg total) by mouth every 6 (six) hours as needed for Nausea.  Dispense: 30 tablet; Refill: 0    2. Chronic bilateral low back pain with right-sided sciatica  -     DULoxetine (CYMBALTA) 60 MG capsule; Take 1 capsule (60 mg total) by mouth 2 (two) times daily.  Dispense: 180 capsule; Refill: 1    3. Nerve pain  -     gabapentin (NEURONTIN) 800 MG tablet; Take 1 tablet (800 mg total) by mouth 3 (three) times daily.  Dispense: 270 tablet; Refill: 1    4. Type 2 diabetes mellitus with diabetic neuropathy, with long-term current use of insulin  -     glipiZIDE (GLUCOTROL) 10 MG tablet; Take 1 tablet (10 mg total) by mouth 2 (two) times daily with meals.  Dispense: 180 tablet; Refill: 1  -     insulin (LANTUS SOLOSTAR U-100 INSULIN) glargine 100 units/mL SubQ pen; Inject 15 Units into the skin every evening.  Dispense: 18 mL; Refill: 1  -     OZEMPIC 1 mg/dose (4 mg/3 mL); Inject 1 mg into the skin every 7 days.  Dispense: 3 mL; Refill: 5    5. Anxiety  -     hydrOXYzine pamoate (VISTARIL) 25 MG  Cap; Take 1 capsule (25 mg total) by mouth 3 (three) times daily.  Dispense: 270 capsule; Refill: 1    6. Essential hypertension  -     lisinopriL (PRINIVIL,ZESTRIL) 2.5 MG tablet; Take 1 tablet (2.5 mg total) by mouth once daily.  Dispense: 90 tablet; Refill: 1    7. Gastroesophageal reflux disease without esophagitis  -     omeprazole (PRILOSEC) 40 MG capsule; Take 1 capsule (40 mg total) by mouth Daily.  Dispense: 90 capsule; Refill: 1    8. Generalized abdominal pain  -     Comprehensive Metabolic Panel; Future; Expected date: 06/13/2023  -     CBC Auto Differential; Future; Expected date: 06/13/2023  -     Amylase; Future; Expected date: 06/13/2023  -     Lipase; Future; Expected date: 06/13/2023    9. Nausea  -     ondansetron (ZOFRAN) 4 MG tablet; Take 1 tablet (4 mg total) by mouth every 6 (six) hours as needed for Nausea.  Dispense: 30 tablet; Refill: 0    Other orders  -     methocarbamoL (ROBAXIN) 500 MG Tab; Take 1 tablet (500 mg total) by mouth 3 (three) times daily as needed (muscle spasm/pain).  Dispense: 90 tablet; Refill: 2         Active Problem List with Overview Notes    Diagnosis Date Noted    Type 2 diabetes mellitus with diabetic neuropathy, with long-term current use of insulin 08/25/2021    Essential hypertension 08/25/2021    Anxiety 01/23/2023    Other hyperlipidemia 07/07/2022    Morbid obesity 01/17/2022    Depression 01/23/2023    Femoral artery pseudo-aneurysm, right 01/17/2022    Other sleep apnea 11/30/2021    Lumbosacral spondylosis without myelopathy 09/08/2021    Gastroesophageal reflux disease 08/25/2021    Chronic bilateral low back pain with right-sided sciatica 07/07/2022    Seasonal allergies 07/07/2022    Muscle spasm 07/07/2022        RTC as scheduled for chronic follow up. RTC sooner if needed.  Patient voiced understanding and is agreeable to plan.      Lisandra Mcdaniel MD    Family Medicine

## 2023-06-14 LAB
ALBUMIN SERPL BCP-MCNC: 3.6 G/DL (ref 3.5–5)
ALBUMIN/GLOB SERPL: 1 {RATIO}
ALP SERPL-CCNC: 63 U/L (ref 39–100)
ALT SERPL W P-5'-P-CCNC: 23 U/L (ref 13–56)
AMYLASE SERPL-CCNC: 43 U/L (ref 25–115)
ANION GAP SERPL CALCULATED.3IONS-SCNC: 6 MMOL/L (ref 7–16)
AST SERPL W P-5'-P-CCNC: 12 U/L (ref 15–37)
BASOPHILS # BLD AUTO: 0.05 K/UL (ref 0–0.2)
BASOPHILS NFR BLD AUTO: 0.7 % (ref 0–1)
BILIRUB SERPL-MCNC: 0.5 MG/DL (ref ?–1.2)
BUN SERPL-MCNC: 9 MG/DL (ref 7–18)
BUN/CREAT SERPL: 9 (ref 6–20)
CALCIUM SERPL-MCNC: 9.8 MG/DL (ref 8.5–10.1)
CHLORIDE SERPL-SCNC: 99 MMOL/L (ref 98–107)
CO2 SERPL-SCNC: 32 MMOL/L (ref 21–32)
CREAT SERPL-MCNC: 0.96 MG/DL (ref 0.55–1.02)
DIFFERENTIAL METHOD BLD: ABNORMAL
EGFR (NO RACE VARIABLE) (RUSH/TITUS): 73 ML/MIN/1.73M2
EOSINOPHIL # BLD AUTO: 0.12 K/UL (ref 0–0.5)
EOSINOPHIL NFR BLD AUTO: 1.7 % (ref 1–4)
ERYTHROCYTE [DISTWIDTH] IN BLOOD BY AUTOMATED COUNT: 13.4 % (ref 11.5–14.5)
GLOBULIN SER-MCNC: 3.5 G/DL (ref 2–4)
GLUCOSE SERPL-MCNC: 342 MG/DL (ref 74–106)
HCT VFR BLD AUTO: 36.3 % (ref 38–47)
HGB BLD-MCNC: 11.3 G/DL (ref 12–16)
IMM GRANULOCYTES # BLD AUTO: 0.09 K/UL (ref 0–0.04)
IMM GRANULOCYTES NFR BLD: 1.3 % (ref 0–0.4)
LIPASE SERPL-CCNC: 110 U/L (ref 73–393)
LYMPHOCYTES # BLD AUTO: 1.83 K/UL (ref 1–4.8)
LYMPHOCYTES NFR BLD AUTO: 26.3 % (ref 27–41)
MCH RBC QN AUTO: 27.1 PG (ref 27–31)
MCHC RBC AUTO-ENTMCNC: 31.1 G/DL (ref 32–36)
MCV RBC AUTO: 87.1 FL (ref 80–96)
MONOCYTES # BLD AUTO: 0.57 K/UL (ref 0–0.8)
MONOCYTES NFR BLD AUTO: 8.2 % (ref 2–6)
MPC BLD CALC-MCNC: 11.5 FL (ref 9.4–12.4)
NEUTROPHILS # BLD AUTO: 4.29 K/UL (ref 1.8–7.7)
NEUTROPHILS NFR BLD AUTO: 61.8 % (ref 53–65)
NRBC # BLD AUTO: 0 X10E3/UL
NRBC, AUTO (.00): 0 %
PLATELET # BLD AUTO: 320 K/UL (ref 150–400)
POTASSIUM SERPL-SCNC: 3.6 MMOL/L (ref 3.5–5.1)
PROT SERPL-MCNC: 7.1 G/DL (ref 6.4–8.2)
RBC # BLD AUTO: 4.17 M/UL (ref 4.2–5.4)
SODIUM SERPL-SCNC: 133 MMOL/L (ref 136–145)
WBC # BLD AUTO: 6.95 K/UL (ref 4.5–11)

## 2023-06-20 ENCOUNTER — PATIENT MESSAGE (OUTPATIENT)
Dept: ADMINISTRATIVE | Facility: HOSPITAL | Age: 48
End: 2023-06-20

## 2023-06-20 ENCOUNTER — TELEPHONE (OUTPATIENT)
Dept: FAMILY MEDICINE | Facility: CLINIC | Age: 48
End: 2023-06-20
Payer: COMMERCIAL

## 2023-06-20 ENCOUNTER — PATIENT OUTREACH (OUTPATIENT)
Dept: ADMINISTRATIVE | Facility: HOSPITAL | Age: 48
End: 2023-06-20

## 2023-06-20 NOTE — TELEPHONE ENCOUNTER
----- Message from Elisa Mcdaniel MD sent at 6/15/2023  1:16 PM CDT -----  Please contact the patient and let them know that their results were stable/ok and do not require any change in treatment. Is the patient feeling improved? Thanks!    1202- call made to pt; pt voiced understanding and stated her stomach still gives her trouble but not as much as it did.

## 2023-06-20 NOTE — PROGRESS NOTES
A1C was due on 4/19/2023. No upcoming appt scheduled at this time with PCP. Comment placed in chart to obtain A1C. Message sent to patient on portal asking to schedule appt.

## 2023-06-23 ENCOUNTER — HOSPITAL ENCOUNTER (EMERGENCY)
Facility: HOSPITAL | Age: 48
Discharge: HOME | End: 2023-06-23
Payer: COMMERCIAL

## 2023-06-23 VITALS
WEIGHT: 200 LBS | DIASTOLIC BLOOD PRESSURE: 89 MMHG | OXYGEN SATURATION: 100 % | BODY MASS INDEX: 34.15 KG/M2 | HEIGHT: 64 IN | RESPIRATION RATE: 20 BRPM | SYSTOLIC BLOOD PRESSURE: 123 MMHG | HEART RATE: 102 BPM | TEMPERATURE: 98 F

## 2023-06-23 DIAGNOSIS — M54.50 LOW BACK PAIN WITHOUT SCIATICA, UNSPECIFIED BACK PAIN LATERALITY, UNSPECIFIED CHRONICITY: Primary | ICD-10-CM

## 2023-06-23 PROCEDURE — 63600175 PHARM REV CODE 636 W HCPCS: Performed by: FAMILY MEDICINE

## 2023-06-23 PROCEDURE — 99284 PR EMERGENCY DEPT VISIT,LEVEL IV: ICD-10-PCS | Mod: ,,, | Performed by: FAMILY MEDICINE

## 2023-06-23 PROCEDURE — 99284 EMERGENCY DEPT VISIT MOD MDM: CPT

## 2023-06-23 PROCEDURE — 96372 THER/PROPH/DIAG INJ SC/IM: CPT | Performed by: FAMILY MEDICINE

## 2023-06-23 PROCEDURE — 99284 EMERGENCY DEPT VISIT MOD MDM: CPT | Mod: ,,, | Performed by: FAMILY MEDICINE

## 2023-06-23 RX ORDER — MORPHINE SULFATE 4 MG/ML
4 INJECTION, SOLUTION INTRAMUSCULAR; INTRAVENOUS
Status: COMPLETED | OUTPATIENT
Start: 2023-06-23 | End: 2023-06-23

## 2023-06-23 RX ORDER — ONDANSETRON 2 MG/ML
4 INJECTION INTRAMUSCULAR; INTRAVENOUS
Status: COMPLETED | OUTPATIENT
Start: 2023-06-23 | End: 2023-06-23

## 2023-06-23 RX ORDER — TIZANIDINE 4 MG/1
4 TABLET ORAL EVERY 6 HOURS PRN
Qty: 30 TABLET | Refills: 0 | Status: SHIPPED | OUTPATIENT
Start: 2023-06-23 | End: 2023-07-03

## 2023-06-23 RX ORDER — KETOROLAC TROMETHAMINE 30 MG/ML
60 INJECTION, SOLUTION INTRAMUSCULAR; INTRAVENOUS
Status: COMPLETED | OUTPATIENT
Start: 2023-06-23 | End: 2023-06-23

## 2023-06-23 RX ORDER — ONDANSETRON 2 MG/ML
4 INJECTION INTRAMUSCULAR; INTRAVENOUS
Status: DISCONTINUED | OUTPATIENT
Start: 2023-06-23 | End: 2023-06-23

## 2023-06-23 RX ORDER — DEXAMETHASONE SODIUM PHOSPHATE 4 MG/ML
4 INJECTION, SOLUTION INTRA-ARTICULAR; INTRALESIONAL; INTRAMUSCULAR; INTRAVENOUS; SOFT TISSUE
Status: COMPLETED | OUTPATIENT
Start: 2023-06-23 | End: 2023-06-23

## 2023-06-23 RX ADMIN — ONDANSETRON 4 MG: 2 INJECTION INTRAMUSCULAR; INTRAVENOUS at 03:06

## 2023-06-23 RX ADMIN — MORPHINE SULFATE 4 MG: 4 INJECTION INTRAVENOUS at 03:06

## 2023-06-23 RX ADMIN — KETOROLAC TROMETHAMINE 60 MG: 30 INJECTION, SOLUTION INTRAMUSCULAR at 03:06

## 2023-06-23 RX ADMIN — DEXAMETHASONE SODIUM PHOSPHATE 4 MG: 4 INJECTION, SOLUTION INTRA-ARTICULAR; INTRALESIONAL; INTRAMUSCULAR; INTRAVENOUS; SOFT TISSUE at 03:06

## 2023-06-23 NOTE — Clinical Note
"Georgiana"Shayna Zayas was seen and treated in our emergency department on 6/23/2023.  She may return to work on 06/29/2023.       If you have any questions or concerns, please don't hesitate to call.      Chalo Soni, DO"

## 2023-06-23 NOTE — ED PROVIDER NOTES
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  Patient also with left hand and wrist pain.  More likely carpal tunnel ---      Review of patient's allergies indicates:   Allergen Reactions    Codeine Itching     Past Medical History:   Diagnosis Date    Arthritis     Depression     Diabetes mellitus     Diabetes mellitus, type 2     GERD (gastroesophageal reflux disease)     Hearing difficulty     Hypertension     Liver disease     Sleep apnea      Past Surgical History:   Procedure Laterality Date    CARPAL TUNNEL RELEASE Bilateral      SECTION      INJECTION OF ANESTHETIC AGENT AROUND MEDIAL BRANCH NERVES INNERVATING LUMBAR FACET JOINT Bilateral 2021    Procedure: BLOCK, NERVE, FACET JOINT, LUMBAR, MEDIAL BRANCH;  Surgeon: Amari Razo MD;  Location: [facility];  Service: Pain Management;  Laterality: Bilateral;  Bilateral L3-S1 Facet Injection    INJECTION OF ANESTHETIC AGENT AROUND MEDIAL BRANCH NERVES INNERVATING LUMBAR FACET JOINT Bilateral 2022    Procedure: BLOCK, NERVE, FACET JOINT, LUMBAR, MEDIAL BRANCH;  Surgeon: Amari Razo MD;  Location: [facility];  Service: Pain Management;  Laterality: Bilateral;  Bilateral L3-S1 FI    INJECTION OF ANESTHETIC AGENT AROUND MEDIAL BRANCH NERVES INNERVATING LUMBAR FACET JOINT Bilateral 2023    Procedure: BLOCK, NERVE, FACET JOINT, LUMBAR, MEDIAL BRANCH;  Surgeon: Amari Razo MD;  Location: [facility];  Service: Pain Management;  Laterality: Bilateral;  Bilateral L3-S1 FI    LEFT HEART CATHETERIZATION Left 2021    Procedure: Left heart cath;  Surgeon: Jeremy Rojo DO;  Location: [facility] CATH LAB;  Service: Cardiology;  Laterality: Left;    RADIOFREQUENCY ABLATION OF LUMBAR  MEDIAL BRANCH NERVE AT SINGLE LEVEL Bilateral 4/12/2023    Procedure: RADIOFREQUENCY ABLATION, NERVE, SPINAL, LUMBAR, MEDIAL BRANCH, 1 LEVEL;  Surgeon: Amari Razo MD;  Location: [facility];  Service: Pain Management;  Laterality: Bilateral;  Bilateral L3-5 RFTC    STAPEDES SURGERY Bilateral     TONSILLECTOMY       Family History   Problem Relation Age of Onset    Cancer Mother     Diabetes Mother     Hypertension Mother      Social History     Tobacco Use    Smoking status: Never    Smokeless tobacco: Never   Substance Use Topics    Alcohol use: Not Currently    Drug use: Never     Review of Systems   Constitutional: Negative.  Negative for fever.   HENT: Negative.  Negative for sore throat.    Eyes: Negative.    Respiratory: Negative.  Negative for shortness of breath.    Cardiovascular: Negative.  Negative for chest pain.   Gastrointestinal: Negative.  Negative for nausea.   Endocrine: Negative.    Genitourinary: Negative.  Negative for dysuria.   Musculoskeletal:  Positive for back pain.   Skin: Negative.  Negative for rash.   Allergic/Immunologic: Negative.    Neurological: Negative.  Negative for weakness.   Hematological: Negative.  Does not bruise/bleed easily.   Psychiatric/Behavioral: Negative.     All other systems reviewed and are negative.    Physical Exam     Initial Vitals [06/23/23 1513]   BP Pulse Resp Temp SpO2   123/89 102 20 98.1 °F (36.7 °C) 100 %      MAP       --         Physical Exam    Constitutional: She appears well-developed and well-nourished.   HENT:   Head: Normocephalic and atraumatic.   Right Ear: External ear normal.   Left Ear: External ear normal.   Nose: Nose normal.   Mouth/Throat: Oropharynx is clear and moist.   Eyes: Conjunctivae and EOM are normal. Pupils are equal, round, and reactive to light.   Neck: Neck supple.   Normal range of motion.  Cardiovascular:  Normal rate, regular rhythm, normal heart sounds and intact distal pulses.            Pulmonary/Chest: Breath sounds normal.   Abdominal: Abdomen is soft. Bowel sounds are normal.   Genitourinary:    Vagina and uterus normal.     Musculoskeletal:         General: Normal range of motion.      Cervical back: Normal range of motion and neck supple.      Comments: Lower back pain with decreased range of motion     Neurological: She is alert and oriented to person, place, and time. She has normal strength and normal reflexes.   Skin: Skin is warm. Capillary refill takes less than 2 seconds.   Psychiatric: She has a normal mood and affect. Her behavior is normal. Judgment and thought content normal.       Medical Screening Exam   See Full Note    ED Course   Procedures  Labs Reviewed - No data to display       Imaging Results    None          Medications   morphine injection 4 mg (4 mg Intramuscular Given 6/23/23 1544)   dexAMETHasone injection 4 mg (4 mg Intramuscular Given 6/23/23 1543)   ketorolac injection 60 mg (60 mg Intramuscular Given 6/23/23 1542)   ondansetron injection 4 mg (4 mg Intramuscular Given 6/23/23 1544)     Medical Decision Making:   Initial Assessment:   Patient comes in with lower back pain no radiation of pain down either leg.  [facility] Dr[facility] Barger is on Percocets and with Robaxin  Differential Diagnosis:   At the present time will give shot for pain patient will take off work and will give her Zanaflex.  She is to follow-up primary care provider and pain treatments pain treatment  ED Management:  Trigger finger ----left wrist pain                       Clinical Impression:   Final diagnoses:  [M54.50] Low back pain without sciatica, unspecified back pain laterality, unspecified chronicity (Primary)        ED Disposition Condition    Discharge Stable          ED Prescriptions       Medication Sig Dispense Start Date End Date Auth. Provider    tiZANidine (ZANAFLEX) 4 MG tablet Take 1 tablet (4 mg total) by mouth every 6 (six) hours as  needed. 30 tablet 6/23/2023 7/3/2023 Chalo Soni, DO          Follow-up Information    None          Chalo Soni, DO  06/23/23 1529       Chalo Soni, DO  06/23/23 1637

## 2023-07-11 ENCOUNTER — HOSPITAL ENCOUNTER (EMERGENCY)
Facility: HOSPITAL | Age: 48
Discharge: HOME | End: 2023-07-11
Payer: COMMERCIAL

## 2023-07-11 VITALS
SYSTOLIC BLOOD PRESSURE: 139 MMHG | HEIGHT: 64 IN | HEART RATE: 64 BPM | TEMPERATURE: 99 F | RESPIRATION RATE: 16 BRPM | DIASTOLIC BLOOD PRESSURE: 99 MMHG | BODY MASS INDEX: 34.15 KG/M2 | WEIGHT: 200 LBS | OXYGEN SATURATION: 100 %

## 2023-07-11 DIAGNOSIS — S39.012A BACK STRAIN, INITIAL ENCOUNTER: Primary | ICD-10-CM

## 2023-07-11 PROCEDURE — 63600175 PHARM REV CODE 636 W HCPCS: Performed by: FAMILY MEDICINE

## 2023-07-11 PROCEDURE — 99284 EMERGENCY DEPT VISIT MOD MDM: CPT | Mod: ,,, | Performed by: FAMILY MEDICINE

## 2023-07-11 PROCEDURE — 96372 THER/PROPH/DIAG INJ SC/IM: CPT | Performed by: FAMILY MEDICINE

## 2023-07-11 PROCEDURE — 25000003 PHARM REV CODE 250: Performed by: FAMILY MEDICINE

## 2023-07-11 PROCEDURE — 99284 EMERGENCY DEPT VISIT MOD MDM: CPT

## 2023-07-11 PROCEDURE — 99284 PR EMERGENCY DEPT VISIT,LEVEL IV: ICD-10-PCS | Mod: ,,, | Performed by: FAMILY MEDICINE

## 2023-07-11 RX ORDER — KETOROLAC TROMETHAMINE 30 MG/ML
60 INJECTION, SOLUTION INTRAMUSCULAR; INTRAVENOUS
Status: COMPLETED | OUTPATIENT
Start: 2023-07-11 | End: 2023-07-11

## 2023-07-11 RX ORDER — DEXAMETHASONE SODIUM PHOSPHATE 4 MG/ML
4 INJECTION, SOLUTION INTRA-ARTICULAR; INTRALESIONAL; INTRAMUSCULAR; INTRAVENOUS; SOFT TISSUE
Status: COMPLETED | OUTPATIENT
Start: 2023-07-11 | End: 2023-07-11

## 2023-07-11 RX ORDER — TOBRAMYCIN 3 MG/ML
1 SOLUTION/ DROPS OPHTHALMIC
COMMUNITY
Start: 2023-06-30 | End: 2023-07-20 | Stop reason: ALTCHOICE

## 2023-07-11 RX ORDER — ORPHENADRINE CITRATE 30 MG/ML
60 INJECTION INTRAMUSCULAR; INTRAVENOUS
Status: COMPLETED | OUTPATIENT
Start: 2023-07-11 | End: 2023-07-11

## 2023-07-11 RX ORDER — TIZANIDINE 2 MG/1
4 TABLET ORAL EVERY 6 HOURS PRN
Qty: 30 TABLET | Refills: 1 | Status: SHIPPED | OUTPATIENT
Start: 2023-07-11 | End: 2023-07-20 | Stop reason: ALTCHOICE

## 2023-07-11 RX ADMIN — ORPHENADRINE CITRATE 60 MG: 60 INJECTION INTRAMUSCULAR; INTRAVENOUS at 04:07

## 2023-07-11 RX ADMIN — LIDOCAINE HYDROCHLORIDE 1 G: 10 INJECTION, SOLUTION INFILTRATION; PERINEURAL at 04:07

## 2023-07-11 RX ADMIN — KETOROLAC TROMETHAMINE 60 MG: 30 INJECTION, SOLUTION INTRAMUSCULAR at 04:07

## 2023-07-11 RX ADMIN — DEXAMETHASONE SODIUM PHOSPHATE 4 MG: 4 INJECTION, SOLUTION INTRA-ARTICULAR; INTRALESIONAL; INTRAMUSCULAR; INTRAVENOUS; SOFT TISSUE at 04:07

## 2023-07-11 NOTE — ED PROVIDER NOTES
Encounter Date: 2023       History     Chief Complaint   Patient presents with    Back Pain     Chronic back pain, face burning and stinging      Patient comes in with back pain which is chronic it is radiating down her lumbar spine and she has sciatica.  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      Review of patient's allergies indicates:   Allergen Reactions    Codeine Itching     Past Medical History:   Diagnosis Date    Arthritis     Depression     Diabetes mellitus     Diabetes mellitus, type 2     GERD (gastroesophageal reflux disease)     Hearing difficulty     Hypertension     Liver disease     Sleep apnea      Past Surgical History:   Procedure Laterality Date    CARPAL TUNNEL RELEASE Bilateral      SECTION      INJECTION OF ANESTHETIC AGENT AROUND MEDIAL BRANCH NERVES INNERVATING LUMBAR FACET JOINT Bilateral 2021    Procedure: BLOCK, NERVE, FACET JOINT, LUMBAR, MEDIAL BRANCH;  Surgeon: Amari Razo MD;  Location: [facility];  Service: Pain Management;  Laterality: Bilateral;  Bilateral L3-S1 Facet Injection    INJECTION OF ANESTHETIC AGENT AROUND MEDIAL BRANCH NERVES INNERVATING LUMBAR FACET JOINT Bilateral 2022    Procedure: BLOCK, NERVE, FACET JOINT, LUMBAR, MEDIAL BRANCH;  Surgeon: Amari Razo MD;  Location: [facility];  Service: Pain Management;  Laterality: Bilateral;  Bilateral L3-S1 FI    INJECTION OF ANESTHETIC AGENT AROUND MEDIAL BRANCH NERVES INNERVATING LUMBAR FACET JOINT Bilateral 2023    Procedure: BLOCK, NERVE, FACET JOINT, LUMBAR, MEDIAL BRANCH;  Surgeon: Amari Razo MD;  Location: [facility];  Service: Pain Management;  Laterality: Bilateral;  Bilateral L3-S1 FI    LEFT HEART CATHETERIZATION Left 2021    Procedure: Left heart cath;  Surgeon: Jeremy Rojo DO;  Location: [facility] CATH LAB;  Service: Cardiology;  Laterality: Left;    RADIOFREQUENCY ABLATION OF LUMBAR MEDIAL BRANCH NERVE AT SINGLE LEVEL  Bilateral 4/12/2023    Procedure: RADIOFREQUENCY ABLATION, NERVE, SPINAL, LUMBAR, MEDIAL BRANCH, 1 LEVEL;  Surgeon: Amari Razo MD;  Location: [facility];  Service: Pain Management;  Laterality: Bilateral;  Bilateral L3-5 RFTC    STAPEDES SURGERY Bilateral     TONSILLECTOMY       Family History   Problem Relation Age of Onset    Cancer Mother     Diabetes Mother     Hypertension Mother      Social History     Tobacco Use    Smoking status: Never    Smokeless tobacco: Never   Substance Use Topics    Alcohol use: Not Currently    Drug use: Never     Review of Systems   Constitutional: Negative.  Negative for fever.   HENT: Negative.  Negative for sore throat.    Eyes: Negative.    Respiratory: Negative.  Negative for shortness of breath.    Cardiovascular: Negative.  Negative for chest pain.   Gastrointestinal: Negative.  Negative for nausea.   Endocrine: Negative.    Genitourinary: Negative.  Negative for dysuria.   Musculoskeletal:  Positive for back pain.   Skin: Negative.  Negative for rash.   Allergic/Immunologic: Negative.    Neurological: Negative.  Negative for weakness.   Hematological: Negative.  Does not bruise/bleed easily.   Psychiatric/Behavioral: Negative.     All other systems reviewed and are negative.    Physical Exam     Initial Vitals [07/11/23 1610]   BP Pulse Resp Temp SpO2   (!) 150/100 64 16 98.5 °F (36.9 °C) 100 %      MAP       --         Physical Exam    Constitutional: She appears well-developed and well-nourished.   HENT:   Head: Normocephalic and atraumatic.   Right Ear: External ear normal.   Left Ear: External ear normal.   Nose: Nose normal.   Mouth/Throat: Oropharynx is clear and moist.   Eyes: Conjunctivae and EOM are normal. Pupils are equal, round, and reactive to light.   Neck: Neck supple.   Normal range of motion.  Cardiovascular:  Normal rate, regular rhythm, normal heart sounds and intact distal pulses.           Pulmonary/Chest: Breath sounds normal.    Abdominal: Abdomen is soft. Bowel sounds are normal.   Genitourinary:    Vagina and uterus normal.     Musculoskeletal:         General: Normal range of motion.      Cervical back: Normal range of motion and neck supple.      Comments: Decreased range of motion lumbar spine and back pain     Neurological: She is alert and oriented to person, place, and time. She has normal strength and normal reflexes.   Skin: Skin is warm. Capillary refill takes less than 2 seconds.   Psychiatric: She has a normal mood and affect. Her behavior is normal. Judgment and thought content normal.       Medical Screening Exam   See Full Note    ED Course   Procedures  Labs Reviewed - No data to display       Imaging Results    None          Medications   ketorolac injection 60 mg (60 mg Intramuscular Given 7/11/23 1626)   orphenadrine injection 60 mg (60 mg Intramuscular Given 7/11/23 1626)   dexAMETHasone injection 4 mg (4 mg Intramuscular Given 7/11/23 1625)     Medical Decision Making:   Initial Assessment:   Chronic back pain with facial numbness and stinging.  Sciatica.  Differential Diagnosis:   Chronic back pain with sciatica  ED Management:  Follow-up primary care provider and pain management.                       Clinical Impression:   Final diagnoses:  [S39.012A] Back strain, initial encounter (Primary)        ED Disposition Condition    Discharge Stable          ED Prescriptions       Medication Sig Dispense Start Date End Date Auth. Provider    tiZANidine (ZANAFLEX) 2 MG tablet Take 2 tablets (4 mg total) by mouth every 6 (six) hours as needed. 30 tablet 7/11/2023 -- Chalo Soni DO          Follow-up Information    None          Chalo Soni DO  07/13/23 0552

## 2023-07-11 NOTE — Clinical Note
"Georgiana"Shayna Zayas was seen and treated in our emergency department on 7/11/2023.  She may return to work on 07/25/2023.       If you have any questions or concerns, please don't hesitate to call.      Chalo Soni, DO"

## 2023-07-17 ENCOUNTER — OFFICE VISIT (OUTPATIENT)
Dept: FAMILY MEDICINE | Facility: CLINIC | Age: 48
End: 2023-07-17
Payer: COMMERCIAL

## 2023-07-17 VITALS
TEMPERATURE: 98 F | RESPIRATION RATE: 18 BRPM | HEART RATE: 96 BPM | DIASTOLIC BLOOD PRESSURE: 86 MMHG | HEIGHT: 64 IN | OXYGEN SATURATION: 96 % | SYSTOLIC BLOOD PRESSURE: 120 MMHG | WEIGHT: 198 LBS | BODY MASS INDEX: 33.8 KG/M2

## 2023-07-17 DIAGNOSIS — M47.817 LUMBOSACRAL SPONDYLOSIS WITHOUT MYELOPATHY: Chronic | ICD-10-CM

## 2023-07-17 DIAGNOSIS — G89.29 CHRONIC BILATERAL LOW BACK PAIN WITH RIGHT-SIDED SCIATICA: Primary | Chronic | ICD-10-CM

## 2023-07-17 DIAGNOSIS — M54.41 CHRONIC BILATERAL LOW BACK PAIN WITH RIGHT-SIDED SCIATICA: Primary | Chronic | ICD-10-CM

## 2023-07-17 PROCEDURE — 99213 PR OFFICE/OUTPT VISIT, EST, LEVL III, 20-29 MIN: ICD-10-PCS | Mod: ,,, | Performed by: NURSE PRACTITIONER

## 2023-07-17 PROCEDURE — 99213 OFFICE O/P EST LOW 20 MIN: CPT | Mod: ,,, | Performed by: NURSE PRACTITIONER

## 2023-07-17 NOTE — PROGRESS NOTES
Clinic Note    Georgiana Zayas is a 48 y.o. female     Chief Complaint:   Chief Complaint   Patient presents with    Back Pain     Been dealing with back pain for years. Got back pain that going down her right leg. Been going to ER for last 3 weeks. 8/10 pain level. Been taking her pain medication for back.         Subjective:    Patient complains of chronic back pain with right sided sciatica. Patient reports pain is chronic. Denies an acute injury. Patient states she has had multiple ED visits due to pain. Patient sees Dr. Razo for pain treatment. Next f/u in August. Patient states every time she works she aggravates pain. Patient wanting fmla paperwork completed. Patient has no paperwork with her today. Reports she wants to file for disability.      Allergies:   Review of patient's allergies indicates:   Allergen Reactions    Codeine Itching        Past Medical History:  Past Medical History:   Diagnosis Date    Arthritis     Depression     Diabetes mellitus     Diabetes mellitus, type 2     GERD (gastroesophageal reflux disease)     Hearing difficulty     Hypertension     Liver disease     Sleep apnea         Current Medications:    Current Outpatient Medications:     ARIPiprazole (ABILIFY) 2 MG Tab, Take 2 mg by mouth., Disp: , Rfl:     DULoxetine (CYMBALTA) 60 MG capsule, Take 1 capsule (60 mg total) by mouth 2 (two) times daily., Disp: 180 capsule, Rfl: 1    gabapentin (NEURONTIN) 800 MG tablet, Take 1 tablet (800 mg total) by mouth 3 (three) times daily., Disp: 270 tablet, Rfl: 1    glipiZIDE (GLUCOTROL) 10 MG tablet, Take 1 tablet (10 mg total) by mouth 2 (two) times daily with meals., Disp: 180 tablet, Rfl: 1    hydroCHLOROthiazide (HYDRODIURIL) 25 MG tablet, Take 1 tablet (25 mg total) by mouth once daily., Disp: 90 tablet, Rfl: 1    hydrOXYzine pamoate (VISTARIL) 25 MG Cap, Take 1 capsule (25 mg total) by mouth 3 (three) times daily., Disp: 270 capsule, Rfl: 1    insulin (LANTUS SOLOSTAR U-100  "INSULIN) glargine 100 units/mL SubQ pen, Inject 15 Units into the skin every evening., Disp: 18 mL, Rfl: 1    lisinopriL (PRINIVIL,ZESTRIL) 2.5 MG tablet, Take 1 tablet (2.5 mg total) by mouth once daily., Disp: 90 tablet, Rfl: 1    mometasone (NASONEX) 50 mcg/actuation nasal spray, 2 sprays by Nasal route once daily., Disp: 17 g, Rfl: 3    omeprazole (PRILOSEC) 40 MG capsule, Take 1 capsule (40 mg total) by mouth Daily., Disp: 90 capsule, Rfl: 1    ondansetron (ZOFRAN) 4 MG tablet, Take 1 tablet (4 mg total) by mouth every 6 (six) hours as needed for Nausea., Disp: 30 tablet, Rfl: 0    oxyCODONE-acetaminophen (PERCOCET)  mg per tablet, Take 1 tablet by mouth every 6 (six) hours as needed. , Disp: , Rfl:     OZEMPIC 1 mg/dose (4 mg/3 mL), Inject 1 mg into the skin every 7 days., Disp: 3 mL, Rfl: 5    pravastatin (PRAVACHOL) 20 MG tablet, Take 1 tablet (20 mg total) by mouth every evening., Disp: 90 tablet, Rfl: 1    risperiDONE (RISPERDAL) 3 MG Tab, Take 1 tablet (3 mg total) by mouth once daily., Disp: 90 tablet, Rfl: 1    tiZANidine (ZANAFLEX) 2 MG tablet, Take 2 tablets (4 mg total) by mouth every 6 (six) hours as needed., Disp: 30 tablet, Rfl: 1    tobramycin sulfate 0.3% (TOBREX) 0.3 % ophthalmic solution, Place 1 drop into both eyes., Disp: , Rfl:     traZODone (DESYREL) 100 MG tablet, Take 1 tablet (100 mg total) by mouth every evening., Disp: 90 tablet, Rfl: 1       Review of Systems   Constitutional:  Negative for fever.   Respiratory:  Negative for cough and shortness of breath.    Cardiovascular:  Negative for chest pain.   Gastrointestinal:  Negative for abdominal pain.   Genitourinary:  Negative for bladder incontinence and dysuria.   Musculoskeletal:  Positive for back pain, leg pain and myalgias.        Objective:    /86 (BP Location: Left arm, Patient Position: Sitting, BP Method: Medium (Automatic))   Pulse 96   Temp 97.9 °F (36.6 °C) (Oral)   Resp 18   Ht 5' 4" (1.626 m)   Wt 89.8 " kg (198 lb)   LMP 06/28/2023 (Approximate)   SpO2 96%   BMI 33.99 kg/m²      Physical Exam  Constitutional:       Appearance: Normal appearance.   Eyes:      Extraocular Movements: Extraocular movements intact.   Cardiovascular:      Rate and Rhythm: Normal rate and regular rhythm.      Pulses: Normal pulses.      Heart sounds: Normal heart sounds.   Pulmonary:      Effort: Pulmonary effort is normal.      Breath sounds: Normal breath sounds.   Abdominal:      Palpations: Abdomen is soft.      Tenderness: There is no abdominal tenderness.   Musculoskeletal:      Lumbar back: Tenderness present. No swelling. Decreased range of motion.      Right lower leg: No edema.      Left lower leg: No edema.   Neurological:      Mental Status: She is alert and oriented to person, place, and time.        Assessment and Plan:    1. Chronic bilateral low back pain with right-sided sciatica    2. Lumbosacral spondylosis without myelopathy         Chronic bilateral low back pain with right-sided sciatica    Lumbosacral spondylosis without myelopathy    -denies need for work excuse due to ED giving her one  -no paperwork with her today but will bring  -patient sees pain treatment. F/u as scheduled in august.      There are no Patient Instructions on file for this visit.   Follow up if symptoms worsen or fail to improve.

## 2023-07-19 ENCOUNTER — PATIENT MESSAGE (OUTPATIENT)
Dept: ADMINISTRATIVE | Facility: HOSPITAL | Age: 48
End: 2023-07-19

## 2023-07-20 ENCOUNTER — OFFICE VISIT (OUTPATIENT)
Dept: FAMILY MEDICINE | Facility: CLINIC | Age: 48
End: 2023-07-20
Payer: COMMERCIAL

## 2023-07-20 VITALS
OXYGEN SATURATION: 98 % | HEART RATE: 107 BPM | RESPIRATION RATE: 18 BRPM | TEMPERATURE: 99 F | WEIGHT: 188.81 LBS | SYSTOLIC BLOOD PRESSURE: 128 MMHG | BODY MASS INDEX: 32.23 KG/M2 | HEIGHT: 64 IN | DIASTOLIC BLOOD PRESSURE: 84 MMHG

## 2023-07-20 DIAGNOSIS — R35.0 URINARY FREQUENCY: ICD-10-CM

## 2023-07-20 DIAGNOSIS — F32.89 OTHER DEPRESSION: Chronic | ICD-10-CM

## 2023-07-20 DIAGNOSIS — H10.9 BACTERIAL CONJUNCTIVITIS: ICD-10-CM

## 2023-07-20 DIAGNOSIS — F41.9 ANXIETY: Chronic | ICD-10-CM

## 2023-07-20 DIAGNOSIS — R11.0 NAUSEA: ICD-10-CM

## 2023-07-20 DIAGNOSIS — M54.16 LUMBAR RADICULOPATHY: Primary | ICD-10-CM

## 2023-07-20 LAB
BILIRUB SERPL-MCNC: NORMAL MG/DL
BLOOD URINE, POC: NORMAL
COLOR, POC UA: YELLOW
GLUCOSE UR QL STRIP: >1000
KETONES UR QL STRIP: NORMAL
LEUKOCYTE ESTERASE URINE, POC: NORMAL
NITRITE, POC UA: NORMAL
PH, POC UA: 6.5
PROTEIN, POC: NORMAL
SPECIFIC GRAVITY, POC UA: 1.01
UROBILINOGEN, POC UA: 1

## 2023-07-20 PROCEDURE — 99214 PR OFFICE/OUTPT VISIT, EST, LEVL IV, 30-39 MIN: ICD-10-PCS | Mod: 25,,, | Performed by: FAMILY MEDICINE

## 2023-07-20 PROCEDURE — 99214 OFFICE O/P EST MOD 30 MIN: CPT | Mod: 25,,, | Performed by: FAMILY MEDICINE

## 2023-07-20 PROCEDURE — 96372 PR INJECTION,THERAP/PROPH/DIAG2ST, IM OR SUBCUT: ICD-10-PCS | Mod: ,,, | Performed by: FAMILY MEDICINE

## 2023-07-20 PROCEDURE — 96372 THER/PROPH/DIAG INJ SC/IM: CPT | Mod: ,,, | Performed by: FAMILY MEDICINE

## 2023-07-20 PROCEDURE — 81003 URINALYSIS AUTO W/O SCOPE: CPT | Mod: QW,,, | Performed by: FAMILY MEDICINE

## 2023-07-20 PROCEDURE — 81003 POCT URINALYSIS W/O SCOPE: ICD-10-PCS | Mod: QW,,, | Performed by: FAMILY MEDICINE

## 2023-07-20 RX ORDER — KETOROLAC TROMETHAMINE 30 MG/ML
30 INJECTION, SOLUTION INTRAMUSCULAR; INTRAVENOUS
Status: COMPLETED | OUTPATIENT
Start: 2023-07-20 | End: 2023-07-20

## 2023-07-20 RX ORDER — ONDANSETRON 4 MG/1
4 TABLET, FILM COATED ORAL EVERY 6 HOURS PRN
Qty: 30 TABLET | Refills: 0 | Status: SHIPPED | OUTPATIENT
Start: 2023-07-20 | End: 2023-07-25 | Stop reason: ALTCHOICE

## 2023-07-20 RX ORDER — ARIPIPRAZOLE 2 MG/1
2 TABLET ORAL DAILY
Qty: 90 TABLET | Refills: 1 | Status: SHIPPED | OUTPATIENT
Start: 2023-07-20

## 2023-07-20 RX ORDER — POLYMYXIN B SULFATE AND TRIMETHOPRIM 1; 10000 MG/ML; [USP'U]/ML
1 SOLUTION OPHTHALMIC EVERY 6 HOURS
Qty: 10 ML | Refills: 0 | Status: SHIPPED | OUTPATIENT
Start: 2023-07-20 | End: 2023-07-27

## 2023-07-20 RX ORDER — CYCLOBENZAPRINE HCL 10 MG
10 TABLET ORAL 3 TIMES DAILY PRN
Qty: 30 TABLET | Refills: 1 | Status: SHIPPED | OUTPATIENT
Start: 2023-07-20 | End: 2023-07-30

## 2023-07-20 RX ADMIN — KETOROLAC TROMETHAMINE 30 MG: 30 INJECTION, SOLUTION INTRAMUSCULAR; INTRAVENOUS at 10:07

## 2023-07-20 NOTE — PROGRESS NOTES
Clinic Note    Patient Name: Georgiana Zayas  : 1975  MRN: [id number]    Chief Complaint   Patient presents with    Back Pain     Lower back and right sciatica. Took pain pill this morning and states not working.     Anxiety    Depression       HPI:    Ms. Georgiana Zayas is a 48 y.o. female who presents to clinic today with CC of low back pain that radiates down R side. Reports that this is a chronic, intermittent issue. Reports, however, current flare started 3 weeks ago without any known injury. Reports it is worse than her baseline and is not improving with conservative management. Reports she has had 2 ER visits in the past 3 weeks for this pain. Reports she has taken her medication as prescribed but it is not improved.  States she is in the processes of seeking approval for disability but would like to get some temporary time off of work for now as pain is too severe. 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  Reports anxiety/depression. Reports she is on medication for these issues but feels like she is having a flare with this due to her chronic pain and concern over her job. Denies any prior suicide attempts. Reports she does occasionally have thoughts due to severe pain that she is in. Denies having a plan to harm herself. Reports she would never hurt herself because of her son. Reports she worries about him and wants to be around to do everything she can for him. She is accompanied to this visit by her sister. Her sister states she is going to start staying with her. She reports she plans to take her to [facility] when she leaves here. We did also call [facility] and they report they are accepting walk ins today and would be available or medication adjustments.  Patient is, otherwise, without complaints.     Medications:  Medication List with Changes/Refills   New Medications    CYCLOBENZAPRINE (FLEXERIL) 10 MG TABLET    Take 1  tablet (10 mg total) by mouth 3 (three) times daily as needed for Muscle spasms (May cause drowsiness).    POLYMYXIN B SULF-TRIMETHOPRIM (POLYTRIM) 10,000 UNIT- 1 MG/ML DROP    Place 1 drop into both eyes every 6 (six) hours. for 7 days   Current Medications    DULOXETINE (CYMBALTA) 60 MG CAPSULE    Take 1 capsule (60 mg total) by mouth 2 (two) times daily.    GABAPENTIN (NEURONTIN) 800 MG TABLET    Take 1 tablet (800 mg total) by mouth 3 (three) times daily.    GLIPIZIDE (GLUCOTROL) 10 MG TABLET    Take 1 tablet (10 mg total) by mouth 2 (two) times daily with meals.    HYDROCHLOROTHIAZIDE (HYDRODIURIL) 25 MG TABLET    Take 1 tablet (25 mg total) by mouth once daily.    HYDROXYZINE PAMOATE (VISTARIL) 25 MG CAP    Take 1 capsule (25 mg total) by mouth 3 (three) times daily.    INSULIN (LANTUS SOLOSTAR U-100 INSULIN) GLARGINE 100 UNITS/ML SUBQ PEN    Inject 15 Units into the skin every evening.    LISINOPRIL (PRINIVIL,ZESTRIL) 2.5 MG TABLET    Take 1 tablet (2.5 mg total) by mouth once daily.    MOMETASONE (NASONEX) 50 MCG/ACTUATION NASAL SPRAY    2 sprays by Nasal route once daily.    OMEPRAZOLE (PRILOSEC) 40 MG CAPSULE    Take 1 capsule (40 mg total) by mouth Daily.    OXYCODONE-ACETAMINOPHEN (PERCOCET)  MG PER TABLET    Take 1 tablet by mouth every 6 (six) hours as needed.     OZEMPIC 1 MG/DOSE (4 MG/3 ML)    Inject 1 mg into the skin every 7 days.    PRAVASTATIN (PRAVACHOL) 20 MG TABLET    Take 1 tablet (20 mg total) by mouth every evening.    RISPERIDONE (RISPERDAL) 3 MG TAB    Take 1 tablet (3 mg total) by mouth once daily.    TRAZODONE (DESYREL) 100 MG TABLET    Take 1 tablet (100 mg total) by mouth every evening.   Changed and/or Refilled Medications    Modified Medication Previous Medication    ARIPIPRAZOLE (ABILIFY) 2 MG TAB ARIPiprazole (ABILIFY) 2 MG Tab       Take 1 tablet (2 mg total) by mouth once daily.    Take 2 mg by mouth.    ONDANSETRON (ZOFRAN) 4 MG TABLET ondansetron (ZOFRAN) 4 MG tablet        Take 1 tablet (4 mg total) by mouth every 6 (six) hours as needed for Nausea.    Take 1 tablet (4 mg total) by mouth every 6 (six) hours as needed for Nausea.   Discontinued Medications    TIZANIDINE (ZANAFLEX) 2 MG TABLET    Take 2 tablets (4 mg total) by mouth every 6 (six) hours as needed.    TOBRAMYCIN SULFATE 0.3% (TOBREX) 0.3 % OPHTHALMIC SOLUTION    Place 1 drop into both eyes.        Allergies: Codeine      Past Medical History:    Past Medical History:   Diagnosis Date    Arthritis     Depression     Diabetes mellitus     Diabetes mellitus, type 2     GERD (gastroesophageal reflux disease)     Hearing difficulty     Hypertension     Liver disease     Sleep apnea        Past Surgical History:    Past Surgical History:   Procedure Laterality Date    CARPAL TUNNEL RELEASE Bilateral      SECTION      INJECTION OF ANESTHETIC AGENT AROUND MEDIAL BRANCH NERVES INNERVATING LUMBAR FACET JOINT Bilateral 2021    Procedure: BLOCK, NERVE, FACET JOINT, LUMBAR, MEDIAL BRANCH;  Surgeon: Amari Razo MD;  Location: [facility];  Service: Pain Management;  Laterality: Bilateral;  Bilateral L3-S1 Facet Injection    INJECTION OF ANESTHETIC AGENT AROUND MEDIAL BRANCH NERVES INNERVATING LUMBAR FACET JOINT Bilateral 2022    Procedure: BLOCK, NERVE, FACET JOINT, LUMBAR, MEDIAL BRANCH;  Surgeon: Amari Razo MD;  Location: [facility];  Service: Pain Management;  Laterality: Bilateral;  Bilateral L3-S1 FI    INJECTION OF ANESTHETIC AGENT AROUND MEDIAL BRANCH NERVES INNERVATING LUMBAR FACET JOINT Bilateral 2023    Procedure: BLOCK, NERVE, FACET JOINT, LUMBAR, MEDIAL BRANCH;  Surgeon: Amari Razo MD;  Location: [facility];  Service: Pain Management;  Laterality: Bilateral;  Bilateral L3-S1 FI    LEFT HEART CATHETERIZATION Left 2021    Procedure: Left heart cath;  Surgeon: Jeremy Rojo DO;  Location: [facility] CATH LAB;  Service: Cardiology;  Laterality:  "Left;    RADIOFREQUENCY ABLATION OF LUMBAR MEDIAL BRANCH NERVE AT SINGLE LEVEL Bilateral 4/12/2023    Procedure: RADIOFREQUENCY ABLATION, NERVE, SPINAL, LUMBAR, MEDIAL BRANCH, 1 LEVEL;  Surgeon: Amari Razo MD;  Location: [facility];  Service: Pain Management;  Laterality: Bilateral;  Bilateral L3-5 RFTC    STAPEDES SURGERY Bilateral     TONSILLECTOMY           Social History:    Social History     Tobacco Use   Smoking Status Never   Smokeless Tobacco Never     Social History     Substance and Sexual Activity   Alcohol Use Not Currently     Social History     Substance and Sexual Activity   Drug Use Never         Family History:    Family History   Problem Relation Age of Onset    Cancer Mother     Diabetes Mother     Hypertension Mother        Review of Systems:    Review of Systems   Constitutional:  Negative for appetite change, chills, fatigue, fever and unexpected weight change.   Eyes:  Positive for visual disturbance.        Reports she has "pinkeye" several weeks ago and also has dry eyes. Reports she is still having some issues with eyes being red, irritated, and draining.    Respiratory:  Negative for cough and shortness of breath.    Cardiovascular:  Negative for chest pain and leg swelling.   Gastrointestinal:  Positive for nausea. Negative for abdominal pain, blood in stool, change in bowel habit, constipation, diarrhea, vomiting and change in bowel habit.        She attributes nausea to pain   Genitourinary:  Positive for frequency. Negative for dysuria.   Musculoskeletal:  Positive for arthralgias and back pain.   Integumentary:  Negative for rash.   Neurological:  Positive for headaches. Negative for dizziness.   Psychiatric/Behavioral:  Positive for dysphoric mood. Negative for self-injury, sleep disturbance and suicidal ideas. The patient is nervous/anxious.       Vitals:    Vitals:    07/20/23 0815   BP: 128/84   BP Location: Left arm   Patient Position: Sitting   BP Method: " "Medium (Manual)   Pulse: 107   Resp: 18   Temp: 98.5 °F (36.9 °C)   TempSrc: Oral   SpO2: 98%   Weight: 85.6 kg (188 lb 12.8 oz)   Height: 5' 4" (1.626 m)       Body mass index is 32.41 kg/m².    Wt Readings from Last 3 Encounters:   07/20/23 0815 85.6 kg (188 lb 12.8 oz)   07/17/23 0934 89.8 kg (198 lb)   07/11/23 1610 90.7 kg (200 lb)        Physical Exam:    Physical Exam  Constitutional:       General: She is not in acute distress.     Appearance: Normal appearance. She is obese.   HENT:      Nose: Nose normal.      Mouth/Throat:      Mouth: Mucous membranes are moist.      Pharynx: Oropharynx is clear.   Eyes:      Conjunctiva/sclera: Conjunctivae normal.      Comments: + eye irritation   Cardiovascular:      Rate and Rhythm: Normal rate and regular rhythm.      Heart sounds: Normal heart sounds. No murmur heard.  Pulmonary:      Effort: Pulmonary effort is normal. No respiratory distress.      Breath sounds: Normal breath sounds. No wheezing, rhonchi or rales.   Abdominal:      General: Bowel sounds are normal.      Palpations: Abdomen is soft.      Tenderness: There is no abdominal tenderness.   Musculoskeletal:         General: Tenderness present. No swelling. Normal range of motion.      Cervical back: Neck supple.      Right lower leg: No edema.      Left lower leg: No edema.   Skin:     Findings: No rash.   Neurological:      General: No focal deficit present.      Mental Status: She is alert. Mental status is at baseline.   Psychiatric:         Mood and Affect: Mood normal.       Assessment/Plan:   1. Lumbar radiculopathy  -     MRI Lumbar Spine Without Contrast; Future; Expected date: 07/20/2023  -     ketorolac injection 30 mg  -     cyclobenzaprine (FLEXERIL) 10 MG tablet; Take 1 tablet (10 mg total) by mouth 3 (three) times daily as needed for Muscle spasms (May cause drowsiness).  Dispense: 30 tablet; Refill: 1    2. Nausea  -     ondansetron (ZOFRAN) 4 MG tablet; Take 1 tablet (4 mg total) by mouth " every 6 (six) hours as needed for Nausea.  Dispense: 30 tablet; Refill: 0    3. Anxiety  -     ARIPiprazole (ABILIFY) 2 MG Tab; Take 1 tablet (2 mg total) by mouth once daily.  Dispense: 90 tablet; Refill: 1  - Patient denies any suicidal/homicidal ideation currently. She does report worsening anxiety/depression secondary to pain. [facility] was called and patient is to follow up there today. Sister agrees to take her.    4. Other depression  -     ARIPiprazole (ABILIFY) 2 MG Tab; Take 1 tablet (2 mg total) by mouth once daily.  Dispense: 90 tablet; Refill: 1  - Patient denies any suicidal/homicidal ideation currently. She does report worsening anxiety/depression secondary to pain. [facility] was called and patient is to follow up there today. Sister agrees to take her.    5. Urinary frequency  -     POCT URINALYSIS W/O SCOPE    6. Bacterial conjunctivitis  -     polymyxin B sulf-trimethoprim (POLYTRIM) 10,000 unit- 1 mg/mL Drop; Place 1 drop into both eyes every 6 (six) hours. for 7 days  Dispense: 10 mL; Refill: 0         Active Problem List with Overview Notes    Diagnosis Date Noted    Type 2 diabetes mellitus with diabetic neuropathy, with long-term current use of insulin 08/25/2021    Essential hypertension 08/25/2021    Anxiety 01/23/2023    Other hyperlipidemia 07/07/2022    Morbid obesity 01/17/2022    Depression 01/23/2023    Femoral artery pseudo-aneurysm, right 01/17/2022    Other sleep apnea 11/30/2021    Lumbosacral spondylosis without myelopathy 09/08/2021    Gastroesophageal reflux disease 08/25/2021    Chronic bilateral low back pain with right-sided sciatica 07/07/2022    Seasonal allergies 07/07/2022    Muscle spasm 07/07/2022          RTC as scheduled next week for follow up.  RTC sooner if needed.  Patient voiced understanding and is agreeable to plan.      Lisandra Mcdaniel MD    Family Medicine

## 2023-07-20 NOTE — LETTER
July 20, 2023      [facility]  [address] [place] [address]  Phone: [phone]  Fax: [phone]       Patient: Georgiana Zayas   YOB: 1975  Date of Visit: 07/20/2023    To Whom It May Concern:    Nadia Zayas  was at [facility] on 07/20/2023. The patient may return to work/school on 09/04/2023 with restrictions. If you have any questions or concerns, or if I can be of further assistance, please do not hesitate to contact me.    Sincerely,    Elisa Berman MD

## 2023-07-24 ENCOUNTER — TELEPHONE (OUTPATIENT)
Dept: FAMILY MEDICINE | Facility: CLINIC | Age: 48
End: 2023-07-24
Payer: COMMERCIAL

## 2023-07-24 NOTE — TELEPHONE ENCOUNTER
Contacted patient in regards to urinalysis. Informed patient that she does not have a UTI. Patient voiced understanding and had no further questions.     ----- Message from Elisa Mcdaniel MD sent at 7/20/2023 12:20 PM CDT -----  Please call patient to let her know that it does not look like she has a UTI. Thanks!

## 2023-07-25 ENCOUNTER — OFFICE VISIT (OUTPATIENT)
Dept: FAMILY MEDICINE | Facility: CLINIC | Age: 48
End: 2023-07-25
Payer: COMMERCIAL

## 2023-07-25 VITALS
OXYGEN SATURATION: 96 % | RESPIRATION RATE: 19 BRPM | WEIGHT: 185 LBS | BODY MASS INDEX: 31.58 KG/M2 | HEART RATE: 96 BPM | DIASTOLIC BLOOD PRESSURE: 70 MMHG | SYSTOLIC BLOOD PRESSURE: 102 MMHG | TEMPERATURE: 99 F | HEIGHT: 64 IN

## 2023-07-25 DIAGNOSIS — G89.29 CHRONIC BILATERAL LOW BACK PAIN WITH RIGHT-SIDED SCIATICA: Primary | Chronic | ICD-10-CM

## 2023-07-25 DIAGNOSIS — R11.0 NAUSEA: ICD-10-CM

## 2023-07-25 DIAGNOSIS — M54.41 CHRONIC BILATERAL LOW BACK PAIN WITH RIGHT-SIDED SCIATICA: Primary | Chronic | ICD-10-CM

## 2023-07-25 PROCEDURE — 99213 OFFICE O/P EST LOW 20 MIN: CPT | Mod: ,,, | Performed by: FAMILY MEDICINE

## 2023-07-25 PROCEDURE — 99213 PR OFFICE/OUTPT VISIT, EST, LEVL III, 20-29 MIN: ICD-10-PCS | Mod: ,,, | Performed by: FAMILY MEDICINE

## 2023-07-25 RX ORDER — PROMETHAZINE HYDROCHLORIDE 25 MG/1
25 TABLET ORAL EVERY 6 HOURS PRN
Qty: 30 TABLET | Refills: 0 | Status: SHIPPED | OUTPATIENT
Start: 2023-07-25 | End: 2023-11-29 | Stop reason: SDUPTHER

## 2023-07-25 NOTE — PROGRESS NOTES
Clinic Note    Patient Name: Georgiana Zayas  : 1975  MRN: [id number]    Chief Complaint   Patient presents with    Back Pain    Medication Problem     Patient is not taking Zofran, due to side effect of constipation.        HPI:    Ms. Georgiana Zayas is a 48 y.o. female who presents to clinic today with CC of follow up on chronic back pain. Patient reports it is unchanged from previous visit. Conservative measures are not helping. MRI is pending insurance appproval. Patient reports she has not yet tried PT but is willing to get this scheduled.   She does follow with pain management and is scheduled to follow up with Dr. Bonner in 2 days.   Reports sometimes the pain is so severe she is nauseated. She states she can't take zofran because it causes constipation. She would like to try something else.  Reports her sister is staying with her and anxiety/depression is currently improved. Denies suicidal/homicidal ideation.  Patient is, otherwise, without complaints.     Medications:  Medication List with Changes/Refills   New Medications    PROMETHAZINE (PHENERGAN) 25 MG TABLET    Take 1 tablet (25 mg total) by mouth every 6 (six) hours as needed for Nausea.   Current Medications    ARIPIPRAZOLE (ABILIFY) 2 MG TAB    Take 1 tablet (2 mg total) by mouth once daily.    DULOXETINE (CYMBALTA) 60 MG CAPSULE    Take 1 capsule (60 mg total) by mouth 2 (two) times daily.    GABAPENTIN (NEURONTIN) 800 MG TABLET    Take 1 tablet (800 mg total) by mouth 3 (three) times daily.    GLIPIZIDE (GLUCOTROL) 10 MG TABLET    Take 1 tablet (10 mg total) by mouth 2 (two) times daily with meals.    HYDROCHLOROTHIAZIDE (HYDRODIURIL) 25 MG TABLET    Take 1 tablet (25 mg total) by mouth once daily.    HYDROXYZINE PAMOATE (VISTARIL) 25 MG CAP    Take 1 capsule (25 mg total) by mouth 3 (three) times daily.    INSULIN (LANTUS SOLOSTAR U-100 INSULIN) GLARGINE 100 UNITS/ML SUBQ PEN    Inject 15 Units into the skin every evening.    LISINOPRIL  (PRINIVIL,ZESTRIL) 2.5 MG TABLET    Take 1 tablet (2.5 mg total) by mouth once daily.    MOMETASONE (NASONEX) 50 MCG/ACTUATION NASAL SPRAY    2 sprays by Nasal route once daily.    OMEPRAZOLE (PRILOSEC) 40 MG CAPSULE    Take 1 capsule (40 mg total) by mouth Daily.    OXYCODONE-ACETAMINOPHEN (PERCOCET)  MG PER TABLET    Take 1 tablet by mouth every 6 (six) hours as needed.     OZEMPIC 1 MG/DOSE (4 MG/3 ML)    Inject 1 mg into the skin every 7 days.    PRAVASTATIN (PRAVACHOL) 20 MG TABLET    Take 1 tablet (20 mg total) by mouth every evening.    RISPERIDONE (RISPERDAL) 3 MG TAB    Take 1 tablet (3 mg total) by mouth once daily.    TRAZODONE (DESYREL) 100 MG TABLET    Take 1 tablet (100 mg total) by mouth every evening.   Discontinued Medications    ONDANSETRON (ZOFRAN) 4 MG TABLET    Take 1 tablet (4 mg total) by mouth every 6 (six) hours as needed for Nausea.        Allergies: Codeine      Past Medical History:    Past Medical History:   Diagnosis Date    Arthritis     Depression     Diabetes mellitus     Diabetes mellitus, type 2     GERD (gastroesophageal reflux disease)     Hearing difficulty     Hypertension     Liver disease     Sleep apnea        Past Surgical History:    Past Surgical History:   Procedure Laterality Date    CARPAL TUNNEL RELEASE Bilateral      SECTION      INJECTION OF ANESTHETIC AGENT AROUND MEDIAL BRANCH NERVES INNERVATING LUMBAR FACET JOINT Bilateral 2021    Procedure: BLOCK, NERVE, FACET JOINT, LUMBAR, MEDIAL BRANCH;  Surgeon: Amari Razo MD;  Location: [facility];  Service: Pain Management;  Laterality: Bilateral;  Bilateral L3-S1 Facet Injection    INJECTION OF ANESTHETIC AGENT AROUND MEDIAL BRANCH NERVES INNERVATING LUMBAR FACET JOINT Bilateral 2022    Procedure: BLOCK, NERVE, FACET JOINT, LUMBAR, MEDIAL BRANCH;  Surgeon: Amari Razo MD;  Location: [facility];  Service: Pain Management;  Laterality: Bilateral;  Bilateral L3-S1 FI     INJECTION OF ANESTHETIC AGENT AROUND MEDIAL BRANCH NERVES INNERVATING LUMBAR FACET JOINT Bilateral 1/11/2023    Procedure: BLOCK, NERVE, FACET JOINT, LUMBAR, MEDIAL BRANCH;  Surgeon: Amari Razo MD;  Location: [facility];  Service: Pain Management;  Laterality: Bilateral;  Bilateral L3-S1 FI    LEFT HEART CATHETERIZATION Left 12/21/2021    Procedure: Left heart cath;  Surgeon: Jeremy Rojo DO;  Location: [facility] CATH LAB;  Service: Cardiology;  Laterality: Left;    RADIOFREQUENCY ABLATION OF LUMBAR MEDIAL BRANCH NERVE AT SINGLE LEVEL Bilateral 4/12/2023    Procedure: RADIOFREQUENCY ABLATION, NERVE, SPINAL, LUMBAR, MEDIAL BRANCH, 1 LEVEL;  Surgeon: Amari Razo MD;  Location: [facility];  Service: Pain Management;  Laterality: Bilateral;  Bilateral L3-5 RFTC    STAPEDES SURGERY Bilateral     TONSILLECTOMY           Social History:    Social History     Tobacco Use   Smoking Status Never   Smokeless Tobacco Never     Social History     Substance and Sexual Activity   Alcohol Use Not Currently     Social History     Substance and Sexual Activity   Drug Use Never         Family History:    Family History   Problem Relation Age of Onset    Cancer Mother     Diabetes Mother     Hypertension Mother        Review of Systems:    Review of Systems   Constitutional:  Negative for appetite change, chills, fatigue, fever and unexpected weight change.   Eyes:  Negative for visual disturbance.   Respiratory:  Negative for cough and shortness of breath.    Cardiovascular:  Negative for chest pain and leg swelling.   Gastrointestinal:  Positive for nausea. Negative for abdominal pain, blood in stool, change in bowel habit, diarrhea, vomiting and change in bowel habit.        Reports chronic constipation   Musculoskeletal:  Positive for arthralgias and back pain.   Integumentary:  Negative for rash.   Neurological:  Negative for dizziness and headaches.   Psychiatric/Behavioral:  The patient  "is not nervous/anxious.         Vitals:    Vitals:    07/25/23 1605   BP: 102/70   BP Location: Left arm   Patient Position: Sitting   BP Method: Large (Automatic)   Pulse: 96   Resp: 19   Temp: 98.5 °F (36.9 °C)   TempSrc: Oral   SpO2: 96%   Weight: 83.9 kg (185 lb)   Height: 5' 4" (1.626 m)       Body mass index is 31.76 kg/m².    Wt Readings from Last 3 Encounters:   07/25/23 1605 83.9 kg (185 lb)   07/20/23 0815 85.6 kg (188 lb 12.8 oz)   07/17/23 0934 89.8 kg (198 lb)        Physical Exam:    Physical Exam  Constitutional:       General: She is not in acute distress.     Appearance: Normal appearance. She is obese.   HENT:      Nose: Nose normal.      Mouth/Throat:      Mouth: Mucous membranes are moist.      Pharynx: Oropharynx is clear.   Eyes:      Conjunctiva/sclera: Conjunctivae normal.   Cardiovascular:      Rate and Rhythm: Normal rate and regular rhythm.      Heart sounds: Normal heart sounds. No murmur heard.  Pulmonary:      Effort: Pulmonary effort is normal. No respiratory distress.      Breath sounds: Normal breath sounds. No wheezing, rhonchi or rales.   Abdominal:      General: Bowel sounds are normal.      Palpations: Abdomen is soft.      Tenderness: There is no abdominal tenderness.   Musculoskeletal:         General: Tenderness present. No swelling. Normal range of motion.      Cervical back: Neck supple.      Right lower leg: No edema.      Left lower leg: No edema.   Skin:     Findings: No rash.   Neurological:      General: No focal deficit present.      Mental Status: She is alert. Mental status is at baseline.   Psychiatric:         Mood and Affect: Mood normal.         Assessment/Plan:   1. Chronic bilateral low back pain with right-sided sciatica  -     Ambulatory referral/consult to Physical/Occupational Therapy; Future; Expected date: 08/01/2023    2. Nausea  -     promethazine (PHENERGAN) 25 MG tablet; Take 1 tablet (25 mg total) by mouth every 6 (six) hours as needed for Nausea.  " Dispense: 30 tablet; Refill: 0         Active Problem List with Overview Notes    Diagnosis Date Noted    Type 2 diabetes mellitus with diabetic neuropathy, with long-term current use of insulin 08/25/2021    Essential hypertension 08/25/2021    Anxiety 01/23/2023    Other hyperlipidemia 07/07/2022    Morbid obesity 01/17/2022    Depression 01/23/2023    Femoral artery pseudo-aneurysm, right 01/17/2022    Other sleep apnea 11/30/2021    Lumbosacral spondylosis without myelopathy 09/08/2021    Gastroesophageal reflux disease 08/25/2021    Chronic bilateral low back pain with right-sided sciatica 07/07/2022    Seasonal allergies 07/07/2022    Muscle spasm 07/07/2022        RTC in 3 weeks for follow up.  RTC sooner if symptoms worsen or fail to resolve.  Patient voiced understanding and is agreeable to plan.      Lisandra Mcdaniel MD    Family Medicine

## 2023-07-28 ENCOUNTER — HOSPITAL ENCOUNTER (OUTPATIENT)
Dept: RADIOLOGY | Facility: HOSPITAL | Age: 48
Discharge: HOME | End: 2023-07-28
Attending: ANESTHESIOLOGY
Payer: COMMERCIAL

## 2023-07-28 DIAGNOSIS — M54.50 LOW BACK PAIN: ICD-10-CM

## 2023-07-28 PROCEDURE — 72110 X-RAY EXAM L-2 SPINE 4/>VWS: CPT | Mod: TC

## 2023-07-31 ENCOUNTER — CLINICAL SUPPORT (OUTPATIENT)
Dept: REHABILITATION | Facility: HOSPITAL | Age: 48
End: 2023-07-31
Payer: COMMERCIAL

## 2023-07-31 DIAGNOSIS — M54.41 CHRONIC BILATERAL LOW BACK PAIN WITH RIGHT-SIDED SCIATICA: Chronic | ICD-10-CM

## 2023-07-31 DIAGNOSIS — G89.29 CHRONIC BILATERAL LOW BACK PAIN WITH RIGHT-SIDED SCIATICA: Chronic | ICD-10-CM

## 2023-07-31 PROCEDURE — 97162 PT EVAL MOD COMPLEX 30 MIN: CPT

## 2023-07-31 NOTE — PLAN OF CARE
[facility]   Physical Therapy Initial Evaluation      Name: Georgiana Zayas  Clinic Number: [id number]    Therapy Diagnosis:   Encounter Diagnosis   Name Primary?    Chronic bilateral low back pain with right-sided sciatica         Physician: Chaz Mcdaniel*    Physician Orders: PT Eval and Treat low back pain  Medical Diagnosis from Referral: M54.41  Evaluation Date: 7/31/2023  Authorization Period Expiration: pending insurance approval  Plan of Care Expiration: 9/11/2023   Progress Note Due: 8/21/2023  Visit # / Visits authorized: 1/ 12   FOTO: 33/ 100    Precautions: Standard     Time In: 1040  Time Out: 1122  Total Billable Time: 42 minutes      Subjective     Date of onset:  one month ago    History of current condition - Georgiana reports: chronic c/o low back pain for several years with most recent exacerbation over the past month.  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    Falls:  2 reported falls in the past 2 months     Imaging: X-ray:  Mild multilevel degenerative change.    Prior Therapy: none  Social History: Pt lives with their son  Occupation: CNA  Prior Level of Function: Pt is Independent community ambulator and uses a cane intermittently depending on pain level.  Current Level of Function: Pt has to occasionally call on her son to assist with ambulation when pain is increased.    Pain:  Current 7/10, worst 10/10, best 7/10   Location: bilateral back, R buttock extending to ankle  Description: Aching  Aggravating Factors: Bending and Walking  Easing Factors: heating pad, pain meds, muscle relaxer    Patients goals: Pt would like to have less pain and stiffness so that she can function more normally and be able to participate in family functions.     Medical History:   Past Medical History:   Diagnosis Date    Arthritis     Depression     Diabetes mellitus     Diabetes mellitus, type 2     GERD  (gastroesophageal reflux disease)     Hearing difficulty     Hypertension     Liver disease     Sleep apnea        Surgical History:   Georgiana Zayas  has a past surgical history that includes Tonsillectomy;  section; Carpal tunnel release (Bilateral); Stapedes surgery (Bilateral); Injection of anesthetic agent around medial branch nerves innervating lumbar facet joint (Bilateral, 2021); Left heart catheterization (Left, 2021); Injection of anesthetic agent around medial branch nerves innervating lumbar facet joint (Bilateral, 2022); Injection of anesthetic agent around medial branch nerves innervating lumbar facet joint (Bilateral, 2023); and Radiofrequency ablation of lumbar medial branch nerve at single level (Bilateral, 2023).    Medications:   Georgiana has a current medication list which includes the following prescription(s): aripiprazole, duloxetine, gabapentin, glipizide, hydrochlorothiazide, hydroxyzine pamoate, insulin, lisinopril, mometasone, omeprazole, oxycodone-acetaminophen, ozempic, pravastatin, promethazine, risperidone, and trazodone.    Allergies:   Review of patient's allergies indicates:   Allergen Reactions    Codeine Itching        Objective        Objective     Posture:  Standing lordosis: WNL  Sitting lordosis: WNL  Iliac crest height:  equal  PSIS height:  equal  Pelvic rotation/torsion: No  Scoliosis: No  Lateral shift: no    Forward bend: 40  Back bend: 15  Lateral trunk lean: right 12 left 15  Trunk rotation: right 40 left 54    MANUAL MUSCLE TEST  Right left   Hip flexion 4-/5 4-/5   Hip abduction 4-/5 4-/5   Knee extension 4/5 4/5   Knee flexion  4/5 4/5   Ankle dorsiflexion 3+/5 4-/5   Ankle plantar flexion  4-/5 4-/5     ROM/flexibility right left   Hip flexion (120) WFL WFL   Internal rotation (45) WFL WFL   External rotation (45) WFL WFL   Hamstring 90/90 (-10) 18 10     Special test Right  Left    SLR test < 60 degrees NEGATIVE NEGATIVE   SLR test >  60 degrees NEGATIVE NEGATIVE   Sitting slump test NEGATIVE NEGATIVE   Piriformis test POSITIVE POSITIVE   LUIS F test POSITIVE POSITIVE     Gait assessment:   Step length: decreased bilateral  Step width: decreased  Janet: decreased   Antalgic gait: Yes  Assistive device: none    Palpation:  Pt exhibits moderate palpable tenderness in R sacroiliac region, piriformis.  Mild tenderness on L in same areas.        Intake Outcome Measure for FOTO back Survey    Therapist reviewed FOTO scores for Georgiana Zayas on 7/31/2023.   FOTO documents entered into [employer] - see Media section.    Intake Score: 33%         Treatment     Total Treatment time (time-based codes) separate from Evaluation: 0 minutes       Patient Education and Home Exercises     Education provided:   - Discussed correct technique for supine to sit to decrease strain on lower back.      Written Home Exercises Provided:  will be provided next session . Exercises were reviewed and Georgiana was able to demonstrate them prior to the end of the session.  Georgiana demonstrated good  understanding of the education provided. See EMR under Patient Instructions for exercises provided during therapy sessions.    Assessment     Georgiana is a 48 y.o. female referred to outpatient Physical Therapy with a medical diagnosis of low back pain. Patient presents with pain, decreased flexibility, decreased LE and core strength, impaired posture, impaired functional mobility.    Patient prognosis is Good.   Patient will benefit from skilled outpatient Physical Therapy to address the deficits stated above and in the chart below, provide patient /family education, and to maximize patientt's level of independence.     Plan of care discussed with patient: Yes  Patient's spiritual, cultural and educational needs considered and patient is agreeable to the plan of care and goals as stated below:     Anticipated Barriers for therapy: none    Medical Necessity is demonstrated by the  following  History  Co-morbidities and personal factors that may impact the plan of care [] LOW: no personal factors / co-morbidities  [x] MODERATE: 1-2 personal factors / co-morbidities  [] HIGH: 3+ personal factors / co-morbidities    Moderate / High Support Documentation:   Co-morbidities affecting plan of care: arthritis, depression    Personal Factors:   no deficits     Examination  Body Structures and Functions, activity limitations and participation restrictions that may impact the plan of care [] LOW: addressing 1-2 elements  [x] MODERATE: 3+ elements  [] HIGH: 4+ elements (please support below)    Moderate / High Support Documentation: low back, R LE pain, muscle weakness     Clinical Presentation [] LOW: stable  [x] MODERATE: Evolving  [] HIGH: Unstable     Decision Making/ Complexity Score: moderate         Goals:  Short Term Goals: 4 weeks   1. Patient will be Independent with Home Exercise Program   2. Patient will demonstrate with improved Posture and Body Mechanics  3. Patient will increase Lower Extremity and Core Strength to grossly 4+/5  4. Patient will decrease complaints of pain with activity to Less than or Equal to 5/10     Long Term Goals: 8 weeks   1. Patient will tolerate 30 minutes of activity with complaints of Pain at Less Than or Equal to 4/10    2. Patient will improve FOTO score by 20% indicating improved function.    Plan     Plan of care Certification: 7/31/2023 to 9/11/2023    Outpatient Physical Therapy 2 times weekly for 6 weeks to include the following interventions: Electrical Stimulation IFC, Gait Training, Manual Therapy, Moist Heat/ Ice, Patient Education, Therapeutic Activities, Therapeutic Exercise, and Ultrasound.     Deisy Milian, PT        Physician's Signature: _________________________________________ Date: ________________

## 2023-08-02 ENCOUNTER — CLINICAL SUPPORT (OUTPATIENT)
Dept: REHABILITATION | Facility: HOSPITAL | Age: 48
End: 2023-08-02
Payer: COMMERCIAL

## 2023-08-02 DIAGNOSIS — G89.29 CHRONIC BILATERAL LOW BACK PAIN WITH RIGHT-SIDED SCIATICA: Primary | Chronic | ICD-10-CM

## 2023-08-02 DIAGNOSIS — M54.41 CHRONIC BILATERAL LOW BACK PAIN WITH RIGHT-SIDED SCIATICA: Primary | Chronic | ICD-10-CM

## 2023-08-02 PROCEDURE — 97014 ELECTRIC STIMULATION THERAPY: CPT

## 2023-08-02 PROCEDURE — 97110 THERAPEUTIC EXERCISES: CPT

## 2023-08-02 NOTE — PROGRESS NOTES
[facility]   Physical Therapy Treatment Note      Name: Georgiana Zayas  Clinic Number: [id number]    Therapy Diagnosis:   Encounter Diagnosis   Name Primary?    Chronic bilateral low back pain with right-sided sciatica Yes     Physician: Ellis Mcdaniel    Visit Date: 8/2/2023    PTA Visit #: 0/5   Time In: 1020  Time Out: 1104  Total Billable Time: 44 minutes    Subjective     Pt reports: Continued low back pain with radiating pain to right LE.  She  will be instructed   in home exercise program.  Response to previous treatment: first treatment session  Functional change: none    Pain: 8/10  Location: right back and LE.    Objective      Objective Measures updated at progress report unless specified.     Treatment     Georgiana received the treatments listed below:      therapeutic exercises to develop strength, ROM, and core stabilization for 28 minutes including:  SKTC stretch bilateral 10 x 10 second hold  Bridging 1 x 10 3 second hold  Quadriped cat cow x 10  Prone hip extension 1 x 5  Seated right piriformis stretch 10 x 5 seconds        supervised modalities after being cleared for contradictions: IFC Electrical Stimulation:  Georgiana received IFC Electrical Stimulation with moist heat 4 tolwel layers for pain control applied to the low back. Pt received stimulation at 100 % scan at a frequency of 80/150 Hz at 12.5 Cv for 15  minutes. Georgiana tolderated treatment well without any adverse effects.        Patient Education and Home Exercises       Education provided:   -    received verbal and tactile cues to facilitate proper execution of exercises and body mechanics.     Written Home Exercises Provided: yes. Exercises were reviewed and Georgiana was able to demonstrate them prior to the end of the session.  Georgiana demonstrated good  understanding of the education provided. See EMR under Patient Instructions for exercises provided during therapy sessions    Assessment     Georgiana  tolerated treatment fair. She has pain performing the therapeutic exercises and her back and core muscles are weak.    Georgiana Is progressing well towards her goals.   Pt prognosis is Good.     Pt will continue to benefit from skilled outpatient physical therapy to address the deficits listed in the problem list box on initial evaluation, provide pt/family education and to maximize pt's level of independence in the home and community environment.     Pt's spiritual, cultural and educational needs considered and pt agreeable to plan of care and goals.     Anticipated barriers to physical therapy: none    Goals:   Goals:  Short Term Goals: 4 weeks   1. Patient will be Independent with Home Exercise Program   2. Patient will demonstrate with improved Posture and Body Mechanics  3. Patient will increase Lower Extremity and Core Strength to grossly 4+/5  4. Patient will decrease complaints of pain with activity to Less than or Equal to 5/10      Long Term Goals: 8 weeks   1. Patient will tolerate 30 minutes of activity with complaints of Pain at Less Than or Equal to 4/10    2. Patient will improve FOTO score by 20% indicating improved function.    Plan     Continue with present plan of care.    Win Nieves, PT

## 2023-08-07 ENCOUNTER — CLINICAL SUPPORT (OUTPATIENT)
Dept: REHABILITATION | Facility: HOSPITAL | Age: 48
End: 2023-08-07
Payer: COMMERCIAL

## 2023-08-07 DIAGNOSIS — M54.41 CHRONIC BILATERAL LOW BACK PAIN WITH RIGHT-SIDED SCIATICA: Primary | Chronic | ICD-10-CM

## 2023-08-07 DIAGNOSIS — G89.29 CHRONIC BILATERAL LOW BACK PAIN WITH RIGHT-SIDED SCIATICA: Primary | Chronic | ICD-10-CM

## 2023-08-07 PROCEDURE — 97110 THERAPEUTIC EXERCISES: CPT | Mod: CQ

## 2023-08-07 PROCEDURE — 97014 ELECTRIC STIMULATION THERAPY: CPT | Mod: CQ

## 2023-08-07 NOTE — PROGRESS NOTES
"[facility]   Physical Therapy Treatment Note      Name: Georgiana Zayas  Clinic Number: [id number]    Therapy Diagnosis:   Encounter Diagnosis   Name Primary?    Chronic bilateral low back pain with right-sided sciatica Yes     Physician: Ellis Mcdaniel    Visit Date: 8/7/2023    PTA Visit #: 1/5   Time In: 0756  Time Out: 0841  Total Billable Time: 45 minutes    Subjective     Pt reports: Continued low back pain with radiating pain to right LE as well as her Right Knee and Left shoulder. She states "I really have joint pain all over my body". She states that her HEP causes her pain in her R knee. Patient also says that "my sciatica is making my R side weak" while performing Aerobic exercise   She  will be instructed   in home exercise program.  Response to previous treatment: first treatment session  Functional change: none    Pain: Before treatment: 7/10 After treatment: 5/10  Location: right back and LE.    Objective      Objective Measures updated at progress report unless specified.     Treatment     Georgiana received the treatments listed below:      therapeutic exercises to develop strength, ROM, and core stabilization for 33 minutes including:  SKTC stretch bilateral 10 x 10 second hold  Bridging 1 x 10 3 second hold  Quadriped cat cow x 10  Prone hip extension 1 x 5 - not today due to pain   Seated right piriformis stretch 10 x 5 seconds   Supine R sided sciatic nerve glides x 10           supervised modalities after being cleared for contradictions: IFC Electrical Stimulation:  Georgiana received IFC Electrical Stimulation with moist heat 4 tolwel layers for pain control applied to the low back. Pt received stimulation at 100 % scan at a frequency of 80/150 Hz at 12.5 Cv for 12 minutes. Georgiana tolderated treatment well without any adverse effects.        Patient Education and Home Exercises       Education provided:   -    received verbal and tactile cues to facilitate " proper execution of exercises and body mechanics.     Written Home Exercises Provided: yes. Exercises were reviewed and Georgiana was able to demonstrate them prior to the end of the session.  Georgiana demonstrated good  understanding of the education provided. See EMR under Patient Instructions for exercises provided during therapy sessions    Assessment     Georgiana tolerated treatment fair with moderate amount of pain with trunk extension and sciatic nerve glides. She has pain performing the therapeutic exercises and her back and core muscles are very weak. She has trouble with mobility when getting in and out of bed and moves in a slow, and guarding manner.     Georgiana Is progressing well towards her goals.   Pt prognosis is Good.     Pt will continue to benefit from skilled outpatient physical therapy to address the deficits listed in the problem list box on initial evaluation, provide pt/family education and to maximize pt's level of independence in the home and community environment.     Pt's spiritual, cultural and educational needs considered and pt agreeable to plan of care and goals.     Anticipated barriers to physical therapy: none    Goals:   Goals:  Short Term Goals: 4 weeks   1. Patient will be Independent with Home Exercise Program   2. Patient will demonstrate with improved Posture and Body Mechanics  3. Patient will increase Lower Extremity and Core Strength to grossly 4+/5  4. Patient will decrease complaints of pain with activity to Less than or Equal to 5/10      Long Term Goals: 8 weeks   1. Patient will tolerate 30 minutes of activity with complaints of Pain at Less Than or Equal to 4/10    2. Patient will improve FOTO score by 20% indicating improved function.    Plan     Continue with present plan of care.    Natasha De Luna, PTA

## 2023-08-10 ENCOUNTER — TELEPHONE (OUTPATIENT)
Dept: FAMILY MEDICINE | Facility: CLINIC | Age: 48
End: 2023-08-10
Payer: COMMERCIAL

## 2023-08-10 NOTE — TELEPHONE ENCOUNTER
----- Message from Junie Dawson sent at 8/10/2023  1:24 PM CDT -----  Regarding: Call Back  Pt said she missed a call from nurse and is requesting to be called back at [phone] or [phone]. [phone]- call made to pt regarding above message. No answer. Left voicemail for pt to call back.   Was trying yesterday to contact pt regarding MRI appt and pt came to clinic yesterday.

## 2023-08-14 ENCOUNTER — CLINICAL SUPPORT (OUTPATIENT)
Dept: REHABILITATION | Facility: HOSPITAL | Age: 48
End: 2023-08-14
Payer: COMMERCIAL

## 2023-08-14 DIAGNOSIS — M54.41 CHRONIC BILATERAL LOW BACK PAIN WITH RIGHT-SIDED SCIATICA: Primary | Chronic | ICD-10-CM

## 2023-08-14 DIAGNOSIS — G89.29 CHRONIC BILATERAL LOW BACK PAIN WITH RIGHT-SIDED SCIATICA: Primary | Chronic | ICD-10-CM

## 2023-08-14 PROCEDURE — 97014 ELECTRIC STIMULATION THERAPY: CPT | Mod: CQ

## 2023-08-14 PROCEDURE — 97110 THERAPEUTIC EXERCISES: CPT | Mod: CQ

## 2023-08-14 NOTE — PROGRESS NOTES
[facility]   Physical Therapy Treatment Note      Name: Georgiana Zayas  Clinic Number: [id number]    Therapy Diagnosis:   Encounter Diagnosis   Name Primary?    Chronic bilateral low back pain with right-sided sciatica Yes     Physician: Chaz Mcdaniel*    Visit Date: 8/14/2023    PTA Visit #: 2/5   Time In: 0809  Time Out: 0853  Total Billable Time: 44 minutes    Subjective     Pt reports: Continued low back pain with radiating pain into right LE. She says she has joint pain and it does not seem to go away. She says she is always in pain and only finds relief with her heating pad. Her aggravating factors include forward flexion.    She was instructed in home exercise program.  Response to previous treatment: patient says exercises and IFC and MHP helped pain   Functional change: none    Pain: Before treatment: 8/10 After treatment: 6/10  Location: Lower back and right LE.    Objective      Objective Measures updated at progress report unless specified.     Treatment     Georgiana received the treatments listed below:      therapeutic exercises to develop strength, ROM, and core stabilization for 44 minutes including:  Supine SKTC stretch bilateral 10 x 10 second hold   Supine marching x 30 on each LE  Posterior pelvic tilts 10 x 3  Bridging 1 x 10 3 second hold   Quadriped cat cow x 10 - complains of wrist and knee joint pain  Prone hip extension 1 x 5 - not today due to pain   Seated right piriformis stretch 10 x 5 seconds - not today due to pain  Supine R sided sciatic nerve glides x 10 - not today due to pain          supervised modalities after being cleared for contradictions: IFC Electrical Stimulation:  Georgiana received IFC Electrical Stimulation with moist heat 4 tolwel layers for pain control applied to the low back in prone with pillow under pelvis. Pt received stimulation at 100 % scan at a frequency of 80/150 Hz at 12.5 Cv for 12 minutes. Georgiana tolderated treatment  well without any adverse effects.        Patient Education and Home Exercises       Education provided:   -    received verbal and tactile cues to facilitate proper execution of exercises and body mechanics.     Written Home Exercises Provided: yes. Exercises were reviewed and Georgiana was able to demonstrate them prior to the end of the session.  Georgiana demonstrated good  understanding of the education provided. See EMR under Patient Instructions for exercises provided during therapy sessions. Exercises were added to HEP in treatment session for posture awareness. New exercises were demonstrated and gone over. Patient verbalized understanding.    Assessment     Georgiana tolerated treatment fair with moderate amount of pain and fatigue with exercises. She still has trouble with mobility when getting in and out of bed and moves in a slow, and guarding manner.     Georgiana Is progressing well towards her goals.   Pt prognosis is Good.     Pt will continue to benefit from skilled outpatient physical therapy to address the deficits listed in the problem list box on initial evaluation, provide pt/family education and to maximize pt's level of independence in the home and community environment.     Pt's spiritual, cultural and educational needs considered and pt agreeable to plan of care and goals.     Anticipated barriers to physical therapy: none    Goals:   Goals:  Short Term Goals: 4 weeks   1. Patient will be Independent with Home Exercise Program   2. Patient will demonstrate with improved Posture and Body Mechanics  3. Patient will increase Lower Extremity and Core Strength to grossly 4+/5  4. Patient will decrease complaints of pain with activity to Less than or Equal to 5/10      Long Term Goals: 8 weeks   1. Patient will tolerate 30 minutes of activity with complaints of Pain at Less Than or Equal to 4/10    2. Patient will improve FOTO score by 20% indicating improved function.    Plan     Continue with present plan  of care.    Natasha De Luna, PTA

## 2023-08-15 ENCOUNTER — OFFICE VISIT (OUTPATIENT)
Dept: FAMILY MEDICINE | Facility: CLINIC | Age: 48
End: 2023-08-15
Payer: COMMERCIAL

## 2023-08-15 VITALS
OXYGEN SATURATION: 96 % | WEIGHT: 184.38 LBS | SYSTOLIC BLOOD PRESSURE: 103 MMHG | TEMPERATURE: 98 F | RESPIRATION RATE: 19 BRPM | HEIGHT: 64 IN | HEART RATE: 113 BPM | DIASTOLIC BLOOD PRESSURE: 73 MMHG | BODY MASS INDEX: 31.48 KG/M2

## 2023-08-15 DIAGNOSIS — Z81.8 FAMILY HISTORY OF DEMENTIA: ICD-10-CM

## 2023-08-15 DIAGNOSIS — M47.817 LUMBOSACRAL SPONDYLOSIS WITHOUT MYELOPATHY: Chronic | ICD-10-CM

## 2023-08-15 DIAGNOSIS — E11.40 TYPE 2 DIABETES MELLITUS WITH DIABETIC NEUROPATHY, WITH LONG-TERM CURRENT USE OF INSULIN: Primary | ICD-10-CM

## 2023-08-15 DIAGNOSIS — I10 ESSENTIAL HYPERTENSION: ICD-10-CM

## 2023-08-15 DIAGNOSIS — R07.89 OTHER CHEST PAIN: ICD-10-CM

## 2023-08-15 DIAGNOSIS — M62.838 MUSCLE SPASM: ICD-10-CM

## 2023-08-15 DIAGNOSIS — Z79.4 TYPE 2 DIABETES MELLITUS WITH DIABETIC NEUROPATHY, WITH LONG-TERM CURRENT USE OF INSULIN: Primary | ICD-10-CM

## 2023-08-15 DIAGNOSIS — R41.3 MEMORY LOSS: ICD-10-CM

## 2023-08-15 DIAGNOSIS — Z12.31 SCREENING MAMMOGRAM FOR BREAST CANCER: ICD-10-CM

## 2023-08-15 DIAGNOSIS — Z12.11 SCREENING FOR MALIGNANT NEOPLASM OF COLON: ICD-10-CM

## 2023-08-15 DIAGNOSIS — E78.49 OTHER HYPERLIPIDEMIA: ICD-10-CM

## 2023-08-15 PROCEDURE — 80061 LIPID PANEL: ICD-10-PCS | Mod: ,,, | Performed by: CLINICAL MEDICAL LABORATORY

## 2023-08-15 PROCEDURE — 82570 MICROALBUMIN / CREATININE RATIO URINE: ICD-10-PCS | Mod: ,,, | Performed by: CLINICAL MEDICAL LABORATORY

## 2023-08-15 PROCEDURE — 99214 OFFICE O/P EST MOD 30 MIN: CPT | Mod: 25,,, | Performed by: FAMILY MEDICINE

## 2023-08-15 PROCEDURE — 80053 COMPREHENSIVE METABOLIC PANEL: ICD-10-PCS | Mod: ,,, | Performed by: CLINICAL MEDICAL LABORATORY

## 2023-08-15 PROCEDURE — 85025 COMPLETE CBC W/AUTO DIFF WBC: CPT | Mod: ,,, | Performed by: CLINICAL MEDICAL LABORATORY

## 2023-08-15 PROCEDURE — 96372 PR INJECTION,THERAP/PROPH/DIAG2ST, IM OR SUBCUT: ICD-10-PCS | Mod: ,,, | Performed by: FAMILY MEDICINE

## 2023-08-15 PROCEDURE — 82043 MICROALBUMIN / CREATININE RATIO URINE: ICD-10-PCS | Mod: ,,, | Performed by: CLINICAL MEDICAL LABORATORY

## 2023-08-15 PROCEDURE — 80053 COMPREHEN METABOLIC PANEL: CPT | Mod: ,,, | Performed by: CLINICAL MEDICAL LABORATORY

## 2023-08-15 PROCEDURE — 82570 ASSAY OF URINE CREATININE: CPT | Mod: ,,, | Performed by: CLINICAL MEDICAL LABORATORY

## 2023-08-15 PROCEDURE — 83036 HEMOGLOBIN A1C: ICD-10-PCS | Mod: ,,, | Performed by: CLINICAL MEDICAL LABORATORY

## 2023-08-15 PROCEDURE — 80061 LIPID PANEL: CPT | Mod: ,,, | Performed by: CLINICAL MEDICAL LABORATORY

## 2023-08-15 PROCEDURE — 96372 THER/PROPH/DIAG INJ SC/IM: CPT | Mod: ,,, | Performed by: FAMILY MEDICINE

## 2023-08-15 PROCEDURE — 82043 UR ALBUMIN QUANTITATIVE: CPT | Mod: ,,, | Performed by: CLINICAL MEDICAL LABORATORY

## 2023-08-15 PROCEDURE — 83036 HEMOGLOBIN GLYCOSYLATED A1C: CPT | Mod: ,,, | Performed by: CLINICAL MEDICAL LABORATORY

## 2023-08-15 PROCEDURE — 85025 CBC WITH DIFFERENTIAL: ICD-10-PCS | Mod: ,,, | Performed by: CLINICAL MEDICAL LABORATORY

## 2023-08-15 PROCEDURE — 99214 PR OFFICE/OUTPT VISIT, EST, LEVL IV, 30-39 MIN: ICD-10-PCS | Mod: 25,,, | Performed by: FAMILY MEDICINE

## 2023-08-15 RX ORDER — CYCLOBENZAPRINE HCL 10 MG
10 TABLET ORAL 3 TIMES DAILY
Qty: 90 TABLET | Refills: 1 | Status: SHIPPED | OUTPATIENT
Start: 2023-08-15

## 2023-08-15 RX ORDER — CYCLOBENZAPRINE HCL 10 MG
10 TABLET ORAL 3 TIMES DAILY
COMMUNITY
Start: 2023-08-09 | End: 2023-08-15 | Stop reason: SDUPTHER

## 2023-08-15 RX ORDER — KETOROLAC TROMETHAMINE 30 MG/ML
30 INJECTION, SOLUTION INTRAMUSCULAR; INTRAVENOUS
Status: COMPLETED | OUTPATIENT
Start: 2023-08-15 | End: 2023-08-15

## 2023-08-15 RX ADMIN — KETOROLAC TROMETHAMINE 30 MG: 30 INJECTION, SOLUTION INTRAMUSCULAR; INTRAVENOUS at 03:08

## 2023-08-15 NOTE — PROGRESS NOTES
Clinic Note    Patient Name: Georgiana Zayas  : 1975  MRN: [id number]    Chief Complaint   Patient presents with    Follow-up     3 week follow up     Back Pain     Patient missed her MRI, patient states her MRI is scheduled for 23       HPI:    Ms. Georgiana Zayas is a 48 y.o. female who presents to clinic today with CC of follow up on back pain.  Reports she is following with Dr. Bonner (Pain Management). MRI of L spine is scheduled for next Thursday.   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  She has been off work since late July. She has [facility] paperwork to be completed. She is requesting 3 months leave while she has this issue worked up and managed.   She also reports that she feels like she is having some issues with her memory. States she is concerned because she has a family h/o dementia. Reports she would like a referral to Neurology for an evaluation.   Reports she has been having chest pain for years. Denies any pain currently but reports it is an intermittent issue. States she has previously seen 2 Cardiologists and had normal cardiac evaluations. States, however, no one can tell her why she is still having chest pain. She is requesting referral to a different Cardiologist for another opinion.  Patient has  PMH significant for Type II DM, HTN, HLD, lumbosacral spondylosis, anxiety/depression, obesity, GERD, chronic back pain, MATT, and seasonal allergies.  Reports she is scheduled to follow up with Suraj for anxiety/depression but reports it is improved. Denies suicidal/homicidal ideation.  Patient is, otherwise, without complaints.     Medications:  Medication List with Changes/Refills   Current Medications    ARIPIPRAZOLE (ABILIFY) 2 MG TAB    Take 1 tablet (2 mg total) by mouth once daily.    DULOXETINE (CYMBALTA) 60 MG CAPSULE    Take 1 capsule (60 mg total) by mouth 2 (two) times daily.    GABAPENTIN (NEURONTIN)  800 MG TABLET    Take 1 tablet (800 mg total) by mouth 3 (three) times daily.    GLIPIZIDE (GLUCOTROL) 10 MG TABLET    Take 1 tablet (10 mg total) by mouth 2 (two) times daily with meals.    HYDROCHLOROTHIAZIDE (HYDRODIURIL) 25 MG TABLET    Take 1 tablet (25 mg total) by mouth once daily.    HYDROXYZINE PAMOATE (VISTARIL) 25 MG CAP    Take 1 capsule (25 mg total) by mouth 3 (three) times daily.    INSULIN (LANTUS SOLOSTAR U-100 INSULIN) GLARGINE 100 UNITS/ML SUBQ PEN    Inject 15 Units into the skin every evening.    LISINOPRIL (PRINIVIL,ZESTRIL) 2.5 MG TABLET    Take 1 tablet (2.5 mg total) by mouth once daily.    MOMETASONE (NASONEX) 50 MCG/ACTUATION NASAL SPRAY    2 sprays by Nasal route once daily.    OMEPRAZOLE (PRILOSEC) 40 MG CAPSULE    Take 1 capsule (40 mg total) by mouth Daily.    OXYCODONE-ACETAMINOPHEN (PERCOCET)  MG PER TABLET    Take 1 tablet by mouth every 6 (six) hours as needed.     OZEMPIC 1 MG/DOSE (4 MG/3 ML)    Inject 1 mg into the skin every 7 days.    PRAVASTATIN (PRAVACHOL) 20 MG TABLET    Take 1 tablet (20 mg total) by mouth every evening.    PROMETHAZINE (PHENERGAN) 25 MG TABLET    Take 1 tablet (25 mg total) by mouth every 6 (six) hours as needed for Nausea.    RISPERIDONE (RISPERDAL) 3 MG TAB    Take 1 tablet (3 mg total) by mouth once daily.    TRAZODONE (DESYREL) 100 MG TABLET    Take 1 tablet (100 mg total) by mouth every evening.   Changed and/or Refilled Medications    Modified Medication Previous Medication    CYCLOBENZAPRINE (FLEXERIL) 10 MG TABLET cyclobenzaprine (FLEXERIL) 10 MG tablet       Take 1 tablet (10 mg total) by mouth 3 (three) times daily.    Take 10 mg by mouth 3 (three) times daily.        Allergies: Codeine      Past Medical History:    Past Medical History:   Diagnosis Date    Arthritis     Depression     Diabetes mellitus     Diabetes mellitus, type 2     GERD (gastroesophageal reflux disease)     Hearing difficulty     Hypertension     Liver disease      Sleep apnea        Past Surgical History:    Past Surgical History:   Procedure Laterality Date    CARPAL TUNNEL RELEASE Bilateral      SECTION      INJECTION OF ANESTHETIC AGENT AROUND MEDIAL BRANCH NERVES INNERVATING LUMBAR FACET JOINT Bilateral 2021    Procedure: BLOCK, NERVE, FACET JOINT, LUMBAR, MEDIAL BRANCH;  Surgeon: Amari Razo MD;  Location: [facility];  Service: Pain Management;  Laterality: Bilateral;  Bilateral L3-S1 Facet Injection    INJECTION OF ANESTHETIC AGENT AROUND MEDIAL BRANCH NERVES INNERVATING LUMBAR FACET JOINT Bilateral 2022    Procedure: BLOCK, NERVE, FACET JOINT, LUMBAR, MEDIAL BRANCH;  Surgeon: Amari Razo MD;  Location: [facility];  Service: Pain Management;  Laterality: Bilateral;  Bilateral L3-S1 FI    INJECTION OF ANESTHETIC AGENT AROUND MEDIAL BRANCH NERVES INNERVATING LUMBAR FACET JOINT Bilateral 2023    Procedure: BLOCK, NERVE, FACET JOINT, LUMBAR, MEDIAL BRANCH;  Surgeon: Amari Razo MD;  Location: [facility];  Service: Pain Management;  Laterality: Bilateral;  Bilateral L3-S1 FI    LEFT HEART CATHETERIZATION Left 2021    Procedure: Left heart cath;  Surgeon: Jeremy Rojo DO;  Location: [facility] C[facility];  Service: Cardiology;  Laterality: Left;    RADIOFREQUENCY ABLATION OF LUMBAR MEDIAL BRANCH NERVE AT SINGLE LEVEL Bilateral 2023    Procedure: RADIOFREQUENCY ABLATION, NERVE, SPINAL, LUMBAR, MEDIAL BRANCH, 1 LEVEL;  Surgeon: Amari Razo MD;  Location: [facility];  Service: Pain Management;  Laterality: Bilateral;  Bilateral L3-5 RFTC    STAPEDES SURGERY Bilateral     TONSILLECTOMY           Social History:    Social History     Tobacco Use   Smoking Status Never   Smokeless Tobacco Never     Social History     Substance and Sexual Activity   Alcohol Use Not Currently     Social History     Substance and Sexual Activity   Drug Use Never         Family History:    Family History  "  Problem Relation Age of Onset    Cancer Mother     Diabetes Mother     Hypertension Mother        Review of Systems:    Review of Systems   Constitutional:  Negative for appetite change, chills, fatigue, fever and unexpected weight change.   Eyes:  Positive for visual disturbance.   Respiratory:  Negative for cough and shortness of breath.    Cardiovascular:  Negative for chest pain and leg swelling.        Reports chronic sharp chest pain that is intermittent; reports pain lasts 2-3 minutes and resolves on its own. It is not exertional. She reports prior cardiac evaluation was normal.  Reports she has seen 2 cardiologists and both evaluations were normal. Reports she was told that it was not her heart.   Gastrointestinal:  Negative for abdominal pain, change in bowel habit, constipation, diarrhea, nausea, vomiting and change in bowel habit.        Reports intermittent issues with abdominal pain, nausea, and constipation which she attributes to pain medication   Musculoskeletal:  Positive for back pain. Negative for arthralgias.   Integumentary:  Negative for rash.   Neurological:  Positive for memory loss. Negative for dizziness and headaches.   Psychiatric/Behavioral:  The patient is not nervous/anxious.         Vitals:    Vitals:    08/15/23 1423   BP: 103/73   BP Location: Left arm   Patient Position: Sitting   BP Method: Medium (Automatic)   Pulse: (!) 113   Resp: 19   Temp: 98.4 °F (36.9 °C)   TempSrc: Oral   SpO2: 96%   Weight: 83.6 kg (184 lb 6.4 oz)   Height: 5' 4" (1.626 m)       Body mass index is 31.65 kg/m².    Wt Readings from Last 3 Encounters:   08/15/23 1423 83.6 kg (184 lb 6.4 oz)   07/25/23 1605 83.9 kg (185 lb)   07/20/23 0815 85.6 kg (188 lb 12.8 oz)        Physical Exam:    Physical Exam  Constitutional:       General: She is not in acute distress.     Appearance: Normal appearance. She is obese.   HENT:      Nose: Nose normal.      Mouth/Throat:      Mouth: Mucous membranes are moist.      " Pharynx: Oropharynx is clear.   Eyes:      Conjunctiva/sclera: Conjunctivae normal.   Cardiovascular:      Rate and Rhythm: Normal rate and regular rhythm.      Heart sounds: Normal heart sounds. No murmur heard.  Pulmonary:      Effort: Pulmonary effort is normal. No respiratory distress.      Breath sounds: Normal breath sounds. No wheezing, rhonchi or rales.   Abdominal:      General: Bowel sounds are normal.      Palpations: Abdomen is soft.      Tenderness: There is no abdominal tenderness.   Musculoskeletal:         General: Tenderness present. No swelling.      Cervical back: Neck supple.      Right lower leg: No edema.      Left lower leg: No edema.   Skin:     Findings: No rash.   Neurological:      General: No focal deficit present.      Mental Status: She is alert. Mental status is at baseline.   Psychiatric:         Mood and Affect: Mood normal.         Assessment/Plan:   1. Type 2 diabetes mellitus with diabetic neuropathy, with long-term current use of insulin  -     CBC Auto Differential; Future; Expected date: 08/15/2023  -     Comprehensive Metabolic Panel; Future; Expected date: 08/15/2023  -     Lipid Panel; Future; Expected date: 08/15/2023  -     Hemoglobin A1C; Future; Expected date: 08/15/2023  -     Microalbumin/Creatinine Ratio, Urine    2. Memory loss  -     Ambulatory referral/consult to Neurology; Future; Expected date: 08/22/2023    3. Family history of dementia  -     Ambulatory referral/consult to Neurology; Future; Expected date: 08/22/2023    4. Muscle spasm  -     cyclobenzaprine (FLEXERIL) 10 MG tablet; Take 1 tablet (10 mg total) by mouth 3 (three) times daily.  Dispense: 90 tablet; Refill: 1    5. Other chest pain  -     Ambulatory referral/consult to Cardiology; Future; Expected date: 08/22/2023    6. Essential hypertension  -     CBC Auto Differential; Future; Expected date: 08/15/2023  -     Comprehensive Metabolic Panel; Future; Expected date: 08/15/2023  -     Lipid Panel;  Future; Expected date: 08/15/2023  -     Microalbumin/Creatinine Ratio, Urine    7. Other hyperlipidemia  -     CBC Auto Differential; Future; Expected date: 08/15/2023  -     Comprehensive Metabolic Panel; Future; Expected date: 08/15/2023  -     Lipid Panel; Future; Expected date: 08/15/2023    8. Screening mammogram for breast cancer  -     Mammo Digital Screening Bilat; Future; Expected date: 08/15/2023    9. Screening for malignant neoplasm of colon  -     Colonoscopy; Future; Expected date: 08/15/2023    10. Lumbosacral spondylosis without myelopathy  -     ketorolac injection 30 mg         Active Problem List with Overview Notes    Diagnosis Date Noted    Type 2 diabetes mellitus with diabetic neuropathy, with long-term current use of insulin 08/25/2021    Essential hypertension 08/25/2021    Anxiety 01/23/2023    Other hyperlipidemia 07/07/2022    Morbid obesity 01/17/2022    Depression 01/23/2023    Femoral artery pseudo-aneurysm, right 01/17/2022    Other sleep apnea 11/30/2021    Lumbosacral spondylosis without myelopathy 09/08/2021    Gastroesophageal reflux disease 08/25/2021    Chronic bilateral low back pain with right-sided sciatica 07/07/2022    Seasonal allergies 07/07/2022    Muscle spasm 07/07/2022        RTC in 2 months for chronic follow up.  RTC sooner if symptoms worsen or fail to resolve.  Patient voiced understanding and is agreeable to plan.      Lisandra Mcdaniel MD    Family Medicine

## 2023-08-16 ENCOUNTER — TELEPHONE (OUTPATIENT)
Dept: FAMILY MEDICINE | Facility: CLINIC | Age: 48
End: 2023-08-16
Payer: COMMERCIAL

## 2023-08-16 LAB
BASOPHILS # BLD AUTO: 0.03 K/UL (ref 0–0.2)
BASOPHILS NFR BLD AUTO: 0.6 % (ref 0–1)
DIFFERENTIAL METHOD BLD: ABNORMAL
EOSINOPHIL # BLD AUTO: 0.13 K/UL (ref 0–0.5)
EOSINOPHIL NFR BLD AUTO: 2.6 % (ref 1–4)
ERYTHROCYTE [DISTWIDTH] IN BLOOD BY AUTOMATED COUNT: 13.2 % (ref 11.5–14.5)
HCT VFR BLD AUTO: 39.4 % (ref 38–47)
HGB BLD-MCNC: 12.5 G/DL (ref 12–16)
IMM GRANULOCYTES # BLD AUTO: 0 K/UL (ref 0–0.04)
IMM GRANULOCYTES NFR BLD: 0 % (ref 0–0.4)
LYMPHOCYTES # BLD AUTO: 2.96 K/UL (ref 1–4.8)
LYMPHOCYTES NFR BLD AUTO: 59.1 % (ref 27–41)
MCH RBC QN AUTO: 26.9 PG (ref 27–31)
MCHC RBC AUTO-ENTMCNC: 31.7 G/DL (ref 32–36)
MCV RBC AUTO: 84.9 FL (ref 80–96)
MONOCYTES # BLD AUTO: 0.52 K/UL (ref 0–0.8)
MONOCYTES NFR BLD AUTO: 10.4 % (ref 2–6)
MPC BLD CALC-MCNC: 11.4 FL (ref 9.4–12.4)
NEUTROPHILS # BLD AUTO: 1.37 K/UL (ref 1.8–7.7)
NEUTROPHILS NFR BLD AUTO: 27.3 % (ref 53–65)
NRBC # BLD AUTO: 0 X10E3/UL
NRBC, AUTO (.00): 0 %
PLATELET # BLD AUTO: 308 K/UL (ref 150–400)
RBC # BLD AUTO: 4.64 M/UL (ref 4.2–5.4)
WBC # BLD AUTO: 5.01 K/UL (ref 4.5–11)

## 2023-08-17 LAB
ALBUMIN SERPL BCP-MCNC: 3.6 G/DL (ref 3.5–5)
ALBUMIN/GLOB SERPL: 0.9 {RATIO}
ALP SERPL-CCNC: 68 U/L (ref 39–100)
ALT SERPL W P-5'-P-CCNC: 29 U/L (ref 13–56)
ANION GAP SERPL CALCULATED.3IONS-SCNC: 9 MMOL/L (ref 7–16)
AST SERPL W P-5'-P-CCNC: 20 U/L (ref 15–37)
BILIRUB SERPL-MCNC: 0.6 MG/DL (ref ?–1.2)
BUN SERPL-MCNC: 8 MG/DL (ref 7–18)
BUN/CREAT SERPL: 10 (ref 6–20)
CALCIUM SERPL-MCNC: 9.5 MG/DL (ref 8.5–10.1)
CHLORIDE SERPL-SCNC: 99 MMOL/L (ref 98–107)
CHOLEST SERPL-MCNC: 114 MG/DL (ref 0–200)
CHOLEST/HDLC SERPL: 2.4 {RATIO}
CO2 SERPL-SCNC: 29 MMOL/L (ref 21–32)
CREAT SERPL-MCNC: 0.8 MG/DL (ref 0.55–1.02)
CREAT UR-MCNC: 85 MG/DL (ref 28–219)
EGFR (NO RACE VARIABLE) (RUSH/TITUS): 90 ML/MIN/1.73M2
EST. AVERAGE GLUCOSE BLD GHB EST-MCNC: 324 MG/DL
GLOBULIN SER-MCNC: 3.8 G/DL (ref 2–4)
GLUCOSE SERPL-MCNC: 302 MG/DL (ref 74–106)
HBA1C MFR BLD HPLC: 12.3 % (ref 4.5–6.6)
HDLC SERPL-MCNC: 47 MG/DL (ref 40–60)
LDLC SERPL CALC-MCNC: 52 MG/DL
LDLC/HDLC SERPL: 1.1 {RATIO}
MICROALBUMIN UR-MCNC: 1.5 MG/DL (ref 0–2.8)
MICROALBUMIN/CREAT RATIO PNL UR: 17.6 MG/G (ref 0–30)
NONHDLC SERPL-MCNC: 67 MG/DL
POTASSIUM SERPL-SCNC: 3.4 MMOL/L (ref 3.5–5.1)
PROT SERPL-MCNC: 7.4 G/DL (ref 6.4–8.2)
SODIUM SERPL-SCNC: 134 MMOL/L (ref 136–145)
TRIGL SERPL-MCNC: 76 MG/DL (ref 35–150)
VLDLC SERPL-MCNC: 15 MG/DL

## 2023-08-23 ENCOUNTER — CLINICAL SUPPORT (OUTPATIENT)
Dept: REHABILITATION | Facility: HOSPITAL | Age: 48
End: 2023-08-23
Payer: COMMERCIAL

## 2023-08-23 DIAGNOSIS — G89.29 CHRONIC BILATERAL LOW BACK PAIN WITH RIGHT-SIDED SCIATICA: Primary | Chronic | ICD-10-CM

## 2023-08-23 DIAGNOSIS — M54.41 CHRONIC BILATERAL LOW BACK PAIN WITH RIGHT-SIDED SCIATICA: Primary | Chronic | ICD-10-CM

## 2023-08-23 PROCEDURE — 97110 THERAPEUTIC EXERCISES: CPT

## 2023-08-23 NOTE — PROGRESS NOTES
[facility]   Physical Therapy Treatment Note      Name: Georgiana Zayas  Clinic Number: [id number]    Therapy Diagnosis:   Encounter Diagnosis   Name Primary?    Chronic bilateral low back pain with right-sided sciatica Yes     Physician: Ellis Mcdaniel    Visit Date: 8/23/2023  Visit #:   PTA Visit #: 0/5   Time In: 0805  Time Out: 0845  Total Billable Time: 40  minutes    Subjective     Pt reports: Continued low back pain with radiating pain into right LE. She also complains of multiple site joint pain bilaterally. She states she is getting her MRI tomorrow.    She was previously instructed in home exercise program.  Response to previous treatment: patient says treatment is helping with her mobility, however, not so much with chronic pain due.  Functional change: none    Pain: Before treatment: 9/10   Location: Lower back and right LE.    Objective      Objective Measures updated at progress report unless specified.     8/23/23  Spine AROM  Forward bend: 55  Back bend: 10  Lateral trunk lean: right 15 left 12  Trunk rotation: right 52 left 50    8/23/23  MMT abdominals 2+/5  MMT back extension 2+/5    Treatment     Georgiana received the treatments listed below:      therapeutic exercises to develop strength, ROM, and core stabilization for 40 minutes including:  Supine SKTC stretch bilateral 10 x 10 second hold   Supine marching x 30 on each LE  Posterior pelvic tilts 10 x 3  Bridging with knee ball 2 x 10 3 second hold   Prone on elbows 5 x 30 seconds  Standing hip flexion, hip abduction 1 X 10      NOT TODAY  Quadriped cat cow x 10 - complains of wrist and knee joint pain  Prone hip extension 1 x 5 - not today due to pain   Seated right piriformis stretch 10 x 5 seconds - not today due to pain  Supine R sided sciatic nerve glides x 10 - not today due to pain          supervised modalities after being cleared for contradictions: IFC Electrical Stimulation:  Georgiana received IFC  Electrical Stimulation with moist heat 4 tolwel layers for pain control applied to the low back in prone with pillow under pelvis. Pt received stimulation at 100 % scan at a frequency of 80/150 Hz at 12.5 Cv for 12 minutes. Georgiana tolderated treatment well without any adverse effects.        Patient Education and Home Exercises       Education provided:   -    received verbal and tactile cues to facilitate proper execution of exercises and body mechanics.     Written Home Exercises Provided: Patient instructed to cont prior HEP.     Assessment     Georgiana tolerated treatment fair and still reports pain and discomfort when performing therapeutic exercises. She was able to improve some of her back ROM per objective measurements.    Georgiana Is progressing well towards her goals.   Pt prognosis is Fair.     Pt will continue to benefit from skilled outpatient physical therapy to address the deficits listed in the problem list box on initial evaluation, provide pt/family education and to maximize pt's level of independence in the home and community environment.     Pt's spiritual, cultural and educational needs considered and pt agreeable to plan of care and goals.     Anticipated barriers to physical therapy: none    Goals:   Goals:  Short Term Goals: 4 weeks   1. Patient will be Independent with Home Exercise Program. GOAL MET.   2. Patient will demonstrate with improved Posture and Body Mechanics.   3. Patient will increase Lower Extremity and Core Strength to grossly 4+/5  4. Patient will decrease complaints of pain with activity to Less than or Equal to 5/10      Long Term Goals: 8 weeks   1. Patient will tolerate 30 minutes of activity with complaints of Pain at Less Than or Equal to 4/10    2. Patient will improve FOTO score by 20% indicating improved function.    Plan     Continue with present plan of care.    Win Nieves, PT

## 2023-08-28 ENCOUNTER — CLINICAL SUPPORT (OUTPATIENT)
Dept: REHABILITATION | Facility: HOSPITAL | Age: 48
End: 2023-08-28
Payer: COMMERCIAL

## 2023-08-28 DIAGNOSIS — M54.41 CHRONIC BILATERAL LOW BACK PAIN WITH RIGHT-SIDED SCIATICA: Primary | Chronic | ICD-10-CM

## 2023-08-28 DIAGNOSIS — G89.29 CHRONIC BILATERAL LOW BACK PAIN WITH RIGHT-SIDED SCIATICA: Primary | Chronic | ICD-10-CM

## 2023-08-28 PROCEDURE — 97110 THERAPEUTIC EXERCISES: CPT

## 2023-08-28 NOTE — PROGRESS NOTES
[facility]   Physical Therapy Treatment Note      Name: Georgiana Zayas  Clinic Number: [id number]    Therapy Diagnosis:   Encounter Diagnosis   Name Primary?    Chronic bilateral low back pain with right-sided sciatica Yes     Physician: Ellis Mcdaniel    Visit Date: 8/28/2023  Visit #: 6/12  PTA Visit #: 0/5   Time In: 0809  Time Out: 0840  Total Billable Time: 31 minutes    Subjective     Pt reports: Continued low back pain with radiating pain into right LE.     She was previously instructed in home exercise program.  Response to previous treatment: fair  Functional change: none    Pain: Before treatment: 7/10   Location: Lower back and right LE.    MRI LUMBAR SPINE WITHOUT CONTRAST 8/24/23     CLINICAL HISTORY:  Lumbar radiculopathy, symptoms persist with conservative treatment; Radiculopathy, lumbar region     TECHNIQUE:  Axial and sagittal imaging of the spine is performed using T1, T2, STIR and T2 fat sat sequences without contrast.     COMPARISON:  None available     FINDINGS:  Vertebral bodies are normal in height and alignment.     Disc space heights are well-maintained.  No evidence of disc herniation is seen.     There is facet joint hypertrophy and degenerative changes present most prominent at L3-4 and L4-5 contributing to mild foraminal stenosis at these levels.     Spinal cord signal appears within normal limits.     Osseous marrow signal appears within normal limits.     Impression:     Mild multilevel facet joint osteoarthrosis.    Objective      Objective Measures updated at progress report unless specified.     8/23/23  Spine AROM  Forward bend: 55  Back bend: 10  Lateral trunk lean: right 15 left 12  Trunk rotation: right 52 left 50    8/23/23  MMT abdominals 2+/5  MMT back extension 2+/5    Treatment     Georgiana received the treatments listed below:      therapeutic exercises to develop strength, ROM, and core stabilization for 31 minutes including:  Supine  SKTC stretch bilateral 10 x 10 second hold   Posterior pelvic tilts 2 x 10 3 s/h  Bridging with knee ball 2 x 10 3 second hold   Double knee to chest with small ball 2 x 10  Prone on elbows 10 x 10 seconds  Standing hip extension, hip abduction 1 X 10  Standing hip flexion with ball 2 x 10    NOT TODAY-supervised modalities after being cleared for contradictions: IFC Electrical Stimulation:  Georgiana received IFC Electrical Stimulation () for pain control applied to the (). Pt received stimulation at () % scan at a frequency of () Hz at () Cv for () minutes. Georgiana tolderated treatment () without any adverse effects.        Patient Education and Home Exercises       Education provided:   -    received verbal and tactile cues to facilitate proper execution of exercises and body mechanics. MRI report discussed with gabino.    Written Home Exercises Provided:  patient received revised home exercise program .     Assessment     Georgiana reports back felt a little better following treatment today.    Georgiana Is progressing slowly towards her goals.   Pt prognosis is Fair.     Pt will continue to benefit from skilled outpatient physical therapy to address the deficits listed in the problem list box on initial evaluation, provide pt/family education and to maximize pt's level of independence in the home and community environment.     Pt's spiritual, cultural and educational needs considered and pt agreeable to plan of care and goals.     Anticipated barriers to physical therapy: none    Goals:   Short Term Goals: 4 weeks   1. Patient will be Independent with Home Exercise Program. GOAL MET.   2. Patient will demonstrate with improved Posture and Body Mechanics.   3. Patient will increase Lower Extremity and Core Strength to grossly 4+/5  4. Patient will decrease complaints of pain with activity to Less than or Equal to 5/10      Long Term Goals: 8 weeks   1. Patient will tolerate 30 minutes of activity with complaints of Pain  at Less Than or Equal to 4/10    2. Patient will improve FOTO score by 20% indicating improved function.    Plan     Continue with present plan of care.    Win Nieves, PT

## 2023-08-29 ENCOUNTER — TELEPHONE (OUTPATIENT)
Dept: FAMILY MEDICINE | Facility: CLINIC | Age: 48
End: 2023-08-29
Payer: COMMERCIAL

## 2023-08-29 NOTE — TELEPHONE ENCOUNTER
----- Message from Elisa Mcdaniel MD sent at 8/25/2023 11:35 AM CDT -----  Please call patient regarding MRI results. Patient has multi-level degenerative changes worse at L3-L4 and L4-L5. Recommend referral to Dr. Maciel ([facility]) if patient does not already follow with him. Does she need a copy of her results sent to pain management? She does follow with Pain Management already. Thanks!        0351- call made to pt. No answer. Unable to leave .

## 2023-08-31 ENCOUNTER — CLINICAL SUPPORT (OUTPATIENT)
Dept: REHABILITATION | Facility: HOSPITAL | Age: 48
End: 2023-08-31
Payer: COMMERCIAL

## 2023-08-31 DIAGNOSIS — G89.29 CHRONIC BILATERAL LOW BACK PAIN WITH RIGHT-SIDED SCIATICA: Primary | Chronic | ICD-10-CM

## 2023-08-31 DIAGNOSIS — M54.41 CHRONIC BILATERAL LOW BACK PAIN WITH RIGHT-SIDED SCIATICA: Primary | Chronic | ICD-10-CM

## 2023-08-31 PROCEDURE — 97110 THERAPEUTIC EXERCISES: CPT | Mod: CQ

## 2023-08-31 PROCEDURE — 97014 ELECTRIC STIMULATION THERAPY: CPT | Mod: CQ

## 2023-08-31 NOTE — PROGRESS NOTES
[facility]   Physical Therapy Treatment Note      Name: Georgiana Zayas  Clinic Number: [id number]    Therapy Diagnosis:   Encounter Diagnosis   Name Primary?    Chronic bilateral low back pain with right-sided sciatica Yes     Physician: Ellis Mcdaniel    Visit Date: 8/31/2023  Visit #: 7/12  PTA Visit #: 1/5   Time In: 0802  Time Out: 0847  Total Billable Time: 45 minutes    Subjective     Pt reports: Continued low back pain with radiating pain into right LE.     She was previously instructed in home exercise program.  Response to previous treatment: fair  Functional change: none    Pain: Before treatment: 8/10   Location: Lower back and right LE.    MRI LUMBAR SPINE WITHOUT CONTRAST 8/24/23     CLINICAL HISTORY:  Lumbar radiculopathy, symptoms persist with conservative treatment; Radiculopathy, lumbar region     TECHNIQUE:  Axial and sagittal imaging of the spine is performed using T1, T2, STIR and T2 fat sat sequences without contrast.     COMPARISON:  None available     FINDINGS:  Vertebral bodies are normal in height and alignment.     Disc space heights are well-maintained.  No evidence of disc herniation is seen.     There is facet joint hypertrophy and degenerative changes present most prominent at L3-4 and L4-5 contributing to mild foraminal stenosis at these levels.     Spinal cord signal appears within normal limits.     Osseous marrow signal appears within normal limits.     Impression:     Mild multilevel facet joint osteoarthrosis.    Objective       8/23/23  Spine AROM  Forward bend: 55  Back bend: 10  Lateral trunk lean: right 15 left 12  Trunk rotation: right 52 left 50    8/23/23  MMT abdominals 2+/5  MMT back extension 2+/5    Treatment     Georgiana received the treatments listed below:      therapeutic exercises to develop strength, ROM, and core stabilization for 30 minutes including:  Supine SKTC stretch bilateral 10 x 10 second hold   Posterior pelvic tilts  2 x 10 3 s/h  Bridging with knee ball 2 x 10 3 s/h  Double knee to chest with small ball 2 x 10  Prone on elbows 10 x 10 seconds  Standing hip extension, hip abduction 1 x 10  Standing hip flexion with ball 2 x 10    supervised modalities after being cleared for contradictions: IFC Electrical Stimulation:  Georgiana received IFC Electrical Stimulation to her right low back/glute areas for pain control. Pt received stimulation at 100 % scan at a frequency of 80/150Hz at 11.0 Cv for 15 minutes along with moist heat with 4 layers. Georgiana tolderated treatment well without any adverse effects during or after application.       Patient Education and Home Exercises       Education provided:   -received verbal and tactile cues to facilitate proper execution of exercises and body mechanics. MRI report discussed with gabino.    Written Home Exercises Provided:  patient received revised home exercise program .     Assessment     Georgiana able to tolerate all her exercises fair to well with rest breaks when needed due to pain levels throughout tx.      Georgiana Is progressing slowly towards her goals.   Pt prognosis is Fair.     Pt will continue to benefit from skilled outpatient physical therapy to address the deficits listed in the problem list box on initial evaluation, provide pt/family education and to maximize pt's level of independence in the home and community environment.     Pt's spiritual, cultural and educational needs considered and pt agreeable to plan of care and goals.     Anticipated barriers to physical therapy: none    Goals:   Short Term Goals: 4 weeks   1. Patient will be Independent with Home Exercise Program. GOAL MET.   2. Patient will demonstrate with improved Posture and Body Mechanics.   3. Patient will increase Lower Extremity and Core Strength to grossly 4+/5  4. Patient will decrease complaints of pain with activity to Less than or Equal to 5/10      Long Term Goals: 8 weeks   1. Patient will tolerate 30  minutes of activity with complaints of Pain at Less Than or Equal to 4/10    2. Patient will improve FOTO score by 20% indicating improved function.    Plan     Continue with current plan of care.    Molly Thomson, MATT

## 2023-09-06 ENCOUNTER — CLINICAL SUPPORT (OUTPATIENT)
Dept: REHABILITATION | Facility: HOSPITAL | Age: 48
End: 2023-09-06
Payer: COMMERCIAL

## 2023-09-06 DIAGNOSIS — G89.29 CHRONIC BILATERAL LOW BACK PAIN WITH RIGHT-SIDED SCIATICA: Primary | Chronic | ICD-10-CM

## 2023-09-06 DIAGNOSIS — M54.41 CHRONIC BILATERAL LOW BACK PAIN WITH RIGHT-SIDED SCIATICA: Primary | Chronic | ICD-10-CM

## 2023-09-06 PROCEDURE — 97110 THERAPEUTIC EXERCISES: CPT

## 2023-09-06 PROCEDURE — 97014 ELECTRIC STIMULATION THERAPY: CPT

## 2023-09-06 NOTE — PROGRESS NOTES
[facility]   Physical Therapy Treatment Note      Name: Georgiana Zayas  Clinic Number: [id number]    Therapy Diagnosis:   Encounter Diagnosis   Name Primary?    Chronic bilateral low back pain with right-sided sciatica Yes     Physician: Ellis Mcdaniel    Visit Date: 9/6/2023  Visit #: 8/12  PTA Visit #: 0/5   Time In: 0805  Time Out: 0850  Total Billable Time: 45 minutes    Subjective     Pt reports: Continued low back pain with radiating pain into right LE and she reports intermittent bilateral knee pain. She reports her knees don't hurt at the moment.    She was previously instructed in home exercise program.  Response to previous treatment: fair  Functional change: mild decrease in LBP.    Pain: Before treatment: 7/10   Location: Lower back and right LE.    MRI LUMBAR SPINE WITHOUT CONTRAST 8/24/23     CLINICAL HISTORY:  Lumbar radiculopathy, symptoms persist with conservative treatment; Radiculopathy, lumbar region     TECHNIQUE:  Axial and sagittal imaging of the spine is performed using T1, T2, STIR and T2 fat sat sequences without contrast.     COMPARISON:  None available     FINDINGS:  Vertebral bodies are normal in height and alignment.     Disc space heights are well-maintained.  No evidence of disc herniation is seen.     There is facet joint hypertrophy and degenerative changes present most prominent at L3-4 and L4-5 contributing to mild foraminal stenosis at these levels.     Spinal cord signal appears within normal limits.     Osseous marrow signal appears within normal limits.     Impression:     Mild multilevel facet joint osteoarthrosis.    Objective       8/23/23  Spine AROM  Forward bend: 55  Back bend: 10  Lateral trunk lean: right 15 left 12  Trunk rotation: right 52 left 50    8/23/23  MMT abdominals 2+/5  MMT back extension 2+/5    9/6/23  Posture;  Rounded shoulders: YES  Forward head: YES  Increased Kyphosis: YES    Treatment     Georgiana received the  treatments listed below:      therapeutic exercises to develop strength, ROM, and core stabilization for 30 minutes including:  Supine SKTC stretch bilateral 10 x 10 second hold   Double knee to chest stretch 10 x 10 second hold  Mini crunch 2 x 10 3 s/h  Bridging with knee ball 2 x 10 3 s/h  Hamstring stretch bilateral with strap 10 x 10 sec hold    NOT TODAY  Standing hip extension, hip abduction 1 x 10  Standing hip flexion with ball 2 x 10    supervised modalities after being cleared for contradictions: IFC Electrical Stimulation:  Georgiana received IFC Electrical Stimulation to her right low back/glute areas for pain control. Pt received stimulation at 100 % scan at a frequency of 80/150Hz at 14.0 Cv for 15 minutes along with moist heat with 4 layers. Georgiana tolderated treatment well without any adverse effects during or after application.       Patient Education and Home Exercises       Education provided:   -received verbal and tactile cues to facilitate proper execution of exercises, body mechanics and posture.    Written Home Exercises Provided: Patient instructed to cont prior HEP.     Assessment     Georgiana reported a reduction in LBP to 6/10 following treatment today.    Georgiana Is progressing slowly towards her goals.   Pt prognosis is Fair.     Pt will continue to benefit from skilled outpatient physical therapy to address the deficits listed in the problem list box on initial evaluation, provide pt/family education and to maximize pt's level of independence in the home and community environment.     Pt's spiritual, cultural and educational needs considered and pt agreeable to plan of care and goals.     Anticipated barriers to physical therapy: none    Goals:   Short Term Goals: 4 weeks   1. Patient will be Independent with Home Exercise Program. GOAL MET.   2. Patient will demonstrate with improved Posture and Body Mechanics. GOAL MET.  3. Patient will increase Lower Extremity and Core Strength to  grossly 4+/5  4. Patient will decrease complaints of pain with activity to Less than or Equal to 5/10      Long Term Goals: 8 weeks   1. Patient will tolerate 30 minutes of activity with complaints of Pain at Less Than or Equal to 4/10    2. Patient will improve FOTO score by 20% indicating improved function.    Plan     Continue with current plan of care.    Win Nieves, PT

## 2023-09-08 ENCOUNTER — CLINICAL SUPPORT (OUTPATIENT)
Dept: REHABILITATION | Facility: HOSPITAL | Age: 48
End: 2023-09-08
Payer: COMMERCIAL

## 2023-09-08 DIAGNOSIS — G89.29 CHRONIC BILATERAL LOW BACK PAIN WITH RIGHT-SIDED SCIATICA: Primary | Chronic | ICD-10-CM

## 2023-09-08 DIAGNOSIS — M54.41 CHRONIC BILATERAL LOW BACK PAIN WITH RIGHT-SIDED SCIATICA: Primary | Chronic | ICD-10-CM

## 2023-09-08 PROCEDURE — 97110 THERAPEUTIC EXERCISES: CPT | Mod: CQ

## 2023-09-08 PROCEDURE — 97014 ELECTRIC STIMULATION THERAPY: CPT | Mod: CQ

## 2023-09-08 NOTE — PROGRESS NOTES
[facility]   Physical Therapy Treatment Note      Name: Georgiana Zayas  Clinic Number: [id number]    Therapy Diagnosis:   Encounter Diagnosis   Name Primary?    Chronic bilateral low back pain with right-sided sciatica Yes     Physician: Ellis Mcdaniel    Visit Date: 9/8/2023  Visit #: 9/12  PTA Visit #: 1/5   Time In: 0803  Time Out: 0848  Total Billable Time: 45 minutes    Subjective     Pt reports: back is still the same.  She was previously instructed in home exercise program.  Response to previous treatment: fair  Functional change: mild decrease in LBP.    Pain: Before treatment: 7/10   Location: Lower back and right LE.    MRI LUMBAR SPINE WITHOUT CONTRAST 8/24/23     CLINICAL HISTORY:  Lumbar radiculopathy, symptoms persist with conservative treatment; Radiculopathy, lumbar region     TECHNIQUE:  Axial and sagittal imaging of the spine is performed using T1, T2, STIR and T2 fat sat sequences without contrast.     COMPARISON:  None available     FINDINGS:  Vertebral bodies are normal in height and alignment.     Disc space heights are well-maintained.  No evidence of disc herniation is seen.     There is facet joint hypertrophy and degenerative changes present most prominent at L3-4 and L4-5 contributing to mild foraminal stenosis at these levels.     Spinal cord signal appears within normal limits.     Osseous marrow signal appears within normal limits.     Impression:     Mild multilevel facet joint osteoarthrosis.    Objective       8/23/23  Spine AROM  Forward bend: 55  Back bend: 10  Lateral trunk lean: right 15 left 12  Trunk rotation: right 52 left 50    8/23/23  MMT abdominals 2+/5  MMT back extension 2+/5    9/6/23  Posture;  Rounded shoulders: YES  Forward head: YES  Increased Kyphosis: YES    Treatment     Georgiana received the treatments listed below:      therapeutic exercises to develop strength, ROM, and core stabilization for 30 minutes including:  Supine  SKTC stretch bilateral 10 x 10 second hold   Double knee to chest stretch 10 x 10 second hold  Mini crunch 2 x 10 3 s/h  Bridging with knee ball 2 x 10 3 s/h  Hamstring stretch bilateral with strap 10 x 10 sec hold    NOT TODAY  Standing hip extension, hip abduction 1 x 10  Standing hip flexion with ball 2 x 10    supervised modalities after being cleared for contradictions: IFC Electrical Stimulation:  Georgiana received IFC Electrical Stimulation to her right low back/glute areas for pain control. Pt received stimulation at 100 % scan at a frequency of 80/150Hz at 14.0- 15.5 Cv for 15 minutes along with moist heat with 4 layers. Georgiana tolderated treatment well without any adverse effects during or after application.       Patient Education and Home Exercises       Education provided:   -received verbal and tactile cues to facilitate proper execution of exercises, body mechanics and posture.    Written Home Exercises Provided: Patient instructed to cont prior HEP.     Assessment     Georgiana performed supine exercises for strengthening and ROM for trunk, core, and low back fairly well with increased time to complete and rest breaks when needed all to improve mobility and decrease pain.     Georgiana Is progressing slowly towards her goals.   Pt prognosis is Fair.     Pt will continue to benefit from skilled outpatient physical therapy to address the deficits listed in the problem list box on initial evaluation, provide pt/family education and to maximize pt's level of independence in the home and community environment.     Pt's spiritual, cultural and educational needs considered and pt agreeable to plan of care and goals.     Anticipated barriers to physical therapy: none    Goals:   Short Term Goals: 4 weeks   1. Patient will be Independent with Home Exercise Program. GOAL MET.   2. Patient will demonstrate with improved Posture and Body Mechanics. GOAL MET.  3. Patient will increase Lower Extremity and Core Strength to  grossly 4+/5  4. Patient will decrease complaints of pain with activity to Less than or Equal to 5/10      Long Term Goals: 8 weeks   1. Patient will tolerate 30 minutes of activity with complaints of Pain at Less Than or Equal to 4/10    2. Patient will improve FOTO score by 20% indicating improved function.    Plan     Continue with current plan of care.    Molly Thomson, MATT

## 2023-09-13 DIAGNOSIS — M25.561 PAIN IN BOTH KNEES, UNSPECIFIED CHRONICITY: Primary | ICD-10-CM

## 2023-09-13 DIAGNOSIS — M25.562 PAIN IN BOTH KNEES, UNSPECIFIED CHRONICITY: Primary | ICD-10-CM

## 2023-09-14 ENCOUNTER — HOSPITAL ENCOUNTER (OUTPATIENT)
Dept: RADIOLOGY | Facility: HOSPITAL | Age: 48
Discharge: HOME | End: 2023-09-14
Attending: FAMILY MEDICINE
Payer: COMMERCIAL

## 2023-09-14 VITALS — HEIGHT: 64 IN | WEIGHT: 185 LBS | BODY MASS INDEX: 31.58 KG/M2

## 2023-09-14 DIAGNOSIS — Z12.31 SCREENING MAMMOGRAM FOR BREAST CANCER: ICD-10-CM

## 2023-09-14 PROCEDURE — 77067 SCR MAMMO BI INCL CAD: CPT | Mod: TC

## 2023-09-15 ENCOUNTER — CLINICAL SUPPORT (OUTPATIENT)
Dept: REHABILITATION | Facility: HOSPITAL | Age: 48
End: 2023-09-15
Payer: COMMERCIAL

## 2023-09-15 ENCOUNTER — DOCUMENTATION ONLY (OUTPATIENT)
Dept: REHABILITATION | Facility: HOSPITAL | Age: 48
End: 2023-09-15
Payer: COMMERCIAL

## 2023-09-15 ENCOUNTER — HOSPITAL ENCOUNTER (OUTPATIENT)
Dept: RADIOLOGY | Facility: HOSPITAL | Age: 48
Discharge: HOME | End: 2023-09-15
Attending: NURSE PRACTITIONER
Payer: COMMERCIAL

## 2023-09-15 ENCOUNTER — TELEPHONE (OUTPATIENT)
Dept: FAMILY MEDICINE | Facility: CLINIC | Age: 48
End: 2023-09-15
Payer: COMMERCIAL

## 2023-09-15 DIAGNOSIS — M54.41 CHRONIC BILATERAL LOW BACK PAIN WITH RIGHT-SIDED SCIATICA: Primary | Chronic | ICD-10-CM

## 2023-09-15 DIAGNOSIS — G89.29 CHRONIC BILATERAL LOW BACK PAIN WITH RIGHT-SIDED SCIATICA: Primary | Chronic | ICD-10-CM

## 2023-09-15 DIAGNOSIS — M54.2 CERVICALGIA: ICD-10-CM

## 2023-09-15 PROCEDURE — 97110 THERAPEUTIC EXERCISES: CPT | Mod: CQ

## 2023-09-15 PROCEDURE — 72050 X-RAY EXAM NECK SPINE 4/5VWS: CPT | Mod: TC

## 2023-09-15 NOTE — PROGRESS NOTES
[facility]   Physical Therapy Treatment Note      Name: Georgiana Zayas  Clinic Number: [id number]    Therapy Diagnosis:   Encounter Diagnosis   Name Primary?    Chronic bilateral low back pain with right-sided sciatica Yes     Physician: No ref. provider found    Visit Date: 9/15/2023  Visit #: 10/12  PTA Visit #: 2/5   Time In: 0808  Time Out: 0850  Total Billable Time: 42 minutes    Subjective     Pt reports: back is still the same. Im always in pain  She was previously instructed in home exercise program.  Response to previous treatment: fair  Functional change: mild decrease in LBP after IFC    Pain: Before treatment: 7/10  After Treatment: 5/10  Location: Lower back and right LE.    MRI LUMBAR SPINE WITHOUT CONTRAST 8/24/23     CLINICAL HISTORY:  Lumbar radiculopathy, symptoms persist with conservative treatment; Radiculopathy, lumbar region     TECHNIQUE:  Axial and sagittal imaging of the spine is performed using T1, T2, STIR and T2 fat sat sequences without contrast.     COMPARISON:  None available     FINDINGS:  Vertebral bodies are normal in height and alignment.     Disc space heights are well-maintained.  No evidence of disc herniation is seen.     There is facet joint hypertrophy and degenerative changes present most prominent at L3-4 and L4-5 contributing to mild foraminal stenosis at these levels.     Spinal cord signal appears within normal limits.     Osseous marrow signal appears within normal limits.     Impression:     Mild multilevel facet joint osteoarthrosis.    Objective       8/23/23  Spine AROM  Forward bend: 55  Back bend: 10  Lateral trunk lean: right 15 left 12  Trunk rotation: right 52 left 50    8/23/23  MMT abdominals 2+/5  MMT back extension 2+/5    9/6/23  Posture;  Rounded shoulders: YES  Forward head: YES  Increased Kyphosis: YES    Treatment     Georgiana received the treatments listed below:      therapeutic exercises to develop strength, ROM, and core  stabilization for 42 minutes including:  Supine SKTC stretch bilateral 10 x 10 second hold   Double knee to chest stretch 10 x 10 second hold  Mini crunch 2 x 10 3 s/h  Bridging with knee ball  x 10 3 s/h - sets were reduced due to shooting pain in R low back  Hamstring stretch bilateral with strap 10 x 10 sec hold    NOT TODAY  Standing hip extension, hip abduction 1 x 10  Standing hip flexion with ball 2 x 10    supervised modalities after being cleared for contradictions: IFC Electrical Stimulation:  Georgiana received IFC Electrical Stimulation to her right low back/glute areas for pain control. Pt received stimulation at 100 % scan at a frequency of 80/150Hz at 14.0- 15.5 Cv for 15 minutes along with moist heat with 4 layers. Georgiana tolderated treatment well without any adverse effects during or after application.       Patient Education and Home Exercises       Education provided:   -received verbal and tactile cues to facilitate proper execution of exercises, body mechanics and posture.    Written Home Exercises Provided: Patient instructed to cont prior HEP.     Assessment     Georgiana tolerated treatment fair this morning. She was in pain this morning, so standing exercises were not done. She is having problems with her neck, and will be scheduled for an x-ray. When performing supine exercises, she did have pain with bridging with knee ball stating she was experiencing a sharp pain in her LB. PTA suggested patient look into getting a TENS unit to help with pain at home. She will be given a handout of the recommended TENS unit on [employer] to purchase on next visit. She will return to therapy for instructions on how to use TENS machine if she decides to purchase. Patient is progressing slowly towards short term goals.    Georgiana Is progressing slowly towards her goals.   Pt prognosis is Fair.     Pt will continue to benefit from skilled outpatient physical therapy to address the deficits listed in the problem list  box on initial evaluation, provide pt/family education and to maximize pt's level of independence in the home and community environment.     Pt's spiritual, cultural and educational needs considered and pt agreeable to plan of care and goals.     Anticipated barriers to physical therapy: none    Goals:   Short Term Goals: 4 weeks   1. Patient will be Independent with Home Exercise Program. GOAL MET.   2. Patient will demonstrate with improved Posture and Body Mechanics. GOAL MET.  3. Patient will increase Lower Extremity and Core Strength to grossly 4+/5  4. Patient will decrease complaints of pain with activity to Less than or Equal to 5/10      Long Term Goals: 8 weeks   1. Patient will tolerate 30 minutes of activity with complaints of Pain at Less Than or Equal to 4/10    2. Patient will improve FOTO score by 20% indicating improved function.    Plan     Continue with current plan of care.    Natasha De Luna, PTA

## 2023-09-15 NOTE — TELEPHONE ENCOUNTER
Attempted to contact patient in regards to mammogram, patient did not answer, was able to leave message for patient to return phone call.     ----- Message from Elisa Mcdaniel MD sent at 9/15/2023  8:13 AM CDT -----  Please call patient to let her know that her screening mammogram is negative/normal. Recommend repeat in 1 year. Thanks!

## 2023-09-18 ENCOUNTER — LAB VISIT (OUTPATIENT)
Dept: LAB | Facility: HOSPITAL | Age: 48
End: 2023-09-18
Attending: REGISTERED NURSE
Payer: COMMERCIAL

## 2023-09-18 DIAGNOSIS — F32.89 ATYPICAL DEPRESSIVE DISORDER: Primary | ICD-10-CM

## 2023-09-18 LAB
ALBUMIN SERPL BCP-MCNC: 3.6 G/DL (ref 3.5–5)
ALBUMIN/GLOB SERPL: 0.9 {RATIO}
ALP SERPL-CCNC: 70 U/L (ref 39–100)
ALT SERPL W P-5'-P-CCNC: 26 U/L (ref 13–56)
ANION GAP SERPL CALCULATED.3IONS-SCNC: 14 MMOL/L (ref 7–16)
AST SERPL W P-5'-P-CCNC: 14 U/L (ref 15–37)
BASOPHILS # BLD AUTO: 0.03 K/UL (ref 0–0.2)
BASOPHILS NFR BLD AUTO: 0.5 % (ref 0–1)
BILIRUB SERPL-MCNC: 0.4 MG/DL (ref ?–1.2)
BUN SERPL-MCNC: 9 MG/DL (ref 7–18)
BUN/CREAT SERPL: 9 (ref 6–20)
CALCIUM SERPL-MCNC: 9.2 MG/DL (ref 8.5–10.1)
CHLORIDE SERPL-SCNC: 97 MMOL/L (ref 98–107)
CHOLEST SERPL-MCNC: 180 MG/DL (ref 0–200)
CHOLEST/HDLC SERPL: 3 {RATIO}
CO2 SERPL-SCNC: 29 MMOL/L (ref 21–32)
CREAT SERPL-MCNC: 1.01 MG/DL (ref 0.55–1.02)
DIFFERENTIAL METHOD BLD: ABNORMAL
EGFR (NO RACE VARIABLE) (RUSH/TITUS): 68 ML/MIN/1.73M2
EOSINOPHIL # BLD AUTO: 0.18 K/UL (ref 0–0.5)
EOSINOPHIL NFR BLD AUTO: 2.8 % (ref 1–4)
ERYTHROCYTE [DISTWIDTH] IN BLOOD BY AUTOMATED COUNT: 14 % (ref 11.5–14.5)
GLOBULIN SER-MCNC: 3.9 G/DL (ref 2–4)
GLUCOSE SERPL-MCNC: 316 MG/DL (ref 74–106)
HCT VFR BLD AUTO: 36.8 % (ref 38–47)
HDLC SERPL-MCNC: 61 MG/DL (ref 40–60)
HGB BLD-MCNC: 11.8 G/DL (ref 12–16)
LDLC SERPL CALC-MCNC: 96 MG/DL
LDLC/HDLC SERPL: 1.6 {RATIO}
LYMPHOCYTES # BLD AUTO: 2.93 K/UL (ref 1–4.8)
LYMPHOCYTES NFR BLD AUTO: 46.2 % (ref 27–41)
MCH RBC QN AUTO: 27.2 PG (ref 27–31)
MCHC RBC AUTO-ENTMCNC: 32.1 G/DL (ref 32–36)
MCV RBC AUTO: 84.8 FL (ref 80–96)
MONOCYTES # BLD AUTO: 0.5 K/UL (ref 0–0.8)
MONOCYTES NFR BLD AUTO: 7.9 % (ref 2–6)
MPC BLD CALC-MCNC: 9.9 FL (ref 9.4–12.4)
NEUTROPHILS # BLD AUTO: 2.7 K/UL (ref 1.8–7.7)
NEUTROPHILS NFR BLD AUTO: 42.6 % (ref 53–65)
NONHDLC SERPL-MCNC: 119 MG/DL
PLATELET # BLD AUTO: 341 K/UL (ref 150–400)
POTASSIUM SERPL-SCNC: 3.7 MMOL/L (ref 3.5–5.1)
PROT SERPL-MCNC: 7.5 G/DL (ref 6.4–8.2)
RBC # BLD AUTO: 4.34 M/UL (ref 4.2–5.4)
SODIUM SERPL-SCNC: 136 MMOL/L (ref 136–145)
TRIGL SERPL-MCNC: 113 MG/DL (ref 35–150)
VLDLC SERPL-MCNC: 23 MG/DL
WBC # BLD AUTO: 6.34 K/UL (ref 4.5–11)

## 2023-09-18 PROCEDURE — 84443 ASSAY THYROID STIM HORMONE: CPT

## 2023-09-18 PROCEDURE — 80061 LIPID PANEL: CPT

## 2023-09-18 PROCEDURE — 36415 COLL VENOUS BLD VENIPUNCTURE: CPT

## 2023-09-18 PROCEDURE — 85025 COMPLETE CBC W/AUTO DIFF WBC: CPT

## 2023-09-18 PROCEDURE — 80053 COMPREHEN METABOLIC PANEL: CPT

## 2023-09-19 ENCOUNTER — HOSPITAL ENCOUNTER (OUTPATIENT)
Dept: RADIOLOGY | Facility: HOSPITAL | Age: 48
Discharge: HOME | End: 2023-09-19
Attending: NURSE PRACTITIONER
Payer: COMMERCIAL

## 2023-09-19 ENCOUNTER — OFFICE VISIT (OUTPATIENT)
Dept: ORTHOPEDICS | Facility: CLINIC | Age: 48
End: 2023-09-19
Payer: COMMERCIAL

## 2023-09-19 DIAGNOSIS — M25.561 PAIN IN BOTH KNEES, UNSPECIFIED CHRONICITY: ICD-10-CM

## 2023-09-19 DIAGNOSIS — M25.562 PAIN IN BOTH KNEES, UNSPECIFIED CHRONICITY: ICD-10-CM

## 2023-09-19 DIAGNOSIS — M17.0 PRIMARY OSTEOARTHRITIS OF BOTH KNEES: Primary | ICD-10-CM

## 2023-09-19 LAB — TSH SERPL DL<=0.005 MIU/L-ACNC: 2.23 UIU/ML (ref 0.36–3.74)

## 2023-09-19 PROCEDURE — 99203 PR OFFICE/OUTPT VISIT, NEW, LEVL III, 30-44 MIN: ICD-10-PCS | Mod: S$PBB,25,, | Performed by: NURSE PRACTITIONER

## 2023-09-19 PROCEDURE — 99999PBSHW PR PBB SHADOW TECHNICAL ONLY FILED TO HB: ICD-10-PCS | Mod: PBBFAC,,,

## 2023-09-19 PROCEDURE — 73562 X-RAY EXAM OF KNEE 3: CPT | Mod: 26,50,, | Performed by: RADIOLOGY

## 2023-09-19 PROCEDURE — 73562 X-RAY EXAM OF KNEE 3: CPT | Mod: TC,50

## 2023-09-19 PROCEDURE — 20610 LARGE JOINT ASPIRATION/INJECTION: R SUPRA PATELLAR BURSA: ICD-10-PCS | Mod: S$PBB,50,, | Performed by: NURSE PRACTITIONER

## 2023-09-19 PROCEDURE — 99203 OFFICE O/P NEW LOW 30 MIN: CPT | Mod: S$PBB,25,, | Performed by: NURSE PRACTITIONER

## 2023-09-19 PROCEDURE — 99213 OFFICE O/P EST LOW 20 MIN: CPT | Mod: PBBFAC,25 | Performed by: NURSE PRACTITIONER

## 2023-09-19 PROCEDURE — 20610 DRAIN/INJ JOINT/BURSA W/O US: CPT | Mod: PBBFAC | Performed by: NURSE PRACTITIONER

## 2023-09-19 PROCEDURE — 73562 XR KNEE 3 VIEW BILATERAL: ICD-10-PCS | Mod: 26,50,, | Performed by: RADIOLOGY

## 2023-09-19 PROCEDURE — 99999PBSHW PR PBB SHADOW TECHNICAL ONLY FILED TO HB: Mod: PBBFAC,,,

## 2023-09-19 RX ORDER — TRIAMCINOLONE ACETONIDE 40 MG/ML
40 INJECTION, SUSPENSION INTRA-ARTICULAR; INTRAMUSCULAR
Status: DISCONTINUED | OUTPATIENT
Start: 2023-09-19 | End: 2023-09-19 | Stop reason: HOSPADM

## 2023-09-19 RX ADMIN — TRIAMCINOLONE ACETONIDE 40 MG: 40 INJECTION, SUSPENSION INTRA-ARTICULAR; INTRAMUSCULAR at 10:09

## 2023-09-19 NOTE — PROGRESS NOTES
HPI:   Georgiana Zayas is a pleasant 48 y.o. patient who reports to clinic for evaluation of bilateral knee pain.  Reports she has had bilateral knee pain for almost 2 years.  No injuries.  Pain just started in his progressively gotten worse.  Painful to stand for long periods of time.  She has no swelling today.  She does report occasional swelling at times..     Injury onset and description:   Patient's occupation:   This is not a work related injury.   This injury has been non-responsive to conservative care. The pain is worse with repetitive use, and strenuous activity is very difficult.  her pain improves with rest.   PAST MEDICAL HISTORY:   Past Medical History:   Diagnosis Date    Arthritis     Depression     Diabetes mellitus     Diabetes mellitus, type 2     GERD (gastroesophageal reflux disease)     Hearing difficulty     Hypertension     Liver disease     Sleep apnea      PAST SURGICAL HISTORY:   Past Surgical History:   Procedure Laterality Date    CARPAL TUNNEL RELEASE Bilateral      SECTION      INJECTION OF ANESTHETIC AGENT AROUND MEDIAL BRANCH NERVES INNERVATING LUMBAR FACET JOINT Bilateral 2021    Procedure: BLOCK, NERVE, FACET JOINT, LUMBAR, MEDIAL BRANCH;  Surgeon: Amari Razo MD;  Location: [facility];  Service: Pain Management;  Laterality: Bilateral;  Bilateral L3-S1 Facet Injection    INJECTION OF ANESTHETIC AGENT AROUND MEDIAL BRANCH NERVES INNERVATING LUMBAR FACET JOINT Bilateral 2022    Procedure: BLOCK, NERVE, FACET JOINT, LUMBAR, MEDIAL BRANCH;  Surgeon: Amari Razo MD;  Location: [facility];  Service: Pain Management;  Laterality: Bilateral;  Bilateral L3-S1 FI    INJECTION OF ANESTHETIC AGENT AROUND MEDIAL BRANCH NERVES INNERVATING LUMBAR FACET JOINT Bilateral 2023    Procedure: BLOCK, NERVE, FACET JOINT, LUMBAR, MEDIAL BRANCH;  Surgeon: Amari Razo MD;  Location: [facility];  Service: Pain Management;   Laterality: Bilateral;  Bilateral L3-S1 FI    LEFT HEART CATHETERIZATION Left 12/21/2021    Procedure: Left heart cath;  Surgeon: Jeremy Rojo DO;  Location: [facility];  Service: Cardiology;  Laterality: Left;    RADIOFREQUENCY ABLATION OF LUMBAR MEDIAL BRANCH NERVE AT SINGLE LEVEL Bilateral 4/12/2023    Procedure: RADIOFREQUENCY ABLATION, NERVE, SPINAL, LUMBAR, MEDIAL BRANCH, 1 LEVEL;  Surgeon: Amari Razo MD;  Location: [facility];  Service: Pain Management;  Laterality: Bilateral;  Bilateral L3-5 RFTC    STAPEDES SURGERY Bilateral     TONSILLECTOMY       MEDICATIONS:    Current Outpatient Medications:     ARIPiprazole (ABILIFY) 2 MG Tab, Take 1 tablet (2 mg total) by mouth once daily., Disp: 90 tablet, Rfl: 1    cyclobenzaprine (FLEXERIL) 10 MG tablet, Take 1 tablet (10 mg total) by mouth 3 (three) times daily., Disp: 90 tablet, Rfl: 1    DULoxetine (CYMBALTA) 60 MG capsule, Take 1 capsule (60 mg total) by mouth 2 (two) times daily., Disp: 180 capsule, Rfl: 1    gabapentin (NEURONTIN) 800 MG tablet, Take 1 tablet (800 mg total) by mouth 3 (three) times daily., Disp: 270 tablet, Rfl: 1    glipiZIDE (GLUCOTROL) 10 MG tablet, Take 1 tablet (10 mg total) by mouth 2 (two) times daily with meals., Disp: 180 tablet, Rfl: 1    hydroCHLOROthiazide (HYDRODIURIL) 25 MG tablet, Take 1 tablet (25 mg total) by mouth once daily., Disp: 90 tablet, Rfl: 1    hydrOXYzine pamoate (VISTARIL) 25 MG Cap, Take 1 capsule (25 mg total) by mouth 3 (three) times daily., Disp: 270 capsule, Rfl: 1    insulin (LANTUS SOLOSTAR U-100 INSULIN) glargine 100 units/mL SubQ pen, Inject 15 Units into the skin every evening., Disp: 18 mL, Rfl: 1    lisinopriL (PRINIVIL,ZESTRIL) 2.5 MG tablet, Take 1 tablet (2.5 mg total) by mouth once daily., Disp: 90 tablet, Rfl: 1    mometasone (NASONEX) 50 mcg/actuation nasal spray, 2 sprays by Nasal route once daily., Disp: 17 g, Rfl: 3    omeprazole (PRILOSEC) 40 MG capsule, Take 1  capsule (40 mg total) by mouth Daily., Disp: 90 capsule, Rfl: 1    oxyCODONE-acetaminophen (PERCOCET)  mg per tablet, Take 1 tablet by mouth every 6 (six) hours as needed. , Disp: , Rfl:     OZEMPIC 1 mg/dose (4 mg/3 mL), Inject 1 mg into the skin every 7 days., Disp: 3 mL, Rfl: 5    pravastatin (PRAVACHOL) 20 MG tablet, Take 1 tablet (20 mg total) by mouth every evening., Disp: 90 tablet, Rfl: 1    promethazine (PHENERGAN) 25 MG tablet, Take 1 tablet (25 mg total) by mouth every 6 (six) hours as needed for Nausea., Disp: 30 tablet, Rfl: 0    risperiDONE (RISPERDAL) 3 MG Tab, Take 1 tablet (3 mg total) by mouth once daily., Disp: 90 tablet, Rfl: 1    traZODone (DESYREL) 100 MG tablet, Take 1 tablet (100 mg total) by mouth every evening., Disp: 90 tablet, Rfl: 1  ALLERGIES:   Review of patient's allergies indicates:   Allergen Reactions    Codeine Itching         PHYSICAL EXAM:  VITAL SIGNS: There were no vitals taken for this visit.  General: Well-developed well-nourished 48 y.o. femalein no acute distress;Cardiovascular: Regular rhythm by palpation of distal pulse, normal color and temperature, no concerning varicosities on symptomatic side Lungs: No labored breathing or wheezing appreciated Neuro: Alert and oriented ×3 Psychiatric: well oriented to person, place and time, demonstrates normal mood and affect Skin: No rashes, lesions or ulcers, normal temperature, turgor, and texture on uninvolved extremity    General    Constitutional: She is oriented to person, place, and time. She appears well-nourished.   HENT:   Head: Normocephalic and atraumatic.   Eyes: Pupils are equal, round, and reactive to light.   Neck: Neck supple.   Cardiovascular:  Normal rate and regular rhythm.            Pulmonary/Chest: Effort normal. No respiratory distress.   Abdominal: There is no abdominal tenderness. There is no guarding.   Neurological: She is alert and oriented to person, place, and time. She has normal reflexes.    Psychiatric: She has a normal mood and affect. Her behavior is normal. Judgment and thought content normal.           Right Knee Exam     Inspection   Swelling: present  Effusion: present    Tenderness   The patient is tender to palpation of the medial joint line and lateral joint line.    Crepitus   The patient has crepitus of the patella.    Range of Motion   Extension:  normal   Flexion:  abnormal     Tests   Meniscus   Juwan:  Medial - positive   Ligament Examination   Lachman: normal (-1 to 2mm)   PCL-Posterior Drawer: normal (0 to 2mm)     Patella   Patellar Tracking: normal  Patellar Grind: positive    Other   Sensation: normal    Left Knee Exam     Inspection   Swelling: present  Effusion: present    Tenderness   The patient tender to palpation of the medial joint line and lateral joint line.    Crepitus   The patient has crepitus of the patella.    Range of Motion   Extension:  normal   Flexion:  abnormal     Tests   Meniscus   Juwan:  Medial - positive   Stability   Lachman: normal (-1 to 2mm)   PCL-Posterior Drawer: normal (0 to 2mm)  Patella   Patellar Tracking: normal    Other   Sensation: normal    Muscle Strength   Right Lower Extremity   Quadriceps:  5/5   Hamstrin/5   Left Lower Extremity   Quadriceps:  5/5   Hamstrin/5     Reflexes     Left Side  Achilles:  2+  Quadriceps:  2+    Right Side   Achilles:  2+  Quadriceps:  2+    Vascular Exam     Right Pulses  Dorsalis Pedis:      2+  Posterior Tibial:      2+        Left Pulses  Dorsalis Pedis:      2+  Posterior Tibial:      2+          IMAGING:  X-Ray Knee 3 View Bilateral    Result Date: 2023  EXAMINATION: XR KNEE 3 VIEW BILATERAL CLINICAL HISTORY: Pain in right knee COMPARISON: None available TECHNIQUE: XR KNEE 3 VIEW BILATERAL FINDINGS: No evidence of fracture seen.  The alignment of the joints appears normal.  Mild bilateral medial compartment degenerative change is present.  No soft tissue abnormality is seen.     Mild  knee osteoarthrosis. Electronically signed by: Jamshid Nava Date:    09/19/2023 Time:    10:03    X-Ray Cervical Spine Complete 5 view    Result Date: 9/15/2023  EXAMINATION: XR CERVICAL SPINE COMPLETE 5 VIEW CLINICAL HISTORY: Cervicalgia COMPARISON: 5 April 2019 TECHNIQUE: XR CERVICAL SPINE 5 VIEW FINDINGS: No fracture is seen. Vertebral body heights and alignment are normal.  The disc space heights are well-maintained.  No significant degenerative change is present.     No evidence of abnormality demonstrated Electronically signed by: Jamshid Nava Date:    09/15/2023 Time:    10:07    Mammo Digital Screening Bilat w/ Carlos    Result Date: 9/14/2023  Result: Mammo Digital Screening Bilat w/ Carlos  History: Patient is 48 y.o. and is seen for a screening mammogram. Films Compared: Compared to: 11/22/2021 Mammo Digital Screening Bilat (No Change)  Findings: This procedure was performed using tomosynthesis. Computer-aided detection was utilized in the interpretation of this examination. The breasts have scattered areas of fibroglandular density. There is no evidence of suspicious masses, calcifications, or other abnormal findings.     Bilateral There is no mammographic evidence of malignancy. BI-RADS Category: Overall: 1 - Negative  Recommendation: Routine screening mammogram in 1 year is recommended. Your estimated lifetime risk of breast cancer (to age 85) based on Tyrer-Cuzick risk assessment model is Tyrer-Cuzick: 10.11 %. According to the American [employer] Society, patients with a lifetime breast cancer risk of 20% or higher might benefit from supplemental screening tests.    MRI Lumbar Spine Without Contrast    Result Date: 8/24/2023  EXAMINATION: MRI LUMBAR SPINE WITHOUT CONTRAST CLINICAL HISTORY: Lumbar radiculopathy, symptoms persist with conservative treatment; Radiculopathy, lumbar region TECHNIQUE: Axial and sagittal imaging of the spine is performed using T1, T2, STIR and T2 fat sat sequences without  contrast. COMPARISON: None available FINDINGS: Vertebral bodies are normal in height and alignment. Disc space heights are well-maintained.  No evidence of disc herniation is seen. There is facet joint hypertrophy and degenerative changes present most prominent at L3-4 and L4-5 contributing to mild foraminal stenosis at these levels. Spinal cord signal appears within normal limits. Osseous marrow signal appears within normal limits.     Mild multilevel facet joint osteoarthrosis. Electronically signed by: Jamshid Nava Date:    08/24/2023 Time:    13:21        ASSESSMENT:      ICD-10-CM ICD-9-CM   1. Primary osteoarthritis of both knees  M17.0 715.16       PLAN:     -Findings and treatment options were discussed with the patient  -All questions answered  Bilateral knee injections today  RTC 3 months for recheck      There are no Patient Instructions on file for this visit.  Orders Placed This Encounter   Procedures    Large Joint Aspiration/Injection: L supra patellar bursa     This order was created via procedure documentation    Large Joint Aspiration/Injection: R supra patellar bursa     This order was created via procedure documentation     Large Joint Aspiration/Injection: L supra patellar bursa    Date/Time: 9/19/2023 10:00 AM    Performed by: Natalie Dixon FNP  Authorized by: Natalie Dixon FNP    Consent Done?:  Yes (Verbal)  Indications:  Pain  Site marked: the procedure site was marked    Local anesthetic:  Bupivacaine 0.25% without epinephrine    Details:  Needle Size:  22 G  Location:  Knee  Site:  L supra patellar bursa  Medications:  40 mg triamcinolone acetonide 40 mg/mL  Patient tolerance:  Patient tolerated the procedure well with no immediate complications  Large Joint Aspiration/Injection: R supra patellar bursa    Date/Time: 9/19/2023 10:00 AM    Performed by: Natalie Dixon FNP  Authorized by: Natalie Dixon FNP    Consent Done?:  Yes (Verbal)  Indications:  Pain  Site marked: the  procedure site was marked    Local anesthetic:  Bupivacaine 0.25% without epinephrine    Details:  Needle Size:  22 G  Location:  Knee  Site:  R supra patellar bursa  Medications:  40 mg triamcinolone acetonide 40 mg/mL  Patient tolerance:  Patient tolerated the procedure well with no immediate complications

## 2023-09-20 ENCOUNTER — CLINICAL SUPPORT (OUTPATIENT)
Dept: REHABILITATION | Facility: HOSPITAL | Age: 48
End: 2023-09-20
Payer: COMMERCIAL

## 2023-09-20 DIAGNOSIS — M54.41 CHRONIC BILATERAL LOW BACK PAIN WITH RIGHT-SIDED SCIATICA: Primary | Chronic | ICD-10-CM

## 2023-09-20 DIAGNOSIS — G89.29 CHRONIC BILATERAL LOW BACK PAIN WITH RIGHT-SIDED SCIATICA: Primary | Chronic | ICD-10-CM

## 2023-09-20 PROCEDURE — 97110 THERAPEUTIC EXERCISES: CPT | Mod: CQ

## 2023-09-20 PROCEDURE — 97014 ELECTRIC STIMULATION THERAPY: CPT | Mod: CQ

## 2023-09-20 NOTE — PROGRESS NOTES
[facility]   Physical Therapy Treatment Note      Name: Georgiana Zayas  Clinic Number: [id number]    Therapy Diagnosis:   Encounter Diagnosis   Name Primary?    Chronic bilateral low back pain with right-sided sciatica Yes     Physician: Ellis Mcdaniel    Visit Date: 9/20/2023  Visit #: 11/12  PTA Visit #: 3/5   Time In: 0902  Time Out: 0930  Total Billable Time: 28 minutes    Subjective     Pt reports: I had some test ran yesterday and I got injections in both knees. They say I have Osteoarthritis in both knees, in my back and neck, and probably in my ankle.   She was previously instructed in home exercise program.  Response to previous treatment: fair  Functional change: mild decrease in LBP after IFC    Pain: Before treatment: 7/10    Location: Lower back and right LE.    MRI LUMBAR SPINE WITHOUT CONTRAST 8/24/23     CLINICAL HISTORY:  Lumbar radiculopathy, symptoms persist with conservative treatment; Radiculopathy, lumbar region     TECHNIQUE:  Axial and sagittal imaging of the spine is performed using T1, T2, STIR and T2 fat sat sequences without contrast.     COMPARISON:  None available     FINDINGS:  Vertebral bodies are normal in height and alignment.     Disc space heights are well-maintained.  No evidence of disc herniation is seen.     There is facet joint hypertrophy and degenerative changes present most prominent at L3-4 and L4-5 contributing to mild foraminal stenosis at these levels.     Spinal cord signal appears within normal limits.     Osseous marrow signal appears within normal limits.     Impression:     Mild multilevel facet joint osteoarthrosis.    Objective       8/23/23  Spine AROM  Forward bend: 55  Back bend: 10  Lateral trunk lean: right 15 left 12  Trunk rotation: right 52 left 50    8/23/23  MMT abdominals 2+/5  MMT back extension 2+/5    9/6/23  Posture;  Rounded shoulders: YES  Forward head: YES  Increased Kyphosis: YES    Treatment     Georgiana  received the treatments listed below:      therapeutic exercises to develop strength, ROM, and core stabilization for 18 minutes including:  Supine SKTC stretch bilateral 10 x 10 second hold   Double knee to chest stretch 10 x 10 second hold  Mini crunch 2 x 10 3 s/h  Bridging with knee ball 2 x 10 3 s/h   Hamstring stretch bilateral with strap 10 x 10 s/h     NOT TODAY  Standing hip extension, hip abduction 1 x 10  Standing hip flexion with ball 2 x 10    supervised modalities after being cleared for contradictions: IFC Electrical Stimulation:  Georgiana received IFC Electrical Stimulation to her right low back/glute areas for pain control. Pt received stimulation at 100 % scan at a frequency of 80/150Hz at 15.5 Volts Cv for 10 minutes along with moist heat with appropriate layers.  Georgiana tolderated treatment well without any adverse effects during or after application.       Patient Education and Home Exercises       Education provided:   -received verbal and tactile cues to facilitate proper execution of exercises, body mechanics and posture.    Written Home Exercises Provided: Patient instructed to cont prior HEP.     Assessment     Georgiana able to perform all exercises listed above fairly well without extreme difficulty, still pain with bridges but tolerable per pt.     Georgiana Is progressing slowly towards her goals.   Pt prognosis is Fair.     Pt will continue to benefit from skilled outpatient physical therapy to address the deficits listed in the problem list box on initial evaluation, provide pt/family education and to maximize pt's level of independence in the home and community environment.     Pt's spiritual, cultural and educational needs considered and pt agreeable to plan of care and goals.     Anticipated barriers to physical therapy: none    Goals:   Short Term Goals: 4 weeks   1. Patient will be Independent with Home Exercise Program. GOAL MET.   2. Patient will demonstrate with improved Posture and  Body Mechanics. GOAL MET.  3. Patient will increase Lower Extremity and Core Strength to grossly 4+/5  4. Patient will decrease complaints of pain with activity to Less than or Equal to 5/10      Long Term Goals: 8 weeks   1. Patient will tolerate 30 minutes of activity with complaints of Pain at Less Than or Equal to 4/10    2. Patient will improve FOTO score by 20% indicating improved function.    Plan     Continue with current plan of care.    Molly Thomson, PTA

## 2023-09-22 ENCOUNTER — DOCUMENTATION ONLY (OUTPATIENT)
Dept: REHABILITATION | Facility: HOSPITAL | Age: 48
End: 2023-09-22
Payer: COMMERCIAL

## 2023-09-22 ENCOUNTER — CLINICAL SUPPORT (OUTPATIENT)
Dept: REHABILITATION | Facility: HOSPITAL | Age: 48
End: 2023-09-22
Payer: COMMERCIAL

## 2023-09-22 DIAGNOSIS — G89.29 CHRONIC BILATERAL LOW BACK PAIN WITH RIGHT-SIDED SCIATICA: Primary | Chronic | ICD-10-CM

## 2023-09-22 DIAGNOSIS — M54.41 CHRONIC BILATERAL LOW BACK PAIN WITH RIGHT-SIDED SCIATICA: Primary | Chronic | ICD-10-CM

## 2023-09-22 PROCEDURE — 97014 ELECTRIC STIMULATION THERAPY: CPT

## 2023-09-22 PROCEDURE — 97110 THERAPEUTIC EXERCISES: CPT

## 2023-09-22 NOTE — PROGRESS NOTES
[facility]   Physical Therapy Treatment Note      Name: Georgiana Zayas  Clinic Number: [id number]    Therapy Diagnosis:   Encounter Diagnosis   Name Primary?    Chronic bilateral low back pain with right-sided sciatica Yes     Physician: Ellis Mcdaniel    Visit Date: 9/22/2023  Visit #: 12/12  PTA Visit #: 3/5   Time In: 0855  Time Out: 0940  Total Billable Time: 45 minutes    Subjective     Pt reports: I am having sharp muscle pain on my right low back. I had injections to my knees but they don't seem to be working yet.  She was previously instructed in home exercise program.  Response to previous treatment: fair  Functional change: increased back pain    Pain: Before treatment: 9/10    Location: Right Lower back    MRI LUMBAR SPINE WITHOUT CONTRAST 8/24/23     CLINICAL HISTORY:  Lumbar radiculopathy, symptoms persist with conservative treatment; Radiculopathy, lumbar region     TECHNIQUE:  Axial and sagittal imaging of the spine is performed using T1, T2, STIR and T2 fat sat sequences without contrast.     COMPARISON:  None available     FINDINGS:  Vertebral bodies are normal in height and alignment.     Disc space heights are well-maintained.  No evidence of disc herniation is seen.     There is facet joint hypertrophy and degenerative changes present most prominent at L3-4 and L4-5 contributing to mild foraminal stenosis at these levels.     Spinal cord signal appears within normal limits.     Osseous marrow signal appears within normal limits.     Impression:     Mild multilevel facet joint osteoarthrosis.    Objective       9/22/23  Spine AROM  Forward bend: 60  Back bend: 10  Lateral trunk lean: right 8 left 20  Trunk rotation: right 38 left 55    9/22/23  MMT abdominals 3-/5  MMT back extension 2+/5    9/22/23  Posture;  Rounded shoulders: YES  Forward head: YES  Increased Kyphosis: YES    9/22/23  MANUAL MUSCLE TEST  Right left   Hip flexion 4-/5 4+/5   Hip abduction  3+/5 4-/5   Knee extension 5/5 5/5   Knee flexion  4/5 4/5   Ankle dorsiflexion 4-/5 4-/5   Ankle plantar flexion  4-/5 4-/5        Treatment     Georgiana received the treatments listed below:      therapeutic exercises to develop strength, ROM, and core stabilization for 30 minutes including:  Scifit stepper 5 minutes level 1.8  Supine SKTC stretch bilateral 10 x 10 second hold   Double knee to chest stretch 10 x 10 second hold  Mini crunch 2 x 10 3 s/h  Bridging with knee ball 2 x 10 3 s/h   Hamstring stretch bilateral with strap 10 x 10 s/h         supervised modalities after being cleared for contradictions: IFC Electrical Stimulation:  Georgiana received IFC Electrical Stimulation to her right low back/glute areas for pain control. Pt received stimulation at 100 % scan at a frequency of 80/150Hz at 16 Volts Cv for 12 minutes along with moist heat with appropriate layers.  Georgiana tolderated treatment well without any adverse effects during or after application.       Patient Education and Home Exercises       Education provided:   -received verbal and tactile cues to facilitate proper execution of exercises, body mechanics and posture.    Written Home Exercises Provided: Patient instructed to cont prior HEP.     Assessment     Georgiana has completed her therapy prescription. She did not progress well especially with pain management. Her core and hip muscle strength remains weak and she has a hard time performing traditional exercises due to multi joint pain and discomfort. Therapist has recommend aquatic therapy at [facility] in [place] and patient will consider this.    Georgiana Is progressing slowly towards her goals.   Pt prognosis is Fair.       Pt's spiritual, cultural and educational needs considered and pt agreeable to plan of care and goals.     Anticipated barriers to physical therapy: none    Goals:   Short Term Goals: 4 weeks   1. Patient will be Independent with Home Exercise Program. GOAL MET.   2.  Patient will demonstrate with improved Posture and Body Mechanics. GOAL MET.  3. Patient will increase Lower Extremity and Core Strength to grossly 4+/5  4. Patient will decrease complaints of pain with activity to Less than or Equal to 5/10      Long Term Goals: 8 weeks   1. Patient will tolerate 30 minutes of activity with complaints of Pain at Less Than or Equal to 4/10    2. Patient will improve FOTO score by 20% indicating improved function. Goal Met with final FOTO score of 65.    Plan     Continue with current plan of care.    Win Nieves, PT

## 2023-09-22 NOTE — PROGRESS NOTES
[facility]  Physical Discharge Note    Name: Georgiana Zayas  Clinic Number: [id number]    Therapy Diagnosis:        Encounter Diagnosis   Name Primary?    Chronic bilateral low back pain with right-sided sciatica          Physician: Chaz Mcdaniel*     Physician Orders: PT Eval and Treat low back pain  Medical Diagnosis from Referral: M54.41  Evaluation Date: 7/31/2023  Authorization Period Expiration: pending insurance approval  Visit # / Visits authorized: 1/ 12   Initial FOTO: 33/ 100    Date of Last visit: 9/22/23  Total Visits Received: 12    9/22/23  Spine AROM  Forward bend: 60  Back bend: 10  Lateral trunk lean: right 8 left 20  Trunk rotation: right 38 left 55     9/22/23  MMT abdominals 3-/5  MMT back extension 2+/5     9/22/23  Posture;  Rounded shoulders: YES  Forward head: YES  Increased Kyphosis: YES     9/22/23  MANUAL MUSCLE TEST  Right left   Hip flexion 4-/5 4+/5   Hip abduction 3+/5 4-/5   Knee extension 5/5 5/5   Knee flexion  4/5 4/5   Ankle dorsiflexion 4-/5 4-/5   Ankle plantar flexion  4-/5 4-/5            ASSESSMENT      Georgiana has completed her therapy prescription. She did not progress well especially with pain management. Her core and hip muscle strength remains weak and she has a hard time performing traditional exercises due to multi joint pain and discomfort. Therapist has recommend aquatic therapy at [facility] in [place] and patient will consider this.    Discharge reason: Patient has completed the physician's prescription and Patient has reached the maximum rehab potential for the present time    Discharge FOTO Score: 65    Goals:   Short Term Goals: 4 weeks   1. Patient will be Independent with Home Exercise Program. GOAL MET.   2. Patient will demonstrate with improved Posture and Body Mechanics. GOAL MET.  3. Patient will increase Lower Extremity and Core Strength to grossly 4+/5  4. Patient will decrease complaints of pain with activity  to Less than or Equal to 5/10      Long Term Goals: 8 weeks   1. Patient will tolerate 30 minutes of activity with complaints of Pain at Less Than or Equal to 4/10    2. Patient will improve FOTO score by 20% indicating improved function. Goal Met with final FOTO score of 65.    PLAN   This patient is discharged from Physical Therapy      Win Nieves, PT

## 2023-09-26 DIAGNOSIS — R29.898 WEAKNESS OF BOTH LOWER EXTREMITIES: Primary | ICD-10-CM

## 2023-09-26 DIAGNOSIS — R29.898 WEAKNESS OF BOTH UPPER EXTREMITIES: ICD-10-CM

## 2023-10-05 ENCOUNTER — TELEPHONE (OUTPATIENT)
Dept: FAMILY MEDICINE | Facility: CLINIC | Age: 48
End: 2023-10-05
Payer: COMMERCIAL

## 2023-10-05 NOTE — TELEPHONE ENCOUNTER
----- Message from Elisa Mcdaniel MD sent at 10/4/2023 11:38 AM CDT -----  Please call patient regarding lab results. DM control is not at goal. Is patient taking ozempic 1 mg SC weekly? Is she tolerating without any problems or side effects? If yes, recommend increasing to 2 mg SC weekly. Labs, otherwise, ok/stable. Thanks!      1052- call made to pt regarding above message. Pt states she forgets sometimes to take ozempic med. States she will try and remember to take it better than she has been. States she does not need a refill at this time. Advised pt to continue to take the 1mg as rx weekly.

## 2023-10-19 DIAGNOSIS — E78.49 OTHER HYPERLIPIDEMIA: Chronic | ICD-10-CM

## 2023-10-19 RX ORDER — PRAVASTATIN SODIUM 20 MG/1
20 TABLET ORAL NIGHTLY
Qty: 90 TABLET | Refills: 1 | Status: SHIPPED | OUTPATIENT
Start: 2023-10-19 | End: 2023-11-29 | Stop reason: SDUPTHER

## 2023-10-23 LAB
LEFT EYE DM RETINOPATHY: NEGATIVE
RIGHT EYE DM RETINOPATHY: NEGATIVE

## 2023-10-25 ENCOUNTER — HOSPITAL ENCOUNTER (EMERGENCY)
Facility: HOSPITAL | Age: 48
Discharge: HOME | End: 2023-10-25
Attending: EMERGENCY MEDICINE
Payer: COMMERCIAL

## 2023-10-25 VITALS
HEART RATE: 107 BPM | HEIGHT: 64 IN | OXYGEN SATURATION: 99 % | WEIGHT: 200 LBS | TEMPERATURE: 98 F | RESPIRATION RATE: 17 BRPM | BODY MASS INDEX: 34.15 KG/M2 | SYSTOLIC BLOOD PRESSURE: 140 MMHG | DIASTOLIC BLOOD PRESSURE: 92 MMHG

## 2023-10-25 DIAGNOSIS — E86.0 DEHYDRATION: ICD-10-CM

## 2023-10-25 DIAGNOSIS — J02.0 STREP PHARYNGITIS: ICD-10-CM

## 2023-10-25 DIAGNOSIS — E87.6 HYPOKALEMIA: ICD-10-CM

## 2023-10-25 DIAGNOSIS — E11.65 HYPERGLYCEMIA DUE TO DIABETES MELLITUS: ICD-10-CM

## 2023-10-25 DIAGNOSIS — R19.7 DIARRHEA, UNSPECIFIED TYPE: ICD-10-CM

## 2023-10-25 DIAGNOSIS — R11.0 NAUSEA: Primary | ICD-10-CM

## 2023-10-25 LAB
ALBUMIN SERPL BCP-MCNC: 3.5 G/DL (ref 3.5–5)
ALBUMIN/GLOB SERPL: 0.7 {RATIO}
ALP SERPL-CCNC: 78 U/L (ref 39–100)
ALT SERPL W P-5'-P-CCNC: 34 U/L (ref 13–56)
ANION GAP SERPL CALCULATED.3IONS-SCNC: 16 MMOL/L (ref 7–16)
AST SERPL W P-5'-P-CCNC: 35 U/L (ref 15–37)
BASOPHILS # BLD AUTO: 0.01 K/UL (ref 0–0.2)
BASOPHILS NFR BLD AUTO: 0.2 % (ref 0–1)
BILIRUB SERPL-MCNC: 0.6 MG/DL (ref ?–1.2)
BILIRUB UR QL STRIP: NEGATIVE
BUN SERPL-MCNC: 7 MG/DL (ref 7–18)
BUN/CREAT SERPL: 8 (ref 6–20)
CALCIUM SERPL-MCNC: 9.1 MG/DL (ref 8.5–10.1)
CHLORIDE SERPL-SCNC: 99 MMOL/L (ref 98–107)
CLARITY UR: CLEAR
CO2 SERPL-SCNC: 25 MMOL/L (ref 21–32)
COLOR UR: YELLOW
CREAT SERPL-MCNC: 0.88 MG/DL (ref 0.55–1.02)
DIFFERENTIAL METHOD BLD: ABNORMAL
EGFR (NO RACE VARIABLE) (RUSH/TITUS): 81 ML/MIN/1.73M2
EOSINOPHIL # BLD AUTO: 0.08 K/UL (ref 0–0.5)
EOSINOPHIL NFR BLD AUTO: 1.6 % (ref 1–4)
ERYTHROCYTE [DISTWIDTH] IN BLOOD BY AUTOMATED COUNT: 14.3 % (ref 11.5–14.5)
FLUAV AG UPPER RESP QL IA.RAPID: NEGATIVE
FLUBV AG UPPER RESP QL IA.RAPID: NEGATIVE
GLOBULIN SER-MCNC: 5 G/DL (ref 2–4)
GLUCOSE SERPL-MCNC: 192 MG/DL (ref 74–106)
GLUCOSE UR STRIP-MCNC: NEGATIVE MG/DL
HCT VFR BLD AUTO: 42.7 % (ref 38–47)
HGB BLD-MCNC: 13.9 G/DL (ref 12–16)
KETONES UR STRIP-SCNC: NORMAL MG/DL
LEUKOCYTE ESTERASE UR QL STRIP: NEGATIVE
LIPASE SERPL-CCNC: 26 U/L (ref 73–393)
LYMPHOCYTES # BLD AUTO: 1.5 K/UL (ref 1–4.8)
LYMPHOCYTES NFR BLD AUTO: 30.5 % (ref 27–41)
MCH RBC QN AUTO: 27.4 PG (ref 27–31)
MCHC RBC AUTO-ENTMCNC: 32.6 G/DL (ref 32–36)
MCV RBC AUTO: 84.2 FL (ref 80–96)
MONOCYTES # BLD AUTO: 0.57 K/UL (ref 0–0.8)
MONOCYTES NFR BLD AUTO: 11.6 % (ref 2–6)
MPC BLD CALC-MCNC: 10.2 FL (ref 9.4–12.4)
NEUTROPHILS # BLD AUTO: 2.76 K/UL (ref 1.8–7.7)
NEUTROPHILS NFR BLD AUTO: 56.1 % (ref 53–65)
NITRITE UR QL STRIP: NEGATIVE
PH UR STRIP: 5.5 PH UNITS
PLATELET # BLD AUTO: 375 K/UL (ref 150–400)
POTASSIUM SERPL-SCNC: 3.1 MMOL/L (ref 3.5–5.1)
PROT SERPL-MCNC: 8.5 G/DL (ref 6.4–8.2)
PROT UR QL STRIP: NEGATIVE
RAPID GROUP A STREP: POSITIVE
RBC # BLD AUTO: 5.07 M/UL (ref 4.2–5.4)
RBC # UR STRIP: NEGATIVE /UL
SARS-COV+SARS-COV-2 AG RESP QL IA.RAPID: NEGATIVE
SODIUM SERPL-SCNC: 137 MMOL/L (ref 136–145)
SP GR UR STRIP: 1.01
UROBILINOGEN UR STRIP-ACNC: 0.2 MG/DL
WBC # BLD AUTO: 4.92 K/UL (ref 4.5–11)

## 2023-10-25 PROCEDURE — 99284 EMERGENCY DEPT VISIT MOD MDM: CPT | Mod: ,,, | Performed by: EMERGENCY MEDICINE

## 2023-10-25 PROCEDURE — 96361 HYDRATE IV INFUSION ADD-ON: CPT

## 2023-10-25 PROCEDURE — 81003 URINALYSIS AUTO W/O SCOPE: CPT | Performed by: EMERGENCY MEDICINE

## 2023-10-25 PROCEDURE — 63600175 PHARM REV CODE 636 W HCPCS: Performed by: EMERGENCY MEDICINE

## 2023-10-25 PROCEDURE — C9113 INJ PANTOPRAZOLE SODIUM, VIA: HCPCS | Performed by: EMERGENCY MEDICINE

## 2023-10-25 PROCEDURE — 96365 THER/PROPH/DIAG IV INF INIT: CPT

## 2023-10-25 PROCEDURE — 25000003 PHARM REV CODE 250: Performed by: EMERGENCY MEDICINE

## 2023-10-25 PROCEDURE — 83690 ASSAY OF LIPASE: CPT | Performed by: EMERGENCY MEDICINE

## 2023-10-25 PROCEDURE — 99284 PR EMERGENCY DEPT VISIT,LEVEL IV: ICD-10-PCS | Mod: ,,, | Performed by: EMERGENCY MEDICINE

## 2023-10-25 PROCEDURE — 96376 TX/PRO/DX INJ SAME DRUG ADON: CPT

## 2023-10-25 PROCEDURE — 87880 STREP A ASSAY W/OPTIC: CPT | Performed by: EMERGENCY MEDICINE

## 2023-10-25 PROCEDURE — 85025 COMPLETE CBC W/AUTO DIFF WBC: CPT | Performed by: EMERGENCY MEDICINE

## 2023-10-25 PROCEDURE — 80053 COMPREHEN METABOLIC PANEL: CPT | Performed by: EMERGENCY MEDICINE

## 2023-10-25 PROCEDURE — 99285 EMERGENCY DEPT VISIT HI MDM: CPT | Mod: 25

## 2023-10-25 PROCEDURE — 87428 SARSCOV & INF VIR A&B AG IA: CPT | Performed by: EMERGENCY MEDICINE

## 2023-10-25 PROCEDURE — 96375 TX/PRO/DX INJ NEW DRUG ADDON: CPT

## 2023-10-25 RX ORDER — ONDANSETRON 2 MG/ML
4 INJECTION INTRAMUSCULAR; INTRAVENOUS
Status: COMPLETED | OUTPATIENT
Start: 2023-10-25 | End: 2023-10-25

## 2023-10-25 RX ORDER — ONDANSETRON 4 MG/1
4 TABLET, FILM COATED ORAL EVERY 8 HOURS PRN
Qty: 15 TABLET | Refills: 0 | Status: SHIPPED | OUTPATIENT
Start: 2023-10-25 | End: 2023-10-30

## 2023-10-25 RX ORDER — POTASSIUM CHLORIDE 20 MEQ/1
20 TABLET, EXTENDED RELEASE ORAL 2 TIMES DAILY
Qty: 6 TABLET | Refills: 0 | Status: SHIPPED | OUTPATIENT
Start: 2023-10-25 | End: 2023-10-28

## 2023-10-25 RX ORDER — AMOXICILLIN 500 MG/1
500 CAPSULE ORAL 3 TIMES DAILY
Qty: 30 CAPSULE | Refills: 0 | Status: SHIPPED | OUTPATIENT
Start: 2023-10-25 | End: 2023-11-04

## 2023-10-25 RX ORDER — PANTOPRAZOLE SODIUM 40 MG/10ML
40 INJECTION, POWDER, LYOPHILIZED, FOR SOLUTION INTRAVENOUS
Status: COMPLETED | OUTPATIENT
Start: 2023-10-25 | End: 2023-10-25

## 2023-10-25 RX ORDER — ACETAMINOPHEN 500 MG
1000 TABLET ORAL
Status: COMPLETED | OUTPATIENT
Start: 2023-10-25 | End: 2023-10-25

## 2023-10-25 RX ORDER — HYDROMORPHONE HYDROCHLORIDE 1 MG/ML
1 INJECTION, SOLUTION INTRAMUSCULAR; INTRAVENOUS; SUBCUTANEOUS
Status: DISCONTINUED | OUTPATIENT
Start: 2023-10-25 | End: 2023-10-25

## 2023-10-25 RX ADMIN — ONDANSETRON 4 MG: 2 INJECTION INTRAMUSCULAR; INTRAVENOUS at 08:10

## 2023-10-25 RX ADMIN — ACETAMINOPHEN 1000 MG: 500 TABLET ORAL at 08:10

## 2023-10-25 RX ADMIN — CEFTRIAXONE SODIUM 1 G: 1 INJECTION, POWDER, FOR SOLUTION INTRAMUSCULAR; INTRAVENOUS at 08:10

## 2023-10-25 RX ADMIN — SODIUM CHLORIDE 500 ML: 9 INJECTION, SOLUTION INTRAVENOUS at 08:10

## 2023-10-25 RX ADMIN — SODIUM CHLORIDE 1000 ML: 9 INJECTION, SOLUTION INTRAVENOUS at 06:10

## 2023-10-25 RX ADMIN — ONDANSETRON 4 MG: 2 INJECTION INTRAMUSCULAR; INTRAVENOUS at 06:10

## 2023-10-25 RX ADMIN — PANTOPRAZOLE SODIUM 40 MG: 40 INJECTION, POWDER, FOR SOLUTION INTRAVENOUS at 06:10

## 2023-10-25 NOTE — Clinical Note
"Georgiana"Shayna Zayas was seen and treated in our emergency department on 10/25/2023.  She may return to school on 10/26/2023.  Luc Zayas was with his mother in the ED    If you have any questions or concerns, please don't hesitate to call.      BRO Fountain RN RN"

## 2023-10-25 NOTE — ED PROVIDER NOTES
Encounter Date: 10/25/2023       History     Chief Complaint   Patient presents with    Diarrhea    Nausea    Headache     Nausea, diarrhea and headache x 3 days     PT IS A 48 YR OLD BF WITH NAUSEA AND DIARRHEA (5 STOOLS A DAY) ONSET 2 D AGO WITH SISTER HAVING SIMILAR PRIOR TO PT'S ONSET OF SYMPTOMS. PT HAS HA AND DIFFUSE ABDOMINAL PAIN  -CRAMPING DESCRIBED AS 8/10 WITHOUT INCREASING OR DECREASING FACTORS.   PT DENIES CHRONIC HA OR GI ISSUES.      The history is provided by the patient.     Review of patient's allergies indicates:   Allergen Reactions    Codeine Itching     Past Medical History:   Diagnosis Date    Arthritis     Depression     Diabetes mellitus     Diabetes mellitus, type 2     GERD (gastroesophageal reflux disease)     Hearing difficulty     Hypertension     Liver disease     Sleep apnea      Past Surgical History:   Procedure Laterality Date    CARPAL TUNNEL RELEASE Bilateral      SECTION      INJECTION OF ANESTHETIC AGENT AROUND MEDIAL BRANCH NERVES INNERVATING LUMBAR FACET JOINT Bilateral 2021    Procedure: BLOCK, NERVE, FACET JOINT, LUMBAR, MEDIAL BRANCH;  Surgeon: Amari Razo MD;  Location: [facility]IN [facility];  Service: Pain Management;  Laterality: Bilateral;  Bilateral L3-S1 Facet Injection    INJECTION OF ANESTHETIC AGENT AROUND MEDIAL BRANCH NERVES INNERVATING LUMBAR FACET JOINT Bilateral 2022    Procedure: BLOCK, NERVE, FACET JOINT, LUMBAR, MEDIAL BRANCH;  Surgeon: Amari Razo MD;  Location: [facility]IN [facility];  Service: Pain Management;  Laterality: Bilateral;  Bilateral L3-S1 FI    INJECTION OF ANESTHETIC AGENT AROUND MEDIAL BRANCH NERVES INNERVATING LUMBAR FACET JOINT Bilateral 2023    Procedure: BLOCK, NERVE, FACET JOINT, LUMBAR, MEDIAL BRANCH;  Surgeon: Amari Razo MD;  Location: [facility];  Service: Pain Management;  Laterality: Bilateral;  Bilateral L3-S1 FI    LEFT HEART CATHETERIZATION Left 2021    Procedure: Left  heart cath;  Surgeon: Jeremy Rojo DO;  Location: [facility] CATH LAB;  Service: Cardiology;  Laterality: Left;    RADIOFREQUENCY ABLATION OF LUMBAR MEDIAL BRANCH NERVE AT SINGLE LEVEL Bilateral 4/12/2023    Procedure: RADIOFREQUENCY ABLATION, NERVE, SPINAL, LUMBAR, MEDIAL BRANCH, 1 LEVEL;  Surgeon: Amari Razo MD;  [facility];  Service: Pain Management;  Laterality: Bilateral;  Bilateral L3-5 RFTC    STAPEDES SURGERY Bilateral     TONSILLECTOMY       Family History   Problem Relation Age of Onset    Cancer Mother     Diabetes Mother     Hypertension Mother      Social History     Tobacco Use    Smoking status: Never    Smokeless tobacco: Never   Substance Use Topics    Alcohol use: Not Currently    Drug use: Never     Review of Systems   Constitutional:  Positive for activity change and fatigue.   HENT: Negative.     Eyes: Negative.    Respiratory: Negative.     Cardiovascular: Negative.    Gastrointestinal:  Positive for abdominal pain, diarrhea and nausea.   Endocrine: Negative.    Genitourinary: Negative.    Musculoskeletal: Negative.    Skin: Negative.    Allergic/Immunologic: Positive for immunocompromised state (DM).   Neurological:  Positive for headaches. Negative for syncope.   Hematological: Negative.    Psychiatric/Behavioral: Negative.         Physical Exam     Initial Vitals [10/25/23 0549]   BP Pulse Resp Temp SpO2   (!) 139/100 (!) 140 20 98.4 °F (36.9 °C) 98 %      MAP       --         Physical Exam    Nursing note and vitals reviewed.  Constitutional: She appears well-developed and well-nourished. She is cooperative. She appears distressed.   HENT:   Head: Normocephalic and atraumatic.   Right Ear: External ear normal.   Left Ear: External ear normal.   Nose: Nose normal.   DRY MM  PHARYNX MILD ERYTHEMA   Eyes: Conjunctivae and EOM are normal. Pupils are equal, round, and reactive to light.   DISCS SHARP, NL VESSELS   Neck: Trachea normal. Neck supple.   Normal range of  motion.  Cardiovascular:  Regular rhythm, normal heart sounds and intact distal pulses.           No murmur heard.  Pulses:       Radial pulses are 3+ on the right side and 3+ on the left side.        Dorsalis pedis pulses are 3+ on the right side and 3+ on the left side.   Pulmonary/Chest: Breath sounds normal. No respiratory distress. She has no wheezes.   Abdominal: Abdomen is soft. Bowel sounds are normal. There is abdominal tenderness (DIFFUSE).   Musculoskeletal:         General: No tenderness or edema. Normal range of motion.      Right shoulder: Normal.      Left shoulder: Normal.      Right upper arm: Normal.      Left upper arm: Normal.      Right elbow: Normal.      Left elbow: Normal.      Right forearm: Normal.      Left forearm: Normal.      Right wrist: Normal.      Left wrist: Normal.      Right hand: Normal.      Left hand: Normal.      Cervical back: Normal range of motion and neck supple.     Lymphadenopathy:     She has no cervical adenopathy.     She has no axillary adenopathy.   Neurological: She is alert and oriented to person, place, and time. She has normal strength. No cranial nerve deficit or sensory deficit. She displays a negative Romberg sign. GCS eye subscore is 4. GCS verbal subscore is 5. GCS motor subscore is 6.   Reflex Scores:       Bicep reflexes are 2+ on the right side and 2+ on the left side.       Patellar reflexes are 2+ on the right side and 2+ on the left side.  Skin: Skin is warm and dry. Capillary refill takes less than 2 seconds.   Psychiatric: She has a normal mood and affect. Her speech is normal. Cognition and memory are normal.   DYSPHORIC MOOD         Medical Screening Exam   See Full Note    ED Course   Procedures  Labs Reviewed   THROAT SCREEN, RAPID STREP - Abnormal; Notable for the following components:       Result Value    Rapid Group A Strep Positive (*)     All other components within normal limits   COMPREHENSIVE METABOLIC PANEL - Abnormal; Notable for the  following components:    Potassium 3.1 (*)     Glucose 192 (*)     Total Protein 8.5 (*)     Globulin 5.0 (*)     All other components within normal limits   LIPASE - Abnormal; Notable for the following components:    Lipase 26 (*)     All other components within normal limits   CBC WITH DIFFERENTIAL - Abnormal; Notable for the following components:    Monocytes % 11.6 (*)     All other components within normal limits   SARS-COV2 (COVID) W/ FLU ANTIGEN - Normal    Narrative:     Negative SARS-CoV results should not be used as the sole basis for treatment or patient management decisions; negative results should be considered in the context of a patient's recent exposures, history and the presene of clinical signs and symptoms consistent with COVID-19.  Negative results should be treated as presumptive and confirmed by molecular assay, if necessary for patient management.   CBC W/ AUTO DIFFERENTIAL    Narrative:     The following orders were created for panel order CBC Auto Differential.  Procedure                               Abnormality         Status                     ---------                               -----------         ------                     CBC with Differential[[id number]]       Abnormal            Final result                 Please view results for these tests on the individual orders.   URINALYSIS          Imaging Results              CT Head Without Contrast (Final result)  Result time 10/25/23 08:26:57      Final result by Luis Caro MD (10/25/23 08:26:57)                   Impression:      No acute intracranial abnormality.      Electronically signed by: Luis Caro  Date:    10/25/2023  Time:    08:26               Narrative:    EXAMINATION:  CT HEAD WITHOUT CONTRAST    CLINICAL HISTORY:  Headache, chronic, new features or increased frequency;    TECHNIQUE:  CT of the head performed without the use of intravenous contrast.  The CT examination was performed using one or more of the  following dose reduction techniques: Automated exposure control, adjustment of the mA and kV according to patient's size, use of acute or iterative reconstruction techniques.    COMPARISON:  None.    FINDINGS:  No acute intracranial hemorrhage, mass, infarct, or fluid collection.  Ventricles, sulci, and cisterns appear normal.  No mass effect or midline shift.  Calvarium intact.  Mastoid air cells and paranasal sinuses clear.                                       CT Abdomen Pelvis  Without Contrast (Final result)  Result time 10/25/23 08:25:06      Final result by Luis Caro MD (10/25/23 08:25:06)                   Impression:      No acute abnormality identified in the abdomen or pelvis.    Diffuse fatty infiltration of the liver.      Electronically signed by: Luis Caro  Date:    10/25/2023  Time:    08:25               Narrative:    EXAMINATION:  CT ABDOMEN PELVIS WITHOUT CONTRAST    CLINICAL HISTORY:  Abdominal pain, acute, nonlocalized;    TECHNIQUE:  Low dose axial images, sagittal and coronal reformations were obtained from the lung bases to the pubic symphysis.  Oral contrast was not administered. The CT examination was performed using one or more of the following dose reduction techniques: Automated exposure control, adjustment of the mA and kV according to patient's size, use of acute or iterative reconstruction techniques.    COMPARISON:  02/15/2021    FINDINGS:  Lung bases are clear.    Diffuse fatty infiltration of the liver.  The gallbladder is unremarkable.  Adrenals, spleen, and pancreas appear normal.  The kidneys are normal.  There is no renal stone.  No hydronephrosis.  No hydroureter.  Urinary bladder unremarkable.  No adnexal lesion.    No pneumoperitoneum.  No ascites.  Appendix is normal.  No bowel obstruction.  No adenopathy.  Abdominal aorta is normal in size.    No acute fracture.                                    X-Rays:   Independently Interpreted Readings:   Head CT: Viewed and  Other Results - Imaging    Updated   Order   10/25/23 0829  CT Head Without Contrast  Performed: 10/25/23 0705  Final         Impression: No acute intracranial abnormality. Electronically signed by: Luis Caro Date: 10/25/2023 Time: 08:26    10/25/23 0827  CT Abdomen Pelvis  Without Contrast  Performed: 10/25/23 0705  Final         Impression: No acute abnormality identified in the abdomen or pelvis. Diffuse fatty infiltration of the liver. Electronically signed by: Luis Caro Date: 10/25/2023 Time: 08:25         Abdomen: Viewed and Other Results - Imaging    Updated   Order   10/25/23 0829  CT Head Without Contrast  Performed: 10/25/23 0705  Final         Impression: No acute intracranial abnormality. Electronically signed by: Luis Caro Date: 10/25/2023 Time: 08:26    10/25/23 0827  CT Abdomen Pelvis  Without Contrast  Performed: 10/25/23 0705  Final         Impression: No acute abnormality identified in the abdomen or pelvis. Diffuse fatty infiltration of the liver. Electronically signed by: Luis Caro Date: 10/25/2023 Time: 08:25         Other Readings:  Viewed and Other Results - Imaging    Updated   Order   10/25/23 0829  CT Head Without Contrast  Performed: 10/25/23 0705  Final         Impression: No acute intracranial abnormality. Electronically signed by: Luis Caro Date: 10/25/2023 Time: 08:26    10/25/23 0827  CT Abdomen Pelvis  Without Contrast  Performed: 10/25/23 0705  Final         Impression: No acute abnormality identified in the abdomen or pelvis. Diffuse fatty infiltration of the liver. Electronically signed by: Luis Caro Date: 10/25/2023 Time: 08:25          Medications   sodium chloride 0.9% bolus 1,000 mL 1,000 mL (0 mLs Intravenous Stopped 10/25/23 0755)   ondansetron injection 4 mg (4 mg Intravenous Given 10/25/23 0633)   pantoprazole injection 40 mg (40 mg Intravenous Given 10/25/23 0633)   ondansetron injection 4 mg (4 mg Intravenous Given 10/25/23 0815)   sodium chloride 0.9% bolus 500 mL  500 mL (0 mLs Intravenous Stopped 10/25/23 0943)   cefTRIAXone (ROCEPHIN) 1 g in dextrose 5 % in water (D5W) 100 mL IVPB (MB+) (0 g Intravenous Stopped 10/25/23 0915)   acetaminophen tablet 1,000 mg (1,000 mg Oral Given 10/25/23 0843)     Medical Decision Making  PT IS A 48 YR OLD BF WITH NAUSEA AND DIARRHEA (5 STOOLS A DAY) ONSET 2 D AGO WITH SISTER HAVING SIMILAR PRIOR TO PT'S ONSET OF SYMPTOMS. PT HAS HA AND DIFFUSE ABDOMINAL PAIN  -CRAMPING DESCRIBED AS 8/10 WITHOUT INCREASING OR DECREASING FACTORS.   PT DENIES CHRONIC HA OR GI ISSUES.    Amount and/or Complexity of Data Reviewed  Labs: ordered.     Details:   Updated   Order   10/25/23 0825  SARS-CoV2 (COVID) with FLU Antigen  Collected: 10/25/23 0806  Final result  Specimen: Respiratory from Nasopharyngeal      Influenza A Negative  Influenza B Negative  COVID-19 Ag Negative       10/25/23 0815  Rapid strep screen  Collected: 10/25/23 0806  Final result  Specimen: Throat      Rapid Group A Strep Positive Abnormal        10/25/23 0635  Urinalysis  Collected: 10/25/23 0631  Final result  Specimen: Urine      Color, UA Yellow  Clarity, UA Clear  pH, UA 5.5 pH Units  Leukocytes, UA Negative  Nitrites, UA Negative  Protein, UA Negative  Glucose, UA Negative mg/dL  Ketones, UA Trace mg/dL  Urobilinogen, UA 0.2 mg/dL  Bilirubin, UA Negative  Blood, UA Negative  Specific Gravity, UA 1.015       10/25/23 0629  Lipase  Collected: 10/25/23 0618  Final result  Specimen: Blood      Lipase 26 Low  U/L       10/25/23 0629  Comprehensive metabolic panel  Collected: 10/25/23 0618  Final result  Specimen: Blood      Sodium 137 mmol/L  Potassium 3.1 Low  mmol/L  Chloride 99 mmol/L  CO2 25 mmol/L  Anion Gap 16 mmol/L  Glucose 192 High  mg/dL  BUN 7 mg/dL  Creatinine 0.88 mg/dL  BUN/Creatinine Ratio 8  Calcium 9.1 mg/dL  Total Protein 8.5 High  g/dL  Albumin 3.5 g/dL  Globulin 5.0 High  g/dL  A/G Ratio 0.7  Bilirubin, Total 0.6 mg/dL  Alk Phos 78 U/L  ALT 34 U/L  AST 35  U/L  eGFR 81 mL/min/1.73m2       10/25/23 0621  CBC Auto Differential  Collected: 10/25/23 0618  Final result  Specimen: Blood         10/25/23 0621  CBC with Differential  Collected: 10/25/23 0618  Final result  Specimen: Blood      WBC 4.92 K/uL  RBC 5.07 M/uL  Hemoglobin 13.9 g/dL  Hematocrit 42.7 %  MCV 84.2 fL  MCH 27.4 pg  MCHC 32.6 g/dL  RDW 14.3 %  Platelet Count 375 K/uL  MPV 10.2 fL  Neutrophils % 56.1 %  Lymphocytes % 30.5 %  Neutrophils, Abs 2.76 K/uL  Lymphocytes, Absolute 1.50 K/uL  Diff Type Auto  Monocytes % 11.6 High  %  Eosinophils % 1.6 %  Basophils % 0.2 %  Monocytes, Absolute 0.57 K/uL  Eosinophils, Absolute 0.08 K/uL  Basophils, Absolute 0.01 K/uL           Radiology: ordered.     Details: Viewed and Other Results - Imaging    Updated   Order   10/25/23 0829  CT Head Without Contrast  Performed: 10/25/23 0705  Final         Impression: No acute intracranial abnormality. Electronically signed by: Luis Caro Date: 10/25/2023 Time: 08:26    10/25/23 0827  CT Abdomen Pelvis  Without Contrast  Performed: 10/25/23 0705  Final         Impression: No acute abnormality identified in the abdomen or pelvis. Diffuse fatty infiltration of the liver. Electronically signed by: Luis Caro Date: 10/25/2023 Time: 08:25        Discussion of management or test interpretation with external provider(s): EXAM  LABS AS ABOVE K 3.1, POS STREP, KET UR,   CT NEG HEAD AND ABD  IVF, ZOFRAN/PROTONIX /ROCEPHIN  IMPROVED, AMBULATORY  PT HAD A DIARRHEAL STOOL IN ED BUT FORGOT TO USE SPECIMEN CONTAINER  PT IS AMBULATORY AND IN NAD ON DC  DC HOME    Risk  OTC drugs.  Prescription drug management.  Risk Details: WORSENING OF SYMPTOMOLOGY                               Clinical Impression:   Final diagnoses:  [R11.0] Nausea (Primary)  [R19.7] Diarrhea, unspecified type  [E86.0] Dehydration  [E11.65] Hyperglycemia due to diabetes mellitus  [J02.0] Strep pharyngitis  [E87.6] Hypokalemia        ED Disposition Condition     Discharge Stable          ED Prescriptions       Medication Sig Dispense Start Date End Date Auth. Provider    amoxicillin (AMOXIL) 500 MG capsule Take 1 capsule (500 mg total) by mouth 3 (three) times daily. for 10 days 30 capsule 10/25/2023 11/4/2023 Praveena Mcmanus MD    ondansetron (ZOFRAN) 4 MG tablet Take 1 tablet (4 mg total) by mouth every 8 (eight) hours as needed for Nausea. 15 tablet 10/25/2023 10/30/2023 Praveena Mcmanus MD    potassium chloride SA (K-DUR,KLOR-CON) 20 MEQ tablet Take 1 tablet (20 mEq total) by mouth 2 (two) times daily. for 3 days 6 tablet 10/25/2023 10/28/2023 Praveena Mcmanus MD          Follow-up Information       Follow up With Specialties Details Why Contact Info    Elisa Mcdaniel MD Family Medicine In 1 week If symptoms worsen [facility] [address]  [facility] [place] [address] [phone]               Praveena Mcmanus MD  10/25/23 9876

## 2023-10-25 NOTE — Clinical Note
"Georgiana"Shayna Zayas was seen and treated in our emergency department on 10/25/2023.  She may return to school on 10/26/2023.  Luc Zayas was with his mother in the ED    If you have any questions or concerns, please don't hesitate to call.      BRO Fountain RN, MD"

## 2023-11-02 ENCOUNTER — OFFICE VISIT (OUTPATIENT)
Dept: OBSTETRICS AND GYNECOLOGY | Facility: CLINIC | Age: 48
End: 2023-11-02
Payer: COMMERCIAL

## 2023-11-02 VITALS
SYSTOLIC BLOOD PRESSURE: 132 MMHG | HEIGHT: 64 IN | BODY MASS INDEX: 33.32 KG/M2 | WEIGHT: 195.19 LBS | DIASTOLIC BLOOD PRESSURE: 76 MMHG

## 2023-11-02 DIAGNOSIS — Z01.419 WOMEN'S ANNUAL ROUTINE GYNECOLOGICAL EXAMINATION: Primary | ICD-10-CM

## 2023-11-02 DIAGNOSIS — Z12.11 COLON CANCER SCREENING: ICD-10-CM

## 2023-11-02 PROCEDURE — 3078F DIAST BP <80 MM HG: CPT | Mod: CPTII,,, | Performed by: OBSTETRICS & GYNECOLOGY

## 2023-11-02 PROCEDURE — 4010F ACE/ARB THERAPY RXD/TAKEN: CPT | Mod: CPTII,,, | Performed by: OBSTETRICS & GYNECOLOGY

## 2023-11-02 PROCEDURE — 99215 OFFICE O/P EST HI 40 MIN: CPT | Mod: PBBFAC | Performed by: OBSTETRICS & GYNECOLOGY

## 2023-11-02 PROCEDURE — 99396 PREV VISIT EST AGE 40-64: CPT | Mod: S$PBB,,, | Performed by: OBSTETRICS & GYNECOLOGY

## 2023-11-02 PROCEDURE — 3066F PR DOCUMENTATION OF TREATMENT FOR NEPHROPATHY: ICD-10-PCS | Mod: CPTII,,, | Performed by: OBSTETRICS & GYNECOLOGY

## 2023-11-02 PROCEDURE — 3066F NEPHROPATHY DOC TX: CPT | Mod: CPTII,,, | Performed by: OBSTETRICS & GYNECOLOGY

## 2023-11-02 PROCEDURE — 3008F BODY MASS INDEX DOCD: CPT | Mod: CPTII,,, | Performed by: OBSTETRICS & GYNECOLOGY

## 2023-11-02 PROCEDURE — 3008F PR BODY MASS INDEX (BMI) DOCUMENTED: ICD-10-PCS | Mod: CPTII,,, | Performed by: OBSTETRICS & GYNECOLOGY

## 2023-11-02 PROCEDURE — 3061F PR NEG MICROALBUMINURIA RESULT DOCUMENTED/REVIEW: ICD-10-PCS | Mod: CPTII,,, | Performed by: OBSTETRICS & GYNECOLOGY

## 2023-11-02 PROCEDURE — 3075F PR MOST RECENT SYSTOLIC BLOOD PRESS GE 130-139MM HG: ICD-10-PCS | Mod: CPTII,,, | Performed by: OBSTETRICS & GYNECOLOGY

## 2023-11-02 PROCEDURE — 99396 PR PREVENTIVE VISIT,EST,40-64: ICD-10-PCS | Mod: S$PBB,,, | Performed by: OBSTETRICS & GYNECOLOGY

## 2023-11-02 PROCEDURE — 3046F HEMOGLOBIN A1C LEVEL >9.0%: CPT | Mod: CPTII,,, | Performed by: OBSTETRICS & GYNECOLOGY

## 2023-11-02 PROCEDURE — 4010F PR ACE/ARB THEARPY RXD/TAKEN: ICD-10-PCS | Mod: CPTII,,, | Performed by: OBSTETRICS & GYNECOLOGY

## 2023-11-02 PROCEDURE — 3061F NEG MICROALBUMINURIA REV: CPT | Mod: CPTII,,, | Performed by: OBSTETRICS & GYNECOLOGY

## 2023-11-02 PROCEDURE — 3075F SYST BP GE 130 - 139MM HG: CPT | Mod: CPTII,,, | Performed by: OBSTETRICS & GYNECOLOGY

## 2023-11-02 PROCEDURE — 3046F PR MOST RECENT HEMOGLOBIN A1C LEVEL > 9.0%: ICD-10-PCS | Mod: CPTII,,, | Performed by: OBSTETRICS & GYNECOLOGY

## 2023-11-02 PROCEDURE — 3078F PR MOST RECENT DIASTOLIC BLOOD PRESSURE < 80 MM HG: ICD-10-PCS | Mod: CPTII,,, | Performed by: OBSTETRICS & GYNECOLOGY

## 2023-11-02 PROCEDURE — 1159F MED LIST DOCD IN RCRD: CPT | Mod: CPTII,,, | Performed by: OBSTETRICS & GYNECOLOGY

## 2023-11-02 PROCEDURE — 1159F PR MEDICATION LIST DOCUMENTED IN MEDICAL RECORD: ICD-10-PCS | Mod: CPTII,,, | Performed by: OBSTETRICS & GYNECOLOGY

## 2023-11-02 NOTE — PROGRESS NOTES
Subjective:       Patient ID: Georgiana Zayas is a 47 y.o. female.    Chief Complaint: Well Woman (Here for check up- pt c/o bad cramps and headaches with menses. )    Presents for yearly gyn check.      No specific complaints.  Menses monthly light no specific complaints.    She is not sexually active at present.      We have previously discussed performing tubal ligation.  However she had some unexplained chest pain and had have follow-up with cardiologist.  No significant problems have been found by cardiologist.    Review of Systems      Objective:      Physical Exam  Exam conducted with a chaperone present.   HENT:      Head: Normocephalic.      Nose: Nose normal.   Eyes:      Pupils: Pupils are equal, round, and reactive to light.   Cardiovascular:      Rate and Rhythm: Normal rate.   Pulmonary:      Effort: Pulmonary effort is normal.      Breath sounds: Normal breath sounds.   Chest:      Comments: Breasts without palpable masses no skin retraction or nipple dimpling.  She had mammography screening September 2023 received normal letter  Abdominal:      General: Abdomen is flat.      Palpations: Abdomen is soft.   Genitourinary:     General: Normal vulva.      Vagina: Normal.      Cervix: Normal.      Uterus: Normal.       Adnexa: Right adnexa normal and left adnexa normal.      Rectum: Normal.      Comments: External normal vault normal cervix normal to appearance previous Pap September 20 21- with negative HPV therefore Pap not repeated bimanual exam revealed uterus normal size no adnexal masses noted on vaginal or rectovaginal examination hemoccult was negative.  Colonoscopy versus colon guard screening discussed  Musculoskeletal:         General: Normal range of motion.      Cervical back: Full passive range of motion without pain, normal range of motion and neck supple.   Skin:     General: Skin is dry.   Neurological:      General: No focal deficit present.      Mental Status: She is alert.    Psychiatric:         Attention and Perception: Attention normal.         Mood and Affect: Mood normal.         Assessment:       1. Women's annual routine gynecological examination    2. Colon cancer screening        Plan:       Patient Instructions   Discussed normal gyn examination.      Consult sent for gastroenterology.  She will notify me if she does not hear from appointment.      She will continue yearly screening mammography.      We have discussed condoms for contraception if sexual activity occurs she will have follow-up with me for further counseling.      Continue calcium with vitamin-D supplements    Routine glucose cholesterol testing by primary care provider

## 2023-11-02 NOTE — PATIENT INSTRUCTIONS
Discussed normal gyn examination.      Consult sent for gastroenterology.  She will notify me if she does not hear from appointment.      She will continue yearly screening mammography.      We have discussed condoms for contraception if sexual activity occurs she will have follow-up with me for further counseling.      Continue calcium with vitamin-D supplements    Routine glucose cholesterol testing by primary care provider

## 2023-11-16 ENCOUNTER — PATIENT OUTREACH (OUTPATIENT)
Dept: ADMINISTRATIVE | Facility: HOSPITAL | Age: 48
End: 2023-11-16

## 2023-11-16 DIAGNOSIS — H52.223 REGULAR ASTIGMATISM, BILATERAL: ICD-10-CM

## 2023-11-16 DIAGNOSIS — H52.13 BILATERAL MYOPIA: Primary | ICD-10-CM

## 2023-11-16 NOTE — PROGRESS NOTES
Health maintenance record review for population health care gaps    Population Health Chart Review & Patient Outreach Details      Further Action Needed If Patient Returns Outreach:            Updates Requested / Reviewed:     []  Care Everywhere    []     []  External Sources ([facility], [facility], [facility], etc.)    [] [facility]   [] [facility]   [] Other:    [x]  Care Team Updated   []  Removed  or Duplicate Orders   []  Immunization Reconciliation Completed / Queried    [] [place]   [] [place]   [] [place]   [] [place]      Health Maintenance Topics Addressed and Outreach Outcomes / Actions Taken:             Breast Cancer Screening []  Mammogram Order Placed    []  Mammogram Screening Scheduled    []  External Records Requested & Care Team Updated if Applicable    []  External Records Uploaded & Care Team Updated if Applicable    []  Pt Declined Scheduling Mammogram    []  Pt Will Schedule with External Provider / Order Routed & Care Team Updated if Applicable              Cervical Cancer Screening []  Pap Smear Scheduled in Primary Care or OBGYN    []  External Records Requested & Care Team Updated if Applicable       []  External Records Uploaded, Care Team Updated, & History Updated if Applicable    []  Patient Declined Scheduling Pap Smear    []  Patient Will Schedule with External Provider & Care Team Updated if Applicable                  Colorectal Cancer Screening []  Colonoscopy Case Request / Referral / Home Test Order Placed    []  External Records Requested & Care Team Updated if Applicable    []  External Records Uploaded, Care Team Updated, & History Updated if Applicable    []  Patient Declined Completing Colon Cancer Screening    []  Patient Will Schedule with External Provider & Care Team Updated if Applicable    []  Fit Kit Mailed (add the SmartPhrase under additional notes)    []  Reminded Patient to Complete Home Test                Diabetic Eye Exam []  Eye Exam Screening Order Placed     []  Eye Camera Scheduled or Optometry/Ophthalmology Referral Placed    []  External Records Requested & Care Team Updated if Applicable    [x]  External Records Uploaded, Care Team Updated, & History Updated if Applicable    []  Patient Declined Scheduling Eye Exam    []  Patient Will Schedule with External Provider & Care Team Updated if Applicable             Blood Pressure Control []  Primary Care Follow Up Visit Scheduled     []  Remote Blood Pressure Reading Captured    []  Patient Declined Remote Reading or Scheduling Appt - Escalated to PCP    []  Patient Will Call Back or Send Portal Message with Reading                 HbA1c & Other Labs []  Overdue Lab(s) Ordered    []  Overdue Lab(s) Scheduled    []  External Records Uploaded & Care Team Updated if Applicable    []  Primary Care Follow Up Visit Scheduled     []  Reminded Patient to Complete A1c Home Test    []  Patient Declined Scheduling Labs or Will Call Back to Schedule    []  Patient Will Schedule with External Provider / Order Routed, & Care Team Updated if Applicable           Primary Care Appointment []  Primary Care Appt Scheduled    []  Patient Declined Scheduling or Will Call Back to Schedule    []  Pt Established with External Provider, Updated Care Team, & Informed Pt to Notify Payor if Applicable           Medication Adherence /    Statin Use []  Primary Care Appointment Scheduled    []  Patient Reminded to  Prescription    []  Patient Declined, Provider Notified if Needed    []  Sent Provider Message to Review to Evaluate Pt for Statin, Add Exclusion Dx Codes, Document   Exclusion in Problem List, Change Statin Intensity Level to Moderate or High Intensity if Applicable                Osteoporosis Screening []  Dexa Order Placed    []  Dexa Appointment Scheduled    []  External Records Requested & Care Team Updated    []  External Records Uploaded, Care Team Updated, & History Updated if Applicable    []  Patient Declined  Scheduling Dexa or Will Call Back to Schedule    []  Patient Will Schedule with External Provider / Order Routed & Care Team Updated if Applicable       Additional Notes:

## 2023-11-29 ENCOUNTER — OFFICE VISIT (OUTPATIENT)
Dept: FAMILY MEDICINE | Facility: CLINIC | Age: 48
End: 2023-11-29
Payer: COMMERCIAL

## 2023-11-29 VITALS
WEIGHT: 205.38 LBS | HEART RATE: 106 BPM | HEIGHT: 64 IN | RESPIRATION RATE: 17 BRPM | DIASTOLIC BLOOD PRESSURE: 85 MMHG | SYSTOLIC BLOOD PRESSURE: 125 MMHG | BODY MASS INDEX: 35.06 KG/M2 | OXYGEN SATURATION: 97 % | TEMPERATURE: 98 F

## 2023-11-29 DIAGNOSIS — K21.9 GASTROESOPHAGEAL REFLUX DISEASE WITHOUT ESOPHAGITIS: Chronic | ICD-10-CM

## 2023-11-29 DIAGNOSIS — G89.29 CHRONIC BILATERAL LOW BACK PAIN WITH RIGHT-SIDED SCIATICA: Chronic | ICD-10-CM

## 2023-11-29 DIAGNOSIS — F41.9 ANXIETY: Chronic | ICD-10-CM

## 2023-11-29 DIAGNOSIS — J30.89 NON-SEASONAL ALLERGIC RHINITIS DUE TO OTHER ALLERGIC TRIGGER: ICD-10-CM

## 2023-11-29 DIAGNOSIS — Z79.4 TYPE 2 DIABETES MELLITUS WITH DIABETIC NEUROPATHY, WITH LONG-TERM CURRENT USE OF INSULIN: Primary | Chronic | ICD-10-CM

## 2023-11-29 DIAGNOSIS — E11.40 TYPE 2 DIABETES MELLITUS WITH DIABETIC NEUROPATHY, WITH LONG-TERM CURRENT USE OF INSULIN: Primary | Chronic | ICD-10-CM

## 2023-11-29 DIAGNOSIS — M47.817 LUMBOSACRAL SPONDYLOSIS WITHOUT MYELOPATHY: Chronic | ICD-10-CM

## 2023-11-29 DIAGNOSIS — M79.2 NERVE PAIN: ICD-10-CM

## 2023-11-29 DIAGNOSIS — J30.2 SEASONAL ALLERGIES: Chronic | ICD-10-CM

## 2023-11-29 DIAGNOSIS — F32.89 OTHER DEPRESSION: Chronic | ICD-10-CM

## 2023-11-29 DIAGNOSIS — M54.41 CHRONIC BILATERAL LOW BACK PAIN WITH RIGHT-SIDED SCIATICA: Chronic | ICD-10-CM

## 2023-11-29 DIAGNOSIS — R11.0 NAUSEA: ICD-10-CM

## 2023-11-29 DIAGNOSIS — I10 ESSENTIAL HYPERTENSION: Chronic | ICD-10-CM

## 2023-11-29 DIAGNOSIS — E78.49 OTHER HYPERLIPIDEMIA: Chronic | ICD-10-CM

## 2023-11-29 PROCEDURE — 4010F ACE/ARB THERAPY RXD/TAKEN: CPT | Mod: CPTII,,, | Performed by: FAMILY MEDICINE

## 2023-11-29 PROCEDURE — 3079F DIAST BP 80-89 MM HG: CPT | Mod: CPTII,,, | Performed by: FAMILY MEDICINE

## 2023-11-29 PROCEDURE — 3079F PR MOST RECENT DIASTOLIC BLOOD PRESSURE 80-89 MM HG: ICD-10-PCS | Mod: CPTII,,, | Performed by: FAMILY MEDICINE

## 2023-11-29 PROCEDURE — 1160F RVW MEDS BY RX/DR IN RCRD: CPT | Mod: CPTII,,, | Performed by: FAMILY MEDICINE

## 2023-11-29 PROCEDURE — 3074F PR MOST RECENT SYSTOLIC BLOOD PRESSURE < 130 MM HG: ICD-10-PCS | Mod: CPTII,,, | Performed by: FAMILY MEDICINE

## 2023-11-29 PROCEDURE — 1159F MED LIST DOCD IN RCRD: CPT | Mod: CPTII,,, | Performed by: FAMILY MEDICINE

## 2023-11-29 PROCEDURE — 1159F PR MEDICATION LIST DOCUMENTED IN MEDICAL RECORD: ICD-10-PCS | Mod: CPTII,,, | Performed by: FAMILY MEDICINE

## 2023-11-29 PROCEDURE — 2023F PR DILATED RETINAL EXAM W/O EVID OF RETINOPATHY: ICD-10-PCS | Mod: CPTII,,, | Performed by: FAMILY MEDICINE

## 2023-11-29 PROCEDURE — 3008F BODY MASS INDEX DOCD: CPT | Mod: CPTII,,, | Performed by: FAMILY MEDICINE

## 2023-11-29 PROCEDURE — 3061F PR NEG MICROALBUMINURIA RESULT DOCUMENTED/REVIEW: ICD-10-PCS | Mod: CPTII,,, | Performed by: FAMILY MEDICINE

## 2023-11-29 PROCEDURE — 3066F NEPHROPATHY DOC TX: CPT | Mod: CPTII,,, | Performed by: FAMILY MEDICINE

## 2023-11-29 PROCEDURE — 99214 OFFICE O/P EST MOD 30 MIN: CPT | Mod: 25,,, | Performed by: FAMILY MEDICINE

## 2023-11-29 PROCEDURE — 3066F PR DOCUMENTATION OF TREATMENT FOR NEPHROPATHY: ICD-10-PCS | Mod: CPTII,,, | Performed by: FAMILY MEDICINE

## 2023-11-29 PROCEDURE — 96372 PR INJECTION,THERAP/PROPH/DIAG2ST, IM OR SUBCUT: ICD-10-PCS | Mod: ,,, | Performed by: FAMILY MEDICINE

## 2023-11-29 PROCEDURE — 2023F DILAT RTA XM W/O RTNOPTHY: CPT | Mod: CPTII,,, | Performed by: FAMILY MEDICINE

## 2023-11-29 PROCEDURE — 3008F PR BODY MASS INDEX (BMI) DOCUMENTED: ICD-10-PCS | Mod: CPTII,,, | Performed by: FAMILY MEDICINE

## 2023-11-29 PROCEDURE — 99214 PR OFFICE/OUTPT VISIT, EST, LEVL IV, 30-39 MIN: ICD-10-PCS | Mod: 25,,, | Performed by: FAMILY MEDICINE

## 2023-11-29 PROCEDURE — 4010F PR ACE/ARB THEARPY RXD/TAKEN: ICD-10-PCS | Mod: CPTII,,, | Performed by: FAMILY MEDICINE

## 2023-11-29 PROCEDURE — 1160F PR REVIEW ALL MEDS BY PRESCRIBER/CLIN PHARMACIST DOCUMENTED: ICD-10-PCS | Mod: CPTII,,, | Performed by: FAMILY MEDICINE

## 2023-11-29 PROCEDURE — 3046F PR MOST RECENT HEMOGLOBIN A1C LEVEL > 9.0%: ICD-10-PCS | Mod: CPTII,,, | Performed by: FAMILY MEDICINE

## 2023-11-29 PROCEDURE — 3061F NEG MICROALBUMINURIA REV: CPT | Mod: CPTII,,, | Performed by: FAMILY MEDICINE

## 2023-11-29 PROCEDURE — 3074F SYST BP LT 130 MM HG: CPT | Mod: CPTII,,, | Performed by: FAMILY MEDICINE

## 2023-11-29 PROCEDURE — 3046F HEMOGLOBIN A1C LEVEL >9.0%: CPT | Mod: CPTII,,, | Performed by: FAMILY MEDICINE

## 2023-11-29 PROCEDURE — 96372 THER/PROPH/DIAG INJ SC/IM: CPT | Mod: ,,, | Performed by: FAMILY MEDICINE

## 2023-11-29 RX ORDER — GLIPIZIDE 10 MG/1
10 TABLET ORAL 2 TIMES DAILY WITH MEALS
Qty: 180 TABLET | Refills: 1 | Status: SHIPPED | OUTPATIENT
Start: 2023-11-29

## 2023-11-29 RX ORDER — GABAPENTIN 800 MG/1
800 TABLET ORAL 3 TIMES DAILY
Qty: 270 TABLET | Refills: 1 | Status: SHIPPED | OUTPATIENT
Start: 2023-11-29

## 2023-11-29 RX ORDER — INSULIN GLARGINE 100 [IU]/ML
15 INJECTION, SOLUTION SUBCUTANEOUS NIGHTLY
Qty: 18 ML | Refills: 1 | Status: SHIPPED | OUTPATIENT
Start: 2023-11-29

## 2023-11-29 RX ORDER — PRAVASTATIN SODIUM 20 MG/1
20 TABLET ORAL NIGHTLY
Qty: 90 TABLET | Refills: 1 | Status: SHIPPED | OUTPATIENT
Start: 2023-11-29

## 2023-11-29 RX ORDER — PROMETHAZINE HYDROCHLORIDE 25 MG/1
25 TABLET ORAL EVERY 6 HOURS PRN
Qty: 30 TABLET | Refills: 0 | Status: SHIPPED | OUTPATIENT
Start: 2023-11-29

## 2023-11-29 RX ORDER — SEMAGLUTIDE 1.34 MG/ML
1 INJECTION, SOLUTION SUBCUTANEOUS
Qty: 3 ML | Refills: 5 | Status: SHIPPED | OUTPATIENT
Start: 2023-11-29

## 2023-11-29 RX ORDER — MOMETASONE FUROATE 50 UG/1
2 SPRAY, METERED NASAL DAILY
Qty: 17 G | Refills: 3 | Status: SHIPPED | OUTPATIENT
Start: 2023-11-29

## 2023-11-29 RX ORDER — HYDROCHLOROTHIAZIDE 25 MG/1
25 TABLET ORAL DAILY
Qty: 90 TABLET | Refills: 1 | Status: SHIPPED | OUTPATIENT
Start: 2023-11-29 | End: 2024-01-26 | Stop reason: SDUPTHER

## 2023-11-29 RX ORDER — DULOXETIN HYDROCHLORIDE 60 MG/1
60 CAPSULE, DELAYED RELEASE ORAL 2 TIMES DAILY
Qty: 180 CAPSULE | Refills: 1 | Status: SHIPPED | OUTPATIENT
Start: 2023-11-29

## 2023-11-29 RX ORDER — OMEPRAZOLE 40 MG/1
40 CAPSULE, DELAYED RELEASE ORAL DAILY
Qty: 90 CAPSULE | Refills: 1 | Status: SHIPPED | OUTPATIENT
Start: 2023-11-29

## 2023-11-29 RX ORDER — CHLORPHENIRAMINE MALEATE, DEXTROMETHORPHAN HYDROBROMIDE, AND PHENYLEPHRINE HYDROCHLORIDE 4; 10; 10 MG/1; MG/1; MG/1
1 TABLET, COATED ORAL EVERY 8 HOURS PRN
Qty: 30 TABLET | Refills: 0 | Status: SHIPPED | OUTPATIENT
Start: 2023-11-29 | End: 2024-01-03 | Stop reason: ALTCHOICE

## 2023-11-29 RX ORDER — LISINOPRIL 2.5 MG/1
2.5 TABLET ORAL DAILY
Qty: 90 TABLET | Refills: 1 | Status: SHIPPED | OUTPATIENT
Start: 2023-11-29

## 2023-11-29 RX ORDER — KETOROLAC TROMETHAMINE 30 MG/ML
30 INJECTION, SOLUTION INTRAMUSCULAR; INTRAVENOUS
Status: COMPLETED | OUTPATIENT
Start: 2023-11-29 | End: 2023-11-29

## 2023-11-29 RX ADMIN — KETOROLAC TROMETHAMINE 30 MG: 30 INJECTION, SOLUTION INTRAMUSCULAR; INTRAVENOUS at 02:11

## 2023-11-29 NOTE — PROGRESS NOTES
"Clinic Note    Patient Name: Georgiana Zayas  : 1975  MRN: [id number]    Chief Complaint   Patient presents with    Back Pain     Lower back pain.        HPI:    Ms. Georgiana Zayas is a 47 y.o. female who presents to clinic today with CC of low back pain. Patient has a h/o chronic back pain. She has a known h/o lumbosacral spondylosis without myelopathy. She has had PT and follows with Pain Management.  She is on percocet and follows with DERIK Jacobs with Pain Management. Patient reports she had to quit her job due to chronic back pain and is trying to get on disability.   I asked if this was a flare with her chronic pain and patient reports "it just hurts". State she is scheduled to follow up with Pain Management next week and is going to inquire about an injection or nerve burn for chronic pain.  Reports she is on [employer] insurance and needs to have her medications sent to [email or web address] as she is no longer using the mail order company associated with insurance that was with her job.  Reports scratchy throat, nasal congestion, and rhinorrhea X 2 days. Denies fever. Reports mild associated cough. Denies green or yellow mucus.  Patient is, otherwise, without complaints.     Medications:  Medication List with Changes/Refills   New Medications    CHLORPHENIRAMINE-PHENYLEPH-DM (ED A-HIST DM) 4-10-10 MG TAB    Take 1 tablet by mouth every 8 (eight) hours as needed (congestion or cough).   Current Medications    ARIPIPRAZOLE (ABILIFY) 2 MG TAB    Take 1 tablet (2 mg total) by mouth once daily.    CYCLOBENZAPRINE (FLEXERIL) 10 MG TABLET    Take 1 tablet (10 mg total) by mouth 3 (three) times daily.    HYDROXYZINE PAMOATE (VISTARIL) 25 MG CAP    Take 1 capsule (25 mg total) by mouth 3 (three) times daily.    OXYCODONE-ACETAMINOPHEN (PERCOCET)  MG PER TABLET    Take 1 tablet by mouth every 6 (six) hours as needed.     RISPERIDONE (RISPERDAL) 3 MG TAB    Take 1 tablet (3 mg total) by mouth once daily.    " TRAZODONE (DESYREL) 100 MG TABLET    Take 1 tablet (100 mg total) by mouth every evening.   Changed and/or Refilled Medications    Modified Medication Previous Medication    DULOXETINE (CYMBALTA) 60 MG CAPSULE DULoxetine (CYMBALTA) 60 MG capsule       Take 1 capsule (60 mg total) by mouth 2 (two) times daily.    Take 1 capsule (60 mg total) by mouth 2 (two) times daily.    GABAPENTIN (NEURONTIN) 800 MG TABLET gabapentin (NEURONTIN) 800 MG tablet       Take 1 tablet (800 mg total) by mouth 3 (three) times daily.    Take 1 tablet (800 mg total) by mouth 3 (three) times daily.    GLIPIZIDE (GLUCOTROL) 10 MG TABLET glipiZIDE (GLUCOTROL) 10 MG tablet       Take 1 tablet (10 mg total) by mouth 2 (two) times daily with meals.    Take 1 tablet (10 mg total) by mouth 2 (two) times daily with meals.    HYDROCHLOROTHIAZIDE (HYDRODIURIL) 25 MG TABLET hydroCHLOROthiazide (HYDRODIURIL) 25 MG tablet       Take 1 tablet (25 mg total) by mouth once daily.    Take 1 tablet (25 mg total) by mouth once daily.    INSULIN (LANTUS SOLOSTAR U-100 INSULIN) GLARGINE 100 UNITS/ML SUBQ PEN insulin (LANTUS SOLOSTAR U-100 INSULIN) glargine 100 units/mL SubQ pen       Inject 15 Units into the skin every evening.    Inject 15 Units into the skin every evening.    LISINOPRIL (PRINIVIL,ZESTRIL) 2.5 MG TABLET lisinopriL (PRINIVIL,ZESTRIL) 2.5 MG tablet       Take 1 tablet (2.5 mg total) by mouth once daily.    Take 1 tablet (2.5 mg total) by mouth once daily.    MOMETASONE (NASONEX) 50 MCG/ACTUATION NASAL SPRAY mometasone (NASONEX) 50 mcg/actuation nasal spray       2 sprays by Nasal route once daily.    2 sprays by Nasal route once daily.    OMEPRAZOLE (PRILOSEC) 40 MG CAPSULE omeprazole (PRILOSEC) 40 MG capsule       Take 1 capsule (40 mg total) by mouth Daily.    Take 1 capsule (40 mg total) by mouth Daily.    OZEMPIC 1 MG/DOSE (4 MG/3 ML) OZEMPIC 1 mg/dose (4 mg/3 mL)       Inject 1 mg into the skin every 7 days.    Inject 1 mg into the skin  every 7 days.    PRAVASTATIN (PRAVACHOL) 20 MG TABLET pravastatin (PRAVACHOL) 20 MG tablet       Take 1 tablet (20 mg total) by mouth every evening.    Take 1 tablet (20 mg total) by mouth every evening.    PROMETHAZINE (PHENERGAN) 25 MG TABLET promethazine (PHENERGAN) 25 MG tablet       Take 1 tablet (25 mg total) by mouth every 6 (six) hours as needed for Nausea.    Take 1 tablet (25 mg total) by mouth every 6 (six) hours as needed for Nausea.        Allergies: Codeine      Past Medical History:    Past Medical History:   Diagnosis Date    Arthritis     Depression     Diabetes mellitus     Diabetes mellitus, type 2     GERD (gastroesophageal reflux disease)     Hearing difficulty     Hypertension     Liver disease     Sleep apnea        Past Surgical History:    Past Surgical History:   Procedure Laterality Date    CARPAL TUNNEL RELEASE Bilateral      SECTION      INJECTION OF ANESTHETIC AGENT AROUND MEDIAL BRANCH NERVES INNERVATING LUMBAR FACET JOINT Bilateral 2021    Procedure: BLOCK, NERVE, FACET JOINT, LUMBAR, MEDIAL BRANCH;  Surgeon: Amari Razo MD;  Location: [facility];  Service: Pain Management;  Laterality: Bilateral;  Bilateral L3-S1 Facet Injection    INJECTION OF ANESTHETIC AGENT AROUND MEDIAL BRANCH NERVES INNERVATING LUMBAR FACET JOINT Bilateral 2022    Procedure: BLOCK, NERVE, FACET JOINT, LUMBAR, MEDIAL BRANCH;  Surgeon: Amari Razo MD;  Location: [facility];  Service: Pain Management;  Laterality: Bilateral;  Bilateral L3-S1 FI    INJECTION OF ANESTHETIC AGENT AROUND MEDIAL BRANCH NERVES INNERVATING LUMBAR FACET JOINT Bilateral 2023    Procedure: BLOCK, NERVE, FACET JOINT, LUMBAR, MEDIAL BRANCH;  Surgeon: Amari Razo MD;  Location: [facility];  Service: Pain Management;  Laterality: Bilateral;  Bilateral L3-S1 FI    LEFT HEART CATHETERIZATION Left 2021    Procedure: Left heart cath;  Surgeon: Jeremy Rojo DO;   "Location: [facility] CATH LAB;  Service: Cardiology;  Laterality: Left;    RADIOFREQUENCY ABLATION OF LUMBAR MEDIAL BRANCH NERVE AT SINGLE LEVEL Bilateral 4/12/2023    Procedure: RADIOFREQUENCY ABLATION, NERVE, SPINAL, LUMBAR, MEDIAL BRANCH, 1 LEVEL;  Surgeon: Amari Razo MD;  Location: [facility];  Service: Pain Management;  Laterality: Bilateral;  Bilateral L3-5 RFTC    STAPEDES SURGERY Bilateral     TONSILLECTOMY           Social History:    Social History     Tobacco Use   Smoking Status Never   Smokeless Tobacco Never     Social History     Substance and Sexual Activity   Alcohol Use Not Currently     Social History     Substance and Sexual Activity   Drug Use Never         Family History:    Family History   Problem Relation Age of Onset    Cancer Mother     Diabetes Mother     Hypertension Mother        Review of Systems:    Review of Systems   Constitutional:  Negative for appetite change, chills, fatigue, fever and unexpected weight change.   HENT:  Positive for nasal congestion, postnasal drip, rhinorrhea and sore throat. Negative for ear pain and sinus pressure/congestion.    Eyes:  Negative for visual disturbance.   Respiratory:  Positive for cough. Negative for shortness of breath.    Cardiovascular:  Negative for chest pain and leg swelling.   Gastrointestinal:  Negative for abdominal pain, change in bowel habit, constipation, diarrhea, nausea and vomiting.   Musculoskeletal:  Positive for arthralgias and back pain.   Integumentary:  Negative for rash.   Neurological:  Negative for dizziness and headaches.   Psychiatric/Behavioral:  The patient is not nervous/anxious.         Vitals:    Vitals:    11/29/23 1311   BP: 125/85   BP Location: Left arm   Patient Position: Sitting   BP Method: Large (Automatic)   Pulse: 106   Resp: 17   Temp: 98.1 °F (36.7 °C)   TempSrc: Oral   SpO2: 97%   Weight: 93.2 kg (205 lb 6.4 oz)   Height: 5' 4" (1.626 m)       Body mass index is 35.26 kg/m².    Wt " Readings from Last 3 Encounters:   11/29/23 1311 93.2 kg (205 lb 6.4 oz)   11/02/23 0858 88.5 kg (195 lb 3.2 oz)   10/25/23 0549 90.7 kg (200 lb)        Physical Exam:    Physical Exam  Constitutional:       General: She is not in acute distress.     Appearance: Normal appearance. She is obese.   HENT:      Nose: Congestion present.      Mouth/Throat:      Mouth: Mucous membranes are moist.      Pharynx: Oropharynx is clear. Posterior oropharyngeal erythema present. No oropharyngeal exudate.   Eyes:      Conjunctiva/sclera: Conjunctivae normal.   Cardiovascular:      Rate and Rhythm: Normal rate and regular rhythm.      Heart sounds: Normal heart sounds. No murmur heard.  Pulmonary:      Effort: Pulmonary effort is normal. No respiratory distress.      Breath sounds: Normal breath sounds. No wheezing, rhonchi or rales.   Abdominal:      General: Bowel sounds are normal.      Palpations: Abdomen is soft.      Tenderness: There is no abdominal tenderness.   Musculoskeletal:         General: Tenderness present. No swelling. Normal range of motion.      Cervical back: Neck supple.      Right lower leg: No edema.      Left lower leg: No edema.   Skin:     Findings: No rash.   Neurological:      General: No focal deficit present.      Mental Status: She is alert. Mental status is at baseline.   Psychiatric:         Mood and Affect: Mood normal.         Assessment/Plan:   1. Type 2 diabetes mellitus with diabetic neuropathy, with long-term current use of insulin  -     CBC Auto Differential; Future; Expected date: 11/29/2023  -     Comprehensive Metabolic Panel; Future; Expected date: 11/29/2023  -     Lipid Panel; Future; Expected date: 11/29/2023  -     Hemoglobin A1C; Future; Expected date: 11/29/2023  -     Microalbumin/Creatinine Ratio, Urine  -     glipiZIDE (GLUCOTROL) 10 MG tablet; Take 1 tablet (10 mg total) by mouth 2 (two) times daily with meals.  Dispense: 180 tablet; Refill: 1  -     insulin (LANTUS SOLOSTAR  U-100 INSULIN) glargine 100 units/mL SubQ pen; Inject 15 Units into the skin every evening.  Dispense: 18 mL; Refill: 1  -     OZEMPIC 1 mg/dose (4 mg/3 mL); Inject 1 mg into the skin every 7 days.  Dispense: 3 mL; Refill: 5    2. Essential hypertension  -     CBC Auto Differential; Future; Expected date: 11/29/2023  -     Comprehensive Metabolic Panel; Future; Expected date: 11/29/2023  -     Lipid Panel; Future; Expected date: 11/29/2023  -     Microalbumin/Creatinine Ratio, Urine  -     hydroCHLOROthiazide (HYDRODIURIL) 25 MG tablet; Take 1 tablet (25 mg total) by mouth once daily.  Dispense: 90 tablet; Refill: 1  -     lisinopriL (PRINIVIL,ZESTRIL) 2.5 MG tablet; Take 1 tablet (2.5 mg total) by mouth once daily.  Dispense: 90 tablet; Refill: 1    3. Other hyperlipidemia  -     CBC Auto Differential; Future; Expected date: 11/29/2023  -     Comprehensive Metabolic Panel; Future; Expected date: 11/29/2023  -     Lipid Panel; Future; Expected date: 11/29/2023  -     pravastatin (PRAVACHOL) 20 MG tablet; Take 1 tablet (20 mg total) by mouth every evening.  Dispense: 90 tablet; Refill: 1    4. Anxiety  The current medical regimen is effective;  continue present plan and medications.  - Follow up with [facility] [place] as scheduled.    5. Other depression  The current medical regimen is effective;  continue present plan and medications.  - Follow up with [facility] as scheduled.    6. Lumbosacral spondylosis without myelopathy  -     ketorolac injection 30 mg    7. Non-seasonal allergic rhinitis due to other allergic trigger  -     chlorpheniramine-phenyleph-DM (ED A-HIST DM) 4-10-10 mg Tab; Take 1 tablet by mouth every 8 (eight) hours as needed (congestion or cough).  Dispense: 30 tablet; Refill: 0    8. Chronic bilateral low back pain with right-sided sciatica  -     DULoxetine (CYMBALTA) 60 MG capsule; Take 1 capsule (60 mg total) by mouth 2 (two) times daily.  Dispense: 180 capsule; Refill: 1    9. Nerve pain  -     gabapentin  (NEURONTIN) 800 MG tablet; Take 1 tablet (800 mg total) by mouth 3 (three) times daily.  Dispense: 270 tablet; Refill: 1    10. Seasonal allergies  -     mometasone (NASONEX) 50 mcg/actuation nasal spray; 2 sprays by Nasal route once daily.  Dispense: 17 g; Refill: 3    11. Gastroesophageal reflux disease without esophagitis  -     omeprazole (PRILOSEC) 40 MG capsule; Take 1 capsule (40 mg total) by mouth Daily.  Dispense: 90 capsule; Refill: 1    12. Nausea  -     promethazine (PHENERGAN) 25 MG tablet; Take 1 tablet (25 mg total) by mouth every 6 (six) hours as needed for Nausea.  Dispense: 30 tablet; Refill: 0         Active Problem List with Overview Notes    Diagnosis Date Noted    Type 2 diabetes mellitus with diabetic neuropathy, with long-term current use of insulin 08/25/2021    Essential hypertension 08/25/2021    Anxiety 01/23/2023    Other hyperlipidemia 07/07/2022    Morbid obesity 01/17/2022    Depression 01/23/2023    Femoral artery pseudo-aneurysm, right 01/17/2022    Other sleep apnea 11/30/2021    Lumbosacral spondylosis without myelopathy 09/08/2021    Gastroesophageal reflux disease 08/25/2021    Chronic bilateral low back pain with right-sided sciatica 07/07/2022    Seasonal allergies 07/07/2022    Muscle spasm 07/07/2022          RTC as scheduled in 4 months for chronic follow up. RTC sooner if needed.  Patient voiced understanding and is agreeable to plan.      Lisandra Mcdaniel MD    Family Medicine

## 2023-12-01 ENCOUNTER — TELEPHONE (OUTPATIENT)
Dept: FAMILY MEDICINE | Facility: CLINIC | Age: 48
End: 2023-12-01
Payer: COMMERCIAL

## 2023-12-01 DIAGNOSIS — M54.41 CHRONIC BILATERAL LOW BACK PAIN WITH RIGHT-SIDED SCIATICA: Chronic | ICD-10-CM

## 2023-12-01 DIAGNOSIS — M47.817 LUMBOSACRAL SPONDYLOSIS WITHOUT MYELOPATHY: Primary | ICD-10-CM

## 2023-12-01 DIAGNOSIS — G89.29 CHRONIC BILATERAL LOW BACK PAIN WITH RIGHT-SIDED SCIATICA: Chronic | ICD-10-CM

## 2023-12-01 NOTE — TELEPHONE ENCOUNTER
----- Message from Elisa Mcdaniel MD sent at 12/1/2023  8:48 AM CST -----  Ok to place referral. I don't know who she is scheduled to see December 7th. I sent her in some ed a-hist DM at her office  visit. I'm not sure what she is requesting. If this is not working and she feels like she has a lot of thick mucus that she cannot get up we can change to mucinex DM. Thanks!  ----- Message -----  From: Landen Navarrete MA  Sent: 12/1/2023   8:29 AM CST  To: Elisa Mcdaniel MD    Pt changed insurance and they are requiring her to have a referral to go to Pain Treatment. She has an appt scheduled for Dec 7. Can you place referral so I can fax to their office. Pt also states that she came and seen you earlier in the week and she is having a bad cough and a lot of mucus build up in her throat. She wants to know can you send her something to [facility].

## 2023-12-04 ENCOUNTER — OFFICE VISIT (OUTPATIENT)
Dept: FAMILY MEDICINE | Facility: CLINIC | Age: 48
End: 2023-12-04
Payer: COMMERCIAL

## 2023-12-04 VITALS
SYSTOLIC BLOOD PRESSURE: 125 MMHG | BODY MASS INDEX: 34.62 KG/M2 | RESPIRATION RATE: 18 BRPM | WEIGHT: 202.81 LBS | OXYGEN SATURATION: 96 % | HEART RATE: 98 BPM | HEIGHT: 64 IN | DIASTOLIC BLOOD PRESSURE: 80 MMHG | TEMPERATURE: 99 F

## 2023-12-04 DIAGNOSIS — J01.00 ACUTE NON-RECURRENT MAXILLARY SINUSITIS: Primary | ICD-10-CM

## 2023-12-04 DIAGNOSIS — J02.9 SORE THROAT: ICD-10-CM

## 2023-12-04 DIAGNOSIS — R52 BODY ACHES: ICD-10-CM

## 2023-12-04 LAB
CTP QC/QA: YES
CTP QC/QA: YES
FLUAV AG NPH QL: NEGATIVE
FLUBV AG NPH QL: NEGATIVE
S PYO RRNA THROAT QL PROBE: NEGATIVE
SARS-COV-2 AG RESP QL IA.RAPID: NEGATIVE

## 2023-12-04 PROCEDURE — 1160F PR REVIEW ALL MEDS BY PRESCRIBER/CLIN PHARMACIST DOCUMENTED: ICD-10-PCS | Mod: CPTII,,, | Performed by: FAMILY MEDICINE

## 2023-12-04 PROCEDURE — 3008F PR BODY MASS INDEX (BMI) DOCUMENTED: ICD-10-PCS | Mod: CPTII,,, | Performed by: FAMILY MEDICINE

## 2023-12-04 PROCEDURE — 3061F PR NEG MICROALBUMINURIA RESULT DOCUMENTED/REVIEW: ICD-10-PCS | Mod: CPTII,,, | Performed by: FAMILY MEDICINE

## 2023-12-04 PROCEDURE — 3046F PR MOST RECENT HEMOGLOBIN A1C LEVEL > 9.0%: ICD-10-PCS | Mod: CPTII,,, | Performed by: FAMILY MEDICINE

## 2023-12-04 PROCEDURE — 96372 PR INJECTION,THERAP/PROPH/DIAG2ST, IM OR SUBCUT: ICD-10-PCS | Mod: ,,, | Performed by: FAMILY MEDICINE

## 2023-12-04 PROCEDURE — 99213 OFFICE O/P EST LOW 20 MIN: CPT | Mod: 25,,, | Performed by: FAMILY MEDICINE

## 2023-12-04 PROCEDURE — 3079F PR MOST RECENT DIASTOLIC BLOOD PRESSURE 80-89 MM HG: ICD-10-PCS | Mod: CPTII,,, | Performed by: FAMILY MEDICINE

## 2023-12-04 PROCEDURE — 3008F BODY MASS INDEX DOCD: CPT | Mod: CPTII,,, | Performed by: FAMILY MEDICINE

## 2023-12-04 PROCEDURE — 87880 STREP A ASSAY W/OPTIC: CPT | Mod: QW,,, | Performed by: FAMILY MEDICINE

## 2023-12-04 PROCEDURE — 96372 THER/PROPH/DIAG INJ SC/IM: CPT | Mod: ,,, | Performed by: FAMILY MEDICINE

## 2023-12-04 PROCEDURE — 1159F PR MEDICATION LIST DOCUMENTED IN MEDICAL RECORD: ICD-10-PCS | Mod: CPTII,,, | Performed by: FAMILY MEDICINE

## 2023-12-04 PROCEDURE — 2023F PR DILATED RETINAL EXAM W/O EVID OF RETINOPATHY: ICD-10-PCS | Mod: CPTII,,, | Performed by: FAMILY MEDICINE

## 2023-12-04 PROCEDURE — 3074F SYST BP LT 130 MM HG: CPT | Mod: CPTII,,, | Performed by: FAMILY MEDICINE

## 2023-12-04 PROCEDURE — 1159F MED LIST DOCD IN RCRD: CPT | Mod: CPTII,,, | Performed by: FAMILY MEDICINE

## 2023-12-04 PROCEDURE — 3079F DIAST BP 80-89 MM HG: CPT | Mod: CPTII,,, | Performed by: FAMILY MEDICINE

## 2023-12-04 PROCEDURE — 87428 POCT SARS-COV2 (COVID) WITH FLU ANTIGEN: ICD-10-PCS | Mod: QW,,, | Performed by: FAMILY MEDICINE

## 2023-12-04 PROCEDURE — 3066F NEPHROPATHY DOC TX: CPT | Mod: CPTII,,, | Performed by: FAMILY MEDICINE

## 2023-12-04 PROCEDURE — 87880 POCT RAPID STREP A: ICD-10-PCS | Mod: QW,,, | Performed by: FAMILY MEDICINE

## 2023-12-04 PROCEDURE — 3046F HEMOGLOBIN A1C LEVEL >9.0%: CPT | Mod: CPTII,,, | Performed by: FAMILY MEDICINE

## 2023-12-04 PROCEDURE — 4010F PR ACE/ARB THEARPY RXD/TAKEN: ICD-10-PCS | Mod: CPTII,,, | Performed by: FAMILY MEDICINE

## 2023-12-04 PROCEDURE — 2023F DILAT RTA XM W/O RTNOPTHY: CPT | Mod: CPTII,,, | Performed by: FAMILY MEDICINE

## 2023-12-04 PROCEDURE — 1160F RVW MEDS BY RX/DR IN RCRD: CPT | Mod: CPTII,,, | Performed by: FAMILY MEDICINE

## 2023-12-04 PROCEDURE — 3066F PR DOCUMENTATION OF TREATMENT FOR NEPHROPATHY: ICD-10-PCS | Mod: CPTII,,, | Performed by: FAMILY MEDICINE

## 2023-12-04 PROCEDURE — 87428 SARSCOV & INF VIR A&B AG IA: CPT | Mod: QW,,, | Performed by: FAMILY MEDICINE

## 2023-12-04 PROCEDURE — 3074F PR MOST RECENT SYSTOLIC BLOOD PRESSURE < 130 MM HG: ICD-10-PCS | Mod: CPTII,,, | Performed by: FAMILY MEDICINE

## 2023-12-04 PROCEDURE — 4010F ACE/ARB THERAPY RXD/TAKEN: CPT | Mod: CPTII,,, | Performed by: FAMILY MEDICINE

## 2023-12-04 PROCEDURE — 99213 PR OFFICE/OUTPT VISIT, EST, LEVL III, 20-29 MIN: ICD-10-PCS | Mod: 25,,, | Performed by: FAMILY MEDICINE

## 2023-12-04 PROCEDURE — 3061F NEG MICROALBUMINURIA REV: CPT | Mod: CPTII,,, | Performed by: FAMILY MEDICINE

## 2023-12-04 RX ORDER — AMOXICILLIN AND CLAVULANATE POTASSIUM 875; 125 MG/1; MG/1
1 TABLET, FILM COATED ORAL EVERY 12 HOURS
Qty: 20 TABLET | Refills: 0 | Status: SHIPPED | OUTPATIENT
Start: 2023-12-04 | End: 2023-12-14

## 2023-12-04 RX ORDER — FLUCONAZOLE 150 MG/1
TABLET ORAL
Qty: 3 TABLET | Refills: 0 | Status: SHIPPED | OUTPATIENT
Start: 2023-12-04 | End: 2024-01-03 | Stop reason: SDUPTHER

## 2023-12-04 RX ORDER — CEFTRIAXONE 1 G/1
1 INJECTION, POWDER, FOR SOLUTION INTRAMUSCULAR; INTRAVENOUS
Status: COMPLETED | OUTPATIENT
Start: 2023-12-04 | End: 2023-12-04

## 2023-12-04 RX ADMIN — CEFTRIAXONE 1 G: 1 INJECTION, POWDER, FOR SOLUTION INTRAMUSCULAR; INTRAVENOUS at 09:12

## 2023-12-04 NOTE — PROGRESS NOTES
"Clinic Note    Patient Name: Georgiana Zayas  : 1975  MRN: [id number]    Chief Complaint   Patient presents with    Generalized Body Aches    Otalgia    Cough     Patient reports "coughing up phlegm".     Facial Pain       HPI:    Ms. Georgiana Zayas is a 47 y.o. female who presents to clinic today with CC of body aches, earache, cough, and facial pain X 7-10 days.  Patient reports she was prescribed ed a-hist at previous visit but reports symptoms have worsened.  Reports she has now progressed to having thick yellow mucus.  Patient denies fever.  Patient is, otherwise, without complaints.     Medications:  Medication List with Changes/Refills   New Medications    AMOXICILLIN-CLAVULANATE 875-125MG (AUGMENTIN) 875-125 MG PER TABLET    Take 1 tablet by mouth every 12 (twelve) hours. for 10 days    FLUCONAZOLE (DIFLUCAN) 150 MG TAB    Take one tablet by mouth every 72 hours X 3 doses if needed for yeast infection after completion of antibiotics.   Current Medications    ARIPIPRAZOLE (ABILIFY) 2 MG TAB    Take 1 tablet (2 mg total) by mouth once daily.    CHLORPHENIRAMINE-PHENYLEPH-DM (ED A-HIST DM) 4-10-10 MG TAB    Take 1 tablet by mouth every 8 (eight) hours as needed (congestion or cough).    CYCLOBENZAPRINE (FLEXERIL) 10 MG TABLET    Take 1 tablet (10 mg total) by mouth 3 (three) times daily.    DULOXETINE (CYMBALTA) 60 MG CAPSULE    Take 1 capsule (60 mg total) by mouth 2 (two) times daily.    GABAPENTIN (NEURONTIN) 800 MG TABLET    Take 1 tablet (800 mg total) by mouth 3 (three) times daily.    GLIPIZIDE (GLUCOTROL) 10 MG TABLET    Take 1 tablet (10 mg total) by mouth 2 (two) times daily with meals.    HYDROCHLOROTHIAZIDE (HYDRODIURIL) 25 MG TABLET    Take 1 tablet (25 mg total) by mouth once daily.    HYDROXYZINE PAMOATE (VISTARIL) 25 MG CAP    Take 1 capsule (25 mg total) by mouth 3 (three) times daily.    INSULIN (LANTUS SOLOSTAR U-100 INSULIN) GLARGINE 100 UNITS/ML SUBQ PEN    Inject 15 Units into " the skin every evening.    LISINOPRIL (PRINIVIL,ZESTRIL) 2.5 MG TABLET    Take 1 tablet (2.5 mg total) by mouth once daily.    MOMETASONE (NASONEX) 50 MCG/ACTUATION NASAL SPRAY    2 sprays by Nasal route once daily.    OMEPRAZOLE (PRILOSEC) 40 MG CAPSULE    Take 1 capsule (40 mg total) by mouth Daily.    OXYCODONE-ACETAMINOPHEN (PERCOCET)  MG PER TABLET    Take 1 tablet by mouth every 6 (six) hours as needed.     OZEMPIC 1 MG/DOSE (4 MG/3 ML)    Inject 1 mg into the skin every 7 days.    PRAVASTATIN (PRAVACHOL) 20 MG TABLET    Take 1 tablet (20 mg total) by mouth every evening.    PROMETHAZINE (PHENERGAN) 25 MG TABLET    Take 1 tablet (25 mg total) by mouth every 6 (six) hours as needed for Nausea.    RISPERIDONE (RISPERDAL) 3 MG TAB    Take 1 tablet (3 mg total) by mouth once daily.    TRAZODONE (DESYREL) 100 MG TABLET    Take 1 tablet (100 mg total) by mouth every evening.        Allergies: Codeine      Past Medical History:    Past Medical History:   Diagnosis Date    Arthritis     Depression     Diabetes mellitus     Diabetes mellitus, type 2     GERD (gastroesophageal reflux disease)     Hearing difficulty     Hypertension     Liver disease     Sleep apnea        Past Surgical History:    Past Surgical History:   Procedure Laterality Date    CARPAL TUNNEL RELEASE Bilateral      SECTION      INJECTION OF ANESTHETIC AGENT AROUND MEDIAL BRANCH NERVES INNERVATING LUMBAR FACET JOINT Bilateral 2021    Procedure: BLOCK, NERVE, FACET JOINT, LUMBAR, MEDIAL BRANCH;  Surgeon: Amari Razo MD;  Location: [facility];  Service: Pain Management;  Laterality: Bilateral;  Bilateral L3-S1 Facet Injection    INJECTION OF ANESTHETIC AGENT AROUND MEDIAL BRANCH NERVES INNERVATING LUMBAR FACET JOINT Bilateral 2022    Procedure: BLOCK, NERVE, FACET JOINT, LUMBAR, MEDIAL BRANCH;  Surgeon: Amari Razo MD;  Location: [facility];  Service: Pain Management;  Laterality: Bilateral;   Bilateral L3-S1 FI    INJECTION OF ANESTHETIC AGENT AROUND MEDIAL BRANCH NERVES INNERVATING LUMBAR FACET JOINT Bilateral 1/11/2023    Procedure: BLOCK, NERVE, FACET JOINT, LUMBAR, MEDIAL BRANCH;  Surgeon: Amari Razo MD;  Location: [facility];  Service: Pain Management;  Laterality: Bilateral;  Bilateral L3-S1 FI    LEFT HEART CATHETERIZATION Left 12/21/2021    Procedure: Left heart cath;  Surgeon: Jeremy Rojo DO;  Location: [facility] CATH LAB;  Service: Cardiology;  Laterality: Left;    RADIOFREQUENCY ABLATION OF LUMBAR MEDIAL BRANCH NERVE AT SINGLE LEVEL Bilateral 4/12/2023    Procedure: RADIOFREQUENCY ABLATION, NERVE, SPINAL, LUMBAR, MEDIAL BRANCH, 1 LEVEL;  Surgeon: Amari Razo MD;  Location: [facility];  Service: Pain Management;  Laterality: Bilateral;  Bilateral L3-5 RFTC    STAPEDES SURGERY Bilateral     TONSILLECTOMY           Social History:    Social History     Tobacco Use   Smoking Status Never   Smokeless Tobacco Never     Social History     Substance and Sexual Activity   Alcohol Use Not Currently     Social History     Substance and Sexual Activity   Drug Use Never         Family History:    Family History   Problem Relation Age of Onset    Cancer Mother     Diabetes Mother     Hypertension Mother        Review of Systems:    Review of Systems   Constitutional:  Negative for appetite change, chills, fatigue, fever and unexpected weight change.   HENT:  Positive for nasal congestion, ear pain, postnasal drip, rhinorrhea, sinus pressure/congestion and sore throat.    Eyes:  Negative for visual disturbance.   Respiratory:  Positive for cough. Negative for shortness of breath.    Cardiovascular:  Negative for chest pain and leg swelling.   Gastrointestinal:  Negative for abdominal pain, change in bowel habit, constipation, diarrhea, nausea and vomiting.   Musculoskeletal:  Positive for arthralgias and back pain.        + body aches   Integumentary:  Negative for  "rash.   Neurological:  Positive for headaches. Negative for dizziness.   Psychiatric/Behavioral:  The patient is not nervous/anxious.         Vitals:    Vitals:    12/04/23 0817 12/04/23 0915   BP: 125/80    BP Location: Left arm    Patient Position: Sitting    BP Method: Medium (Automatic)    Pulse: (!) 123 98   Resp: 18    Temp: 98.7 °F (37.1 °C)    TempSrc: Oral    SpO2: 96%    Weight: 92 kg (202 lb 12.8 oz)    Height: 5' 4" (1.626 m)        Body mass index is 34.81 kg/m².    Wt Readings from Last 3 Encounters:   12/04/23 0817 92 kg (202 lb 12.8 oz)   11/29/23 1311 93.2 kg (205 lb 6.4 oz)   11/02/23 0858 88.5 kg (195 lb 3.2 oz)        Physical Exam:    Physical Exam  Constitutional:       General: She is not in acute distress.     Appearance: Normal appearance. She is obese.   HENT:      Right Ear: Tympanic membrane normal.      Left Ear: Tympanic membrane normal.      Nose: Congestion present.      Mouth/Throat:      Mouth: Mucous membranes are moist.      Pharynx: Oropharynx is clear. Posterior oropharyngeal erythema present.   Eyes:      Conjunctiva/sclera: Conjunctivae normal.   Cardiovascular:      Rate and Rhythm: Normal rate and regular rhythm.      Heart sounds: Normal heart sounds. No murmur heard.  Pulmonary:      Effort: Pulmonary effort is normal. No respiratory distress.      Breath sounds: Normal breath sounds. No wheezing, rhonchi or rales.   Abdominal:      General: Bowel sounds are normal.      Palpations: Abdomen is soft.      Tenderness: There is no abdominal tenderness.   Musculoskeletal:      Cervical back: Neck supple.      Right lower leg: No edema.      Left lower leg: No edema.   Skin:     Findings: No rash.   Neurological:      General: No focal deficit present.      Mental Status: She is alert. Mental status is at baseline.   Psychiatric:         Mood and Affect: Mood normal.         Results:  Results for orders placed or performed in visit on 12/04/23   POCT SARS-COV2 (COVID) with Flu " Antigen   Result Value Ref Range    SARS Coronavirus 2 Antigen Negative Negative    Rapid Influenza A Ag Negative Negative    Rapid Influenza B Ag Negative Negative     Acceptable Yes       Results for orders placed or performed in visit on 12/04/23   POCT rapid strep A   Result Value Ref Range    Rapid Strep A Screen Negative Negative     Acceptable Yes      Assessment/Plan:   1. Acute non-recurrent maxillary sinusitis  -     amoxicillin-clavulanate 875-125mg (AUGMENTIN) 875-125 mg per tablet; Take 1 tablet by mouth every 12 (twelve) hours. for 10 days  Dispense: 20 tablet; Refill: 0  -     cefTRIAXone injection 1 g  -     fluconazole (DIFLUCAN) 150 MG Tab; Take one tablet by mouth every 72 hours X 3 doses if needed for yeast infection after completion of antibiotics.  Dispense: 3 tablet; Refill: 0    2. Body aches  -     POCT SARS-COV2 (COVID) with Flu Antigen  -     POCT rapid strep A    3. Sore throat  -     POCT SARS-COV2 (COVID) with Flu Antigen  -     POCT rapid strep A         Active Problem List with Overview Notes    Diagnosis Date Noted    Type 2 diabetes mellitus with diabetic neuropathy, with long-term current use of insulin 08/25/2021    Essential hypertension 08/25/2021    Anxiety 01/23/2023    Other hyperlipidemia 07/07/2022    Morbid obesity 01/17/2022    Depression 01/23/2023    Femoral artery pseudo-aneurysm, right 01/17/2022    Other sleep apnea 11/30/2021    Lumbosacral spondylosis without myelopathy 09/08/2021    Gastroesophageal reflux disease 08/25/2021    Chronic bilateral low back pain with right-sided sciatica 07/07/2022    Seasonal allergies 07/07/2022    Muscle spasm 07/07/2022          RTC as scheduled for chronic follow up. RTC sooner if needed.  Patient voiced understanding and is agreeable to plan.      Lisandra Mcdaniel MD    Family Medicine

## 2023-12-23 ENCOUNTER — HOSPITAL ENCOUNTER (EMERGENCY)
Facility: HOSPITAL | Age: 48
Discharge: HOME | End: 2023-12-23
Attending: EMERGENCY MEDICINE
Payer: COMMERCIAL

## 2023-12-23 VITALS
HEIGHT: 64 IN | OXYGEN SATURATION: 100 % | RESPIRATION RATE: 18 BRPM | SYSTOLIC BLOOD PRESSURE: 142 MMHG | WEIGHT: 210 LBS | HEART RATE: 65 BPM | BODY MASS INDEX: 35.85 KG/M2 | TEMPERATURE: 98 F | DIASTOLIC BLOOD PRESSURE: 70 MMHG

## 2023-12-23 DIAGNOSIS — R05.9 COUGH: ICD-10-CM

## 2023-12-23 DIAGNOSIS — J40 BRONCHITIS: Primary | ICD-10-CM

## 2023-12-23 DIAGNOSIS — E11.65 HYPERGLYCEMIA DUE TO DIABETES MELLITUS: ICD-10-CM

## 2023-12-23 DIAGNOSIS — J06.9 UPPER RESPIRATORY TRACT INFECTION, UNSPECIFIED TYPE: ICD-10-CM

## 2023-12-23 LAB
ALBUMIN SERPL BCP-MCNC: 3.3 G/DL (ref 3.5–5)
ALBUMIN/GLOB SERPL: 0.9 {RATIO}
ALP SERPL-CCNC: 74 U/L (ref 39–100)
ALT SERPL W P-5'-P-CCNC: 28 U/L (ref 13–56)
ANION GAP SERPL CALCULATED.3IONS-SCNC: 11 MMOL/L (ref 7–16)
AST SERPL W P-5'-P-CCNC: 27 U/L (ref 15–37)
BASOPHILS # BLD AUTO: 0.02 K/UL (ref 0–0.2)
BASOPHILS NFR BLD AUTO: 0.3 % (ref 0–1)
BILIRUB SERPL-MCNC: 0.6 MG/DL (ref ?–1.2)
BILIRUB UR QL STRIP: NEGATIVE
BUN SERPL-MCNC: 10 MG/DL (ref 7–18)
BUN/CREAT SERPL: 11 (ref 6–20)
CALCIUM SERPL-MCNC: 8.5 MG/DL (ref 8.5–10.1)
CHLORIDE SERPL-SCNC: 93 MMOL/L (ref 98–107)
CLARITY UR: CLEAR
CO2 SERPL-SCNC: 31 MMOL/L (ref 21–32)
COLOR UR: YELLOW
CREAT SERPL-MCNC: 0.88 MG/DL (ref 0.55–1.02)
DIFFERENTIAL METHOD BLD: ABNORMAL
EGFR (NO RACE VARIABLE) (RUSH/TITUS): 81 ML/MIN/1.73M2
EOSINOPHIL # BLD AUTO: 0.21 K/UL (ref 0–0.5)
EOSINOPHIL NFR BLD AUTO: 3.2 % (ref 1–4)
ERYTHROCYTE [DISTWIDTH] IN BLOOD BY AUTOMATED COUNT: 13.3 % (ref 11.5–14.5)
FLUAV AG UPPER RESP QL IA.RAPID: NEGATIVE
FLUBV AG UPPER RESP QL IA.RAPID: NEGATIVE
GLOBULIN SER-MCNC: 3.8 G/DL (ref 2–4)
GLUCOSE SERPL-MCNC: 401 MG/DL (ref 70–105)
GLUCOSE SERPL-MCNC: 410 MG/DL (ref 70–105)
GLUCOSE SERPL-MCNC: 424 MG/DL (ref 74–106)
GLUCOSE UR STRIP-MCNC: 500 MG/DL
HCT VFR BLD AUTO: 40.1 % (ref 38–47)
HGB BLD-MCNC: 12.9 G/DL (ref 12–16)
KETONES UR STRIP-SCNC: NEGATIVE MG/DL
LEUKOCYTE ESTERASE UR QL STRIP: NEGATIVE
LYMPHOCYTES # BLD AUTO: 2.5 K/UL (ref 1–4.8)
LYMPHOCYTES NFR BLD AUTO: 37.7 % (ref 27–41)
MCH RBC QN AUTO: 27.8 PG (ref 27–31)
MCHC RBC AUTO-ENTMCNC: 32.2 G/DL (ref 32–36)
MCV RBC AUTO: 86.4 FL (ref 80–96)
MONOCYTES # BLD AUTO: 0.56 K/UL (ref 0–0.8)
MONOCYTES NFR BLD AUTO: 8.4 % (ref 2–6)
MPC BLD CALC-MCNC: 10.6 FL (ref 9.4–12.4)
NEUTROPHILS # BLD AUTO: 3.35 K/UL (ref 1.8–7.7)
NEUTROPHILS NFR BLD AUTO: 50.4 % (ref 53–65)
NITRITE UR QL STRIP: NEGATIVE
PH UR STRIP: 6 PH UNITS
PLATELET # BLD AUTO: 345 K/UL (ref 150–400)
POTASSIUM SERPL-SCNC: 4.1 MMOL/L (ref 3.5–5.1)
PROT SERPL-MCNC: 7.1 G/DL (ref 6.4–8.2)
PROT UR QL STRIP: NEGATIVE
RAPID GROUP A STREP: NEGATIVE
RBC # BLD AUTO: 4.64 M/UL (ref 4.2–5.4)
RBC # UR STRIP: NEGATIVE /UL
SARS-COV+SARS-COV-2 AG RESP QL IA.RAPID: NEGATIVE
SODIUM SERPL-SCNC: 131 MMOL/L (ref 136–145)
SP GR UR STRIP: 1.02
UROBILINOGEN UR STRIP-ACNC: 1 MG/DL
WBC # BLD AUTO: 6.64 K/UL (ref 4.5–11)

## 2023-12-23 PROCEDURE — 87428 SARSCOV & INF VIR A&B AG IA: CPT | Performed by: EMERGENCY MEDICINE

## 2023-12-23 PROCEDURE — 81003 URINALYSIS AUTO W/O SCOPE: CPT | Performed by: EMERGENCY MEDICINE

## 2023-12-23 PROCEDURE — 99284 EMERGENCY DEPT VISIT MOD MDM: CPT | Mod: 25

## 2023-12-23 PROCEDURE — 80053 COMPREHEN METABOLIC PANEL: CPT | Performed by: EMERGENCY MEDICINE

## 2023-12-23 PROCEDURE — 87880 STREP A ASSAY W/OPTIC: CPT | Performed by: EMERGENCY MEDICINE

## 2023-12-23 PROCEDURE — 85025 COMPLETE CBC W/AUTO DIFF WBC: CPT | Performed by: EMERGENCY MEDICINE

## 2023-12-23 PROCEDURE — 82962 GLUCOSE BLOOD TEST: CPT

## 2023-12-23 PROCEDURE — 99284 EMERGENCY DEPT VISIT MOD MDM: CPT | Mod: ,,, | Performed by: EMERGENCY MEDICINE

## 2023-12-23 RX ORDER — AZITHROMYCIN 250 MG/1
TABLET, FILM COATED ORAL
Qty: 6 TABLET | Refills: 0 | Status: SHIPPED | OUTPATIENT
Start: 2023-12-23 | End: 2023-12-28

## 2023-12-23 RX ORDER — METHOCARBAMOL 500 MG/1
500 TABLET, FILM COATED ORAL 3 TIMES DAILY
COMMUNITY
Start: 2023-12-07

## 2023-12-23 RX ORDER — BENZTROPINE MESYLATE 0.5 MG/1
0.5 TABLET ORAL DAILY
COMMUNITY
Start: 2023-12-14

## 2023-12-23 NOTE — DISCHARGE INSTRUCTIONS
INCREASE REST AND FLUIDS   MEDICATIONS AS DIRECTED  OVER THE COUNTER   TYLENOL  FOR FEVER/ PAIN  ROBITUSSIN FOR COUGH  STOP EATING SWEET AND STOP DIET [place]

## 2023-12-23 NOTE — ED PROVIDER NOTES
"Encounter Date: 2023       History     Chief Complaint   Patient presents with    Fatigue    Cough     Sister states pt has h/o diabetes, has been having hallucinations this morning - "seeing these like snakes, hands, and frogs", pt did not check glucose level at home. Pt c/o weakness, especially in lower extremities, cough, congestion, and "full, swelling feeling on the right side of my head."      PT IS A 48 YR OLD BF WITH GENERALIZED WEAKNESS,MALAISE AND MYALGIAS WITH  CONGESTION AND COUGH ONSET TODAY. PT HAS DM2 AND HAS NOT CHECKED GLU AND HAS NOT BEEN COMPLIANT WITH DIET WITH THE HOLIDAYS        Review of patient's allergies indicates:   Allergen Reactions    Codeine Itching     Past Medical History:   Diagnosis Date    Arthritis     Depression     Diabetes mellitus     Diabetes mellitus, type 2     GERD (gastroesophageal reflux disease)     Hearing difficulty     Hypertension     Liver disease     Sleep apnea      Past Surgical History:   Procedure Laterality Date    CARPAL TUNNEL RELEASE Bilateral      SECTION      INJECTION OF ANESTHETIC AGENT AROUND MEDIAL BRANCH NERVES INNERVATING LUMBAR FACET JOINT Bilateral 2021    Procedure: BLOCK, NERVE, FACET JOINT, LUMBAR, MEDIAL BRANCH;  Surgeon: Amari Razo MD;  Location: [facility];  Service: Pain Management;  Laterality: Bilateral;  Bilateral L3-S1 Facet Injection    INJECTION OF ANESTHETIC AGENT AROUND MEDIAL BRANCH NERVES INNERVATING LUMBAR FACET JOINT Bilateral 2022    Procedure: BLOCK, NERVE, FACET JOINT, LUMBAR, MEDIAL BRANCH;  Surgeon: Amari Razo MD;  Location: [facility];  Service: Pain Management;  Laterality: Bilateral;  Bilateral L3-S1 FI    INJECTION OF ANESTHETIC AGENT AROUND MEDIAL BRANCH NERVES INNERVATING LUMBAR FACET JOINT Bilateral 2023    Procedure: BLOCK, NERVE, FACET JOINT, LUMBAR, MEDIAL BRANCH;  Surgeon: Amari Razo MD;  Location: [facility];  Service: Pain " Management;  Laterality: Bilateral;  Bilateral L3-S1 FI    LEFT HEART CATHETERIZATION Left 12/21/2021    Procedure: Left heart cath;  Surgeon: Jeremy Rojo DO;  Location: [facility] CATH LAB;  Service: Cardiology;  Laterality: Left;    RADIOFREQUENCY ABLATION OF LUMBAR MEDIAL BRANCH NERVE AT SINGLE LEVEL Bilateral 4/12/2023    Procedure: RADIOFREQUENCY ABLATION, NERVE, SPINAL, LUMBAR, MEDIAL BRANCH, 1 LEVEL;  Surgeon: Amari Razo MD;  Location: [facility];  Service: Pain Management;  Laterality: Bilateral;  Bilateral L3-5 RFTC    STAPEDES SURGERY Bilateral     TONSILLECTOMY       Family History   Problem Relation Age of Onset    Cancer Mother     Diabetes Mother     Hypertension Mother      Social History     Tobacco Use    Smoking status: Never    Smokeless tobacco: Never   Substance Use Topics    Alcohol use: Not Currently    Drug use: Never     Review of Systems   Constitutional:  Positive for activity change.   HENT:  Positive for congestion.    Eyes: Negative.    Respiratory:  Positive for cough.    Cardiovascular: Negative.    Gastrointestinal: Negative.    Endocrine: Negative.    Genitourinary: Negative.    Skin: Negative.    Allergic/Immunologic: Positive for immunocompromised state.   Neurological:  Positive for weakness.   Hematological: Negative.    Psychiatric/Behavioral:  Positive for hallucinations. The patient is nervous/anxious.        Physical Exam     Initial Vitals [12/23/23 0846]   BP Pulse Resp Temp SpO2   (!) 146/97 76 16 98 °F (36.7 °C) 99 %      MAP       --         Physical Exam    Nursing note and vitals reviewed.  Constitutional: She appears well-developed and well-nourished. She is cooperative.   HENT:   Head: Normocephalic and atraumatic.   Eyes: Conjunctivae and EOM are normal. Pupils are equal, round, and reactive to light.   Neck: Trachea normal. Neck supple.   Normal range of motion.  Cardiovascular:  Normal rate, regular rhythm, normal heart sounds and intact  distal pulses.           Pulses:       Radial pulses are 3+ on the right side and 3+ on the left side.        Dorsalis pedis pulses are 3+ on the right side and 3+ on the left side.   Pulmonary/Chest: Breath sounds normal.   Abdominal: Abdomen is soft. Bowel sounds are normal. She exhibits no distension. There is no abdominal tenderness.   Musculoskeletal:         General: Normal range of motion.      Right shoulder: Normal.      Left shoulder: Normal.      Right upper arm: Normal.      Left upper arm: Normal.      Right elbow: Normal.      Left elbow: Normal.      Right forearm: Normal.      Left forearm: Normal.      Right wrist: Normal.      Left wrist: Normal.      Right hand: Normal.      Left hand: Normal.      Cervical back: Normal range of motion and neck supple.     Lymphadenopathy:     She has no cervical adenopathy.     She has no axillary adenopathy.   Neurological: She is alert and oriented to person, place, and time. She has normal strength. No cranial nerve deficit or sensory deficit. She displays a negative Romberg sign. GCS eye subscore is 4. GCS verbal subscore is 5. GCS motor subscore is 6.   Reflex Scores:       Bicep reflexes are 2+ on the right side and 2+ on the left side.       Patellar reflexes are 2+ on the right side and 2+ on the left side.  Skin: Skin is warm and dry. Capillary refill takes less than 2 seconds.   Psychiatric: She has a normal mood and affect. Her speech is normal and behavior is normal. Judgment and thought content normal. Cognition and memory are normal.         Medical Screening Exam   See Full Note    ED Course   Procedures  Labs Reviewed   COMPREHENSIVE METABOLIC PANEL - Abnormal; Notable for the following components:       Result Value    Sodium 131 (*)     Chloride 93 (*)     Glucose 424 (*)     Albumin 3.3 (*)     All other components within normal limits   URINALYSIS - Abnormal; Notable for the following components:    Glucose,  (*)     All other components  within normal limits   CBC WITH DIFFERENTIAL - Abnormal; Notable for the following components:    Neutrophils % 50.4 (*)     Monocytes % 8.4 (*)     All other components within normal limits   POCT GLUCOSE MONITORING CONTINUOUS - Abnormal; Notable for the following components:    POC Glucose 410 (*)     All other components within normal limits   POCT GLUCOSE MONITORING CONTINUOUS - Abnormal; Notable for the following components:    POC Glucose 401 (*)     All other components within normal limits   THROAT SCREEN, RAPID STREP - Normal   SARS-COV2 (COVID) W/ FLU ANTIGEN - Normal    Narrative:     Negative SARS-CoV results should not be used as the sole basis for treatment or patient management decisions; negative results should be considered in the context of a patient's recent exposures, history and the presene of clinical signs and symptoms consistent with COVID-19.  Negative results should be treated as presumptive and confirmed by molecular assay, if necessary for patient management.   CBC W/ AUTO DIFFERENTIAL    Narrative:     The following orders were created for panel order CBC Auto Differential.  Procedure                               Abnormality         Status                     ---------                               -----------         ------                     CBC with Differential[[id number]]       Abnormal            Final result                 Please view results for these tests on the individual orders.          Imaging Results              X-Ray Chest PA And Lateral (Final result)  Result time 12/23/23 11:01:15      Final result by Deo Eisenberg DO (12/23/23 11:01:15)                   Impression:      No acute cardiopulmonary process demonstrated.    Point of Service: [facility]      Electronically signed by: Deo Eisenberg  Date:    12/23/2023  Time:    11:01               Narrative:    EXAMINATION:  XR CHEST PA AND LATERAL    CLINICAL HISTORY:  Cough,  unspecified    COMPARISON:  Chest x-ray December 7, 2021    TECHNIQUE:  Frontal and lateral views of the chest.    FINDINGS:  Heart size appears within normal limits.  No focal consolidation, pleural effusion, or pneumothorax.  Visualized osseous and surrounding soft tissue structures demonstrate no acute abnormality.                                    X-Rays:   Independently Interpreted Readings:   Chest X-Ray:   X-Ray Chest PA And Lateral  Performed: 12/23/23 1005  Final           Impression: No acute cardiopulmonary process demonstrated. Point of Service: [facility] Electronically signed by: Deo Eisenberg Date: 12/23/2023 Time: 11:01           Medications - No data to display  Medical Decision Making  PT IS A 48 YR OLD BF WITH GENERALIZED WEAKNESS,MALAISE AND MYALGIAS WITH  CONGESTION AND COUGH ONSET TODAY. PT HAS DM2 AND HAS NOT CHECKED GLU AND HAS NOT BEEN COMPLIANT WITH DIET WITH THE [place]OLIDAYS    Amount and/or Complexity of Data Reviewed  Independent Historian: caregiver  Labs: ordered.     Details: Viewed and Other Results - Labs    Updated   Order   12/23/23 1055  POCT glucose  Collected: 12/23/23 1036  Final result      POC Glucose 401 High  mg/dL       12/23/23 1105  Rapid strep screen  Collected: 12/23/23 1027  Final result  Specimen: Throat      Rapid Group A Strep Negative       12/23/23 1105  SARS-CoV2 (COVID) with FLU Antigen  Collected: 12/23/23 1016  Final result  Specimen: Respiratory from Nasopharyngeal      Influenza A Negative  Influenza B Negative  COVID-19 Ag Negative       12/23/23 1021  Urinalysis  Collected: 12/23/23 1016  Final result  Specimen: Urine      Color, UA Yellow  Clarity, UA Clear  pH, UA 6.0 pH Units  Leukocytes, UA Negative  Nitrites, UA Negative  Protein, UA Negative  Glucose,  Abnormal  mg/dL  Ketones, UA Negative mg/dL  Urobilinogen, UA 1.0 mg/dL  Bilirubin, UA Negative  Blood, UA Negative  Specific Gravity, UA 1.020       12/23/23  1020  Comprehensive metabolic panel  Collected: 12/23/23 0955  Final result  Specimen: Blood      Sodium 131 Low  mmol/L  Potassium 4.1 mmol/L  Chloride 93 Low  mmol/L  CO2 31 mmol/L  Anion Gap 11 mmol/L  Glucose 424 High  mg/dL  BUN 10 mg/dL  Creatinine 0.88 mg/dL  BUN/Creatinine Ratio 11  Calcium 8.5 mg/dL  Total Protein 7.1 g/dL  Albumin 3.3 Low  g/dL  Globulin 3.8 g/dL  A/G Ratio 0.9  Bilirubin, Total 0.6 mg/dL  Alk Phos 74 U/L  ALT 28 U/L  AST 27 U/L  eGFR 81 mL/min/1.73m2       12/23/23 1011  CBC Auto Differential  Collected: 12/23/23 0955  Final result  Specimen: Blood         12/23/23 1011  CBC with Differential  Collected: 12/23/23 0955  Final result  Specimen: Blood      WBC 6.64 K/uL  RBC 4.64 M/uL  Hemoglobin 12.9 g/dL  Hematocrit 40.1 %  MCV 86.4 fL  MCH 27.8 pg  MCHC 32.2 g/dL  RDW 13.3 %  Platelet Count 345 K/uL  MPV 10.6 fL  Neutrophils % 50.4 Low  %  Lymphocytes % 37.7 %  Neutrophils, Abs 3.35 K/uL  Lymphocytes, Absolute 2.50 K/uL  Diff Type Auto  Monocytes % 8.4 High  %  Eosinophils % 3.2 %  Basophils % 0.3 %  Monocytes, Absolute 0.56 K/uL  Eosinophils, Absolute 0.21 K/uL  Basophils, Absolute 0.02 K/uL           Radiology: ordered.     Details:   X-Ray Chest PA And Lateral  Performed: 12/23/23 1005  Final           Impression: No acute cardiopulmonary process demonstrated. Point of Service: [facility] Electronically signed by: Deo Eisenberg Date: 12/23/2023 Time: 11:01        Discussion of management or test interpretation with external provider(s): EXAM  LABS     GLU  IVF NS 1 L  IMPROVED  DC HOME IN STABLE CONDITION  DIABETIC TEACHING ACCOMPLISHED    Risk  Prescription drug management.                                      Clinical Impression:   Final diagnoses:  [R05.9] Cough  [J40] Bronchitis (Primary)  [J06.9] Upper respiratory tract infection, unspecified type  [E11.65] Hyperglycemia due to diabetes mellitus        ED Disposition Condition    Discharge  Stable          ED Prescriptions       Medication Sig Dispense Start Date End Date Auth. Provider    azithromycin (Z-YULY) 250 MG tablet () Take 2 tablets by mouth on day 1; Take 1 tablet by mouth on days 2-5 6 tablet 2023 Praveena Mcmanus MD          Follow-up Information       Follow up With Specialties Details Why Contact Info    Elisa Mcdaniel MD Family Medicine In 1 week If symptoms worsen sooner [address]  [facility] [place] [address]  [phone]               Praveena Mcmanus MD  [phone]

## 2024-01-03 ENCOUNTER — OFFICE VISIT (OUTPATIENT)
Dept: FAMILY MEDICINE | Facility: CLINIC | Age: 49
End: 2024-01-03
Payer: COMMERCIAL

## 2024-01-03 VITALS
TEMPERATURE: 99 F | SYSTOLIC BLOOD PRESSURE: 106 MMHG | HEIGHT: 64 IN | OXYGEN SATURATION: 97 % | BODY MASS INDEX: 33.8 KG/M2 | RESPIRATION RATE: 20 BRPM | WEIGHT: 198 LBS | HEART RATE: 114 BPM | DIASTOLIC BLOOD PRESSURE: 70 MMHG

## 2024-01-03 DIAGNOSIS — M54.40 CHRONIC BILATERAL LOW BACK PAIN WITH SCIATICA, SCIATICA LATERALITY UNSPECIFIED: Chronic | ICD-10-CM

## 2024-01-03 DIAGNOSIS — M47.817 LUMBOSACRAL SPONDYLOSIS WITHOUT MYELOPATHY: Primary | Chronic | ICD-10-CM

## 2024-01-03 DIAGNOSIS — G89.29 CHRONIC BILATERAL LOW BACK PAIN WITH SCIATICA, SCIATICA LATERALITY UNSPECIFIED: Chronic | ICD-10-CM

## 2024-01-03 DIAGNOSIS — J01.00 ACUTE NON-RECURRENT MAXILLARY SINUSITIS: ICD-10-CM

## 2024-01-03 PROCEDURE — 99214 OFFICE O/P EST MOD 30 MIN: CPT | Mod: 25,,, | Performed by: FAMILY MEDICINE

## 2024-01-03 PROCEDURE — 3008F BODY MASS INDEX DOCD: CPT | Mod: CPTII,,, | Performed by: FAMILY MEDICINE

## 2024-01-03 PROCEDURE — 3074F SYST BP LT 130 MM HG: CPT | Mod: CPTII,,, | Performed by: FAMILY MEDICINE

## 2024-01-03 PROCEDURE — 3078F DIAST BP <80 MM HG: CPT | Mod: CPTII,,, | Performed by: FAMILY MEDICINE

## 2024-01-03 PROCEDURE — 1160F RVW MEDS BY RX/DR IN RCRD: CPT | Mod: CPTII,,, | Performed by: FAMILY MEDICINE

## 2024-01-03 PROCEDURE — 1159F MED LIST DOCD IN RCRD: CPT | Mod: CPTII,,, | Performed by: FAMILY MEDICINE

## 2024-01-03 PROCEDURE — 96372 THER/PROPH/DIAG INJ SC/IM: CPT | Mod: ,,, | Performed by: FAMILY MEDICINE

## 2024-01-03 RX ORDER — TRIPROLIDINE HCL, DEXTROMETHORPHAN HBR 10; 1.25 MG/5ML; MG/5ML
5 LIQUID ORAL EVERY 6 HOURS PRN
Qty: 150 ML | Refills: 0 | Status: SHIPPED | OUTPATIENT
Start: 2024-01-03

## 2024-01-03 RX ORDER — CEFTRIAXONE 1 G/1
1 INJECTION, POWDER, FOR SOLUTION INTRAMUSCULAR; INTRAVENOUS
Status: COMPLETED | OUTPATIENT
Start: 2024-01-03 | End: 2024-01-03

## 2024-01-03 RX ORDER — AMOXICILLIN AND CLAVULANATE POTASSIUM 875; 125 MG/1; MG/1
1 TABLET, FILM COATED ORAL EVERY 12 HOURS
Qty: 20 TABLET | Refills: 0 | Status: SHIPPED | OUTPATIENT
Start: 2024-01-03 | End: 2024-01-13

## 2024-01-03 RX ORDER — KETOROLAC TROMETHAMINE 30 MG/ML
30 INJECTION, SOLUTION INTRAMUSCULAR; INTRAVENOUS
Status: COMPLETED | OUTPATIENT
Start: 2024-01-03 | End: 2024-01-03

## 2024-01-03 RX ORDER — FLUCONAZOLE 150 MG/1
TABLET ORAL
Qty: 3 TABLET | Refills: 0 | Status: SHIPPED | OUTPATIENT
Start: 2024-01-03

## 2024-01-03 RX ADMIN — CEFTRIAXONE 1 G: 1 INJECTION, POWDER, FOR SOLUTION INTRAMUSCULAR; INTRAVENOUS at 02:01

## 2024-01-03 RX ADMIN — KETOROLAC TROMETHAMINE 30 MG: 30 INJECTION, SOLUTION INTRAMUSCULAR; INTRAVENOUS at 03:01

## 2024-01-03 NOTE — PROGRESS NOTES
Clinic Note    Patient Name: Georgiana Zayas  : 1975  MRN: [id number]    Chief Complaint   Patient presents with    Back Pain    Headache     Runny nose    Health Maintenance     Hepatitis C Screening Never done  TETANUS VACCINE Never done  High Dose Statin Never done  Colorectal Cancer Screening Never done  Pneumococcal Vaccines (Age 0-64)(2 - PPSV23 or PCV20) due on 2022  Foot Exam due on 2023  Influenza Vaccine(1) due on 2023  COVID-19 Vaccine(5 -  season) due on 2023  Hemoglobin A1c due on 11/15/2023     Cough       HPI:    Ms. Georgiana Zayas is a 48 y.o. female who presents to clinic today with CC of chronic back pain and HA.  Reports she is following with pain management for chronic back pain and is scheduled for an injection 01/10/24.  She has some paperwork for short term disability that needs to be completed. Reports she is not sure if this can be approved for longer than 6 months or not. Reports, however, she has still been unable to return to work. Reports she is in the process of being evaluated for disability but this is not completed. States she needs this paperwork completed to try to extend her short term disability for now.   Patient reports sinus congestion/rhinorrhea, and HA X 1 week. Reports mild associated cough. Reports low grade fever at home.  Patient is, otherwise, without complaints.     Medications:  Medication List with Changes/Refills   New Medications    AMOXICILLIN-CLAVULANATE 875-125MG (AUGMENTIN) 875-125 MG PER TABLET    Take 1 tablet by mouth every 12 (twelve) hours. for 10 days    DEXTROMETHORPHAN-TRIPROLIDINE (ENDAL, DM-TRIPROLIDINE,) 10-1.25 MG/5 ML SOLN    Take 5 mLs by mouth every 6 (six) hours as needed (congestion or cough).   Current Medications    ARIPIPRAZOLE (ABILIFY) 2 MG TAB    Take 1 tablet (2 mg total) by mouth once daily.    BENZTROPINE (COGENTIN) 0.5 MG TABLET    Take 0.5 mg by mouth once daily.    CYCLOBENZAPRINE (FLEXERIL)  10 MG TABLET    Take 1 tablet (10 mg total) by mouth 3 (three) times daily.    DULOXETINE (CYMBALTA) 60 MG CAPSULE    Take 1 capsule (60 mg total) by mouth 2 (two) times daily.    GABAPENTIN (NEURONTIN) 800 MG TABLET    Take 1 tablet (800 mg total) by mouth 3 (three) times daily.    GLIPIZIDE (GLUCOTROL) 10 MG TABLET    Take 1 tablet (10 mg total) by mouth 2 (two) times daily with meals.    HYDROCHLOROTHIAZIDE (HYDRODIURIL) 25 MG TABLET    Take 1 tablet (25 mg total) by mouth once daily.    HYDROXYZINE PAMOATE (VISTARIL) 25 MG CAP    Take 1 capsule (25 mg total) by mouth 3 (three) times daily.    INSULIN (LANTUS SOLOSTAR U-100 INSULIN) GLARGINE 100 UNITS/ML SUBQ PEN    Inject 15 Units into the skin every evening.    LISINOPRIL (PRINIVIL,ZESTRIL) 2.5 MG TABLET    Take 1 tablet (2.5 mg total) by mouth once daily.    METHOCARBAMOL (ROBAXIN) 500 MG TAB    Take 500 mg by mouth 3 (three) times daily.    MOMETASONE (NASONEX) 50 MCG/ACTUATION NASAL SPRAY    2 sprays by Nasal route once daily.    OMEPRAZOLE (PRILOSEC) 40 MG CAPSULE    Take 1 capsule (40 mg total) by mouth Daily.    OXYCODONE-ACETAMINOPHEN (PERCOCET)  MG PER TABLET    Take 1 tablet by mouth every 6 (six) hours as needed.     OZEMPIC 1 MG/DOSE (4 MG/3 ML)    Inject 1 mg into the skin every 7 days.    PRAVASTATIN (PRAVACHOL) 20 MG TABLET    Take 1 tablet (20 mg total) by mouth every evening.    PROMETHAZINE (PHENERGAN) 25 MG TABLET    Take 1 tablet (25 mg total) by mouth every 6 (six) hours as needed for Nausea.    RISPERIDONE (RISPERDAL) 3 MG TAB    Take 1 tablet (3 mg total) by mouth once daily.    TRAZODONE (DESYREL) 100 MG TABLET    Take 1 tablet (100 mg total) by mouth every evening.   Changed and/or Refilled Medications    Modified Medication Previous Medication    FLUCONAZOLE (DIFLUCAN) 150 MG TAB fluconazole (DIFLUCAN) 150 MG Tab       Take one tablet by mouth every 72 hours X 3 doses if needed for yeast infection after completion of antibiotics.     Take one tablet by mouth every 72 hours X 3 doses if needed for yeast infection after completion of antibiotics.   Discontinued Medications    CHLORPHENIRAMINE-PHENYLEPH-DM (ED A-HIST DM) 4-10-10 MG TAB    Take 1 tablet by mouth every 8 (eight) hours as needed (congestion or cough).        Allergies: Codeine      Past Medical History:    Past Medical History:   Diagnosis Date    Arthritis     Depression     Diabetes mellitus     Diabetes mellitus, type 2     GERD (gastroesophageal reflux disease)     Hearing difficulty     Hypertension     Liver disease     Sleep apnea        Past Surgical History:    Past Surgical History:   Procedure Laterality Date    CARPAL TUNNEL RELEASE Bilateral      SECTION      INJECTION OF ANESTHETIC AGENT AROUND MEDIAL BRANCH NERVES INNERVATING LUMBAR FACET JOINT Bilateral 2021    Procedure: BLOCK, NERVE, FACET JOINT, LUMBAR, MEDIAL BRANCH;  Surgeon: Amari Razo MD;  Location: [facility] [place] [facility];  Service: Pain Management;  Laterality: Bilateral;  Bilateral L3-S1 Facet Injection    INJECTION OF ANESTHETIC AGENT AROUND MEDIAL BRANCH NERVES INNERVATING LUMBAR FACET JOINT Bilateral 2022    Procedure: BLOCK, NERVE, FACET JOINT, LUMBAR, MEDIAL BRANCH;  Surgeon: Amari Razo MD;  Location: [facility] [place];  Service: Pain Management;  Laterality: Bilateral;  Bilateral L3-S1 FI    INJECTION OF ANESTHETIC AGENT AROUND MEDIAL BRANCH NERVES INNERVATING LUMBAR FACET JOINT Bilateral 2023    Procedure: BLOCK, NERVE, FACET JOINT, LUMBAR, MEDIAL BRANCH;  Surgeon: Amari Razo MD;  Location: [facility] [place];  Service: Pain Management;  Laterality: Bilateral;  Bilateral L3-S1 FI    LEFT HEART CATHETERIZATION Left 2021    Procedure: Left heart cath;  Surgeon: Jeremy Rojo DO;  Location: [facility] CATH LAB;  Service: Cardiology;  Laterality: Left;    RADIOFREQUENCY ABLATION OF LUMBAR MEDIAL BRANCH NERVE AT SINGLE LEVEL Bilateral 2023     "Procedure: RADIOFREQUENCY ABLATION, NERVE, SPINAL, LUMBAR, MEDIAL BRANCH, 1 LEVEL;  Surgeon: Amari Razo MD;  Location: [facility];  Service: Pain Management;  Laterality: Bilateral;  Bilateral L3-5 RFTC    STAPEDES SURGERY Bilateral     TONSILLECTOMY           Social History:    Social History     Tobacco Use   Smoking Status Never   Smokeless Tobacco Never     Social History     Substance and Sexual Activity   Alcohol Use Not Currently     Social History     Substance and Sexual Activity   Drug Use Never         Family History:    Family History   Problem Relation Age of Onset    Cancer Mother     Diabetes Mother     Hypertension Mother        Review of Systems:    Review of Systems   Constitutional:  Positive for fever. Negative for appetite change, chills, fatigue and unexpected weight change.   HENT:  Positive for nasal congestion, ear pain, postnasal drip, rhinorrhea and sinus pressure/congestion. Negative for sore throat.    Eyes:  Negative for visual disturbance.   Respiratory:  Positive for cough. Negative for shortness of breath.    Cardiovascular:  Negative for chest pain and leg swelling.   Gastrointestinal:  Negative for abdominal pain, change in bowel habit, constipation, diarrhea, nausea and vomiting.   Musculoskeletal:  Positive for back pain. Negative for arthralgias.   Integumentary:  Negative for rash.   Neurological:  Positive for headaches. Negative for dizziness.   Psychiatric/Behavioral:  The patient is not nervous/anxious.         Vitals:    Vitals:    01/03/24 1342   BP: 106/70   BP Location: Left arm   Patient Position: Sitting   BP Method: Large (Manual)   Pulse: (!) 114   Resp: 20   Temp: 99.1 °F (37.3 °C)   TempSrc: Oral   SpO2: 97%   Weight: 89.8 kg (198 lb)   Height: 5' 4" (1.626 m)       Body mass index is 33.99 kg/m².    Wt Readings from Last 3 Encounters:   01/03/24 1342 89.8 kg (198 lb)   12/23/23 0846 95.3 kg (210 lb)   12/04/23 0817 92 kg (202 lb 12.8 oz)    "     Physical Exam:    Physical Exam  Constitutional:       General: She is not in acute distress.     Appearance: Normal appearance. She is obese.   HENT:      Nose: Congestion present.      Mouth/Throat:      Mouth: Mucous membranes are moist.      Pharynx: Oropharynx is clear.   Eyes:      Conjunctiva/sclera: Conjunctivae normal.   Cardiovascular:      Rate and Rhythm: Normal rate and regular rhythm.      Heart sounds: Normal heart sounds. No murmur heard.  Pulmonary:      Effort: Pulmonary effort is normal. No respiratory distress.      Breath sounds: Normal breath sounds. No wheezing, rhonchi or rales.   Abdominal:      General: Bowel sounds are normal.      Palpations: Abdomen is soft.      Tenderness: There is no abdominal tenderness.   Musculoskeletal:      Cervical back: Neck supple.      Right lower leg: No edema.      Left lower leg: No edema.   Skin:     Findings: No rash.   Neurological:      General: No focal deficit present.      Mental Status: She is alert. Mental status is at baseline.   Psychiatric:         Mood and Affect: Mood normal.         Assessment/Plan:   1. Lumbosacral spondylosis without myelopathy  -     ketorolac injection 30 mg    2. Acute non-recurrent maxillary sinusitis  -     cefTRIAXone injection 1 g  -     dextromethorphan-triprolidine (ENDAL, DM-TRIPROLIDINE,) 10-1.25 mg/5 mL Soln; Take 5 mLs by mouth every 6 (six) hours as needed (congestion or cough).  Dispense: 150 mL; Refill: 0  -     amoxicillin-clavulanate 875-125mg (AUGMENTIN) 875-125 mg per tablet; Take 1 tablet by mouth every 12 (twelve) hours. for 10 days  Dispense: 20 tablet; Refill: 0  -     fluconazole (DIFLUCAN) 150 MG Tab; Take one tablet by mouth every 72 hours X 3 doses if needed for yeast infection after completion of antibiotics.  Dispense: 3 tablet; Refill: 0    3. Chronic bilateral low back pain with sciatica, sciatica laterality unspecified    Short term disability paperwork completed. Copy to be scanned to  chart.  Follow up with Pain Management as scheduled.    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 RTC sooner if needed.  Patient voiced understanding and is agreeable to plan.      Lisandra Mcdaniel MD    Family Medicine

## 2024-01-26 ENCOUNTER — OFFICE VISIT (OUTPATIENT)
Dept: FAMILY MEDICINE | Facility: CLINIC | Age: 49
End: 2024-01-26
Payer: COMMERCIAL

## 2024-01-26 VITALS
RESPIRATION RATE: 16 BRPM | HEART RATE: 90 BPM | OXYGEN SATURATION: 97 % | BODY MASS INDEX: 34.15 KG/M2 | SYSTOLIC BLOOD PRESSURE: 144 MMHG | HEIGHT: 64 IN | WEIGHT: 200 LBS | DIASTOLIC BLOOD PRESSURE: 98 MMHG | TEMPERATURE: 98 F

## 2024-01-26 DIAGNOSIS — G89.29 CHRONIC BILATERAL LOW BACK PAIN WITH RIGHT-SIDED SCIATICA: Primary | Chronic | ICD-10-CM

## 2024-01-26 DIAGNOSIS — I10 ESSENTIAL HYPERTENSION: Chronic | ICD-10-CM

## 2024-01-26 DIAGNOSIS — M54.41 CHRONIC BILATERAL LOW BACK PAIN WITH RIGHT-SIDED SCIATICA: Primary | Chronic | ICD-10-CM

## 2024-01-26 PROCEDURE — 3008F BODY MASS INDEX DOCD: CPT | Mod: CPTII,,, | Performed by: NURSE PRACTITIONER

## 2024-01-26 PROCEDURE — 99213 OFFICE O/P EST LOW 20 MIN: CPT | Mod: 25,,, | Performed by: NURSE PRACTITIONER

## 2024-01-26 PROCEDURE — 3080F DIAST BP >= 90 MM HG: CPT | Mod: CPTII,,, | Performed by: NURSE PRACTITIONER

## 2024-01-26 PROCEDURE — 1160F RVW MEDS BY RX/DR IN RCRD: CPT | Mod: CPTII,,, | Performed by: NURSE PRACTITIONER

## 2024-01-26 PROCEDURE — 3077F SYST BP >= 140 MM HG: CPT | Mod: CPTII,,, | Performed by: NURSE PRACTITIONER

## 2024-01-26 PROCEDURE — 96372 THER/PROPH/DIAG INJ SC/IM: CPT | Mod: ,,, | Performed by: NURSE PRACTITIONER

## 2024-01-26 PROCEDURE — 1159F MED LIST DOCD IN RCRD: CPT | Mod: CPTII,,, | Performed by: NURSE PRACTITIONER

## 2024-01-26 RX ORDER — HYDROCHLOROTHIAZIDE 25 MG/1
25 TABLET ORAL DAILY
Qty: 90 TABLET | Refills: 1 | Status: SHIPPED | OUTPATIENT
Start: 2024-01-26

## 2024-01-26 RX ORDER — DEXAMETHASONE SODIUM PHOSPHATE 4 MG/ML
4 INJECTION, SOLUTION INTRA-ARTICULAR; INTRALESIONAL; INTRAMUSCULAR; INTRAVENOUS; SOFT TISSUE
Status: COMPLETED | OUTPATIENT
Start: 2024-01-26 | End: 2024-01-26

## 2024-01-26 RX ORDER — METHYLPREDNISOLONE ACETATE 40 MG/ML
40 INJECTION, SUSPENSION INTRA-ARTICULAR; INTRALESIONAL; INTRAMUSCULAR; SOFT TISSUE
Status: COMPLETED | OUTPATIENT
Start: 2024-01-26 | End: 2024-01-26

## 2024-01-26 RX ORDER — KETOROLAC TROMETHAMINE 30 MG/ML
30 INJECTION, SOLUTION INTRAMUSCULAR; INTRAVENOUS
Status: COMPLETED | OUTPATIENT
Start: 2024-01-26 | End: 2024-01-26

## 2024-01-26 RX ADMIN — DEXAMETHASONE SODIUM PHOSPHATE 4 MG: 4 INJECTION, SOLUTION INTRA-ARTICULAR; INTRALESIONAL; INTRAMUSCULAR; INTRAVENOUS; SOFT TISSUE at 08:01

## 2024-01-26 RX ADMIN — METHYLPREDNISOLONE ACETATE 40 MG: 40 INJECTION, SUSPENSION INTRA-ARTICULAR; INTRALESIONAL; INTRAMUSCULAR; SOFT TISSUE at 08:01

## 2024-01-26 RX ADMIN — KETOROLAC TROMETHAMINE 30 MG: 30 INJECTION, SOLUTION INTRAMUSCULAR; INTRAVENOUS at 08:01

## 2024-01-26 NOTE — PROGRESS NOTES
Clinic Note    Georgiana Zayas is a 48 y.o. female     Chief Complaint:   Chief Complaint   Patient presents with    Back Pain     Pt reports back pain radiates down her right leg.     Leg Pain        Subjective:    Patient complains of low back pain with right sided sciatica. Patient reports pain is chronic. Patient states she has arthritis and bulging discs. Denies new or acute injury. Patient does go to pain treatment. Patient states she typically gets pain injections but last injection was cancelled due to insurance not approving. Patient requesting injections.  Patient states she has been out of hctz. Patient states bp is typically well controlled when taking meds as prescribed. Admits to having a bp machine at home.         Allergies:   Review of patient's allergies indicates:   Allergen Reactions    Codeine Itching        Past Medical History:  Past Medical History:   Diagnosis Date    Arthritis     Depression     Diabetes mellitus     Diabetes mellitus, type 2     GERD (gastroesophageal reflux disease)     Hearing difficulty     Hypertension     Liver disease     Sleep apnea         Current Medications:    Current Outpatient Medications:     ARIPiprazole (ABILIFY) 2 MG Tab, Take 1 tablet (2 mg total) by mouth once daily., Disp: 90 tablet, Rfl: 1    benztropine (COGENTIN) 0.5 MG tablet, Take 0.5 mg by mouth once daily., Disp: , Rfl:     cyclobenzaprine (FLEXERIL) 10 MG tablet, Take 1 tablet (10 mg total) by mouth 3 (three) times daily., Disp: 90 tablet, Rfl: 1    dextromethorphan-triprolidine (ENDAL, DM-TRIPROLIDINE,) 10-1.25 mg/5 mL Soln, Take 5 mLs by mouth every 6 (six) hours as needed (congestion or cough)., Disp: 150 mL, Rfl: 0    DULoxetine (CYMBALTA) 60 MG capsule, Take 1 capsule (60 mg total) by mouth 2 (two) times daily., Disp: 180 capsule, Rfl: 1    fluconazole (DIFLUCAN) 150 MG Tab, Take one tablet by mouth every 72 hours X 3 doses if needed for yeast infection after completion of antibiotics.,  Disp: 3 tablet, Rfl: 0    gabapentin (NEURONTIN) 800 MG tablet, Take 1 tablet (800 mg total) by mouth 3 (three) times daily., Disp: 270 tablet, Rfl: 1    glipiZIDE (GLUCOTROL) 10 MG tablet, Take 1 tablet (10 mg total) by mouth 2 (two) times daily with meals., Disp: 180 tablet, Rfl: 1    hydrOXYzine pamoate (VISTARIL) 25 MG Cap, Take 1 capsule (25 mg total) by mouth 3 (three) times daily., Disp: 270 capsule, Rfl: 1    insulin (LANTUS SOLOSTAR U-100 INSULIN) glargine 100 units/mL SubQ pen, Inject 15 Units into the skin every evening., Disp: 18 mL, Rfl: 1    lisinopriL (PRINIVIL,ZESTRIL) 2.5 MG tablet, Take 1 tablet (2.5 mg total) by mouth once daily., Disp: 90 tablet, Rfl: 1    methocarbamoL (ROBAXIN) 500 MG Tab, Take 500 mg by mouth 3 (three) times daily., Disp: , Rfl:     mometasone (NASONEX) 50 mcg/actuation nasal spray, 2 sprays by Nasal route once daily., Disp: 17 g, Rfl: 3    omeprazole (PRILOSEC) 40 MG capsule, Take 1 capsule (40 mg total) by mouth Daily., Disp: 90 capsule, Rfl: 1    oxyCODONE-acetaminophen (PERCOCET)  mg per tablet, Take 1 tablet by mouth every 6 (six) hours as needed. , Disp: , Rfl:     OZEMPIC 1 mg/dose (4 mg/3 mL), Inject 1 mg into the skin every 7 days., Disp: 3 mL, Rfl: 5    pravastatin (PRAVACHOL) 20 MG tablet, Take 1 tablet (20 mg total) by mouth every evening., Disp: 90 tablet, Rfl: 1    promethazine (PHENERGAN) 25 MG tablet, Take 1 tablet (25 mg total) by mouth every 6 (six) hours as needed for Nausea., Disp: 30 tablet, Rfl: 0    risperiDONE (RISPERDAL) 3 MG Tab, Take 1 tablet (3 mg total) by mouth once daily., Disp: 90 tablet, Rfl: 1    traZODone (DESYREL) 100 MG tablet, Take 1 tablet (100 mg total) by mouth every evening., Disp: 90 tablet, Rfl: 1    hydroCHLOROthiazide (HYDRODIURIL) 25 MG tablet, Take 1 tablet (25 mg total) by mouth once daily., Disp: 90 tablet, Rfl: 1  No current facility-administered medications for this visit.       Review of Systems   Constitutional:   "Negative for fever.   Respiratory:  Negative for cough and shortness of breath.    Cardiovascular:  Negative for chest pain.   Gastrointestinal:  Negative for abdominal pain.   Genitourinary:  Negative for dysuria.   Musculoskeletal:  Positive for arthralgias, back pain and leg pain.          Objective:    BP (!) 144/98 (BP Location: Left arm, Patient Position: Sitting, BP Method: Large (Manual))   Pulse 90   Temp 98.1 °F (36.7 °C) (Oral)   Resp 16   Ht 5' 4" (1.626 m)   Wt 90.7 kg (200 lb)   SpO2 97%   BMI 34.33 kg/m²      Physical Exam  Constitutional:       Appearance: She is obese.   Eyes:      Extraocular Movements: Extraocular movements intact.   Cardiovascular:      Rate and Rhythm: Normal rate and regular rhythm.      Pulses: Normal pulses.      Heart sounds: Normal heart sounds.   Pulmonary:      Effort: Pulmonary effort is normal.      Breath sounds: Normal breath sounds.   Neurological:      Mental Status: She is alert and oriented to person, place, and time.          Assessment and Plan:    1. Chronic bilateral low back pain with right-sided sciatica    2. Essential hypertension         Chronic bilateral low back pain with right-sided sciatica  -     ketorolac injection 30 mg  -     dexAMETHasone injection 4 mg  -     methylPREDNISolone acetate injection 40 mg  -continue current med regimen    Essential hypertension  -     hydroCHLOROthiazide (HYDRODIURIL) 25 MG tablet; Take 1 tablet (25 mg total) by mouth once daily.  Dispense: 90 tablet; Refill: 1  -monitor bp at home. If persistently > 140/90 f/u at clinic  -low salt diet        There are no Patient Instructions on file for this visit.   Follow up in about 3 months (around 4/26/2024), or if symptoms worsen or fail to improve.     "

## 2024-02-06 ENCOUNTER — OFFICE VISIT (OUTPATIENT)
Dept: FAMILY MEDICINE | Facility: CLINIC | Age: 49
End: 2024-02-06
Payer: COMMERCIAL

## 2024-02-06 VITALS
TEMPERATURE: 98 F | HEIGHT: 64 IN | WEIGHT: 197 LBS | DIASTOLIC BLOOD PRESSURE: 88 MMHG | BODY MASS INDEX: 33.63 KG/M2 | RESPIRATION RATE: 20 BRPM | SYSTOLIC BLOOD PRESSURE: 122 MMHG | OXYGEN SATURATION: 100 % | HEART RATE: 100 BPM

## 2024-02-06 DIAGNOSIS — R29.898 DECREASED GRIP STRENGTH: ICD-10-CM

## 2024-02-06 DIAGNOSIS — M47.817 LUMBOSACRAL SPONDYLOSIS WITHOUT MYELOPATHY: Chronic | ICD-10-CM

## 2024-02-06 DIAGNOSIS — R20.2 NUMBNESS AND TINGLING: Primary | ICD-10-CM

## 2024-02-06 DIAGNOSIS — R20.0 NUMBNESS AND TINGLING: Primary | ICD-10-CM

## 2024-02-06 PROCEDURE — 3008F BODY MASS INDEX DOCD: CPT | Mod: CPTII,,, | Performed by: FAMILY MEDICINE

## 2024-02-06 PROCEDURE — 3079F DIAST BP 80-89 MM HG: CPT | Mod: CPTII,,, | Performed by: FAMILY MEDICINE

## 2024-02-06 PROCEDURE — 99213 OFFICE O/P EST LOW 20 MIN: CPT | Mod: 25,,, | Performed by: FAMILY MEDICINE

## 2024-02-06 PROCEDURE — 3074F SYST BP LT 130 MM HG: CPT | Mod: CPTII,,, | Performed by: FAMILY MEDICINE

## 2024-02-06 PROCEDURE — 96372 THER/PROPH/DIAG INJ SC/IM: CPT | Mod: ,,, | Performed by: FAMILY MEDICINE

## 2024-02-06 PROCEDURE — 1159F MED LIST DOCD IN RCRD: CPT | Mod: CPTII,,, | Performed by: FAMILY MEDICINE

## 2024-02-06 RX ORDER — KETOROLAC TROMETHAMINE 30 MG/ML
30 INJECTION, SOLUTION INTRAMUSCULAR; INTRAVENOUS
Status: COMPLETED | OUTPATIENT
Start: 2024-02-06 | End: 2024-02-06

## 2024-02-06 RX ADMIN — KETOROLAC TROMETHAMINE 30 MG: 30 INJECTION, SOLUTION INTRAMUSCULAR; INTRAVENOUS at 10:02

## 2024-02-06 NOTE — PROGRESS NOTES
Clinic Note    Patient Name: Georgiana Zayas  : 1975  MRN: [id number]    Chief Complaint   Patient presents with    Back Pain    Health Maintenance     Hepatitis C Screening Never done  TETANUS VACCINE Never done  Colorectal Cancer Screening Never done  Pneumococcal Vaccines (Age 0-64)(2 of 2 - PPSV23 or PCV20) due on 2022  Foot Exam due on 2023  Influenza Vaccine(1) due on 2023  COVID-19 Vaccine( season) due on 2023  Hemoglobin A1c due on 11/15/2023     Immunizations     Request flu immunization today       HPI:    Ms. Georgiana Zayas is a 48 y.o. female who presents to clinic today with CC of chronic back pain but reports it is worsening. Reports she cannot even wipe herself without sharp pain in her back. Reports she is having trouble gripping things like her phone stating that sometimes things slip out of her hands.   She reports that she is having numbness in RLE.  She is currently following with Pain Management. She is scheduled to see them later this month.  Reports she has an appt with Neurology but reports that they told her she needed a new referral because her insurance requires a referral. Reports that she is scheduled to see Dr. Cyr on 24 but reports she has to have  a new referral placed or insurance will not cover appt.    Patient is, otherwise, without complaints.     Medications:  Medication List with Changes/Refills   Current Medications    ARIPIPRAZOLE (ABILIFY) 2 MG TAB    Take 1 tablet (2 mg total) by mouth once daily.    BENZTROPINE (COGENTIN) 0.5 MG TABLET    Take 0.5 mg by mouth once daily.    CYCLOBENZAPRINE (FLEXERIL) 10 MG TABLET    Take 1 tablet (10 mg total) by mouth 3 (three) times daily.    DEXTROMETHORPHAN-TRIPROLIDINE (ENDAL, DM-TRIPROLIDINE,) 10-1.25 MG/5 ML SOLN    Take 5 mLs by mouth every 6 (six) hours as needed (congestion or cough).    DULOXETINE (CYMBALTA) 60 MG CAPSULE    Take 1 capsule (60 mg total) by mouth 2 (two) times  daily.    FLUCONAZOLE (DIFLUCAN) 150 MG TAB    Take one tablet by mouth every 72 hours X 3 doses if needed for yeast infection after completion of antibiotics.    GABAPENTIN (NEURONTIN) 800 MG TABLET    Take 1 tablet (800 mg total) by mouth 3 (three) times daily.    GLIPIZIDE (GLUCOTROL) 10 MG TABLET    Take 1 tablet (10 mg total) by mouth 2 (two) times daily with meals.    HYDROCHLOROTHIAZIDE (HYDRODIURIL) 25 MG TABLET    Take 1 tablet (25 mg total) by mouth once daily.    HYDROXYZINE PAMOATE (VISTARIL) 25 MG CAP    Take 1 capsule (25 mg total) by mouth 3 (three) times daily.    INSULIN (LANTUS SOLOSTAR U-100 INSULIN) GLARGINE 100 UNITS/ML SUBQ PEN    Inject 15 Units into the skin every evening.    LISINOPRIL (PRINIVIL,ZESTRIL) 2.5 MG TABLET    Take 1 tablet (2.5 mg total) by mouth once daily.    METHOCARBAMOL (ROBAXIN) 500 MG TAB    Take 500 mg by mouth 3 (three) times daily.    MOMETASONE (NASONEX) 50 MCG/ACTUATION NASAL SPRAY    2 sprays by Nasal route once daily.    OMEPRAZOLE (PRILOSEC) 40 MG CAPSULE    Take 1 capsule (40 mg total) by mouth Daily.    OXYCODONE-ACETAMINOPHEN (PERCOCET)  MG PER TABLET    Take 1 tablet by mouth every 6 (six) hours as needed.     OZEMPIC 1 MG/DOSE (4 MG/3 ML)    Inject 1 mg into the skin every 7 days.    PRAVASTATIN (PRAVACHOL) 20 MG TABLET    Take 1 tablet (20 mg total) by mouth every evening.    PROMETHAZINE (PHENERGAN) 25 MG TABLET    Take 1 tablet (25 mg total) by mouth every 6 (six) hours as needed for Nausea.    RISPERIDONE (RISPERDAL) 3 MG TAB    Take 1 tablet (3 mg total) by mouth once daily.    TRAZODONE (DESYREL) 100 MG TABLET    Take 1 tablet (100 mg total) by mouth every evening.        Allergies: Codeine      Past Medical History:    Past Medical History:   Diagnosis Date    Arthritis     Depression     Diabetes mellitus     Diabetes mellitus, type 2     GERD (gastroesophageal reflux disease)     Hearing difficulty     Hypertension     Liver disease     Sleep  apnea        Past Surgical History:    Past Surgical History:   Procedure Laterality Date    CARPAL TUNNEL RELEASE Bilateral      SECTION      INJECTION OF ANESTHETIC AGENT AROUND MEDIAL BRANCH NERVES INNERVATING LUMBAR FACET JOINT Bilateral 2021    Procedure: BLOCK, NERVE, FACET JOINT, LUMBAR, MEDIAL BRANCH;  Surgeon: Amari Razo MD;  Location: [facility]IN [facility];  Service: Pain Management;  Laterality: Bilateral;  Bilateral L3-S1 Facet Injection    INJECTION OF ANESTHETIC AGENT AROUND MEDIAL BRANCH NERVES INNERVATING LUMBAR FACET JOINT Bilateral 2022    Procedure: BLOCK, NERVE, FACET JOINT, LUMBAR, MEDIAL BRANCH;  Surgeon: Amari Razo MD;  Location: [facility]IN [facility];  Service: Pain Management;  Laterality: Bilateral;  Bilateral L3-S1 FI    INJECTION OF ANESTHETIC AGENT AROUND MEDIAL BRANCH NERVES INNERVATING LUMBAR FACET JOINT Bilateral 2023    Procedure: BLOCK, NERVE, FACET JOINT, LUMBAR, MEDIAL BRANCH;  Surgeon: Amari Razo MD;  Location: [facility] PAIN [facility];  Service: Pain Management;  Laterality: Bilateral;  Bilateral L3-S1 FI    LEFT HEART CATHETERIZATION Left 2021    Procedure: Left heart cath;  Surgeon: Jeremy Rojo DO;  Location: [facility] CATH LAB;  Service: Cardiology;  Laterality: Left;    RADIOFREQUENCY ABLATION OF LUMBAR MEDIAL BRANCH NERVE AT SINGLE LEVEL Bilateral 2023    Procedure: RADIOFREQUENCY ABLATION, NERVE, SPINAL, LUMBAR, MEDIAL BRANCH, 1 LEVEL;  Surgeon: Amari Razo MD;  Location: [facility] PAIN [facility];  Service: Pain Management;  Laterality: Bilateral;  Bilateral L3-5 RFTC    STAPEDES SURGERY Bilateral     TONSILLECTOMY           Social History:    Social History     Tobacco Use   Smoking Status Never   Smokeless Tobacco Never     Social History     Substance and Sexual Activity   Alcohol Use Not Currently     Social History     Substance and Sexual Activity   Drug Use Never         Family History:    Family History  "  Problem Relation Age of Onset    Cancer Mother     Diabetes Mother     Hypertension Mother        Review of Systems:    Review of Systems   Constitutional:  Negative for appetite change, chills, fatigue, fever and unexpected weight change.   Eyes:  Negative for visual disturbance.   Respiratory:  Negative for cough and shortness of breath.    Cardiovascular:  Negative for chest pain and leg swelling.   Gastrointestinal:  Negative for abdominal pain, change in bowel habit, constipation, diarrhea, nausea and vomiting.   Musculoskeletal:  Positive for arthralgias, back pain and neck pain.   Integumentary:  Negative for rash.   Neurological:  Negative for dizziness and headaches.   Psychiatric/Behavioral:  The patient is not nervous/anxious.         Vitals:    Vitals:    02/06/24 0928   BP: 122/88   BP Location: Left arm   Patient Position: Sitting   BP Method: Large (Automatic)   Pulse: 100   Resp: 20   Temp: 98.1 °F (36.7 °C)   TempSrc: Oral   SpO2: 100%   Weight: 89.4 kg (197 lb)   Height: 5' 4" (1.626 m)       Body mass index is 33.81 kg/m².    Wt Readings from Last 3 Encounters:   02/06/24 0928 89.4 kg (197 lb)   01/26/24 0830 90.7 kg (200 lb)   01/03/24 1342 89.8 kg (198 lb)        Physical Exam:    Physical Exam  Constitutional:       General: She is not in acute distress.     Appearance: Normal appearance. She is obese.   HENT:      Nose: Nose normal.      Mouth/Throat:      Mouth: Mucous membranes are moist.      Pharynx: Oropharynx is clear.   Eyes:      Conjunctiva/sclera: Conjunctivae normal.   Cardiovascular:      Rate and Rhythm: Normal rate and regular rhythm.      Heart sounds: Normal heart sounds. No murmur heard.  Pulmonary:      Effort: Pulmonary effort is normal. No respiratory distress.      Breath sounds: Normal breath sounds. No wheezing, rhonchi or rales.   Abdominal:      General: Bowel sounds are normal.      Palpations: Abdomen is soft.      Tenderness: There is no abdominal tenderness. "   Musculoskeletal:         General: Tenderness present. No swelling. Normal range of motion.      Cervical back: Neck supple.      Right lower leg: No edema.      Left lower leg: No edema.   Skin:     Findings: No rash.   Neurological:      General: No focal deficit present.      Mental Status: She is alert. Mental status is at baseline.   Psychiatric:         Mood and Affect: Mood normal.         Assessment/Plan:   1. Numbness and tingling  -     Ambulatory referral/consult to Neurology; Future; Expected date: 02/13/2024    2. Decreased  strength  -     Ambulatory referral/consult to Neurology; Future; Expected date: 02/13/2024    3. Lumbosacral spondylosis without myelopathy  -     ketorolac injection 30 mg     Patient is scheduled to see Neurology. New referral placed for insurance.   Follow up with Neurology and Pain Management as scheduled.     Active Problem List with Overview Notes    Diagnosis Date Noted    Type 2 diabetes mellitus with diabetic neuropathy, with long-term current use of insulin 08/25/2021    Essential hypertension 08/25/2021    Anxiety 01/23/2023    Other hyperlipidemia 07/07/2022    Morbid obesity 01/17/2022    Depression 01/23/2023    Femoral artery pseudo-aneurysm, right 01/17/2022    Other sleep apnea 11/30/2021    Lumbosacral spondylosis without myelopathy 09/08/2021    Gastroesophageal reflux disease 08/25/2021    Chronic bilateral low back pain with right-sided sciatica 07/07/2022    Seasonal allergies 07/07/2022    Muscle spasm 07/07/2022        RTC as scheduled for chronic follow up. RTC sooner if needed.  Patient voiced understanding and is agreeable to plan.      Lisandra Mcdaniel MD    Family Medicine

## 2024-02-08 ENCOUNTER — CLINICAL SUPPORT (OUTPATIENT)
Dept: FAMILY MEDICINE | Facility: CLINIC | Age: 49
End: 2024-02-08
Payer: COMMERCIAL

## 2024-02-08 DIAGNOSIS — Z23 FLU VACCINE NEED: Primary | ICD-10-CM

## 2024-02-08 PROCEDURE — 90471 IMMUNIZATION ADMIN: CPT | Mod: ,,, | Performed by: NURSE PRACTITIONER

## 2024-02-08 PROCEDURE — 90686 IIV4 VACC NO PRSV 0.5 ML IM: CPT | Mod: ,,, | Performed by: NURSE PRACTITIONER

## 2024-02-08 NOTE — PROGRESS NOTES
Pt her today for flu vaccine only. Pt changed mind about covid vaccine. Pt tolerated injection well with no s/s of allergic reaction noted at this time.

## 2024-02-12 ENCOUNTER — OFFICE VISIT (OUTPATIENT)
Dept: NEUROLOGY | Facility: CLINIC | Age: 49
End: 2024-02-12
Payer: COMMERCIAL

## 2024-02-12 VITALS
BODY MASS INDEX: 33.63 KG/M2 | RESPIRATION RATE: 18 BRPM | SYSTOLIC BLOOD PRESSURE: 118 MMHG | HEART RATE: 102 BPM | HEIGHT: 64 IN | WEIGHT: 197 LBS | DIASTOLIC BLOOD PRESSURE: 90 MMHG | OXYGEN SATURATION: 98 %

## 2024-02-12 DIAGNOSIS — M54.9 DORSALGIA, UNSPECIFIED: ICD-10-CM

## 2024-02-12 DIAGNOSIS — F41.9 ANXIETY: Chronic | ICD-10-CM

## 2024-02-12 DIAGNOSIS — F32.89 OTHER DEPRESSION: Chronic | ICD-10-CM

## 2024-02-12 DIAGNOSIS — M47.817 LUMBOSACRAL SPONDYLOSIS WITHOUT MYELOPATHY: Chronic | ICD-10-CM

## 2024-02-12 DIAGNOSIS — M54.12 CERVICAL RADICULOPATHY: Primary | ICD-10-CM

## 2024-02-12 PROCEDURE — 99204 OFFICE O/P NEW MOD 45 MIN: CPT | Mod: S$PBB,,, | Performed by: PSYCHIATRY & NEUROLOGY

## 2024-02-12 PROCEDURE — 3074F SYST BP LT 130 MM HG: CPT | Mod: CPTII,,, | Performed by: PSYCHIATRY & NEUROLOGY

## 2024-02-12 PROCEDURE — 1159F MED LIST DOCD IN RCRD: CPT | Mod: CPTII,,, | Performed by: PSYCHIATRY & NEUROLOGY

## 2024-02-12 PROCEDURE — 3080F DIAST BP >= 90 MM HG: CPT | Mod: CPTII,,, | Performed by: PSYCHIATRY & NEUROLOGY

## 2024-02-12 PROCEDURE — 3008F BODY MASS INDEX DOCD: CPT | Mod: CPTII,,, | Performed by: PSYCHIATRY & NEUROLOGY

## 2024-02-12 PROCEDURE — 99215 OFFICE O/P EST HI 40 MIN: CPT | Mod: PBBFAC | Performed by: PSYCHIATRY & NEUROLOGY

## 2024-02-12 NOTE — PROGRESS NOTES
Subjective:       Patient ID: Georgiana Zayas is a 48 y.o. female     Chief Complaint:    Chief Complaint   Patient presents with    lumbosacral     Pt.states memory not good. Legs and arms jumping. Forget words.        Allergies:  Codeine    Current Medications:    Outpatient Encounter Medications as of 2/12/2024   Medication Sig Dispense Refill    ARIPiprazole (ABILIFY) 2 MG Tab Take 1 tablet (2 mg total) by mouth once daily. 90 tablet 1    benztropine (COGENTIN) 0.5 MG tablet Take 0.5 mg by mouth once daily.      cyclobenzaprine (FLEXERIL) 10 MG tablet Take 1 tablet (10 mg total) by mouth 3 (three) times daily. 90 tablet 1    dextromethorphan-triprolidine (ENDAL, DM-TRIPROLIDINE,) 10-1.25 mg/5 mL Soln Take 5 mLs by mouth every 6 (six) hours as needed (congestion or cough). 150 mL 0    DULoxetine (CYMBALTA) 60 MG capsule Take 1 capsule (60 mg total) by mouth 2 (two) times daily. 180 capsule 1    fluconazole (DIFLUCAN) 150 MG Tab Take one tablet by mouth every 72 hours X 3 doses if needed for yeast infection after completion of antibiotics. 3 tablet 0    gabapentin (NEURONTIN) 800 MG tablet Take 1 tablet (800 mg total) by mouth 3 (three) times daily. 270 tablet 1    glipiZIDE (GLUCOTROL) 10 MG tablet Take 1 tablet (10 mg total) by mouth 2 (two) times daily with meals. 180 tablet 1    hydroCHLOROthiazide (HYDRODIURIL) 25 MG tablet Take 1 tablet (25 mg total) by mouth once daily. 90 tablet 1    hydrOXYzine pamoate (VISTARIL) 25 MG Cap Take 1 capsule (25 mg total) by mouth 3 (three) times daily. 270 capsule 1    insulin (LANTUS SOLOSTAR U-100 INSULIN) glargine 100 units/mL SubQ pen Inject 15 Units into the skin every evening. 18 mL 1    lisinopriL (PRINIVIL,ZESTRIL) 2.5 MG tablet Take 1 tablet (2.5 mg total) by mouth once daily. 90 tablet 1    methocarbamoL (ROBAXIN) 500 MG Tab Take 500 mg by mouth 3 (three) times daily.      mometasone (NASONEX) 50 mcg/actuation nasal spray 2 sprays by Nasal route once daily. 17  "g 3    omeprazole (PRILOSEC) 40 MG capsule Take 1 capsule (40 mg total) by mouth Daily. 90 capsule 1    oxyCODONE-acetaminophen (PERCOCET)  mg per tablet Take 1 tablet by mouth every 6 (six) hours as needed.       OZEMPIC 1 mg/dose (4 mg/3 mL) Inject 1 mg into the skin every 7 days. 3 mL 5    pravastatin (PRAVACHOL) 20 MG tablet Take 1 tablet (20 mg total) by mouth every evening. 90 tablet 1    promethazine (PHENERGAN) 25 MG tablet Take 1 tablet (25 mg total) by mouth every 6 (six) hours as needed for Nausea. 30 tablet 0    risperiDONE (RISPERDAL) 3 MG Tab Take 1 tablet (3 mg total) by mouth once daily. 90 tablet 1    traZODone (DESYREL) 100 MG tablet Take 1 tablet (100 mg total) by mouth every evening. 90 tablet 1    [DISCONTINUED] hydroCHLOROthiazide (HYDRODIURIL) 25 MG tablet Take 1 tablet (25 mg total) by mouth once daily. 90 tablet 1     No facility-administered encounter medications on file as of 2/12/2024.       History of Present Illness  48-year-old black female referred to Neurology for evaluation of memory issues. 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  She has been followed by pain management for many years and undergone multiple injections epidurals and facet blocks to no avail.  Currently on multiple medications including opioid " narcotics and neuropathic pain meds which have also not helped her symptoms.  She has significant anxiety, depression currently on 2 antipsychotics and 2 antidepressants,  as well as morbid obesity with significant risk factors of hypertension and diabetes which will also exacerbate all chronic pains and neuropathic pain and symptoms.  States that simple movements of activity of daily living are creating radicular pains in her upper extremities as well as lower extremities.  Patient also having some intermittent decreased  strength  MRI lumbar spine showed multilevel degenerative disc with some facet arthropathy approximately 7 months ago but I do not see any follow-up evaluation with spinal surgery         Past Medical History:   Diagnosis Date    Arthritis     Depression     Diabetes mellitus     Diabetes mellitus, type 2     GERD (gastroesophageal reflux disease)     Hearing difficulty     Hypertension     Liver disease     Sleep apnea        Past Surgical History:   Procedure Laterality Date    CARPAL TUNNEL RELEASE Bilateral      SECTION      INJECTION OF ANESTHETIC AGENT AROUND MEDIAL BRANCH NERVES INNERVATING LUMBAR FACET JOINT Bilateral 2021    Procedure: BLOCK, NERVE, FACET JOINT, LUMBAR, MEDIAL BRANCH;  Surgeon: Amari Razo MD;  Location: [facility];  Service: Pain Management;  Laterality: Bilateral;  Bilateral L3-S1 Facet Injection    INJECTION OF ANESTHETIC AGENT AROUND MEDIAL BRANCH NERVES INNERVATING LUMBAR FACET JOINT Bilateral 2022    Procedure: BLOCK, NERVE, FACET JOINT, LUMBAR, MEDIAL BRANCH;  Surgeon: Amari Razo MD;  Location: [facility];  Service: Pain Management;  Laterality: Bilateral;  Bilateral L3-S1 FI    INJECTION OF ANESTHETIC AGENT AROUND MEDIAL BRANCH NERVES INNERVATING LUMBAR FACET JOINT Bilateral 2023    Procedure: BLOCK, NERVE, FACET JOINT, LUMBAR, MEDIAL BRANCH;  Surgeon: Amari Razo MD;  Location: [facility]  "MGMT;  Service: Pain Management;  Laterality: Bilateral;  Bilateral L3-S1 FI    LEFT HEART CATHETERIZATION Left 12/21/2021    Procedure: Left heart cath;  Surgeon: Jeremy Rojo DO;  Location: [facility] CATH LAB;  Service: Cardiology;  Laterality: Left;    RADIOFREQUENCY ABLATION OF LUMBAR MEDIAL BRANCH NERVE AT SINGLE LEVEL Bilateral 4/12/2023    Procedure: RADIOFREQUENCY ABLATION, NERVE, SPINAL, LUMBAR, MEDIAL BRANCH, 1 LEVEL;  Surgeon: Amari Razo MD;  Location: [facility];  Service: Pain Management;  Laterality: Bilateral;  Bilateral L3-5 RFTC    STAPEDES SURGERY Bilateral     TONSILLECTOMY         Social History  Ms. Zayas  reports that she has never smoked. She has never used smokeless tobacco. She reports that she does not currently use alcohol. She reports that she does not use drugs.    Family History  Ms.'s Zayas family history includes Cancer in her mother; Diabetes in her mother; Hypertension in her mother.    Review of Systems  Review of Systems   Musculoskeletal:  Positive for back pain, joint pain, myalgias and neck pain.   Neurological:  Positive for tingling, sensory change, focal weakness and weakness.   Psychiatric/Behavioral:  Positive for depression. The patient is nervous/anxious.    All other systems reviewed and are negative.     Objective:   BP (!) 118/90 (BP Location: Left arm, Patient Position: Sitting, BP Method: Large (Manual))   Pulse 102   Resp 18   Ht 5' 4" (1.626 m)   Wt 89.4 kg (197 lb)   SpO2 98%   BMI 33.81 kg/m²    NEUROLOGICAL EXAMINATION:     MENTAL STATUS   Oriented to person, place, and time.   Level of consciousness: drowsy  Knowledge: consistent with education.        Depression and anxiety     CRANIAL NERVES   Cranial nerves II through XII intact.     MOTOR EXAM     Strength   Strength 5/5 throughout.        Subjective decreased  strength     SENSORY EXAM        Variable and intermittent paresthesias in all extremities     GAIT AND COORDINATION "        Antalgic gait from lumbosacral radiculopathy        Physical Exam  Vitals reviewed.   Constitutional:       Appearance: She is obese.   Neurological:      Mental Status: She is alert and oriented to person, place, and time. Mental status is at baseline.      Cranial Nerves: Cranial nerves 2-12 are intact.      Motor: Motor strength is normal.         Assessment:     Cervical radiculopathy  -     MRI Cervical Spine Without Contrast; Future; Expected date: 02/12/2024    Lumbosacral spondylosis without myelopathy    Anxiety    Other depression    Dorsalgia, unspecified  -     MRI Lumbar Spine Without Contrast; Future; Expected date: 02/12/2024         Primary Diagnosis and ICD10  Cervical radiculopathy [M54.12]    Plan:     Patient Instructions   May need repeat MRI cervical and lumbar spine to rule out any pathology  We do not offer nerve conduction/EMG testing currently at this facility  Continue Gabapentin at dosage and frequency tolerated - may increase to 4x daily  May continue pain management follow-up if intervention successful?  Recommend caution with opioid narcotic medications  Needs psychiatry follow-up for evaluation of anxiety/depression and management of psych meds  Increase physical activity and weight reduction  Follow-up three-months    There are no discontinued medications.    Requested Prescriptions      No prescriptions requested or ordered in this encounter

## 2024-02-12 NOTE — PATIENT INSTRUCTIONS
May need repeat MRI cervical and lumbar spine to rule out any pathology  We do not offer nerve conduction/EMG testing currently at this facility  Continue Gabapentin at dosage and frequency tolerated - may increase to 4x daily  May continue pain management follow-up if intervention successful?  Recommend caution with opioid narcotic medications  Needs psychiatry follow-up for evaluation of anxiety/depression and management of psych meds  Increase physical activity and weight reduction  Follow-up three-months

## 2024-02-22 ENCOUNTER — TELEPHONE (OUTPATIENT)
Dept: NEUROLOGY | Facility: CLINIC | Age: 49
End: 2024-02-22
Payer: COMMERCIAL

## 2024-03-26 ENCOUNTER — HOSPITAL ENCOUNTER (EMERGENCY)
Facility: HOSPITAL | Age: 49
Discharge: HOME | End: 2024-03-26
Payer: COMMERCIAL

## 2024-03-26 VITALS
DIASTOLIC BLOOD PRESSURE: 87 MMHG | TEMPERATURE: 98 F | HEIGHT: 64 IN | OXYGEN SATURATION: 99 % | SYSTOLIC BLOOD PRESSURE: 129 MMHG | BODY MASS INDEX: 34.15 KG/M2 | WEIGHT: 200 LBS | HEART RATE: 109 BPM | RESPIRATION RATE: 18 BRPM

## 2024-03-26 DIAGNOSIS — J01.00 ACUTE MAXILLARY SINUSITIS, RECURRENCE NOT SPECIFIED: ICD-10-CM

## 2024-03-26 DIAGNOSIS — R25.2 SPASM: Primary | ICD-10-CM

## 2024-03-26 LAB
ANION GAP SERPL CALCULATED.3IONS-SCNC: 12 MMOL/L (ref 7–16)
BASOPHILS # BLD AUTO: 0.01 K/UL (ref 0–0.2)
BASOPHILS NFR BLD AUTO: 0.2 % (ref 0–1)
BUN SERPL-MCNC: 8 MG/DL (ref 7–18)
BUN/CREAT SERPL: 9 (ref 6–20)
CALCIUM SERPL-MCNC: 9.7 MG/DL (ref 8.5–10.1)
CHLORIDE SERPL-SCNC: 98 MMOL/L (ref 98–107)
CO2 SERPL-SCNC: 31 MMOL/L (ref 21–32)
CREAT SERPL-MCNC: 0.89 MG/DL (ref 0.55–1.02)
DIFFERENTIAL METHOD BLD: ABNORMAL
EGFR (NO RACE VARIABLE) (RUSH/TITUS): 80 ML/MIN/1.73M2
EOSINOPHIL # BLD AUTO: 0.13 K/UL (ref 0–0.5)
EOSINOPHIL NFR BLD AUTO: 2.1 % (ref 1–4)
ERYTHROCYTE [DISTWIDTH] IN BLOOD BY AUTOMATED COUNT: 14.2 % (ref 11.5–14.5)
FLUAV AG UPPER RESP QL IA.RAPID: NEGATIVE
FLUBV AG UPPER RESP QL IA.RAPID: NEGATIVE
GLUCOSE SERPL-MCNC: 207 MG/DL (ref 74–106)
GLUCOSE SERPL-MCNC: 213 MG/DL (ref 70–105)
GROUP A STREP MOLECULAR (OHS): NEGATIVE
HCT VFR BLD AUTO: 39.6 % (ref 38–47)
HGB BLD-MCNC: 12.4 G/DL (ref 12–16)
LYMPHOCYTES # BLD AUTO: 1.14 K/UL (ref 1–4.8)
LYMPHOCYTES NFR BLD AUTO: 18.4 % (ref 27–41)
MCH RBC QN AUTO: 27.1 PG (ref 27–31)
MCHC RBC AUTO-ENTMCNC: 31.3 G/DL (ref 32–36)
MCV RBC AUTO: 86.5 FL (ref 80–96)
MONOCYTES # BLD AUTO: 0.67 K/UL (ref 0–0.8)
MONOCYTES NFR BLD AUTO: 10.8 % (ref 2–6)
MPC BLD CALC-MCNC: 10.4 FL (ref 9.4–12.4)
NEUTROPHILS # BLD AUTO: 4.25 K/UL (ref 1.8–7.7)
NEUTROPHILS NFR BLD AUTO: 68.5 % (ref 53–65)
PLATELET # BLD AUTO: 308 K/UL (ref 150–400)
POTASSIUM SERPL-SCNC: 4 MMOL/L (ref 3.5–5.1)
RBC # BLD AUTO: 4.58 M/UL (ref 4.2–5.4)
SARS-COV+SARS-COV-2 AG RESP QL IA.RAPID: NEGATIVE
SODIUM SERPL-SCNC: 137 MMOL/L (ref 136–145)
WBC # BLD AUTO: 6.2 K/UL (ref 4.5–11)

## 2024-03-26 PROCEDURE — 99284 EMERGENCY DEPT VISIT MOD MDM: CPT | Mod: ,,, | Performed by: NURSE PRACTITIONER

## 2024-03-26 PROCEDURE — 87428 SARSCOV & INF VIR A&B AG IA: CPT | Performed by: NURSE PRACTITIONER

## 2024-03-26 PROCEDURE — 63600175 PHARM REV CODE 636 W HCPCS: Performed by: NURSE PRACTITIONER

## 2024-03-26 PROCEDURE — 87651 STREP A DNA AMP PROBE: CPT | Performed by: NURSE PRACTITIONER

## 2024-03-26 PROCEDURE — 99284 EMERGENCY DEPT VISIT MOD MDM: CPT | Mod: 25

## 2024-03-26 PROCEDURE — 96375 TX/PRO/DX INJ NEW DRUG ADDON: CPT

## 2024-03-26 PROCEDURE — 25000003 PHARM REV CODE 250: Performed by: NURSE PRACTITIONER

## 2024-03-26 PROCEDURE — 96374 THER/PROPH/DIAG INJ IV PUSH: CPT

## 2024-03-26 PROCEDURE — 85025 COMPLETE CBC W/AUTO DIFF WBC: CPT | Performed by: NURSE PRACTITIONER

## 2024-03-26 PROCEDURE — 80048 BASIC METABOLIC PNL TOTAL CA: CPT | Performed by: NURSE PRACTITIONER

## 2024-03-26 PROCEDURE — 96361 HYDRATE IV INFUSION ADD-ON: CPT

## 2024-03-26 PROCEDURE — 82962 GLUCOSE BLOOD TEST: CPT

## 2024-03-26 RX ORDER — DIPHENHYDRAMINE HYDROCHLORIDE 50 MG/ML
25 INJECTION INTRAMUSCULAR; INTRAVENOUS
Status: COMPLETED | OUTPATIENT
Start: 2024-03-26 | End: 2024-03-26

## 2024-03-26 RX ORDER — KETOROLAC TROMETHAMINE 30 MG/ML
15 INJECTION, SOLUTION INTRAMUSCULAR; INTRAVENOUS
Status: COMPLETED | OUTPATIENT
Start: 2024-03-26 | End: 2024-03-26

## 2024-03-26 RX ORDER — AMOXICILLIN 500 MG/1
500 CAPSULE ORAL 3 TIMES DAILY
Qty: 21 CAPSULE | Refills: 0 | Status: SHIPPED | OUTPATIENT
Start: 2024-03-26 | End: 2024-04-02 | Stop reason: ALTCHOICE

## 2024-03-26 RX ADMIN — DIPHENHYDRAMINE HYDROCHLORIDE 25 MG: 50 INJECTION INTRAMUSCULAR; INTRAVENOUS at 11:03

## 2024-03-26 RX ADMIN — KETOROLAC TROMETHAMINE 15 MG: 30 INJECTION INTRAMUSCULAR; INTRAVENOUS at 12:03

## 2024-03-26 RX ADMIN — SODIUM CHLORIDE 500 ML: 9 INJECTION, SOLUTION INTRAVENOUS at 11:03

## 2024-03-26 NOTE — ED TRIAGE NOTES
"C/o "arms and legs jumping" states around 0600 started having what she describes as possible muscle spasms. States she thought she may be having a stroke so she cane in. States she already takes meds for muscle spasms. Then states she has a sore throat and dry cough also.  "

## 2024-03-26 NOTE — DISCHARGE INSTRUCTIONS
Follow up with primary care provider in 1-2 days for re-evaluation.  Follow up with neurology as scheduled.   Complete full course of antibiotics.  Over the counter medications as directed for symptom relief. Only use over the counter medications with heart on the box which indicates it's safe for people with high blood pressure or other heart problems.  Drink plenty of fluid.  Return to emergency department for any new or worsening symptoms.

## 2024-03-26 NOTE — ED PROVIDER NOTES
Encounter Date: 3/26/2024       History     Chief Complaint   Patient presents with    Spasms     Georgiana Zayas is a 48 y.o. Black or  /female presenting to ED with spasmodic movements of upper and lower extremities leslie for 4-5 hours. She also reports congestion, runny nose, sore throat and sinus congestion for 3-4 days. Denies fever or chills. Denies abd pain, N/V. She notes 2 episodes of diarrhea daily over the last few days after taking laxative a few days ago, but no diarrhea today.  Patient sees pain tx for chronic neck and back pain. She is currently seeing Neurology for weakness in BUE and frequently dropping thing. Neurology ordered MRI Cervical and Lumbar spine but insurance denied so she has not had this done. Notes that she is to f/u with neurology to reschedule studies. She is on muscle relaxants, opioid narcotics and psychiatric medications but none recently changed, added or d/c'd. Currently AAOx4 and in NAD. No spasmotic movements during evaluation nor noted by nursing staff during triage. She appears anxious but currently in NAD. Neuro intact. Tachycardic. Otherwise, VSS at this time.      The history is provided by the patient and a relative.     Review of patient's allergies indicates:   Allergen Reactions    Codeine Itching     Past Medical History:   Diagnosis Date    Arthritis     Depression     Diabetes mellitus     Diabetes mellitus, type 2     GERD (gastroesophageal reflux disease)     Hearing difficulty     Hypertension     Liver disease     Sleep apnea      Past Surgical History:   Procedure Laterality Date    CARPAL TUNNEL RELEASE Bilateral      SECTION      INJECTION OF ANESTHETIC AGENT AROUND MEDIAL BRANCH NERVES INNERVATING LUMBAR FACET JOINT Bilateral 2021    Procedure: BLOCK, NERVE, FACET JOINT, LUMBAR, MEDIAL BRANCH;  Surgeon: Amari Razo MD;  Location: [facility];  Service: Pain Management;  Laterality: Bilateral;  Bilateral L3-S1  Facet Injection    INJECTION OF ANESTHETIC AGENT AROUND MEDIAL BRANCH NERVES INNERVATING LUMBAR FACET JOINT Bilateral 5/4/2022    Procedure: BLOCK, NERVE, FACET JOINT, LUMBAR, MEDIAL BRANCH;  Surgeon: Amari Razo MD;  Location: [facility];  Service: Pain Management;  Laterality: Bilateral;  Bilateral L3-S1 FI    INJECTION OF ANESTHETIC AGENT AROUND MEDIAL BRANCH NERVES INNERVATING LUMBAR FACET JOINT Bilateral 1/11/2023    Procedure: BLOCK, NERVE, FACET JOINT, LUMBAR, MEDIAL BRANCH;  Surgeon: Amari Razo MD;  Location: [facility];  Service: Pain Management;  Laterality: Bilateral;  Bilateral L3-S1 FI    LEFT HEART CATHETERIZATION Left 12/21/2021    Procedure: Left heart cath;  Surgeon: Jeremy Rojo DO;  Location: [facility] CATH LAB;  Service: Cardiology;  Laterality: Left;    RADIOFREQUENCY ABLATION OF LUMBAR MEDIAL BRANCH NERVE AT SINGLE LEVEL Bilateral 4/12/2023    Procedure: RADIOFREQUENCY ABLATION, NERVE, SPINAL, LUMBAR, MEDIAL BRANCH, 1 LEVEL;  Surgeon: Amari Razo MD;  Location: [facility];  Service: Pain Management;  Laterality: Bilateral;  Bilateral L3-5 RFTC    STAPEDES SURGERY Bilateral     TONSILLECTOMY       Family History   Problem Relation Age of Onset    Cancer Mother     Diabetes Mother     Hypertension Mother      Social History     Tobacco Use    Smoking status: Never    Smokeless tobacco: Never   Substance Use Topics    Alcohol use: Not Currently    Drug use: Never     Review of Systems   Constitutional:  Negative for appetite change, chills, diaphoresis, fatigue and fever.   HENT:  Positive for congestion, postnasal drip, rhinorrhea, sinus pressure, sinus pain and sore throat. Negative for ear pain, facial swelling and trouble swallowing.    Eyes:  Negative for pain, discharge and visual disturbance.   Respiratory:  Positive for cough. Negative for chest tightness and shortness of breath.    Cardiovascular:  Negative for chest pain and  palpitations.   Gastrointestinal:  Positive for diarrhea. Negative for abdominal pain, constipation, nausea and vomiting.   Genitourinary:  Negative for decreased urine volume, dysuria, frequency and urgency.   Musculoskeletal:  Positive for arthralgias, back pain (chronic, unchanged), myalgias and neck pain (chronic, unchanged). Negative for gait problem and neck stiffness.   Skin:  Negative for rash and wound.   Neurological:  Positive for tremors. Negative for dizziness, seizures, syncope, facial asymmetry, speech difficulty, weakness, light-headedness, numbness and headaches.   Psychiatric/Behavioral:  Negative for agitation and confusion. The patient is nervous/anxious.    All other systems reviewed and are negative.      Physical Exam     Initial Vitals   BP Pulse Resp Temp SpO2   03/26/24 1051 03/26/24 1046 03/26/24 1046 03/26/24 1046 03/26/24 1046   (!) 141/83 (!) 132 18 98.2 °F (36.8 °C) 99 %      MAP       --                Physical Exam    Nursing note and vitals reviewed.  Constitutional: She appears well-developed and well-nourished. She is not diaphoretic. No distress.   HENT:   Head: Normocephalic and atraumatic.   Right Ear: Hearing, tympanic membrane, external ear and ear canal normal.   Left Ear: Hearing, tympanic membrane, external ear and ear canal normal.   Nose: Mucosal edema and rhinorrhea present. Right sinus exhibits maxillary sinus tenderness. Right sinus exhibits no frontal sinus tenderness. Left sinus exhibits maxillary sinus tenderness. Left sinus exhibits no frontal sinus tenderness.   Mouth/Throat: Uvula is midline. Mucous membranes are dry. Posterior oropharyngeal erythema (mild) present. No oropharyngeal exudate or posterior oropharyngeal edema.   Eyes: Conjunctivae and EOM are normal. Pupils are equal, round, and reactive to light. Right eye exhibits no discharge. Left eye exhibits no discharge. No scleral icterus.   Neck: Neck supple. No thyromegaly present. No tracheal deviation  present.   Normal range of motion.  Cardiovascular:  Regular rhythm, normal heart sounds and intact distal pulses.   Tachycardia present.         Pulmonary/Chest: Breath sounds normal. No respiratory distress. She has no wheezes.   Abdominal: Abdomen is soft. Bowel sounds are normal. She exhibits no distension. There is no abdominal tenderness. There is no rebound and no guarding.   Musculoskeletal:         General: No tenderness or edema. Normal range of motion.      Cervical back: Normal, normal range of motion and neck supple.      Thoracic back: Normal.      Lumbar back: Normal.     Lymphadenopathy:     She has no cervical adenopathy.   Neurological: She is alert and oriented to person, place, and time. She has normal strength. No cranial nerve deficit or sensory deficit. She displays a negative Romberg sign. GCS score is 15. GCS eye subscore is 4. GCS verbal subscore is 5. GCS motor subscore is 6.   Skin: Skin is warm and dry. Capillary refill takes less than 2 seconds.   Psychiatric: Her speech is normal and behavior is normal. Judgment and thought content normal. Her mood appears anxious. Cognition and memory are normal.         Medical Screening Exam   See Full Note    ED Course   Procedures  Labs Reviewed   BASIC METABOLIC PANEL - Abnormal; Notable for the following components:       Result Value    Glucose 207 (*)     All other components within normal limits   CBC WITH DIFFERENTIAL - Abnormal; Notable for the following components:    MCHC 31.3 (*)     Neutrophils % 68.5 (*)     Lymphocytes % 18.4 (*)     Monocytes % 10.8 (*)     All other components within normal limits   POCT GLUCOSE MONITORING CONTINUOUS - Abnormal; Notable for the following components:    POC Glucose 213 (*)     All other components within normal limits   SARS-COV2 (COVID) W/ FLU ANTIGEN - Normal    Narrative:     Negative SARS-CoV results should not be used as the sole basis for treatment or patient management decisions; negative  results should be considered in the context of a patient's recent exposures, history and the presene of clinical signs and symptoms consistent with COVID-19.  Negative results should be treated as presumptive and confirmed by molecular assay, if necessary for patient management.   STREP A BY MOLECULAR METHOD - Normal   CBC W/ AUTO DIFFERENTIAL    Narrative:     The following orders were created for panel order CBC auto differential.  Procedure                               Abnormality         Status                     ---------                               -----------         ------                     CBC with Differential[[id number]]       Abnormal            Final result                 Please view results for these tests on the individual orders.   POCT GLUCOSE MONITORING CONTINUOUS          Imaging Results    None          Medications   diphenhydrAMINE injection 25 mg (25 mg Intravenous Given 3/26/24 1136)   sodium chloride 0.9% bolus 500 mL 500 mL (0 mLs Intravenous Stopped 3/26/24 1235)   ketorolac injection 15 mg (15 mg Intravenous Given 3/26/24 1237)     Medical Decision Making  The presentation of Patient is consistent with acute sinusitis. There were no clinical or ancillary findings suggestive of bronchitis, pneumonia, or streptococcal pharyngitis.Upon discharge, patient has no evidence of respiratory failure and is comfortable without respiratory distress. Additionally, patient has no evidence of airway compromise and is speaking in full/complete sentences without difficulty. Patient meets outpatient treatment criteria.  At this time, I do not feel that radiology studies or lab will add any benefit to care or diagnostics since there is no reported injury, signs of trauma or signs/sx of infection. Spasms subsided with Benadryl, NS bolus. Body aches improved with Toradol. HR and BP improved as well. Patient appears less anxious and resting comfortably in bed on re-evaluation. No observed spasms while in  ED. I feel that patient has reached maximum benefit from ED evaluation, treatment and observation. I thoroughly explained all findings to patient to the best of my ability and answered all questions to their satisfaction. I educated patient on the general/expected course of the condition and treatment options in ED and after discharge. I also informed patient that our evaluation in the emergency department today is an evaluation of the condition in one moment in time. If there is any worsening of the condition of if symptoms persist, the patient has been instructed to return to the ED immediately for further evaluation.  Data Reviewed/Counseling: I have reviewed the patient's vital signs, nursing notes, and other relevant ancillary testing/information. I have had a detailed discussion with the patient regarding the historical points, examination findings, and any diagnostic results. I have also discussed the need for outpatient follow-up. I have recommended symptomatic therapy with over the counter remedies safe for patients with HTN. Symptomatic therapy suggested: cough suppressant of choice. Increase fluids, use vaporizer, Tylenol/Motrin as needed, rest, avoid smoky areas. Follow up with PCP in 1-2 days for re-evaluation. Patient was encouraged to practice good infection control procedures to include but not limited to frequent hand washing to lessen likelihood of transmission of this infection. D/c script for Amoxil.   Patient has been counseled to return to the Emergency Department if symptoms worsen or if there are any questions or concerns that arise while at home.      Dx:  Acute maxillary sinusitis, muscle spasms    Amount and/or Complexity of Data Reviewed  Independent Historian:      Details: Georgiana Zayas is a 48 y.o. Black or  /female presenting to ED with spasmodic movements of upper and lower extremities leslie for 4-5 hours. She also reports congestion, runny nose, sore throat and  "sinus congestion for 3-4 days. Denies fever or chills. Denies abd pain, N/V. She notes 2 episodes of diarrhea daily over the last few days after taking laxative a few days ago, but no diarrhea today.  Patient sees pain tx for chronic neck and back pain. She is currently seeing Neurology for weakness in BUE and frequently dropping thing. Neurology ordered MRI Cervical and Lumbar spine but insurance denied so she has not had this done. Notes that she is to f/u with neurology to reschedule studies. She is on muscle relaxants, opioid narcotics and psychiatric medications but none recently changed, added or d/c'd. Currently AAOx4 and in NAD. No spasmotic movements during evaluation nor noted by nursing staff during triage. She appears anxious but currently in NAD. Neuro intact. Tachycardic. Otherwise, VSS at this time.    Labs: ordered.     Details: CBC- unremarkable  BMP- Glucose 207. Otherwise, unremarkable.    Accucheck- 213  COVID- negative  Flu- negative  Strep- negative    Risk  Prescription drug management.               ED Course as of 03/26/24 1255   Tue Mar 26, 2024   1210 Patient feeling better. No longer having spasms of extremities. HR improved but patient still reporting "body aches". Patient attempting to give urine sample. Will give Toradol.  [LP]      ED Course User Index  [LP] Cintia Leach FNP                           Clinical Impression:   Final diagnoses:  [R25.2] Spasm (Primary)  [J01.00] Acute maxillary sinusitis, recurrence not specified        ED Disposition Condition    Discharge Stable          ED Prescriptions       Medication Sig Dispense Start Date End Date Auth. Provider    amoxicillin (AMOXIL) 500 MG capsule Take 1 capsule (500 mg total) by mouth 3 (three) times daily. for 7 days 21 capsule 3/26/2024 4/2/2024 Cintia Leach FNP          Follow-up Information    None          Cintia Leach FNP  03/26/24 1255    "

## 2024-04-02 ENCOUNTER — OFFICE VISIT (OUTPATIENT)
Dept: FAMILY MEDICINE | Facility: CLINIC | Age: 49
End: 2024-04-02
Payer: COMMERCIAL

## 2024-04-02 DIAGNOSIS — G47.39 OTHER SLEEP APNEA: Chronic | ICD-10-CM

## 2024-04-02 DIAGNOSIS — J06.9 ACUTE URI: ICD-10-CM

## 2024-04-02 DIAGNOSIS — E11.40 TYPE 2 DIABETES MELLITUS WITH DIABETIC NEUROPATHY, WITH LONG-TERM CURRENT USE OF INSULIN: Primary | ICD-10-CM

## 2024-04-02 DIAGNOSIS — K21.9 GASTROESOPHAGEAL REFLUX DISEASE WITHOUT ESOPHAGITIS: Chronic | ICD-10-CM

## 2024-04-02 DIAGNOSIS — Z79.4 TYPE 2 DIABETES MELLITUS WITH DIABETIC NEUROPATHY, WITH LONG-TERM CURRENT USE OF INSULIN: Primary | ICD-10-CM

## 2024-04-02 DIAGNOSIS — M47.817 LUMBOSACRAL SPONDYLOSIS WITHOUT MYELOPATHY: Chronic | ICD-10-CM

## 2024-04-02 DIAGNOSIS — I10 ESSENTIAL HYPERTENSION: ICD-10-CM

## 2024-04-02 DIAGNOSIS — E78.49 OTHER HYPERLIPIDEMIA: ICD-10-CM

## 2024-04-02 DIAGNOSIS — F32.89 OTHER DEPRESSION: Chronic | ICD-10-CM

## 2024-04-02 DIAGNOSIS — F41.9 ANXIETY: Chronic | ICD-10-CM

## 2024-04-02 DIAGNOSIS — E66.01 MORBID OBESITY: Chronic | ICD-10-CM

## 2024-04-02 LAB
ALBUMIN SERPL BCP-MCNC: 3.8 G/DL (ref 3.5–5)
ALBUMIN/GLOB SERPL: 0.9 {RATIO}
ALP SERPL-CCNC: 75 U/L (ref 39–100)
ALT SERPL W P-5'-P-CCNC: 39 U/L (ref 13–56)
ANION GAP SERPL CALCULATED.3IONS-SCNC: 9 MMOL/L (ref 7–16)
AST SERPL W P-5'-P-CCNC: 23 U/L (ref 15–37)
BASOPHILS # BLD AUTO: 0.04 K/UL (ref 0–0.2)
BASOPHILS NFR BLD AUTO: 0.7 % (ref 0–1)
BILIRUB SERPL-MCNC: 0.4 MG/DL (ref ?–1.2)
BUN SERPL-MCNC: 11 MG/DL (ref 7–18)
BUN/CREAT SERPL: 11 (ref 6–20)
CALCIUM SERPL-MCNC: 10.6 MG/DL (ref 8.5–10.1)
CHLORIDE SERPL-SCNC: 102 MMOL/L (ref 98–107)
CHOLEST SERPL-MCNC: 123 MG/DL (ref 0–200)
CHOLEST/HDLC SERPL: 2.4 {RATIO}
CO2 SERPL-SCNC: 30 MMOL/L (ref 21–32)
CREAT SERPL-MCNC: 1.04 MG/DL (ref 0.55–1.02)
CREAT UR-MCNC: 157 MG/DL (ref 28–219)
DIFFERENTIAL METHOD BLD: ABNORMAL
EGFR (NO RACE VARIABLE) (RUSH/TITUS): 66 ML/MIN/1.73M2
EOSINOPHIL # BLD AUTO: 0.13 K/UL (ref 0–0.5)
EOSINOPHIL NFR BLD AUTO: 2.1 % (ref 1–4)
ERYTHROCYTE [DISTWIDTH] IN BLOOD BY AUTOMATED COUNT: 13.9 % (ref 11.5–14.5)
EST. AVERAGE GLUCOSE BLD GHB EST-MCNC: 217 MG/DL
GLOBULIN SER-MCNC: 4.1 G/DL (ref 2–4)
GLUCOSE SERPL-MCNC: 254 MG/DL (ref 74–106)
HBA1C MFR BLD HPLC: 9.2 % (ref 4.5–6.6)
HCT VFR BLD AUTO: 41.8 % (ref 38–47)
HDLC SERPL-MCNC: 51 MG/DL (ref 40–60)
HGB BLD-MCNC: 12.6 G/DL (ref 12–16)
IMM GRANULOCYTES # BLD AUTO: 0.01 K/UL (ref 0–0.04)
IMM GRANULOCYTES NFR BLD: 0.2 % (ref 0–0.4)
LDLC SERPL CALC-MCNC: 59 MG/DL
LDLC/HDLC SERPL: 1.2 {RATIO}
LYMPHOCYTES # BLD AUTO: 3.21 K/UL (ref 1–4.8)
LYMPHOCYTES NFR BLD AUTO: 53 % (ref 27–41)
MCH RBC QN AUTO: 26.6 PG (ref 27–31)
MCHC RBC AUTO-ENTMCNC: 30.1 G/DL (ref 32–36)
MCV RBC AUTO: 88.4 FL (ref 80–96)
MICROALBUMIN UR-MCNC: 0.9 MG/DL (ref 0–2.8)
MICROALBUMIN/CREAT RATIO PNL UR: 5.7 MG/G (ref 0–30)
MONOCYTES # BLD AUTO: 0.37 K/UL (ref 0–0.8)
MONOCYTES NFR BLD AUTO: 6.1 % (ref 2–6)
MPC BLD CALC-MCNC: 10.8 FL (ref 9.4–12.4)
NEUTROPHILS # BLD AUTO: 2.3 K/UL (ref 1.8–7.7)
NEUTROPHILS NFR BLD AUTO: 37.9 % (ref 53–65)
NONHDLC SERPL-MCNC: 72 MG/DL
NRBC # BLD AUTO: 0 X10E3/UL
NRBC, AUTO (.00): 0 %
PLATELET # BLD AUTO: 337 K/UL (ref 150–400)
POTASSIUM SERPL-SCNC: 4 MMOL/L (ref 3.5–5.1)
PROT SERPL-MCNC: 7.9 G/DL (ref 6.4–8.2)
RBC # BLD AUTO: 4.73 M/UL (ref 4.2–5.4)
SODIUM SERPL-SCNC: 137 MMOL/L (ref 136–145)
TRIGL SERPL-MCNC: 63 MG/DL (ref 35–150)
VLDLC SERPL-MCNC: 13 MG/DL
WBC # BLD AUTO: 6.06 K/UL (ref 4.5–11)

## 2024-04-02 PROCEDURE — 80061 LIPID PANEL: CPT | Mod: ,,, | Performed by: CLINICAL MEDICAL LABORATORY

## 2024-04-02 PROCEDURE — 80053 COMPREHEN METABOLIC PANEL: CPT | Mod: ,,, | Performed by: CLINICAL MEDICAL LABORATORY

## 2024-04-02 PROCEDURE — 3061F NEG MICROALBUMINURIA REV: CPT | Mod: CPTII,,, | Performed by: FAMILY MEDICINE

## 2024-04-02 PROCEDURE — 3066F NEPHROPATHY DOC TX: CPT | Mod: CPTII,,, | Performed by: FAMILY MEDICINE

## 2024-04-02 PROCEDURE — 82043 UR ALBUMIN QUANTITATIVE: CPT | Mod: ,,, | Performed by: CLINICAL MEDICAL LABORATORY

## 2024-04-02 PROCEDURE — 3075F SYST BP GE 130 - 139MM HG: CPT | Mod: CPTII,,, | Performed by: FAMILY MEDICINE

## 2024-04-02 PROCEDURE — 99214 OFFICE O/P EST MOD 30 MIN: CPT | Mod: 25,,, | Performed by: FAMILY MEDICINE

## 2024-04-02 PROCEDURE — 3079F DIAST BP 80-89 MM HG: CPT | Mod: CPTII,,, | Performed by: FAMILY MEDICINE

## 2024-04-02 PROCEDURE — 3046F HEMOGLOBIN A1C LEVEL >9.0%: CPT | Mod: CPTII,,, | Performed by: FAMILY MEDICINE

## 2024-04-02 PROCEDURE — 1159F MED LIST DOCD IN RCRD: CPT | Mod: CPTII,,, | Performed by: FAMILY MEDICINE

## 2024-04-02 PROCEDURE — 85025 COMPLETE CBC W/AUTO DIFF WBC: CPT | Mod: ,,, | Performed by: CLINICAL MEDICAL LABORATORY

## 2024-04-02 PROCEDURE — 83036 HEMOGLOBIN GLYCOSYLATED A1C: CPT | Mod: ,,, | Performed by: CLINICAL MEDICAL LABORATORY

## 2024-04-02 PROCEDURE — 3008F BODY MASS INDEX DOCD: CPT | Mod: CPTII,,, | Performed by: FAMILY MEDICINE

## 2024-04-02 PROCEDURE — 82570 ASSAY OF URINE CREATININE: CPT | Mod: ,,, | Performed by: CLINICAL MEDICAL LABORATORY

## 2024-04-02 PROCEDURE — 96372 THER/PROPH/DIAG INJ SC/IM: CPT | Mod: ,,, | Performed by: FAMILY MEDICINE

## 2024-04-02 RX ORDER — AZITHROMYCIN 250 MG/1
TABLET, FILM COATED ORAL
Qty: 6 TABLET | Refills: 0 | Status: SHIPPED | OUTPATIENT
Start: 2024-04-02 | End: 2024-04-07

## 2024-04-02 RX ORDER — KETOROLAC TROMETHAMINE 30 MG/ML
30 INJECTION, SOLUTION INTRAMUSCULAR; INTRAVENOUS
Status: COMPLETED | OUTPATIENT
Start: 2024-04-02 | End: 2024-04-02

## 2024-04-02 RX ORDER — TRIPROLIDINE HCL, DEXTROMETHORPHAN HBR 10; 1.25 MG/5ML; MG/5ML
5 LIQUID ORAL EVERY 6 HOURS PRN
Qty: 150 ML | Refills: 2 | Status: SHIPPED | OUTPATIENT
Start: 2024-04-02

## 2024-04-02 RX ORDER — CHLORPHENIRAMINE MALEATE, DEXTROMETHORPHAN HYDROBROMIDE, AND PHENYLEPHRINE HYDROCHLORIDE 4; 10; 10 MG/1; MG/1; MG/1
1 TABLET, COATED ORAL EVERY 8 HOURS PRN
Qty: 30 TABLET | Refills: 2 | Status: SHIPPED | OUTPATIENT
Start: 2024-04-02

## 2024-04-02 RX ORDER — FLUCONAZOLE 150 MG/1
TABLET ORAL
Qty: 3 TABLET | Refills: 0 | Status: SHIPPED | OUTPATIENT
Start: 2024-04-02

## 2024-04-02 RX ADMIN — KETOROLAC TROMETHAMINE 30 MG: 30 INJECTION, SOLUTION INTRAMUSCULAR; INTRAVENOUS at 09:04

## 2024-04-02 NOTE — PROGRESS NOTES
Clinic Note    Patient Name: Georgiana Zayas  : 1975  MRN: [id number]    Chief Complaint   Patient presents with    Follow-up     Four months follow appointment    Health Maintenance     Hepatitis C Screening Never done  TETANUS VACCINE Never done  Colorectal Cancer Screening Never done  Pneumococcal Vaccines (Age 0-64)(2 of 2 - PPSV23 or PCV20) due on 2022  Foot Exam due on 2023  COVID-19 Vaccine( season) due on 2023  Hemoglobin A1c due on 11/15/2023     Back Pain    Sinus Problem       HPI:    Ms. Georgiana Zayas is a 48 y.o. female who presents to clinic today with CC of follow up on chronic disease processes including Type II DM, HTN, HLD, GERD, obesity, anxiety/depression, chronic back pain/sciatica, and MATT on CPAP.  Reports blood glucose levels have been good. BP is elevated today. Reports she did not take her medication this morning. Reports she is also in pain. Reports BP has been normal at home.   Patient reports chronic issues are, otherwise, well controlled on current medication regimen.  Denies problems or side effects with medications.  Patient is, otherwise, without complaints.     Medications:  Medication List with Changes/Refills   New Medications    AZITHROMYCIN (Z-YULY) 250 MG TABLET    Take 2 tablets by mouth on day 1; Take 1 tablet by mouth on days 2-5    CHLORPHENIRAMINE-PHENYLEPH-DM (ED A-HIST DM) 4-10-10 MG TAB    Take 1 tablet by mouth every 8 (eight) hours as needed (congestion or cough).   Current Medications    ARIPIPRAZOLE (ABILIFY) 2 MG TAB    Take 1 tablet (2 mg total) by mouth once daily.    BENZTROPINE (COGENTIN) 0.5 MG TABLET    Take 0.5 mg by mouth once daily.    CYCLOBENZAPRINE (FLEXERIL) 10 MG TABLET    Take 1 tablet (10 mg total) by mouth 3 (three) times daily.    DULOXETINE (CYMBALTA) 60 MG CAPSULE    Take 1 capsule (60 mg total) by mouth 2 (two) times daily.    GABAPENTIN (NEURONTIN) 800 MG TABLET    Take 1 tablet (800 mg total) by mouth 3  (three) times daily.    GLIPIZIDE (GLUCOTROL) 10 MG TABLET    Take 1 tablet (10 mg total) by mouth 2 (two) times daily with meals.    HYDROCHLOROTHIAZIDE (HYDRODIURIL) 25 MG TABLET    Take 1 tablet (25 mg total) by mouth once daily.    HYDROXYZINE PAMOATE (VISTARIL) 25 MG CAP    Take 1 capsule (25 mg total) by mouth 3 (three) times daily.    INSULIN (LANTUS SOLOSTAR U-100 INSULIN) GLARGINE 100 UNITS/ML SUBQ PEN    Inject 15 Units into the skin every evening.    LISINOPRIL (PRINIVIL,ZESTRIL) 2.5 MG TABLET    Take 1 tablet (2.5 mg total) by mouth once daily.    METHOCARBAMOL (ROBAXIN) 500 MG TAB    Take 500 mg by mouth 3 (three) times daily.    MOMETASONE (NASONEX) 50 MCG/ACTUATION NASAL SPRAY    2 sprays by Nasal route once daily.    OMEPRAZOLE (PRILOSEC) 40 MG CAPSULE    Take 1 capsule (40 mg total) by mouth Daily.    OXYCODONE-ACETAMINOPHEN (PERCOCET)  MG PER TABLET    Take 1 tablet by mouth every 6 (six) hours as needed.     OZEMPIC 1 MG/DOSE (4 MG/3 ML)    Inject 1 mg into the skin every 7 days.    PRAVASTATIN (PRAVACHOL) 20 MG TABLET    Take 1 tablet (20 mg total) by mouth every evening.    PROMETHAZINE (PHENERGAN) 25 MG TABLET    Take 1 tablet (25 mg total) by mouth every 6 (six) hours as needed for Nausea.    RISPERIDONE (RISPERDAL) 3 MG TAB    Take 1 tablet (3 mg total) by mouth once daily.    TRAZODONE (DESYREL) 100 MG TABLET    Take 1 tablet (100 mg total) by mouth every evening.   Changed and/or Refilled Medications    Modified Medication Previous Medication    DEXTROMETHORPHAN-TRIPROLIDINE (ENDAL, DM-TRIPROLIDINE,) 10-1.25 MG/5 ML SOLN dextromethorphan-triprolidine (ENDAL, DM-TRIPROLIDINE,) 10-1.25 mg/5 mL Soln       Take 5 mLs by mouth every 6 (six) hours as needed (congestion or cough).    Take 5 mLs by mouth every 6 (six) hours as needed (congestion or cough).    FLUCONAZOLE (DIFLUCAN) 150 MG TAB fluconazole (DIFLUCAN) 150 MG Tab       Take one tablet by mouth every 72 hours X 3 doses if needed  for yeast infection after completion of antibiotics.    Take one tablet by mouth every 72 hours X 3 doses if needed for yeast infection after completion of antibiotics.   Discontinued Medications    AMOXICILLIN (AMOXIL) 500 MG CAPSULE    Take 1 capsule (500 mg total) by mouth 3 (three) times daily. for 7 days        Allergies: Codeine      Past Medical History:    Past Medical History:   Diagnosis Date    Arthritis     Depression     Diabetes mellitus     Diabetes mellitus, type 2     GERD (gastroesophageal reflux disease)     Hearing difficulty     Hypertension     Liver disease     Sleep apnea        Past Surgical History:    Past Surgical History:   Procedure Laterality Date    CARPAL TUNNEL RELEASE Bilateral      SECTION      INJECTION OF ANESTHETIC AGENT AROUND MEDIAL BRANCH NERVES INNERVATING LUMBAR FACET JOINT Bilateral 2021    Procedure: BLOCK, NERVE, FACET JOINT, LUMBAR, MEDIAL BRANCH;  Surgeon: Amari Razo MD;  Location: [facility];  Service: Pain Management;  Laterality: Bilateral;  Bilateral L3-S1 Facet Injection    INJECTION OF ANESTHETIC AGENT AROUND MEDIAL BRANCH NERVES INNERVATING LUMBAR FACET JOINT Bilateral 2022    Procedure: BLOCK, NERVE, FACET JOINT, LUMBAR, MEDIAL BRANCH;  Surgeon: Amari Razo MD;  Location: [facility];  Service: Pain Management;  Laterality: Bilateral;  Bilateral L3-S1 FI    INJECTION OF ANESTHETIC AGENT AROUND MEDIAL BRANCH NERVES INNERVATING LUMBAR FACET JOINT Bilateral 2023    Procedure: BLOCK, NERVE, FACET JOINT, LUMBAR, MEDIAL BRANCH;  Surgeon: Amari Razo MD;  Location: [facility];  Service: Pain Management;  Laterality: Bilateral;  Bilateral L3-S1 FI    LEFT HEART CATHETERIZATION Left 2021    Procedure: Left heart cath;  Surgeon: Jeremy Rojo DO;  Location: [facility] C[facility];  Service: Cardiology;  Laterality: Left;    RADIOFREQUENCY ABLATION OF LUMBAR MEDIAL BRANCH NERVE AT SINGLE LEVEL  "Bilateral 4/12/2023    Procedure: RADIOFREQUENCY ABLATION, NERVE, SPINAL, LUMBAR, MEDIAL BRANCH, 1 LEVEL;  Surgeon: Amari Razo MD;  Location: [facility];  Service: Pain Management;  Laterality: Bilateral;  Bilateral L3-5 RFTC    STAPEDES SURGERY Bilateral     TONSILLECTOMY           Social History:    Social History     Tobacco Use   Smoking Status Never   Smokeless Tobacco Never     Social History     Substance and Sexual Activity   Alcohol Use Not Currently     Social History     Substance and Sexual Activity   Drug Use Never         Family History:    Family History   Problem Relation Age of Onset    Cancer Mother     Diabetes Mother     Hypertension Mother        Review of Systems:    Review of Systems   Constitutional:  Negative for appetite change, chills, fatigue, fever and unexpected weight change.   HENT:  Positive for nasal congestion, postnasal drip and sinus pressure/congestion. Negative for ear pain and sore throat.    Eyes:  Positive for visual disturbance.   Respiratory:  Positive for cough. Negative for shortness of breath.    Cardiovascular:  Negative for chest pain and leg swelling.   Gastrointestinal:  Negative for abdominal pain, blood in stool, change in bowel habit, constipation, diarrhea, nausea and vomiting.        Reports intermittent issues with diarrhea/constipation - reports this is a chronic, intermittent issue   Musculoskeletal:  Positive for arthralgias and back pain.   Integumentary:  Negative for rash.   Neurological:  Negative for dizziness and headaches.   Psychiatric/Behavioral:  The patient is not nervous/anxious.         Vitals:    Vitals:    04/02/24 0815 04/02/24 0909   BP: (!) 138/90 133/88   BP Location: Left arm Left arm   Patient Position: Sitting Sitting   BP Method: Large (Manual) Medium (Automatic)   Pulse: (!) 120 97   Resp: 20    Temp: 98.8 °F (37.1 °C)    TempSrc: Oral    SpO2: 96%    Weight: 87.7 kg (193 lb 4.8 oz)    Height: 5' 4" (1.626 m)  "       Body mass index is 33.18 kg/m².    Wt Readings from Last 3 Encounters:   04/02/24 0815 87.7 kg (193 lb 4.8 oz)   03/26/24 1046 90.7 kg (200 lb)   02/12/24 1046 89.4 kg (197 lb)        Physical Exam:    Physical Exam  Constitutional:       General: She is not in acute distress.     Appearance: Normal appearance. She is obese.   HENT:      Nose: Congestion present.      Mouth/Throat:      Mouth: Mucous membranes are moist.      Pharynx: Oropharynx is clear.   Eyes:      Conjunctiva/sclera: Conjunctivae normal.   Cardiovascular:      Rate and Rhythm: Normal rate and regular rhythm.      Heart sounds: Normal heart sounds. No murmur heard.  Pulmonary:      Effort: Pulmonary effort is normal. No respiratory distress.      Breath sounds: Normal breath sounds. No wheezing, rhonchi or rales.   Abdominal:      General: Bowel sounds are normal.      Palpations: Abdomen is soft.      Tenderness: There is no abdominal tenderness.   Musculoskeletal:         General: Normal range of motion.      Cervical back: Neck supple.      Right lower leg: No edema.      Left lower leg: No edema.   Skin:     Findings: No rash.   Neurological:      General: No focal deficit present.      Mental Status: She is alert. Mental status is at baseline.   Psychiatric:         Mood and Affect: Mood normal.           Assessment/Plan:   1. Type 2 diabetes mellitus with diabetic neuropathy, with long-term current use of insulin  -     CBC Auto Differential; Future; Expected date: 04/02/2024  -     Comprehensive Metabolic Panel; Future; Expected date: 04/02/2024  -     Lipid Panel; Future; Expected date: 04/02/2024  -     Hemoglobin A1C; Future; Expected date: 04/02/2024  -     Microalbumin/Creatinine Ratio, Urine    2. Acute URI  -     dextromethorphan-triprolidine (ENDAL, DM-TRIPROLIDINE,) 10-1.25 mg/5 mL Soln; Take 5 mLs by mouth every 6 (six) hours as needed (congestion or cough).  Dispense: 150 mL; Refill: 2  -     chlorpheniramine-phenyleph-DM  (ED A-HIST DM) 4-10-10 mg Tab; Take 1 tablet by mouth every 8 (eight) hours as needed (congestion or cough).  Dispense: 30 tablet; Refill: 2  -     fluconazole (DIFLUCAN) 150 MG Tab; Take one tablet by mouth every 72 hours X 3 doses if needed for yeast infection after completion of antibiotics.  Dispense: 3 tablet; Refill: 0  -     azithromycin (Z-YULY) 250 MG tablet; Take 2 tablets by mouth on day 1; Take 1 tablet by mouth on days 2-5  Dispense: 6 tablet; Refill: 0    3. Essential hypertension  -     CBC Auto Differential; Future; Expected date: 04/02/2024  -     Comprehensive Metabolic Panel; Future; Expected date: 04/02/2024  -     Lipid Panel; Future; Expected date: 04/02/2024  -     Microalbumin/Creatinine Ratio, Urine    4. Lumbosacral spondylosis without myelopathy  -     Ambulatory referral/consult to Spine Surgery; Future; Expected date: 04/09/2024  -     ketorolac injection 30 mg    5. Other hyperlipidemia  -     CBC Auto Differential; Future; Expected date: 04/02/2024  -     Comprehensive Metabolic Panel; Future; Expected date: 04/02/2024  -     Lipid Panel; Future; Expected date: 04/02/2024    6. Gastroesophageal reflux disease without esophagitis  The current medical regimen is effective;  continue present plan and medications.    7. Other sleep apnea  - CPAP    8. Morbid obesity  - BMI discussed with patient.  - Diet and exercise  - Diet discussed and educational information provided  - Recommend 30 minutes of exercise at least 5 days per week.    9. Anxiety  The current medical regimen is effective;  continue present plan and medications.  - Follow up at [facility] as scheduled    10. Other depression  The current medical regimen is effective;  continue present plan and medications.  - Follow up at [facility] as scheduled      Active Problem List with Overview Notes    Diagnosis Date Noted    Type 2 diabetes mellitus with diabetic neuropathy, with long-term current use of insulin 08/25/2021    Essential  hypertension 08/25/2021    Anxiety 01/23/2023    Other hyperlipidemia 07/07/2022    Morbid obesity 01/17/2022    Depression 01/23/2023    Femoral artery pseudo-aneurysm, right 01/17/2022    Other sleep apnea 11/30/2021    Lumbosacral spondylosis without myelopathy 09/08/2021    Gastroesophageal reflux disease 08/25/2021    Chronic bilateral low back pain with right-sided sciatica 07/07/2022    Seasonal allergies 07/07/2022    Muscle spasm 07/07/2022        Health Maintenance:  Health Maintenance   Topic Date Due    Hepatitis C Screening  Never done    TETANUS VACCINE  Never done    Colorectal Cancer Screening  Never done    Foot Exam  02/03/2023    Hemoglobin A1c  07/02/2024    Mammogram  09/14/2024    Eye Exam  10/23/2024    Lipid Panel  04/02/2025       RTC in 4 months for chronic follow up.  RTC sooner if needed.   Patient voiced understanding and is agreeable to plan.      Lisandra Mcdaniel MD    Family Medicine

## 2024-04-05 VITALS
HEIGHT: 64 IN | HEART RATE: 97 BPM | WEIGHT: 193.31 LBS | BODY MASS INDEX: 33 KG/M2 | SYSTOLIC BLOOD PRESSURE: 133 MMHG | RESPIRATION RATE: 20 BRPM | OXYGEN SATURATION: 96 % | TEMPERATURE: 99 F | DIASTOLIC BLOOD PRESSURE: 88 MMHG

## 2024-04-10 DIAGNOSIS — M47.817 LUMBOSACRAL SPONDYLOSIS WITHOUT MYELOPATHY: Primary | Chronic | ICD-10-CM

## 2024-04-10 DIAGNOSIS — M54.17 LUMBOSACRAL RADICULOPATHY: ICD-10-CM

## 2024-05-02 ENCOUNTER — OFFICE VISIT (OUTPATIENT)
Dept: FAMILY MEDICINE | Facility: CLINIC | Age: 49
End: 2024-05-02
Payer: COMMERCIAL

## 2024-05-02 VITALS
TEMPERATURE: 99 F | HEART RATE: 108 BPM | SYSTOLIC BLOOD PRESSURE: 126 MMHG | HEIGHT: 64 IN | WEIGHT: 196.19 LBS | OXYGEN SATURATION: 95 % | DIASTOLIC BLOOD PRESSURE: 88 MMHG | BODY MASS INDEX: 33.49 KG/M2 | RESPIRATION RATE: 20 BRPM

## 2024-05-02 DIAGNOSIS — G89.4 CHRONIC PAIN SYNDROME: Primary | ICD-10-CM

## 2024-05-02 DIAGNOSIS — H92.02 LEFT EAR PAIN: ICD-10-CM

## 2024-05-02 DIAGNOSIS — H61.22 IMPACTED CERUMEN OF LEFT EAR: ICD-10-CM

## 2024-05-02 PROCEDURE — 3079F DIAST BP 80-89 MM HG: CPT | Mod: CPTII,,, | Performed by: NURSE PRACTITIONER

## 2024-05-02 PROCEDURE — 3066F NEPHROPATHY DOC TX: CPT | Mod: CPTII,,, | Performed by: NURSE PRACTITIONER

## 2024-05-02 PROCEDURE — 1160F RVW MEDS BY RX/DR IN RCRD: CPT | Mod: CPTII,,, | Performed by: NURSE PRACTITIONER

## 2024-05-02 PROCEDURE — 3061F NEG MICROALBUMINURIA REV: CPT | Mod: CPTII,,, | Performed by: NURSE PRACTITIONER

## 2024-05-02 PROCEDURE — 96372 THER/PROPH/DIAG INJ SC/IM: CPT | Mod: ,,, | Performed by: NURSE PRACTITIONER

## 2024-05-02 PROCEDURE — 1159F MED LIST DOCD IN RCRD: CPT | Mod: CPTII,,, | Performed by: NURSE PRACTITIONER

## 2024-05-02 PROCEDURE — 3074F SYST BP LT 130 MM HG: CPT | Mod: CPTII,,, | Performed by: NURSE PRACTITIONER

## 2024-05-02 PROCEDURE — 3008F BODY MASS INDEX DOCD: CPT | Mod: CPTII,,, | Performed by: NURSE PRACTITIONER

## 2024-05-02 PROCEDURE — 3046F HEMOGLOBIN A1C LEVEL >9.0%: CPT | Mod: CPTII,,, | Performed by: NURSE PRACTITIONER

## 2024-05-02 PROCEDURE — 99213 OFFICE O/P EST LOW 20 MIN: CPT | Mod: 25,,, | Performed by: NURSE PRACTITIONER

## 2024-05-02 RX ORDER — KETOROLAC TROMETHAMINE 30 MG/ML
30 INJECTION, SOLUTION INTRAMUSCULAR; INTRAVENOUS
Status: COMPLETED | OUTPATIENT
Start: 2024-05-02 | End: 2024-05-02

## 2024-05-02 RX ADMIN — KETOROLAC TROMETHAMINE 30 MG: 30 INJECTION, SOLUTION INTRAMUSCULAR; INTRAVENOUS at 02:05

## 2024-05-02 NOTE — PROGRESS NOTES
Clinic Note    Georgiana Zayas is a 48 y.o. female     Chief Complaint:   Chief Complaint   Patient presents with    Otalgia     Left ear    Neck Pain    Shoulder Pain     Right shoulder pain    Back Pain    Health Maintenance     Hepatitis C Screening Never done  TETANUS VACCINE Never done  Colorectal Cancer Screening Never done  Pneumococcal Vaccines (Age 0-64)(2 of 2 - PPSV23 or PCV20) due on 03/31/2022  Foot Exam due on 02/03/2023  COVID-19 Vaccine(5 - 2023-24 season) due on 09/01/2023         Subjective:    Patient complains of pain to left ear. Denies drainage. Admits to full feeling. Patient states she has tubes in ears, placed when she was a child. Denies nasal congestion. Denies fever.  Patient also reports chronic pain to neck, shoulder, and back. Taking meds as prescribed. Has appointments with specialists. Request injection today.     Otalgia   Associated symptoms include neck pain. Pertinent negatives include no coughing, ear discharge or rhinorrhea.   Neck Pain   Pertinent negatives include no chest pain or fever.   Shoulder Pain   Pertinent negatives include no fever.   Back Pain  Pertinent negatives include no chest pain or fever.        Allergies:   Review of patient's allergies indicates:   Allergen Reactions    Codeine Itching        Past Medical History:  Past Medical History:   Diagnosis Date    Arthritis     Depression     Diabetes mellitus     Diabetes mellitus, type 2     GERD (gastroesophageal reflux disease)     Hearing difficulty     Hypertension     Liver disease     Sleep apnea         Current Medications:    Current Outpatient Medications:     ARIPiprazole (ABILIFY) 2 MG Tab, Take 1 tablet (2 mg total) by mouth once daily., Disp: 90 tablet, Rfl: 1    benztropine (COGENTIN) 0.5 MG tablet, Take 0.5 mg by mouth once daily., Disp: , Rfl:     carbamide peroxide (DEBROX) 6.5 % otic solution, Place 5 drops into the left ear 2 (two) times daily. X 4 days, Disp: 15 mL, Rfl: 0     chlorpheniramine-phenyleph-DM (ED A-HIST DM) 4-10-10 mg Tab, Take 1 tablet by mouth every 8 (eight) hours as needed (congestion or cough)., Disp: 30 tablet, Rfl: 2    cyclobenzaprine (FLEXERIL) 10 MG tablet, Take 1 tablet (10 mg total) by mouth 3 (three) times daily., Disp: 90 tablet, Rfl: 1    dextromethorphan-triprolidine (ENDAL, DM-TRIPROLIDINE,) 10-1.25 mg/5 mL Soln, Take 5 mLs by mouth every 6 (six) hours as needed (congestion or cough)., Disp: 150 mL, Rfl: 2    DULoxetine (CYMBALTA) 60 MG capsule, Take 1 capsule (60 mg total) by mouth 2 (two) times daily., Disp: 180 capsule, Rfl: 1    fluconazole (DIFLUCAN) 150 MG Tab, Take one tablet by mouth every 72 hours X 3 doses if needed for yeast infection after completion of antibiotics., Disp: 3 tablet, Rfl: 0    gabapentin (NEURONTIN) 800 MG tablet, Take 1 tablet (800 mg total) by mouth 3 (three) times daily., Disp: 270 tablet, Rfl: 1    glipiZIDE (GLUCOTROL) 10 MG tablet, Take 1 tablet (10 mg total) by mouth 2 (two) times daily with meals., Disp: 180 tablet, Rfl: 1    hydroCHLOROthiazide (HYDRODIURIL) 25 MG tablet, Take 1 tablet (25 mg total) by mouth once daily., Disp: 90 tablet, Rfl: 1    hydrOXYzine pamoate (VISTARIL) 25 MG Cap, Take 1 capsule (25 mg total) by mouth 3 (three) times daily., Disp: 270 capsule, Rfl: 1    insulin (LANTUS SOLOSTAR U-100 INSULIN) glargine 100 units/mL SubQ pen, Inject 15 Units into the skin every evening., Disp: 18 mL, Rfl: 1    lisinopriL (PRINIVIL,ZESTRIL) 2.5 MG tablet, Take 1 tablet (2.5 mg total) by mouth once daily., Disp: 90 tablet, Rfl: 1    methocarbamoL (ROBAXIN) 500 MG Tab, Take 500 mg by mouth 3 (three) times daily., Disp: , Rfl:     mometasone (NASONEX) 50 mcg/actuation nasal spray, 2 sprays by Nasal route once daily., Disp: 17 g, Rfl: 3    omeprazole (PRILOSEC) 40 MG capsule, Take 1 capsule (40 mg total) by mouth Daily., Disp: 90 capsule, Rfl: 1    oxyCODONE-acetaminophen (PERCOCET)  mg per tablet, Take 1 tablet by  "mouth every 6 (six) hours as needed. , Disp: , Rfl:     OZEMPIC 1 mg/dose (4 mg/3 mL), Inject 1 mg into the skin every 7 days., Disp: 3 mL, Rfl: 5    pravastatin (PRAVACHOL) 20 MG tablet, Take 1 tablet (20 mg total) by mouth every evening., Disp: 90 tablet, Rfl: 1    promethazine (PHENERGAN) 25 MG tablet, Take 1 tablet (25 mg total) by mouth every 6 (six) hours as needed for Nausea., Disp: 30 tablet, Rfl: 0    risperiDONE (RISPERDAL) 3 MG Tab, Take 1 tablet (3 mg total) by mouth once daily., Disp: 90 tablet, Rfl: 1    traZODone (DESYREL) 100 MG tablet, Take 1 tablet (100 mg total) by mouth every evening., Disp: 90 tablet, Rfl: 1    Current Facility-Administered Medications:     ketorolac injection 30 mg, 30 mg, Intramuscular, 1 time in Clinic/HOD, Junie Flower FNP       Review of Systems   Constitutional:  Negative for fever.   HENT:  Positive for ear pain. Negative for nasal congestion, ear discharge and rhinorrhea.    Respiratory:  Negative for cough.    Cardiovascular:  Negative for chest pain.   Musculoskeletal:  Positive for arthralgias, back pain and neck pain.          Objective:    /88 (BP Location: Left arm, Patient Position: Sitting, BP Method: Large (Manual))   Pulse 108   Temp 98.9 °F (37.2 °C) (Oral)   Resp 20   Ht 5' 4" (1.626 m)   Wt 89 kg (196 lb 3.2 oz)   SpO2 95%   BMI 33.68 kg/m²      Physical Exam  Constitutional:       Appearance: Normal appearance.   HENT:      Right Ear: Tympanic membrane normal.      Left Ear: There is impacted cerumen.      Ears:      Comments: Scarring to right tm  Eyes:      Extraocular Movements: Extraocular movements intact.   Cardiovascular:      Rate and Rhythm: Normal rate and regular rhythm.      Pulses: Normal pulses.      Heart sounds: Normal heart sounds.   Pulmonary:      Effort: Pulmonary effort is normal.      Breath sounds: Normal breath sounds.   Musculoskeletal:      Cervical back: Pain with movement and spinous process tenderness present. " Decreased range of motion.   Neurological:      Mental Status: She is alert and oriented to person, place, and time.   Psychiatric:         Mood and Affect: Affect is flat.          Assessment and Plan:    1. Chronic pain syndrome    2. Left ear pain    3. Impacted cerumen of left ear         Chronic pain syndrome  -     ketorolac injection 30 mg  -continue current meds  -f/u with specialists as scheduled    Left ear pain    Impacted cerumen of left ear  -     carbamide peroxide (DEBROX) 6.5 % otic solution; Place 5 drops into the left ear 2 (two) times daily. X 4 days  Dispense: 15 mL; Refill: 0  -refused irrigation        There are no Patient Instructions on file for this visit.   Follow up if symptoms worsen or fail to improve.

## 2024-05-07 DIAGNOSIS — M54.17 LUMBOSACRAL RADICULOPATHY: Primary | ICD-10-CM

## 2024-05-08 ENCOUNTER — HOSPITAL ENCOUNTER (OUTPATIENT)
Dept: RADIOLOGY | Facility: HOSPITAL | Age: 49
Discharge: HOME | End: 2024-05-08
Attending: NURSE PRACTITIONER
Payer: COMMERCIAL

## 2024-05-08 ENCOUNTER — OFFICE VISIT (OUTPATIENT)
Dept: SPINE | Facility: CLINIC | Age: 49
End: 2024-05-08
Payer: COMMERCIAL

## 2024-05-08 DIAGNOSIS — M54.17 LUMBOSACRAL RADICULOPATHY: ICD-10-CM

## 2024-05-08 DIAGNOSIS — M47.817 LUMBOSACRAL SPONDYLOSIS WITHOUT MYELOPATHY: Chronic | ICD-10-CM

## 2024-05-08 PROCEDURE — 72110 X-RAY EXAM L-2 SPINE 4/>VWS: CPT | Mod: 26,,, | Performed by: STUDENT IN AN ORGANIZED HEALTH CARE EDUCATION/TRAINING PROGRAM

## 2024-05-08 PROCEDURE — 99204 OFFICE O/P NEW MOD 45 MIN: CPT | Mod: S$PBB,,, | Performed by: NURSE PRACTITIONER

## 2024-05-08 PROCEDURE — 72110 X-RAY EXAM L-2 SPINE 4/>VWS: CPT | Mod: TC

## 2024-05-08 PROCEDURE — 1159F MED LIST DOCD IN RCRD: CPT | Mod: CPTII,,, | Performed by: NURSE PRACTITIONER

## 2024-05-08 PROCEDURE — 3066F NEPHROPATHY DOC TX: CPT | Mod: CPTII,,, | Performed by: NURSE PRACTITIONER

## 2024-05-08 PROCEDURE — 3046F HEMOGLOBIN A1C LEVEL >9.0%: CPT | Mod: CPTII,,, | Performed by: NURSE PRACTITIONER

## 2024-05-08 PROCEDURE — 99215 OFFICE O/P EST HI 40 MIN: CPT | Mod: PBBFAC,25 | Performed by: NURSE PRACTITIONER

## 2024-05-08 PROCEDURE — 3061F NEG MICROALBUMINURIA REV: CPT | Mod: CPTII,,, | Performed by: NURSE PRACTITIONER

## 2024-05-08 RX ORDER — SEMAGLUTIDE 2.68 MG/ML
2 INJECTION, SOLUTION SUBCUTANEOUS
Qty: 3 ML | Refills: 3 | Status: SHIPPED | OUTPATIENT
Start: 2024-05-08

## 2024-05-08 NOTE — TELEPHONE ENCOUNTER
----- Message from Elisa Mcdaniel MD sent at 5/6/2024 11:56 AM CDT -----  Please call patient regarding lab results. HbA1C is improved from 12.3 to 9.2 which is good. However, DM control is still not at goal. Recommend increasing ozempic from 1 mg to 2 mg SC weekly. Low carb diet/exercise.   Calcium is mildly elevated. If she is on any calcium supplementation ok to discontinue. Repeat lab at follow up. Cholesterol is well controlled. Labs, otherwise, ok/stable. Thanks!

## 2024-05-08 NOTE — TELEPHONE ENCOUNTER
Call made to pt regarding lab results. Pt voiced understanding and agreed to increase ozempic and verified pharmacy as [facility].

## 2024-05-08 NOTE — PATIENT INSTRUCTIONS
Referral for EMG nerve conduction.    Start Physical Therapy.    Follow up in 2 months or sooner if EMG nerve conduction study gets performed sooner.

## 2024-05-08 NOTE — PROGRESS NOTES
MDM/time:  Greater than 45 minutes spent on this encounter including 15 minutes reviewing imaging and notes, 20 minutes with the patient, 10 minutes documentation    ASSESSMENT:  48 y.o. female with cervical and lumbar spondylosis with radiculopathy     PLAN:  PT cervical spine   EMG bilateral lower extremities   Follow up with Dr Razo to discuss other epidural injections   Follow up 2 months or after EMG study has been completed     HPI:  48 y.o. pleasant female here for evaluation of neck pain that radiates into bilateral arms that has been ongoing for the past 2-3 years with no known injury.  She also complains of lower back pain that radiates into bilateral legs occasionally the right leg is worse than left this has been ongoing for the past 10+ years.  Patient reports decreased  strength in bilateral hands.  Balance issues with multiple falls.  Denies bladder bowel incontinence.  Unable to stand or walk for any length of time due to pain.  Currently taking Percocet as needed for pain.  Neurontin 800 mg 3 times a day and Cymbalta 60 mg twice a day.  She has not had any recent physical therapy on her cervical spine but has had lumbar spine Physical therapy the end of 2023 that did not give her relief of pain.  She currently sees pain management has had injections in her lumbar spine last year nothing 2024 time frame.  She reports the injections that she is gotten in the past have given her pain relief for 2-3 weeks.  Recent MRI lumbar spine at [facility] 02/27/2024 showed mild neuroforaminal narrowing at L4-5.  No prior spine surgery.  Patient is not a smoker.    IMAGING:  X-Ray Lumbar 4-5 View including Bending Views  Narrative: EXAMINATION:  XR LUMBAR SPINE 4-5 VIEW WITH BENDING VIEWS    CLINICAL HISTORY:  .  Radiculopathy, lumbosacral region    TECHNIQUE:  XR LUMBAR SPINE 4-5 VIEW WITH BENDING VIEWS    COMPARISON:  02/27/2024    FINDINGS:  No acute fracture.    No malalignment.  No dynamic  instability.    Mild multi-level degenerative change.  Impression: No acute fracture.    No malalignment. No dynamic instability.    Mild multi-level degenerative change.    Electronically signed by: Binu Doll  Date:    05/08/2024  Time:    08:55       2/27/2024 MRI Lumbar spine   EXAMINATION:  MRI LUMBAR SPINE WITHOUT CONTRAST     CLINICAL HISTORY:  Low back pain, symptoms persist with > 6wks conservative treatment;Low back pain, progressive neurologic deficit; Dorsalgia, unspecified     TECHNIQUE:  Axial and sagittal imaging of the spine is performed using T1, T2, STIR and T2 fat sat sequences without contrast.     COMPARISON:  24 August 2023     FINDINGS:  Vertebral bodies are normal in height and alignment.     Disc space heights are well-maintained.  No evidence of significant disc herniation is seen.     Small amount of facet joint degenerative changes present most prominent at L3-4 and L4-5 contributing to mild foraminal stenosis bilaterally at these levels.     Spinal cord signal appears within normal limits.     Osseous marrow signal appears within normal limits.     Impression:     Mild facet joint osteoarthrosis.  No other significant findings.       9/15/2023 Cervical spine xray:  EXAMINATION:  XR CERVICAL SPINE COMPLETE 5 VIEW     CLINICAL HISTORY:  Cervicalgia     COMPARISON:  5 April 2019     TECHNIQUE:  XR CERVICAL SPINE 5 VIEW     FINDINGS:  No fracture is seen. Vertebral body heights and alignment are normal.  The disc space heights are well-maintained.  No significant degenerative change is present.     Impression:     No evidence of abnormality demonstrated        Electronically signed by:Jamshid Nava  Past Medical History:   Diagnosis Date    Arthritis     Back injury     Depression     Diabetes mellitus     Diabetes mellitus, type 2     Difficulty swallowing     Fatigue     GERD (gastroesophageal reflux disease)     Hearing difficulty     Hypertension     Irritable bowel syndrome  without diarrhea     Liver disease     Loss of appetite     Sleep apnea      Past Surgical History:   Procedure Laterality Date    CARPAL TUNNEL RELEASE Bilateral      SECTION      INJECTION OF ANESTHETIC AGENT AROUND MEDIAL BRANCH NERVES INNERVATING LUMBAR FACET JOINT Bilateral 2021    Procedure: BLOCK, NERVE, FACET JOINT, LUMBAR, MEDIAL BRANCH;  Surgeon: Amari Razo MD;  Location: [facility];  Service: Pain Management;  Laterality: Bilateral;  Bilateral L3-S1 Facet Injection    INJECTION OF ANESTHETIC AGENT AROUND MEDIAL BRANCH NERVES INNERVATING LUMBAR FACET JOINT Bilateral 2022    Procedure: BLOCK, NERVE, FACET JOINT, LUMBAR, MEDIAL BRANCH;  Surgeon: Amari Razo MD;  Location: [facility]IN [facility];  Service: Pain Management;  Laterality: Bilateral;  Bilateral L3-S1 FI    INJECTION OF ANESTHETIC AGENT AROUND MEDIAL BRANCH NERVES INNERVATING LUMBAR FACET JOINT Bilateral 2023    Procedure: BLOCK, NERVE, FACET JOINT, LUMBAR, MEDIAL BRANCH;  Surgeon: Amari Razo MD;  Location: [facility];  Service: Pain Management;  Laterality: Bilateral;  Bilateral L3-S1 FI    LEFT HEART CATHETERIZATION Left 2021    Procedure: Left heart cath;  Surgeon: Jeremy Rojo DO;  Location: [facility] CATH LAB;  Service: Cardiology;  Laterality: Left;    RADIOFREQUENCY ABLATION OF LUMBAR MEDIAL BRANCH NERVE AT SINGLE LEVEL Bilateral 2023    Procedure: RADIOFREQUENCY ABLATION, NERVE, SPINAL, LUMBAR, MEDIAL BRANCH, 1 LEVEL;  Surgeon: Amari Razo MD;  Location: [facility]IN [facility];  Service: Pain Management;  Laterality: Bilateral;  Bilateral L3-5 RFTC    STAPEDES SURGERY Bilateral     TONSILLECTOMY       Social History     Tobacco Use    Smoking status: Never    Smokeless tobacco: Never   Substance Use Topics    Alcohol use: Not Currently    Drug use: Never      Current Outpatient Medications   Medication Instructions    ARIPiprazole (ABILIFY) 2 mg, Oral, Daily     benztropine (COGENTIN) 0.5 mg, Oral, Daily    carbamide peroxide (DEBROX) 6.5 % otic solution 5 drops, Left Ear, 2 times daily, X 4 days    chlorpheniramine-phenyleph-DM (ED A-HIST DM) 4-10-10 mg Tab 1 tablet, Oral, Every 8 hours PRN    cyclobenzaprine (FLEXERIL) 10 mg, Oral, 3 times daily    dextromethorphan-triprolidine (ENDAL, DM-TRIPROLIDINE,) 10-1.25 mg/5 mL Soln 5 mLs, Oral, Every 6 hours PRN    DULoxetine (CYMBALTA) 60 mg, Oral, 2 times daily    fluconazole (DIFLUCAN) 150 MG Tab Take one tablet by mouth every 72 hours X 3 doses if needed for yeast infection after completion of antibiotics.    gabapentin (NEURONTIN) 800 mg, Oral, 3 times daily    glipiZIDE (GLUCOTROL) 10 mg, Oral, 2 times daily with meals    hydroCHLOROthiazide (HYDRODIURIL) 25 mg, Oral, Daily    hydrOXYzine pamoate (VISTARIL) 25 mg, Oral, 3 times daily    insulin glargine U-100 (Lantus) (LANTUS SOLOSTAR U-100 INSULIN) 15 Units, Subcutaneous, Nightly    lisinopriL (PRINIVIL,ZESTRIL) 2.5 mg, Oral, Daily    methocarbamoL (ROBAXIN) 500 mg, Oral, 3 times daily    mometasone (NASONEX) 50 mcg/actuation nasal spray 2 sprays, Nasal, Daily    omeprazole (PRILOSEC) 40 mg, Oral, Daily    oxyCODONE-acetaminophen (PERCOCET)  mg per tablet 1 tablet, Oral, Every 6 hours PRN    OZEMPIC 1 mg, Subcutaneous, Every 7 days    pravastatin (PRAVACHOL) 20 mg, Oral, Nightly    promethazine (PHENERGAN) 25 mg, Oral, Every 6 hours PRN    risperiDONE (RISPERDAL) 3 mg, Oral, Daily    traZODone (DESYREL) 100 mg, Oral, Nightly        EXAM:  Constitutional  General Appearance:  There is no height or weight on file to calculate BMI., NAD  Psychiatric   Orientation: Oriented to time, oriented to place, oriented to person  Mood and Affect: Active and alert, normal mood, normal affect  Gait and Station   Appearance: antalgic gait, unable to tandem gait, unable to walk on toes, unable to walk on heels    CERVICAL  Musculoskeletal System  Shoulder:  normal appearance, no  instability, no tenderness, normal ROM right, normal ROM left, no pain with ROM    Cervical Spine  Inspection:  alignment normal, no muscle atrophy  Soft Tissue Palpation on the Right:  no tenderness of the paracervicals, no tenderness of the trapezius, no tenderness of the rhomboid  Soft Tissue Palpation on the Left:  no tenderness of the paracervicals, no tenderness of the trapezius, no tenderness of the rhomboid  Bony Palpation:  no tenderness of the occipital protuberance  Active Range:     Flexion normal, Extension normal, Rotation to the left decreased, Rotation to the right decreased, Lateral flexion to the left normal, Lateral flexion to the right normal   Pain elicited on motion    Motor Strength  C5 on the right:  abduction deltoid 5/5  C5 on the left:  abduction deltoid 5/5  C6 on the Right:  flexion biceps 5/5, wrist extension 5/5  C6 on the Left:  flexion biceps 5/5, wrist extension 5/5  C7 on the Right:  extension fingers 5/5, extension triceps 5/5  C7 on the Left:  extension fingers 5/5, extension triceps 5/5  C8 on the Right:  flexion fingers 5/5  C8 on the Left:  flexion fingers 5/5  T1 on the Right:  abduction fingers 5/5  T1 on the Left:  abduction fingers 5/5    Neurological System  Sensation on the Right:  normal sensation of the extremities: right, normal median nerve distribution, normal ulnar nerve distribution  Sensation on the Left:  normal sensation of the extremities: left, normal median nerve distribution, normal ulnar nerve distribution  Biceps Reflex Right:  normal, Brachioradialis Reflex Right:  normal, Triceps Reflex Right: normal  Biceps Reflex Left:  normal, Brachioradialis Reflex Left: normal, Triceps Reflex Left:  normal  Special Test on the Right:  Spurlings test negative, Hoffmans reflex absent, no ankle clonus, Durkan test negative, Tinels sign negative at the elbow  Special Test on the Left:  Spurlings test negative, Hoffmans reflex absent, no ankle clonus, Durkan test  negative, Tinels sign negative at the elbow    Skin:   Head and Neck:  normal   Right Upper Extremity:  normal   Left Upper Extremity:  normal    Cardiovascular:   Arterial Pulses Right:  radial right   Arterial Pulses Left:  radial left   Edema Right:  none   Edema Left:  none   LUMBAR  Musculoskeletal System   Hips: Normal appearance, no leg length discrepancy, normal motion; left, normal motion; right    Lumbar Spine                   Inspection:  Normal alignment, normal sagittal balance                  Range of motion: decreased flexion, extension, lateral bending, rotation. Pain with range of motion                  Bony Palpation of the Lumbar Spine:  No tenderness of the spinous process, no tenderness of the sacrum, no tenderness of the coccyx                  Bony Palpation of the Right Hip:  No tenderness of the iliac crest, no tenderness of the sciatic notch, no tenderness of the SI joint                  Bony Palpation of the Left Hip:  No tenderness of the iliac crest, no tenderness of the sciatic notch, no tenderness of the SI joint                  Soft Tissue Palpation on the Right:  No tenderness of the paraspinal region, no tenderness of the iliolumbar region                  Soft Tissue Palpation on the Left:  No tenderness of the paraspinal region, no tenderness of the iliolumbar region    Motor Strength   L1 Right:  Hip flexion iliopsoas 4/5    L1 Left:  Hip flexion iliopsoas 5/5              L2-L4 Right:  Knee extension quadriceps 4/5, tibialis anterior 4/5              L2-L4 Left:  Knee extension quadriceps 5/5, tibialis anterior 5/5   L5 Right:  Extensor hallucis llongus 5/5,    L5 Left:  Extensor hallucis longus 5/5,    S1 Right:  Plantar flexion gastrocnemius 5/5   S1 Left:  Plantar flexion gastrocnemius 5/5    Neurological System   Ankle Reflex Right:  normal   Ankle Reflex Left: normal   Knee Reflex Right:  normal   Knee Reflex Left:  normal   Sensation on the Right:  L2 normal, L3  normal, L4 normal, L5 normal, S1 normal   Sensation on the Left:  L2 normal, L3 normal, L4 normal, L5 normal, S1 normal              Special Test on the Right:  Seated straight leg raising test negative, no clonus of the ankle              Special Test on the Left:  Seated straight leg raising test negative, no clonus of the ankle    Skin   Lumbosacral Spine:  Normal skin    Cardiovascular System   Arterial Pulses Right:  Posterior tibialis normal, dorsalis pedis normal   Arterial Pulses Left:  Posterior tibialis normal, dorsalis pedis normal   Edema Right: None   Edema Left:  None

## 2024-05-13 ENCOUNTER — OFFICE VISIT (OUTPATIENT)
Dept: NEUROLOGY | Facility: CLINIC | Age: 49
End: 2024-05-13
Payer: COMMERCIAL

## 2024-05-13 VITALS
RESPIRATION RATE: 18 BRPM | DIASTOLIC BLOOD PRESSURE: 102 MMHG | WEIGHT: 200 LBS | HEIGHT: 64 IN | HEART RATE: 109 BPM | BODY MASS INDEX: 34.15 KG/M2 | SYSTOLIC BLOOD PRESSURE: 140 MMHG | OXYGEN SATURATION: 99 %

## 2024-05-13 DIAGNOSIS — M47.817 LUMBOSACRAL SPONDYLOSIS WITHOUT MYELOPATHY: Chronic | ICD-10-CM

## 2024-05-13 DIAGNOSIS — F41.9 ANXIETY: Primary | Chronic | ICD-10-CM

## 2024-05-13 DIAGNOSIS — F32.89 OTHER DEPRESSION: Chronic | ICD-10-CM

## 2024-05-13 PROCEDURE — 99214 OFFICE O/P EST MOD 30 MIN: CPT | Mod: S$PBB,,, | Performed by: PSYCHIATRY & NEUROLOGY

## 2024-05-13 PROCEDURE — 3046F HEMOGLOBIN A1C LEVEL >9.0%: CPT | Mod: CPTII,,, | Performed by: PSYCHIATRY & NEUROLOGY

## 2024-05-13 PROCEDURE — 99215 OFFICE O/P EST HI 40 MIN: CPT | Mod: PBBFAC | Performed by: PSYCHIATRY & NEUROLOGY

## 2024-05-13 PROCEDURE — 3080F DIAST BP >= 90 MM HG: CPT | Mod: CPTII,,, | Performed by: PSYCHIATRY & NEUROLOGY

## 2024-05-13 PROCEDURE — 3066F NEPHROPATHY DOC TX: CPT | Mod: CPTII,,, | Performed by: PSYCHIATRY & NEUROLOGY

## 2024-05-13 PROCEDURE — 3061F NEG MICROALBUMINURIA REV: CPT | Mod: CPTII,,, | Performed by: PSYCHIATRY & NEUROLOGY

## 2024-05-13 PROCEDURE — 3077F SYST BP >= 140 MM HG: CPT | Mod: CPTII,,, | Performed by: PSYCHIATRY & NEUROLOGY

## 2024-05-13 PROCEDURE — 1159F MED LIST DOCD IN RCRD: CPT | Mod: CPTII,,, | Performed by: PSYCHIATRY & NEUROLOGY

## 2024-05-13 PROCEDURE — 3008F BODY MASS INDEX DOCD: CPT | Mod: CPTII,,, | Performed by: PSYCHIATRY & NEUROLOGY

## 2024-05-13 PROCEDURE — 1160F RVW MEDS BY RX/DR IN RCRD: CPT | Mod: CPTII,,, | Performed by: PSYCHIATRY & NEUROLOGY

## 2024-05-13 NOTE — PROGRESS NOTES
Subjective:       Patient ID: Georgiana Zayas is a 48 y.o. female     Chief Complaint:    Chief Complaint   Patient presents with    Headache     Pt. States memory loss, unstable gait.        Allergies:  Codeine    Current Medications:    Outpatient Encounter Medications as of 5/13/2024   Medication Sig Dispense Refill    ARIPiprazole (ABILIFY) 2 MG Tab Take 1 tablet (2 mg total) by mouth once daily. 90 tablet 1    benztropine (COGENTIN) 0.5 MG tablet Take 0.5 mg by mouth once daily.      carbamide peroxide (DEBROX) 6.5 % otic solution Place 5 drops into the left ear 2 (two) times daily. X 4 days 15 mL 0    chlorpheniramine-phenyleph-DM (ED A-HIST DM) 4-10-10 mg Tab Take 1 tablet by mouth every 8 (eight) hours as needed (congestion or cough). 30 tablet 2    cyclobenzaprine (FLEXERIL) 10 MG tablet Take 1 tablet (10 mg total) by mouth 3 (three) times daily. 90 tablet 1    dextromethorphan-triprolidine (ENDAL, DM-TRIPROLIDINE,) 10-1.25 mg/5 mL Soln Take 5 mLs by mouth every 6 (six) hours as needed (congestion or cough). 150 mL 2    DULoxetine (CYMBALTA) 60 MG capsule Take 1 capsule (60 mg total) by mouth 2 (two) times daily. 180 capsule 1    fluconazole (DIFLUCAN) 150 MG Tab Take one tablet by mouth every 72 hours X 3 doses if needed for yeast infection after completion of antibiotics. 3 tablet 0    gabapentin (NEURONTIN) 800 MG tablet Take 1 tablet (800 mg total) by mouth 3 (three) times daily. 270 tablet 1    glipiZIDE (GLUCOTROL) 10 MG tablet Take 1 tablet (10 mg total) by mouth 2 (two) times daily with meals. 180 tablet 1    hydroCHLOROthiazide (HYDRODIURIL) 25 MG tablet Take 1 tablet (25 mg total) by mouth once daily. 90 tablet 1    hydrOXYzine pamoate (VISTARIL) 25 MG Cap Take 1 capsule (25 mg total) by mouth 3 (three) times daily. 270 capsule 1    insulin (LANTUS SOLOSTAR U-100 INSULIN) glargine 100 units/mL SubQ pen Inject 15 Units into the skin every evening. 18 mL 1    lisinopriL (PRINIVIL,ZESTRIL) 2.5  MG tablet Take 1 tablet (2.5 mg total) by mouth once daily. 90 tablet 1    methocarbamoL (ROBAXIN) 500 MG Tab Take 500 mg by mouth 3 (three) times daily.      mometasone (NASONEX) 50 mcg/actuation nasal spray 2 sprays by Nasal route once daily. 17 g 3    omeprazole (PRILOSEC) 40 MG capsule Take 1 capsule (40 mg total) by mouth Daily. 90 capsule 1    oxyCODONE-acetaminophen (PERCOCET)  mg per tablet Take 1 tablet by mouth every 6 (six) hours as needed.       pravastatin (PRAVACHOL) 20 MG tablet Take 1 tablet (20 mg total) by mouth every evening. 90 tablet 1    promethazine (PHENERGAN) 25 MG tablet Take 1 tablet (25 mg total) by mouth every 6 (six) hours as needed for Nausea. 30 tablet 0    risperiDONE (RISPERDAL) 3 MG Tab Take 1 tablet (3 mg total) by mouth once daily. 90 tablet 1    semaglutide (OZEMPIC) 2 mg/dose (8 mg/3 mL) PnIj Inject 2 mg into the skin every 7 days. 3 mL 3    traZODone (DESYREL) 100 MG tablet Take 1 tablet (100 mg total) by mouth every evening. 90 tablet 1    [DISCONTINUED] OZEMPIC 1 mg/dose (4 mg/3 mL) Inject 1 mg into the skin every 7 days. 3 mL 5     No facility-administered encounter medications on file as of 5/13/2024.       History of Present Illness  49 yo BF w/ mult psych issues exacerbating many factors especially her memory issues. Currently on few Psych meds which mod well   Pain mgmt injec's and interventions and opioid narcotics have not really helped her at all  Awaiting NCV/EMG in [facility] w/ likely Dr Paulino   Ortho Spine has eval her lumbar and cervical issues   Greg 800 mg tid works mod well  but very sedating             Past Medical History:   Diagnosis Date    Arthritis     Back injury     Depression     Diabetes mellitus     Diabetes mellitus, type 2     Difficulty swallowing     Fatigue     GERD (gastroesophageal reflux disease)     Hearing difficulty     Hypertension     Irritable bowel syndrome without diarrhea     Liver disease     Loss of appetite     Sleep apnea         Past Surgical History:   Procedure Laterality Date    CARPAL TUNNEL RELEASE Bilateral      SECTION      INJECTION OF ANESTHETIC AGENT AROUND MEDIAL BRANCH NERVES INNERVATING LUMBAR FACET JOINT Bilateral 2021    Procedure: BLOCK, NERVE, FACET JOINT, LUMBAR, MEDIAL BRANCH;  Surgeon: Amari Razo MD;  Location: [facility] PAIN [facility];  Service: Pain Management;  Laterality: Bilateral;  Bilateral L3-S1 Facet Injection    INJECTION OF ANESTHETIC AGENT AROUND MEDIAL BRANCH NERVES INNERVATING LUMBAR FACET JOINT Bilateral 2022    Procedure: BLOCK, NERVE, FACET JOINT, LUMBAR, MEDIAL BRANCH;  Surgeon: Amari Razo MD;  Location: [facility] PAIN [facility]MT;  Service: Pain Management;  Laterality: Bilateral;  Bilateral L3-S1 FI    INJECTION OF ANESTHETIC AGENT AROUND MEDIAL BRANCH NERVES INNERVATING LUMBAR FACET JOINT Bilateral 2023    Procedure: BLOCK, NERVE, FACET JOINT, LUMBAR, MEDIAL BRANCH;  Surgeon: Amari Razo MD;  Location: [facility] PAIN [facility];  Service: Pain Management;  Laterality: Bilateral;  Bilateral L3-S1 FI    LEFT HEART CATHETERIZATION Left 2021    Procedure: Left heart cath;  Surgeon: Jeremy Rojo DO;  Location: [facility] CATH LAB;  Service: Cardiology;  Laterality: Left;    RADIOFREQUENCY ABLATION OF LUMBAR MEDIAL BRANCH NERVE AT SINGLE LEVEL Bilateral 2023    Procedure: RADIOFREQUENCY ABLATION, NERVE, SPINAL, LUMBAR, MEDIAL BRANCH, 1 LEVEL;  Surgeon: Amari Razo MD;  Location: [facility] PAIN [facility];  Service: Pain Management;  Laterality: Bilateral;  Bilateral L3-5 RFTC    STAPEDES SURGERY Bilateral     TONSILLECTOMY         Social History  Ms. Zayas  reports that she has never smoked. She has never used smokeless tobacco. She reports that she does not currently use alcohol. She reports that she does not use drugs.    Family History  Ms.'s Zayas family history includes Cancer in her mother; Diabetes in her mother; Heart disease in an other family  "member; Hypertension in her mother; Kidney failure in an other family member.    Review of Systems  Review of Systems   Musculoskeletal:  Positive for myalgias.   Neurological:  Positive for tingling and sensory change.   Psychiatric/Behavioral:  Positive for depression and memory loss. The patient is nervous/anxious.    All other systems reviewed and are negative.     Objective:   BP (!) 140/102 (BP Location: Right arm, Patient Position: Sitting, BP Method: Large (Manual))   Pulse 109   Resp 18   Ht 5' 4" (1.626 m)   Wt 90.7 kg (200 lb)   SpO2 99%   BMI 34.33 kg/m²    NEUROLOGICAL EXAMINATION:     MENTAL STATUS   Oriented to person, place, and time.   Level of consciousness: alert  Knowledge: good.        Depression /anxiety     CRANIAL NERVES   Cranial nerves II through XII intact.     MOTOR EXAM     Strength   Strength 5/5 throughout.     SENSORY EXAM        Paresthesias in feet/hands      GAIT AND COORDINATION     Gait  Gait: normal       Physical Exam  Vitals reviewed.   Constitutional:       Appearance: She is normal weight.   Neurological:      General: No focal deficit present.      Mental Status: She is alert and oriented to person, place, and time. Mental status is at baseline.      Cranial Nerves: Cranial nerves 2-12 are intact.      Motor: Motor strength is normal.     Gait: Gait is intact.          Assessment:     Anxiety    Other depression    Lumbosacral spondylosis without myelopathy         Primary Diagnosis and ICD10  Anxiety [F41.9]    Plan:     Patient Instructions   NCV pending for lumbar radiculopathy  Cont Suraj for depression/anxiety  Cont Greg 800 mg 3x daily  Stress/reduction   F/u as needed           There are no discontinued medications.    Requested Prescriptions      No prescriptions requested or ordered in this encounter       "

## 2024-05-13 NOTE — PATIENT INSTRUCTIONS
NCV pending for lumbar radiculopathy  Cont Suraj for depression/anxiety  Cont Greg 800 mg 3x daily  Stress/reduction   F/u as needed

## 2024-06-17 ENCOUNTER — HOSPITAL ENCOUNTER (EMERGENCY)
Facility: HOSPITAL | Age: 49
Discharge: HOME | End: 2024-06-17
Attending: EMERGENCY MEDICINE
Payer: COMMERCIAL

## 2024-06-17 VITALS
HEIGHT: 64 IN | TEMPERATURE: 98 F | BODY MASS INDEX: 34.15 KG/M2 | RESPIRATION RATE: 20 BRPM | OXYGEN SATURATION: 98 % | WEIGHT: 200 LBS | SYSTOLIC BLOOD PRESSURE: 149 MMHG | HEART RATE: 97 BPM | DIASTOLIC BLOOD PRESSURE: 99 MMHG

## 2024-06-17 DIAGNOSIS — M54.50 CHRONIC RIGHT-SIDED LOW BACK PAIN WITHOUT SCIATICA: Primary | ICD-10-CM

## 2024-06-17 DIAGNOSIS — R30.0 DYSURIA: ICD-10-CM

## 2024-06-17 DIAGNOSIS — G89.29 CHRONIC RIGHT-SIDED LOW BACK PAIN WITHOUT SCIATICA: Primary | ICD-10-CM

## 2024-06-17 LAB
BILIRUB UR QL STRIP: NEGATIVE
CLARITY UR: CLEAR
COLOR UR: YELLOW
GLUCOSE UR STRIP-MCNC: NEGATIVE MG/DL
KETONES UR STRIP-SCNC: NEGATIVE MG/DL
LEUKOCYTE ESTERASE UR QL STRIP: NEGATIVE
NITRITE UR QL STRIP: NEGATIVE
PH UR STRIP: 6 PH UNITS
PROT UR QL STRIP: NEGATIVE
RBC # UR STRIP: NEGATIVE /UL
SP GR UR STRIP: 1.02
UROBILINOGEN UR STRIP-ACNC: 0.2 MG/DL

## 2024-06-17 PROCEDURE — 99284 EMERGENCY DEPT VISIT MOD MDM: CPT | Mod: ,,, | Performed by: EMERGENCY MEDICINE

## 2024-06-17 PROCEDURE — 99283 EMERGENCY DEPT VISIT LOW MDM: CPT

## 2024-06-17 PROCEDURE — 25000003 PHARM REV CODE 250: Performed by: EMERGENCY MEDICINE

## 2024-06-17 PROCEDURE — 81003 URINALYSIS AUTO W/O SCOPE: CPT | Performed by: EMERGENCY MEDICINE

## 2024-06-17 RX ORDER — ACETAMINOPHEN 500 MG
1000 TABLET ORAL
Status: COMPLETED | OUTPATIENT
Start: 2024-06-17 | End: 2024-06-17

## 2024-06-17 RX ORDER — PHENAZOPYRIDINE HYDROCHLORIDE 200 MG/1
200 TABLET, FILM COATED ORAL 3 TIMES DAILY PRN
Qty: 10 TABLET | Refills: 0 | Status: SHIPPED | OUTPATIENT
Start: 2024-06-17 | End: 2024-06-20

## 2024-06-17 RX ADMIN — ACETAMINOPHEN 1000 MG: 500 TABLET ORAL at 12:06

## 2024-06-17 NOTE — DISCHARGE INSTRUCTIONS
INCREASE REST AND FLUIDS   HEAT TO BACK  MEDICATION AS DIRECTED  PYRIDIUM  CONTINUE HOME MEDS  OVER THE COUNTER   TYLENOL AS NEEDED  CRANBERRY JUICE

## 2024-06-18 NOTE — ED PROVIDER NOTES
Encounter Date: 2024       History     Chief Complaint   Patient presents with    Back Pain     Lower right back pain, co bladder pressure and uncomfortable     PT IS A 48 YR OLD BF WITH DYSURIA, LOWER R BACK PAIN AND URINARY FREQUENCY ONSET TODAY WORSE WITH AMBULATION, PALPATION , AND URINATION .  PT HAS CHRONIC PAIN SYNDROME WITH R LOWER BACK PAIN INCREASED WITH MOVEMENT AND PALPATION.  PT DENIES FEVER/CHILLS, FLANK PAIN, N/V/D, OR ADDITIONAL SYMPTOMS.   Past Medical History    Diagnosis Date Comments  Hypertension [I10]    Diabetes mellitus [E11.9]    Sleep apnea [G47.30]    Liver disease [K76.9]    Hearing difficulty [H91.90]    Arthritis [M19.90]    Diabetes mellitus, type 2 [E11.9]    GERD (gastroesophageal reflux disease) [K21.9]    Depression [F32.A]    Back injury [S39.92XA]    Loss of appetite [R63.0]    Difficulty swallowing [R13.10]    Irritable bowel syndrome without diarrhea [K58.9]    Fatigue [R53.83]              Review of patient's allergies indicates:   Allergen Reactions    Codeine Itching     Past Medical History:   Diagnosis Date    Arthritis     Back injury     Depression     Diabetes mellitus     Diabetes mellitus, type 2     Difficulty swallowing     Fatigue     GERD (gastroesophageal reflux disease)     Hearing difficulty     Hypertension     Irritable bowel syndrome without diarrhea     Liver disease     Loss of appetite     Sleep apnea      Past Surgical History:   Procedure Laterality Date    CARPAL TUNNEL RELEASE Bilateral      SECTION      INJECTION OF ANESTHETIC AGENT AROUND MEDIAL BRANCH NERVES INNERVATING LUMBAR FACET JOINT Bilateral 2021    Procedure: BLOCK, NERVE, FACET JOINT, LUMBAR, MEDIAL BRANCH;  Surgeon: Amari Razo MD;  Location: [facility];  Service: Pain Management;  Laterality: Bilateral;  Bilateral L3-S1 Facet Injection    INJECTION OF ANESTHETIC AGENT AROUND MEDIAL BRANCH NERVES INNERVATING LUMBAR FACET JOINT Bilateral 2022     Procedure: BLOCK, NERVE, FACET JOINT, LUMBAR, MEDIAL BRANCH;  Surgeon: Amari Razo MD;  Location: [facility];  Service: Pain Management;  Laterality: Bilateral;  Bilateral L3-S1 FI    INJECTION OF ANESTHETIC AGENT AROUND MEDIAL BRANCH NERVES INNERVATING LUMBAR FACET JOINT Bilateral 1/11/2023    Procedure: BLOCK, NERVE, FACET JOINT, LUMBAR, MEDIAL BRANCH;  Surgeon: Amari Razo MD;  Location: [facility];  Service: Pain Management;  Laterality: Bilateral;  Bilateral L3-S1 FI    LEFT HEART CATHETERIZATION Left 12/21/2021    Procedure: Left heart cath;  Surgeon: Jeremy Rojo DO;  Location: [facility] CATH LAB;  Service: Cardiology;  Laterality: Left;    RADIOFREQUENCY ABLATION OF LUMBAR MEDIAL BRANCH NERVE AT SINGLE LEVEL Bilateral 4/12/2023    Procedure: RADIOFREQUENCY ABLATION, NERVE, SPINAL, LUMBAR, MEDIAL BRANCH, 1 LEVEL;  Surgeon: Amari Razo MD;  Location: [facility];  Service: Pain Management;  Laterality: Bilateral;  Bilateral L3-5 RFTC    STAPEDES SURGERY Bilateral     TONSILLECTOMY       Family History   Problem Relation Name Age of Onset    Cancer Mother      Diabetes Mother      Hypertension Mother      Kidney failure Other      Heart disease Other       Social History     Tobacco Use    Smoking status: Never    Smokeless tobacco: Never   Substance Use Topics    Alcohol use: Not Currently    Drug use: Never     Review of Systems   Constitutional: Negative.  Negative for appetite change and fatigue.   HENT: Negative.     Eyes: Negative.    Cardiovascular: Negative.    Gastrointestinal: Negative.    Endocrine: Negative.    Genitourinary:  Positive for dysuria. Negative for difficulty urinating and flank pain.   Musculoskeletal:  Positive for arthralgias and back pain.   Skin: Negative.    Allergic/Immunologic: Positive for immunocompromised state.   Neurological: Negative.  Negative for weakness.   Psychiatric/Behavioral: Negative.         Physical Exam      Initial Vitals [06/17/24 1207]   BP Pulse Resp Temp SpO2   (!) 134/97 101 20 98.3 °F (36.8 °C) 98 %      MAP       --         Physical Exam    Nursing note and vitals reviewed.  Constitutional: She appears well-developed and well-nourished. She is cooperative. She appears distressed.   HENT:   Head: Normocephalic and atraumatic.   Right Ear: External ear normal.   Left Ear: External ear normal.   Nose: Nose normal.   Mouth/Throat: Oropharynx is clear and moist.   Eyes: Conjunctivae and EOM are normal. Pupils are equal, round, and reactive to light.   Neck: Trachea normal. Neck supple.   Normal range of motion.  Cardiovascular:  Regular rhythm, normal heart sounds and intact distal pulses.     Exam reveals no gallop and no friction rub.       No murmur heard.  Pulses:       Radial pulses are 3+ on the right side and 3+ on the left side.        Dorsalis pedis pulses are 3+ on the right side and 3+ on the left side.   Pulmonary/Chest: Breath sounds normal. No respiratory distress. She has no wheezes. She has no rales.   Abdominal: Abdomen is soft. Bowel sounds are normal. She exhibits no distension. There is no abdominal tenderness.   NO CVAT There is no rebound.   Musculoskeletal:         General: Tenderness (R SACRAL AREA) present. Normal range of motion.      Right shoulder: Normal.      Left shoulder: Normal.      Right upper arm: Normal.      Left upper arm: Normal.      Right elbow: Normal.      Left elbow: Normal.      Right forearm: Normal.      Left forearm: Normal.      Right wrist: Normal.      Left wrist: Normal.      Right hand: Normal.      Left hand: Normal.      Cervical back: Normal range of motion and neck supple.     Lymphadenopathy:     She has no cervical adenopathy.     She has no axillary adenopathy.   Neurological: She is alert and oriented to person, place, and time. She has normal strength. No cranial nerve deficit or sensory deficit. She displays a negative Romberg sign. GCS eye subscore  is 4. GCS verbal subscore is 5. GCS motor subscore is 6.   Reflex Scores:       Bicep reflexes are 2+ on the right side and 2+ on the left side.       Patellar reflexes are 2+ on the right side and 2+ on the left side.  Skin: Skin is warm and dry. Capillary refill takes less than 2 seconds.   Psychiatric: She has a normal mood and affect. Her speech is normal. Cognition and memory are normal.         Medical Screening Exam   See Full Note    ED Course   Procedures  Labs Reviewed   URINALYSIS, REFLEX TO URINE CULTURE          Imaging Results    None          Medications   acetaminophen tablet 1,000 mg (1,000 mg Oral Given 6/17/24 1235)     Medical Decision Making  PT IS A 48 YR OLD BF WITH DYSURIA, LOWER R BACK PAIN AND URINARY FREQUENCY ONSET TODAY WORSE WITH AMBULATION, PALPATION , AND URINATION .  PT HAS CHRONIC PAIN SYNDROME WITH R LOWER BACK PAIN INCREASED WITH MOVEMENT AND PALPATION.  PT DENIES FEVER/CHILLS, FLANK PAIN, N/V/D, OR ADDITIONAL SYMPTOMS.   Past Medical History    Diagnosis Date Comments  Hypertension [I10]    Diabetes mellitus [E11.9]    Sleep apnea [G47.30]    Liver disease [K76.9]    Hearing difficulty [H91.90]    Arthritis [M19.90]    Diabetes mellitus, type 2 [E11.9]    GERD (gastroesophageal reflux disease) [K21.9]    Depression [F32.A]    Back injury [S39.92XA]    Loss of appetite [R63.0]    Difficulty swallowing [R13.10]    Irritable bowel syndrome without diarrhea [K58.9]    Fatigue [R53.83]        Amount and/or Complexity of Data Reviewed  Labs: ordered.     Details: Viewed and Other Results - Labs      Updated   Order   06/17/24 1302  Urinalysis, Reflex to Urine Culture  Collected: 06/17/24 1255  Final result  Specimen: Urine, Clean Catch      Color, UA Yellow  Clarity, UA Clear  pH, UA 6.0 pH Units  Leukocytes, UA Negative  Nitrites, UA Negative  Protein, UA Negative  Glucose, UA Negative mg/dL  Ketones, UA Negative mg/dL  Urobilinogen, UA 0.2 mg/dL  Bilirubin, UA Negative  Blood,  UA Negative  Specific Gravity, UA 1.025            Discussion of management or test interpretation with external provider(s): EXAM  LABS UA NEG FOR UTI  TYLENOL  DC HOME IN STABLE CONDITION    Risk  OTC drugs.  Prescription drug management.                                      Clinical Impression:   Final diagnoses:  [M54.50, G89.29] Chronic right-sided low back pain without sciatica (Primary)  [R30.0] Dysuria        ED Disposition Condition    Discharge Stable          ED Prescriptions       Medication Sig Dispense Start Date End Date Auth. Provider    phenazopyridine (PYRIDIUM) 200 MG tablet Take 1 tablet (200 mg total) by mouth 3 (three) times daily as needed for Pain. 10 tablet 6/17/2024 6/20/2024 Praveena Mcmanus MD          Follow-up Information       Follow up With Specialties Details Why Contact Info    Elisa Mcdaniel MD Family Medicine In 1 week If symptoms worsen [address]  [facility] [place] [address]  [phone]               Praveena Mcmanus MD  06/17/24 7118

## 2024-06-19 ENCOUNTER — TELEPHONE (OUTPATIENT)
Dept: EMERGENCY MEDICINE | Facility: HOSPITAL | Age: 49
End: 2024-06-19
Payer: COMMERCIAL

## 2024-06-25 ENCOUNTER — OFFICE VISIT (OUTPATIENT)
Dept: FAMILY MEDICINE | Facility: CLINIC | Age: 49
End: 2024-06-25
Payer: COMMERCIAL

## 2024-06-25 VITALS
TEMPERATURE: 99 F | BODY MASS INDEX: 34.08 KG/M2 | OXYGEN SATURATION: 98 % | SYSTOLIC BLOOD PRESSURE: 136 MMHG | HEIGHT: 64 IN | DIASTOLIC BLOOD PRESSURE: 86 MMHG | RESPIRATION RATE: 18 BRPM | WEIGHT: 199.63 LBS | HEART RATE: 97 BPM

## 2024-06-25 DIAGNOSIS — M62.838 MUSCLE SPASM: ICD-10-CM

## 2024-06-25 DIAGNOSIS — M54.40 CHRONIC BILATERAL LOW BACK PAIN WITH SCIATICA, SCIATICA LATERALITY UNSPECIFIED: Primary | ICD-10-CM

## 2024-06-25 DIAGNOSIS — N94.6 MENSTRUAL CRAMPS: ICD-10-CM

## 2024-06-25 DIAGNOSIS — G89.29 CHRONIC BILATERAL LOW BACK PAIN WITH SCIATICA, SCIATICA LATERALITY UNSPECIFIED: Primary | ICD-10-CM

## 2024-06-25 DIAGNOSIS — N92.6 MENSTRUAL CYCLE PROBLEM: ICD-10-CM

## 2024-06-25 LAB
BASOPHILS # BLD AUTO: 0.03 K/UL (ref 0–0.2)
BASOPHILS NFR BLD AUTO: 0.6 % (ref 0–1)
DIFFERENTIAL METHOD BLD: ABNORMAL
EOSINOPHIL # BLD AUTO: 0.15 K/UL (ref 0–0.5)
EOSINOPHIL NFR BLD AUTO: 3 % (ref 1–4)
ERYTHROCYTE [DISTWIDTH] IN BLOOD BY AUTOMATED COUNT: 13.4 % (ref 11.5–14.5)
FERRITIN SERPL-MCNC: 81 NG/ML (ref 8–252)
HCT VFR BLD AUTO: 38.4 % (ref 38–47)
HGB BLD-MCNC: 12 G/DL (ref 12–16)
IMM GRANULOCYTES # BLD AUTO: 0.02 K/UL (ref 0–0.04)
IMM GRANULOCYTES NFR BLD: 0.4 % (ref 0–0.4)
IRON SATN MFR SERPL: 37 % (ref 14–50)
IRON SERPL-MCNC: 118 ΜG/DL (ref 50–170)
LYMPHOCYTES # BLD AUTO: 2.07 K/UL (ref 1–4.8)
LYMPHOCYTES NFR BLD AUTO: 41.3 % (ref 27–41)
MCH RBC QN AUTO: 27.1 PG (ref 27–31)
MCHC RBC AUTO-ENTMCNC: 31.3 G/DL (ref 32–36)
MCV RBC AUTO: 86.9 FL (ref 80–96)
MONOCYTES # BLD AUTO: 0.4 K/UL (ref 0–0.8)
MONOCYTES NFR BLD AUTO: 8 % (ref 2–6)
MPC BLD CALC-MCNC: 10.8 FL (ref 9.4–12.4)
NEUTROPHILS # BLD AUTO: 2.34 K/UL (ref 1.8–7.7)
NEUTROPHILS NFR BLD AUTO: 46.7 % (ref 53–65)
NRBC # BLD AUTO: 0 X10E3/UL
NRBC, AUTO (.00): 0 %
PLATELET # BLD AUTO: 339 K/UL (ref 150–400)
RBC # BLD AUTO: 4.42 M/UL (ref 4.2–5.4)
TIBC SERPL-MCNC: 316 ΜG/DL (ref 250–450)
WBC # BLD AUTO: 5.01 K/UL (ref 4.5–11)

## 2024-06-25 PROCEDURE — 3066F NEPHROPATHY DOC TX: CPT | Mod: CPTII,,, | Performed by: FAMILY MEDICINE

## 2024-06-25 PROCEDURE — 3079F DIAST BP 80-89 MM HG: CPT | Mod: CPTII,,, | Performed by: FAMILY MEDICINE

## 2024-06-25 PROCEDURE — 3075F SYST BP GE 130 - 139MM HG: CPT | Mod: CPTII,,, | Performed by: FAMILY MEDICINE

## 2024-06-25 PROCEDURE — 3046F HEMOGLOBIN A1C LEVEL >9.0%: CPT | Mod: CPTII,,, | Performed by: FAMILY MEDICINE

## 2024-06-25 PROCEDURE — 83550 IRON BINDING TEST: CPT | Mod: ,,, | Performed by: CLINICAL MEDICAL LABORATORY

## 2024-06-25 PROCEDURE — 3008F BODY MASS INDEX DOCD: CPT | Mod: CPTII,,, | Performed by: FAMILY MEDICINE

## 2024-06-25 PROCEDURE — 82728 ASSAY OF FERRITIN: CPT | Mod: ,,, | Performed by: CLINICAL MEDICAL LABORATORY

## 2024-06-25 PROCEDURE — 1159F MED LIST DOCD IN RCRD: CPT | Mod: CPTII,,, | Performed by: FAMILY MEDICINE

## 2024-06-25 PROCEDURE — 4010F ACE/ARB THERAPY RXD/TAKEN: CPT | Mod: CPTII,,, | Performed by: FAMILY MEDICINE

## 2024-06-25 PROCEDURE — 3061F NEG MICROALBUMINURIA REV: CPT | Mod: CPTII,,, | Performed by: FAMILY MEDICINE

## 2024-06-25 PROCEDURE — 85025 COMPLETE CBC W/AUTO DIFF WBC: CPT | Mod: ,,, | Performed by: CLINICAL MEDICAL LABORATORY

## 2024-06-25 PROCEDURE — 99214 OFFICE O/P EST MOD 30 MIN: CPT | Mod: 25,,, | Performed by: FAMILY MEDICINE

## 2024-06-25 PROCEDURE — 96372 THER/PROPH/DIAG INJ SC/IM: CPT | Mod: ,,, | Performed by: FAMILY MEDICINE

## 2024-06-25 PROCEDURE — 83540 ASSAY OF IRON: CPT | Mod: ,,, | Performed by: CLINICAL MEDICAL LABORATORY

## 2024-06-25 RX ORDER — CYCLOBENZAPRINE HCL 10 MG
10 TABLET ORAL 3 TIMES DAILY PRN
Qty: 90 TABLET | Refills: 1 | Status: SHIPPED | OUTPATIENT
Start: 2024-06-25

## 2024-06-25 RX ORDER — IBUPROFEN 800 MG/1
800 TABLET ORAL 3 TIMES DAILY PRN
Qty: 30 TABLET | Refills: 1 | Status: SHIPPED | OUTPATIENT
Start: 2024-06-25

## 2024-06-25 RX ORDER — ARIPIPRAZOLE 5 MG/1
5 TABLET ORAL
COMMUNITY
Start: 2024-04-11

## 2024-06-25 RX ORDER — KETOROLAC TROMETHAMINE 30 MG/ML
30 INJECTION, SOLUTION INTRAMUSCULAR; INTRAVENOUS
Status: COMPLETED | OUTPATIENT
Start: 2024-06-25 | End: 2024-06-25

## 2024-06-25 RX ADMIN — KETOROLAC TROMETHAMINE 30 MG: 30 INJECTION, SOLUTION INTRAMUSCULAR; INTRAVENOUS at 11:06

## 2024-06-25 NOTE — PROGRESS NOTES
Clinic Note    Patient Name: Georgiana Zayas  : 1975  MRN: [id number]    Chief Complaint   Patient presents with    Back Pain    Health Maintenance     Hepatitis C Screening Never done  TETANUS VACCINE Never done  Colorectal Cancer Screening Never done  Pneumococcal Vaccines (Age 0-64)(2 of 2 - PPSV23 or PCV20) due on 2022  Foot Exam due on 2023  COVID-19 Vaccine( season) due on 2023  Hemoglobin A1c due on 2024  Mammogram due on 2024        HPI:    Ms. Georgiana Zayas is a 48 y.o. female who presents to clinic today with CC of back pain. Patient has chronic back pain and follows with pain management.  She reports that her menstrual cycle started recently reports she has always had heavy painful periods that last several days. She denies, however, symptoms being any different from her baseline. Offered referral to OB-GYN but she declined. She states that she is having menstrual cramps and loose stools associated with her cycle. She reports this seems to have caused a flare with her back and hip pain. Denies nausea or vomiting. Reports HA. Denies dizziness or feeling light headed. Repots she typically changes her pad 3-4 times her day. Reports, however, she also wears a diaper with her pad inside of it. She denies, however, ever being anemic from her cycle.   Denies h/o fibroids.  Patient is, otherwise, without complaints.     Medications:  Medication List with Changes/Refills   New Medications    IBUPROFEN (ADVIL,MOTRIN) 800 MG TABLET    Take 1 tablet (800 mg total) by mouth 3 (three) times daily as needed for Pain.   Current Medications    ARIPIPRAZOLE (ABILIFY) 2 MG TAB    Take 1 tablet (2 mg total) by mouth once daily.    ARIPIPRAZOLE (ABILIFY) 5 MG TAB    Take 5 mg by mouth.    BENZTROPINE (COGENTIN) 0.5 MG TABLET    Take 0.5 mg by mouth once daily.    CARBAMIDE PEROXIDE (DEBROX) 6.5 % OTIC SOLUTION    Place 5 drops into the left ear 2 (two) times daily. X 4 days     CHLORPHENIRAMINE-PHENYLEPH-DM (ED A-HIST DM) 4-10-10 MG TAB    Take 1 tablet by mouth every 8 (eight) hours as needed (congestion or cough).    DEXTROMETHORPHAN-TRIPROLIDINE (ENDAL, DM-TRIPROLIDINE,) 10-1.25 MG/5 ML SOLN    Take 5 mLs by mouth every 6 (six) hours as needed (congestion or cough).    DULOXETINE (CYMBALTA) 60 MG CAPSULE    Take 1 capsule (60 mg total) by mouth 2 (two) times daily.    FLUCONAZOLE (DIFLUCAN) 150 MG TAB    Take one tablet by mouth every 72 hours X 3 doses if needed for yeast infection after completion of antibiotics.    GABAPENTIN (NEURONTIN) 800 MG TABLET    Take 1 tablet (800 mg total) by mouth 3 (three) times daily.    GLIPIZIDE (GLUCOTROL) 10 MG TABLET    Take 1 tablet (10 mg total) by mouth 2 (two) times daily with meals.    HYDROCHLOROTHIAZIDE (HYDRODIURIL) 25 MG TABLET    Take 1 tablet (25 mg total) by mouth once daily.    HYDROXYZINE PAMOATE (VISTARIL) 25 MG CAP    Take 1 capsule (25 mg total) by mouth 3 (three) times daily.    INSULIN (LANTUS SOLOSTAR U-100 INSULIN) GLARGINE 100 UNITS/ML SUBQ PEN    Inject 15 Units into the skin every evening.    LISINOPRIL (PRINIVIL,ZESTRIL) 2.5 MG TABLET    Take 1 tablet (2.5 mg total) by mouth once daily.    METHOCARBAMOL (ROBAXIN) 500 MG TAB    Take 500 mg by mouth 3 (three) times daily.    MOMETASONE (NASONEX) 50 MCG/ACTUATION NASAL SPRAY    2 sprays by Nasal route once daily.    OMEPRAZOLE (PRILOSEC) 40 MG CAPSULE    Take 1 capsule (40 mg total) by mouth Daily.    OXYCODONE-ACETAMINOPHEN (PERCOCET)  MG PER TABLET    Take 1 tablet by mouth every 6 (six) hours as needed.     PRAVASTATIN (PRAVACHOL) 20 MG TABLET    Take 1 tablet (20 mg total) by mouth every evening.    PROMETHAZINE (PHENERGAN) 25 MG TABLET    Take 1 tablet (25 mg total) by mouth every 6 (six) hours as needed for Nausea.    RISPERIDONE (RISPERDAL) 3 MG TAB    Take 1 tablet (3 mg total) by mouth once daily.    SEMAGLUTIDE (OZEMPIC) 2 MG/DOSE (8 MG/3 ML) PNIJ    Inject 2  mg into the skin every 7 days.    TRAZODONE (DESYREL) 100 MG TABLET    Take 1 tablet (100 mg total) by mouth every evening.   Changed and/or Refilled Medications    Modified Medication Previous Medication    CYCLOBENZAPRINE (FLEXERIL) 10 MG TABLET cyclobenzaprine (FLEXERIL) 10 MG tablet       Take 1 tablet (10 mg total) by mouth 3 (three) times daily as needed for Muscle spasms (may cause drowsiness).    Take 1 tablet (10 mg total) by mouth 3 (three) times daily.        Allergies: Codeine      Past Medical History:    Past Medical History:   Diagnosis Date    Arthritis     Back injury     Depression     Diabetes mellitus     Diabetes mellitus, type 2     Difficulty swallowing     Fatigue     GERD (gastroesophageal reflux disease)     Hearing difficulty     Hypertension     Irritable bowel syndrome without diarrhea     Liver disease     Loss of appetite     Sleep apnea        Past Surgical History:    Past Surgical History:   Procedure Laterality Date    CARPAL TUNNEL RELEASE Bilateral      SECTION      INJECTION OF ANESTHETIC AGENT AROUND MEDIAL BRANCH NERVES INNERVATING LUMBAR FACET JOINT Bilateral 2021    Procedure: BLOCK, NERVE, FACET JOINT, LUMBAR, MEDIAL BRANCH;  Surgeon: Amari Razo MD;  Location: [facility];  Service: Pain Management;  Laterality: Bilateral;  Bilateral L3-S1 Facet Injection    INJECTION OF ANESTHETIC AGENT AROUND MEDIAL BRANCH NERVES INNERVATING LUMBAR FACET JOINT Bilateral 2022    Procedure: BLOCK, NERVE, FACET JOINT, LUMBAR, MEDIAL BRANCH;  Surgeon: Amari Razo MD;  Location: [facility];  Service: Pain Management;  Laterality: Bilateral;  Bilateral L3-S1 FI    INJECTION OF ANESTHETIC AGENT AROUND MEDIAL BRANCH NERVES INNERVATING LUMBAR FACET JOINT Bilateral 2023    Procedure: BLOCK, NERVE, FACET JOINT, LUMBAR, MEDIAL BRANCH;  Surgeon: Amari Razo MD;  Location: [facility];  Service: Pain Management;  Laterality:  Bilateral;  Bilateral L3-S1 FI    LEFT HEART CATHETERIZATION Left 12/21/2021    Procedure: Left heart cath;  Surgeon: Jeremy Rojo DO;  Location: [facility] CATH LAB;  Service: Cardiology;  Laterality: Left;    RADIOFREQUENCY ABLATION OF LUMBAR MEDIAL BRANCH NERVE AT SINGLE LEVEL Bilateral 4/12/2023    Procedure: RADIOFREQUENCY ABLATION, NERVE, SPINAL, LUMBAR, MEDIAL BRANCH, 1 LEVEL;  Surgeon: Amari Razo MD;  Location: [facility] PAIN [facility]MT;  Service: Pain Management;  Laterality: Bilateral;  Bilateral L3-5 RFTC    STAPEDES SURGERY Bilateral     TONSILLECTOMY           Social History:    Social History     Tobacco Use   Smoking Status Never   Smokeless Tobacco Never     Social History     Substance and Sexual Activity   Alcohol Use Not Currently     Social History     Substance and Sexual Activity   Drug Use Never         Family History:    Family History   Problem Relation Name Age of Onset    Cancer Mother      Diabetes Mother      Hypertension Mother      Kidney failure Other      Heart disease Other         Review of Systems:    Review of Systems   Constitutional:  Negative for appetite change, chills, fatigue, fever and unexpected weight change.   Eyes:  Negative for visual disturbance.   Respiratory:  Negative for cough and shortness of breath.    Cardiovascular:  Negative for chest pain and leg swelling.   Gastrointestinal:  Positive for abdominal pain. Negative for change in bowel habit, constipation, diarrhea, nausea and vomiting.   Musculoskeletal:  Positive for back pain. Negative for arthralgias.   Integumentary:  Negative for rash.   Neurological:  Positive for headaches. Negative for dizziness.   Psychiatric/Behavioral:  The patient is not nervous/anxious.         Vitals:    Vitals:    06/25/24 1009   BP: 136/86   BP Location: Right arm   Patient Position: Sitting   BP Method: Large (Manual)   Pulse: 97   Resp: 18   Temp: 98.6 °F (37 °C)   TempSrc: Oral   SpO2: 98%   Weight: 90.5 kg (199 lb  "9.6 oz)   Height: 5' 4" (1.626 m)       Body mass index is 34.26 kg/m².    Wt Readings from Last 3 Encounters:   06/25/24 1009 90.5 kg (199 lb 9.6 oz)   06/17/24 1207 90.7 kg (200 lb)   05/13/24 0924 90.7 kg (200 lb)        Physical Exam:    Physical Exam  Constitutional:       General: She is not in acute distress.     Appearance: Normal appearance. She is obese.   HENT:      Nose: Nose normal.      Mouth/Throat:      Mouth: Mucous membranes are moist.      Pharynx: Oropharynx is clear.   Eyes:      Conjunctiva/sclera: Conjunctivae normal.   Cardiovascular:      Rate and Rhythm: Normal rate and regular rhythm.      Heart sounds: Normal heart sounds. No murmur heard.  Pulmonary:      Effort: Pulmonary effort is normal. No respiratory distress.      Breath sounds: Normal breath sounds. No wheezing, rhonchi or rales.   Abdominal:      General: Bowel sounds are normal.      Palpations: Abdomen is soft.      Tenderness: There is no abdominal tenderness. There is no guarding or rebound.   Musculoskeletal:         General: No swelling or tenderness. Normal range of motion.      Cervical back: Neck supple.      Right lower leg: No edema.      Left lower leg: No edema.   Skin:     Findings: No rash.   Neurological:      General: No focal deficit present.      Mental Status: She is alert. Mental status is at baseline.   Psychiatric:         Mood and Affect: Mood normal.         Assessment/Plan:   1. Chronic bilateral low back pain with sciatica, sciatica laterality unspecified  -     ibuprofen (ADVIL,MOTRIN) 800 MG tablet; Take 1 tablet (800 mg total) by mouth 3 (three) times daily as needed for Pain.  Dispense: 30 tablet; Refill: 1  -     ketorolac injection 30 mg    2. Muscle spasm  -     cyclobenzaprine (FLEXERIL) 10 MG tablet; Take 1 tablet (10 mg total) by mouth 3 (three) times daily as needed for Muscle spasms (may cause drowsiness).  Dispense: 90 tablet; Refill: 1    3. Menstrual cramps  -     ibuprofen (ADVIL,MOTRIN) " 800 MG tablet; Take 1 tablet (800 mg total) by mouth 3 (three) times daily as needed for Pain.  Dispense: 30 tablet; Refill: 1  -     ketorolac injection 30 mg    4. 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 RTC sooner if needed.  Patient voiced understanding and is agreeable to plan.      Lisandra Mcdaniel MD    Family Medicine

## 2024-07-07 ENCOUNTER — NURSE TRIAGE (OUTPATIENT)
Dept: ADMINISTRATIVE | Facility: CLINIC | Age: 49
End: 2024-07-07

## 2024-07-07 NOTE — TELEPHONE ENCOUNTER
Pt reports intermittent dizziness x2 days. Pt reports dizziness worsens with movement and standing. Pt reports hx vertigo and prescribed meclizine in the past but reports medication is not helping her current dizziness. Pt reports that she has to hold onto something while ambulating s/t severe dizziness. Care advice for pt to go to ER or UCC now. Pt verbalizes understanding.   Reason for Disposition   SEVERE dizziness (e.g., unable to stand, requires support to walk, feels like passing out now)    Additional Information   Negative: SEVERE difficulty breathing (e.g., struggling for each breath, speaks in single words)   Negative: [1] Difficulty breathing or swallowing AND [2] started suddenly after medicine, an allergic food or bee sting   Negative: Shock suspected (e.g., cold/pale/clammy skin, too weak to stand, low BP, rapid pulse)   Negative: Difficult to awaken or acting confused (e.g., disoriented, slurred speech)   Negative: [1] Weakness (i.e., paralysis, loss of muscle strength) of the face, arm or leg on one side of the body AND [2] sudden onset AND [3] present now   Negative: [1] Numbness (i.e., loss of sensation) of the face, arm or leg on one side of the body AND [2] sudden onset AND [3] present now   Negative: [1] Loss of speech or garbled speech AND [2] sudden onset AND [3] present now   Negative: Overdose (accidental or intentional) of medications   Negative: [1] Fainted > 15 minutes ago AND [2] still feels too weak or dizzy to stand   Negative: Heart beating < 50 beats per minute OR > 140 beats per minute   Negative: Sounds like a life-threatening emergency to the triager   Negative: Difficulty breathing    Protocols used: Dizziness - [id number]

## 2024-07-09 ENCOUNTER — OFFICE VISIT (OUTPATIENT)
Dept: FAMILY MEDICINE | Facility: CLINIC | Age: 49
End: 2024-07-09
Payer: COMMERCIAL

## 2024-07-09 VITALS
HEIGHT: 64 IN | DIASTOLIC BLOOD PRESSURE: 79 MMHG | HEART RATE: 111 BPM | SYSTOLIC BLOOD PRESSURE: 129 MMHG | RESPIRATION RATE: 17 BRPM | TEMPERATURE: 99 F | WEIGHT: 200 LBS | OXYGEN SATURATION: 98 % | BODY MASS INDEX: 34.15 KG/M2

## 2024-07-09 DIAGNOSIS — R42 VERTIGO: Primary | ICD-10-CM

## 2024-07-09 DIAGNOSIS — H61.22 IMPACTED CERUMEN OF LEFT EAR: ICD-10-CM

## 2024-07-09 DIAGNOSIS — H92.03 OTALGIA OF BOTH EARS: ICD-10-CM

## 2024-07-09 PROCEDURE — 99213 OFFICE O/P EST LOW 20 MIN: CPT | Mod: 25,,, | Performed by: NURSE PRACTITIONER

## 2024-07-09 PROCEDURE — 3066F NEPHROPATHY DOC TX: CPT | Mod: CPTII,,, | Performed by: NURSE PRACTITIONER

## 2024-07-09 PROCEDURE — 3078F DIAST BP <80 MM HG: CPT | Mod: CPTII,,, | Performed by: NURSE PRACTITIONER

## 2024-07-09 PROCEDURE — 3061F NEG MICROALBUMINURIA REV: CPT | Mod: CPTII,,, | Performed by: NURSE PRACTITIONER

## 2024-07-09 PROCEDURE — 1160F RVW MEDS BY RX/DR IN RCRD: CPT | Mod: CPTII,,, | Performed by: NURSE PRACTITIONER

## 2024-07-09 PROCEDURE — 3046F HEMOGLOBIN A1C LEVEL >9.0%: CPT | Mod: CPTII,,, | Performed by: NURSE PRACTITIONER

## 2024-07-09 PROCEDURE — 3008F BODY MASS INDEX DOCD: CPT | Mod: CPTII,,, | Performed by: NURSE PRACTITIONER

## 2024-07-09 PROCEDURE — 1159F MED LIST DOCD IN RCRD: CPT | Mod: CPTII,,, | Performed by: NURSE PRACTITIONER

## 2024-07-09 PROCEDURE — 3074F SYST BP LT 130 MM HG: CPT | Mod: CPTII,,, | Performed by: NURSE PRACTITIONER

## 2024-07-09 PROCEDURE — 69210 REMOVE IMPACTED EAR WAX UNI: CPT | Mod: LT,,, | Performed by: NURSE PRACTITIONER

## 2024-07-09 PROCEDURE — 4010F ACE/ARB THERAPY RXD/TAKEN: CPT | Mod: CPTII,,, | Performed by: NURSE PRACTITIONER

## 2024-07-09 RX ORDER — MECLIZINE HYDROCHLORIDE 25 MG/1
25 TABLET ORAL 3 TIMES DAILY PRN
Qty: 30 TABLET | Refills: 1 | Status: SHIPPED | OUTPATIENT
Start: 2024-07-09

## 2024-07-09 NOTE — PROGRESS NOTES
"Clinic Note    Georgiana Zayas is a 48 y.o. female     Chief Complaint:   Chief Complaint   Patient presents with    Dizziness     Pt reports dizzy spells that "come and go". Pt states it feels like she is about to pass out, but she catches herself.     Back Pain     Pt reports she is experiencing constant lower back pain. Pt states she does follow with pain clinic later this month.     Nausea        Subjective:    Patient complains of dizziness X 1 week. Patient states when she is walking sometimes she feels like she is going to the right. Admits to nausea with dizziness. States she found some old meclizine but did not get much relief (several years old). Admits to not drinking as much water as needed.  Patient states she would like referral to ENT. Patient has chronic ear issues. Has had tubes to ears. States she can feel fluid come and go. States her ent retired and needs to establish with Dr. Canchola.   Patient also complains of chronic back pain. Denies new symptoms. Reports she has appointment with pain treatment.     Dizziness:    Associated symptoms: ear pain and nausea.no fever, no vomiting and no chest pain.  Back Pain  Pertinent negatives include no abdominal pain, chest pain or fever.   Nausea  Associated symptoms include nausea. Pertinent negatives include no abdominal pain, chest pain, coughing, fever, sore throat or vomiting.        Allergies:   Review of patient's allergies indicates:   Allergen Reactions    Codeine Itching        Past Medical History:  Past Medical History:   Diagnosis Date    Arthritis     Back injury     Depression     Diabetes mellitus     Diabetes mellitus, type 2     Difficulty swallowing     Fatigue     GERD (gastroesophageal reflux disease)     Hearing difficulty     Hypertension     Irritable bowel syndrome without diarrhea     Liver disease     Loss of appetite     Sleep apnea         Current Medications:    Current Outpatient Medications:     ARIPiprazole (ABILIFY) 2 MG " Tab, Take 1 tablet (2 mg total) by mouth once daily., Disp: 90 tablet, Rfl: 1    ARIPiprazole (ABILIFY) 5 MG Tab, Take 5 mg by mouth., Disp: , Rfl:     benztropine (COGENTIN) 0.5 MG tablet, Take 0.5 mg by mouth once daily., Disp: , Rfl:     carbamide peroxide (DEBROX) 6.5 % otic solution, Place 5 drops into the left ear 2 (two) times daily. X 4 days, Disp: 15 mL, Rfl: 0    chlorpheniramine-phenyleph-DM (ED A-HIST DM) 4-10-10 mg Tab, Take 1 tablet by mouth every 8 (eight) hours as needed (congestion or cough)., Disp: 30 tablet, Rfl: 2    cyclobenzaprine (FLEXERIL) 10 MG tablet, Take 1 tablet (10 mg total) by mouth 3 (three) times daily as needed for Muscle spasms (may cause drowsiness)., Disp: 90 tablet, Rfl: 1    dextromethorphan-triprolidine (ENDAL, DM-TRIPROLIDINE,) 10-1.25 mg/5 mL Soln, Take 5 mLs by mouth every 6 (six) hours as needed (congestion or cough)., Disp: 150 mL, Rfl: 2    DULoxetine (CYMBALTA) 60 MG capsule, Take 1 capsule (60 mg total) by mouth 2 (two) times daily., Disp: 180 capsule, Rfl: 1    fluconazole (DIFLUCAN) 150 MG Tab, Take one tablet by mouth every 72 hours X 3 doses if needed for yeast infection after completion of antibiotics., Disp: 3 tablet, Rfl: 0    gabapentin (NEURONTIN) 800 MG tablet, Take 1 tablet (800 mg total) by mouth 3 (three) times daily., Disp: 270 tablet, Rfl: 1    glipiZIDE (GLUCOTROL) 10 MG tablet, Take 1 tablet (10 mg total) by mouth 2 (two) times daily with meals., Disp: 180 tablet, Rfl: 1    hydroCHLOROthiazide (HYDRODIURIL) 25 MG tablet, Take 1 tablet (25 mg total) by mouth once daily., Disp: 90 tablet, Rfl: 1    hydrOXYzine pamoate (VISTARIL) 25 MG Cap, Take 1 capsule (25 mg total) by mouth 3 (three) times daily., Disp: 270 capsule, Rfl: 1    ibuprofen (ADVIL,MOTRIN) 800 MG tablet, Take 1 tablet (800 mg total) by mouth 3 (three) times daily as needed for Pain., Disp: 30 tablet, Rfl: 1    insulin (LANTUS SOLOSTAR U-100 INSULIN) glargine 100 units/mL SubQ pen, Inject 15  Units into the skin every evening., Disp: 18 mL, Rfl: 1    lisinopriL (PRINIVIL,ZESTRIL) 2.5 MG tablet, Take 1 tablet (2.5 mg total) by mouth once daily., Disp: 90 tablet, Rfl: 1    methocarbamoL (ROBAXIN) 500 MG Tab, Take 500 mg by mouth 3 (three) times daily., Disp: , Rfl:     mometasone (NASONEX) 50 mcg/actuation nasal spray, 2 sprays by Nasal route once daily., Disp: 17 g, Rfl: 3    omeprazole (PRILOSEC) 40 MG capsule, Take 1 capsule (40 mg total) by mouth Daily., Disp: 90 capsule, Rfl: 1    oxyCODONE-acetaminophen (PERCOCET)  mg per tablet, Take 1 tablet by mouth every 6 (six) hours as needed. , Disp: , Rfl:     pravastatin (PRAVACHOL) 20 MG tablet, Take 1 tablet (20 mg total) by mouth every evening., Disp: 90 tablet, Rfl: 1    promethazine (PHENERGAN) 25 MG tablet, Take 1 tablet (25 mg total) by mouth every 6 (six) hours as needed for Nausea., Disp: 30 tablet, Rfl: 0    risperiDONE (RISPERDAL) 3 MG Tab, Take 1 tablet (3 mg total) by mouth once daily., Disp: 90 tablet, Rfl: 1    semaglutide (OZEMPIC) 2 mg/dose (8 mg/3 mL) PnIj, Inject 2 mg into the skin every 7 days., Disp: 3 mL, Rfl: 3    traZODone (DESYREL) 100 MG tablet, Take 1 tablet (100 mg total) by mouth every evening., Disp: 90 tablet, Rfl: 1    meclizine (ANTIVERT) 25 mg tablet, Take 1 tablet (25 mg total) by mouth 3 (three) times daily as needed for Dizziness., Disp: 30 tablet, Rfl: 1       Review of Systems   Constitutional:  Negative for fever.   HENT:  Positive for ear pain. Negative for ear discharge and sore throat.    Respiratory:  Negative for cough and shortness of breath.    Cardiovascular:  Negative for chest pain.   Gastrointestinal:  Positive for nausea. Negative for abdominal pain and vomiting.   Musculoskeletal:  Positive for back pain.   Neurological:  Positive for dizziness.          Objective:    /79 (BP Location: Left arm, Patient Position: Lying, BP Method: Medium (Automatic))   Pulse (!) 111   Temp 98.6 °F (37 °C)  "(Oral)   Resp 17   Ht 5' 4" (1.626 m)   Wt 90.7 kg (200 lb)   LMP 05/02/2024   SpO2 98%   BMI 34.33 kg/m²      Physical Exam  Constitutional:       Appearance: Normal appearance. She is obese.   HENT:      Right Ear: Tympanic membrane is scarred.      Left Ear: There is impacted cerumen. Tympanic membrane is scarred.      Ears:      Comments: +fluid left tm     Mouth/Throat:      Mouth: Mucous membranes are moist.   Eyes:      Extraocular Movements: Extraocular movements intact.   Cardiovascular:      Rate and Rhythm: Normal rate and regular rhythm.      Pulses: Normal pulses.      Heart sounds: Normal heart sounds.   Pulmonary:      Effort: Pulmonary effort is normal.      Breath sounds: Normal breath sounds.   Neurological:      Mental Status: She is alert and oriented to person, place, and time.          Assessment and Plan:    1. Vertigo    2. Otalgia of both ears    3. Impacted cerumen of left ear         Vertigo  -     Ambulatory referral/consult to ENT; Future; Expected date: 07/16/2024  -     meclizine (ANTIVERT) 25 mg tablet; Take 1 tablet (25 mg total) by mouth 3 (three) times daily as needed for Dizziness.  Dispense: 30 tablet; Refill: 1  -stay hydrated    Otalgia of both ears  -     Ambulatory referral/consult to ENT; Future; Expected date: 07/16/2024          There are no Patient Instructions on file for this visit.   Follow up if symptoms worsen or fail to improve.     "

## 2024-07-09 NOTE — PROCEDURES
Ear Cerumen Removal    Date/Time: 7/9/2024 11:30 AM    Performed by: Junie Flower FNP  Authorized by: Junie Flower FNP    Consent Done?:  Yes (Verbal)  Location details:  Left ear  Procedure type: curette    Cerumen  Removal Results:  Cerumen completely removed  Patient tolerance:  Patient tolerated the procedure well with no immediate complications

## 2024-07-12 ENCOUNTER — OFFICE VISIT (OUTPATIENT)
Dept: OTOLARYNGOLOGY | Facility: CLINIC | Age: 49
End: 2024-07-12
Payer: COMMERCIAL

## 2024-07-12 VITALS — HEIGHT: 64 IN | WEIGHT: 199 LBS | BODY MASS INDEX: 33.97 KG/M2

## 2024-07-12 DIAGNOSIS — R42 VERTIGO: ICD-10-CM

## 2024-07-12 DIAGNOSIS — H72.813: Primary | ICD-10-CM

## 2024-07-12 DIAGNOSIS — H92.03 OTALGIA OF BOTH EARS: ICD-10-CM

## 2024-07-12 DIAGNOSIS — H90.2 CONDUCTIVE HEARING LOSS, UNSPECIFIED LATERALITY: ICD-10-CM

## 2024-07-12 PROCEDURE — 1160F RVW MEDS BY RX/DR IN RCRD: CPT | Mod: CPTII,,, | Performed by: OTOLARYNGOLOGY

## 2024-07-12 PROCEDURE — 3046F HEMOGLOBIN A1C LEVEL >9.0%: CPT | Mod: CPTII,,, | Performed by: OTOLARYNGOLOGY

## 2024-07-12 PROCEDURE — 1159F MED LIST DOCD IN RCRD: CPT | Mod: CPTII,,, | Performed by: OTOLARYNGOLOGY

## 2024-07-12 PROCEDURE — 99204 OFFICE O/P NEW MOD 45 MIN: CPT | Mod: S$PBB,,, | Performed by: OTOLARYNGOLOGY

## 2024-07-12 PROCEDURE — 99215 OFFICE O/P EST HI 40 MIN: CPT | Mod: PBBFAC | Performed by: OTOLARYNGOLOGY

## 2024-07-12 PROCEDURE — 99999 PR PBB SHADOW E&M-EST. PATIENT-LVL V: CPT | Mod: PBBFAC,,, | Performed by: OTOLARYNGOLOGY

## 2024-07-12 PROCEDURE — 4010F ACE/ARB THERAPY RXD/TAKEN: CPT | Mod: CPTII,,, | Performed by: OTOLARYNGOLOGY

## 2024-07-12 PROCEDURE — 3008F BODY MASS INDEX DOCD: CPT | Mod: CPTII,,, | Performed by: OTOLARYNGOLOGY

## 2024-07-12 PROCEDURE — 3061F NEG MICROALBUMINURIA REV: CPT | Mod: CPTII,,, | Performed by: OTOLARYNGOLOGY

## 2024-07-12 PROCEDURE — 3066F NEPHROPATHY DOC TX: CPT | Mod: CPTII,,, | Performed by: OTOLARYNGOLOGY

## 2024-07-12 NOTE — PROGRESS NOTES
Subjective:       Patient ID: Georgiana Zayas is a 48 y.o. female.    Chief Complaint: Otalgia (Bilateral ear pain. Pt states left ear is worse than right ear, PCP washed left ear out and got some wax build-up out. )    Otalgia       Review of Systems   HENT:  Positive for ear pain.    All other systems reviewed and are negative.      Objective:      Physical Exam  General: NAD  Head: Normocephalic, atraumatic, no facial asymmetry/normal strength,  Ears: Both auricules normal in appearance, w/o deformities tympanic membranes large perforations  external auditory canals normal  Nose: External nose w/o deformities normal turbinates no drainage or inflammation  Oral Cavity: Lips, gums, floor of mouth, tongue hard palate, and buccal mucosa without mass/lesion  Oropharynx: Mucosa pink and moist, soft palate, posterior pharynx and oropharyngeal wall without mass/lesion  Neck: Supple, symmetric, trachea midline, no palpable mass/lesion, no palpable cervical lymphadenopathy  Skin: Warm and dry, no concerning lesions  Respiratory: Respirations even, unlabored  Assessment:       1. Multiple perforations of tympanic membrane, bilateral    2. Otalgia of both ears    3. Vertigo    4. Conductive hearing loss, unspecified laterality        Plan:       Meclizine   PT for dizziness may need MRI if not improved        MAGO]      ED Course User Index  [MAGO] Tahir Campbell Tahir Campbell PA  01/21/19 1240    "

## 2024-08-05 ENCOUNTER — OFFICE VISIT (OUTPATIENT)
Dept: FAMILY MEDICINE | Facility: CLINIC | Age: 49
End: 2024-08-05
Payer: COMMERCIAL

## 2024-08-05 VITALS
DIASTOLIC BLOOD PRESSURE: 85 MMHG | OXYGEN SATURATION: 98 % | HEIGHT: 64 IN | WEIGHT: 200.63 LBS | HEART RATE: 106 BPM | SYSTOLIC BLOOD PRESSURE: 135 MMHG | BODY MASS INDEX: 34.25 KG/M2 | TEMPERATURE: 98 F | RESPIRATION RATE: 20 BRPM

## 2024-08-05 DIAGNOSIS — E11.40 TYPE 2 DIABETES MELLITUS WITH DIABETIC NEUROPATHY, WITH LONG-TERM CURRENT USE OF INSULIN: Primary | ICD-10-CM

## 2024-08-05 DIAGNOSIS — Z79.4 TYPE 2 DIABETES MELLITUS WITH DIABETIC NEUROPATHY, WITH LONG-TERM CURRENT USE OF INSULIN: Primary | ICD-10-CM

## 2024-08-05 DIAGNOSIS — R11.0 NAUSEA: ICD-10-CM

## 2024-08-05 DIAGNOSIS — Z23 IMMUNIZATION DUE: ICD-10-CM

## 2024-08-05 DIAGNOSIS — E78.49 OTHER HYPERLIPIDEMIA: ICD-10-CM

## 2024-08-05 DIAGNOSIS — N94.6 MENSTRUAL CRAMPS: ICD-10-CM

## 2024-08-05 DIAGNOSIS — M79.2 NERVE PAIN: ICD-10-CM

## 2024-08-05 DIAGNOSIS — M54.40 CHRONIC BILATERAL LOW BACK PAIN WITH SCIATICA, SCIATICA LATERALITY UNSPECIFIED: ICD-10-CM

## 2024-08-05 DIAGNOSIS — G89.29 CHRONIC BILATERAL LOW BACK PAIN WITH SCIATICA, SCIATICA LATERALITY UNSPECIFIED: ICD-10-CM

## 2024-08-05 DIAGNOSIS — I10 ESSENTIAL HYPERTENSION: ICD-10-CM

## 2024-08-05 DIAGNOSIS — G89.29 CHRONIC BILATERAL LOW BACK PAIN WITH RIGHT-SIDED SCIATICA: Chronic | ICD-10-CM

## 2024-08-05 DIAGNOSIS — R42 VERTIGO: ICD-10-CM

## 2024-08-05 DIAGNOSIS — J30.2 SEASONAL ALLERGIES: Chronic | ICD-10-CM

## 2024-08-05 DIAGNOSIS — K21.9 GASTROESOPHAGEAL REFLUX DISEASE WITHOUT ESOPHAGITIS: Chronic | ICD-10-CM

## 2024-08-05 DIAGNOSIS — M54.41 CHRONIC BILATERAL LOW BACK PAIN WITH RIGHT-SIDED SCIATICA: Chronic | ICD-10-CM

## 2024-08-05 DIAGNOSIS — Z12.11 SCREENING FOR MALIGNANT NEOPLASM OF COLON: ICD-10-CM

## 2024-08-05 LAB
ALBUMIN SERPL BCP-MCNC: 3.5 G/DL (ref 3.5–5)
ALBUMIN/GLOB SERPL: 0.9 {RATIO}
ALP SERPL-CCNC: 76 U/L (ref 39–100)
ALT SERPL W P-5'-P-CCNC: 35 U/L (ref 13–56)
ANION GAP SERPL CALCULATED.3IONS-SCNC: 9 MMOL/L (ref 7–16)
AST SERPL W P-5'-P-CCNC: 19 U/L (ref 15–37)
BASOPHILS # BLD AUTO: 0.05 K/UL (ref 0–0.2)
BASOPHILS NFR BLD AUTO: 1 % (ref 0–1)
BILIRUB SERPL-MCNC: 0.3 MG/DL (ref ?–1.2)
BUN SERPL-MCNC: 10 MG/DL (ref 7–18)
BUN/CREAT SERPL: 11 (ref 6–20)
CALCIUM SERPL-MCNC: 9.8 MG/DL (ref 8.5–10.1)
CHLORIDE SERPL-SCNC: 102 MMOL/L (ref 98–107)
CHOLEST SERPL-MCNC: 103 MG/DL (ref 0–200)
CHOLEST/HDLC SERPL: 2.2 {RATIO}
CO2 SERPL-SCNC: 30 MMOL/L (ref 21–32)
CREAT SERPL-MCNC: 0.88 MG/DL (ref 0.55–1.02)
CREAT UR-MCNC: 145 MG/DL (ref 28–219)
DIFFERENTIAL METHOD BLD: ABNORMAL
EGFR (NO RACE VARIABLE) (RUSH/TITUS): 81 ML/MIN/1.73M2
EOSINOPHIL # BLD AUTO: 0.24 K/UL (ref 0–0.5)
EOSINOPHIL NFR BLD AUTO: 4.7 % (ref 1–4)
ERYTHROCYTE [DISTWIDTH] IN BLOOD BY AUTOMATED COUNT: 13.1 % (ref 11.5–14.5)
EST. AVERAGE GLUCOSE BLD GHB EST-MCNC: 203 MG/DL
GLOBULIN SER-MCNC: 4 G/DL (ref 2–4)
GLUCOSE SERPL-MCNC: 253 MG/DL (ref 74–106)
HBA1C MFR BLD HPLC: 8.7 % (ref 4.5–6.6)
HCT VFR BLD AUTO: 38.5 % (ref 38–47)
HDLC SERPL-MCNC: 46 MG/DL (ref 40–60)
HGB BLD-MCNC: 11.9 G/DL (ref 12–16)
IMM GRANULOCYTES # BLD AUTO: 0.01 K/UL (ref 0–0.04)
IMM GRANULOCYTES NFR BLD: 0.2 % (ref 0–0.4)
LDLC SERPL CALC-MCNC: 43 MG/DL
LYMPHOCYTES # BLD AUTO: 2.41 K/UL (ref 1–4.8)
LYMPHOCYTES NFR BLD AUTO: 47.4 % (ref 27–41)
MCH RBC QN AUTO: 27.2 PG (ref 27–31)
MCHC RBC AUTO-ENTMCNC: 30.9 G/DL (ref 32–36)
MCV RBC AUTO: 88.1 FL (ref 80–96)
MICROALBUMIN UR-MCNC: 0.8 MG/DL (ref 0–2.8)
MICROALBUMIN/CREAT RATIO PNL UR: 5.5 MG/G (ref 0–30)
MONOCYTES # BLD AUTO: 0.41 K/UL (ref 0–0.8)
MONOCYTES NFR BLD AUTO: 8.1 % (ref 2–6)
MPC BLD CALC-MCNC: 10.9 FL (ref 9.4–12.4)
NEUTROPHILS # BLD AUTO: 1.96 K/UL (ref 1.8–7.7)
NEUTROPHILS NFR BLD AUTO: 38.6 % (ref 53–65)
NONHDLC SERPL-MCNC: 57 MG/DL
NRBC # BLD AUTO: 0 X10E3/UL
NRBC, AUTO (.00): 0 %
PLATELET # BLD AUTO: 332 K/UL (ref 150–400)
POTASSIUM SERPL-SCNC: 4 MMOL/L (ref 3.5–5.1)
PROT SERPL-MCNC: 7.5 G/DL (ref 6.4–8.2)
RBC # BLD AUTO: 4.37 M/UL (ref 4.2–5.4)
SODIUM SERPL-SCNC: 137 MMOL/L (ref 136–145)
TRIGL SERPL-MCNC: 70 MG/DL (ref 35–150)
VLDLC SERPL-MCNC: 14 MG/DL
WBC # BLD AUTO: 5.08 K/UL (ref 4.5–11)

## 2024-08-05 PROCEDURE — 90471 IMMUNIZATION ADMIN: CPT | Mod: ,,, | Performed by: FAMILY MEDICINE

## 2024-08-05 PROCEDURE — 1159F MED LIST DOCD IN RCRD: CPT | Mod: CPTII,,, | Performed by: FAMILY MEDICINE

## 2024-08-05 PROCEDURE — 3066F NEPHROPATHY DOC TX: CPT | Mod: CPTII,,, | Performed by: FAMILY MEDICINE

## 2024-08-05 PROCEDURE — 4010F ACE/ARB THERAPY RXD/TAKEN: CPT | Mod: CPTII,,, | Performed by: FAMILY MEDICINE

## 2024-08-05 PROCEDURE — 96372 THER/PROPH/DIAG INJ SC/IM: CPT | Mod: 59,,, | Performed by: FAMILY MEDICINE

## 2024-08-05 PROCEDURE — 83036 HEMOGLOBIN GLYCOSYLATED A1C: CPT | Mod: ,,, | Performed by: CLINICAL MEDICAL LABORATORY

## 2024-08-05 PROCEDURE — 3008F BODY MASS INDEX DOCD: CPT | Mod: CPTII,,, | Performed by: FAMILY MEDICINE

## 2024-08-05 PROCEDURE — 90677 PCV20 VACCINE IM: CPT | Mod: ,,, | Performed by: FAMILY MEDICINE

## 2024-08-05 PROCEDURE — 3075F SYST BP GE 130 - 139MM HG: CPT | Mod: CPTII,,, | Performed by: FAMILY MEDICINE

## 2024-08-05 PROCEDURE — 3046F HEMOGLOBIN A1C LEVEL >9.0%: CPT | Mod: CPTII,,, | Performed by: FAMILY MEDICINE

## 2024-08-05 PROCEDURE — 3061F NEG MICROALBUMINURIA REV: CPT | Mod: CPTII,,, | Performed by: FAMILY MEDICINE

## 2024-08-05 PROCEDURE — 3079F DIAST BP 80-89 MM HG: CPT | Mod: CPTII,,, | Performed by: FAMILY MEDICINE

## 2024-08-05 PROCEDURE — 85025 COMPLETE CBC W/AUTO DIFF WBC: CPT | Mod: ,,, | Performed by: CLINICAL MEDICAL LABORATORY

## 2024-08-05 PROCEDURE — 82570 ASSAY OF URINE CREATININE: CPT | Mod: ,,, | Performed by: CLINICAL MEDICAL LABORATORY

## 2024-08-05 PROCEDURE — 99214 OFFICE O/P EST MOD 30 MIN: CPT | Mod: 25,,, | Performed by: FAMILY MEDICINE

## 2024-08-05 PROCEDURE — 80053 COMPREHEN METABOLIC PANEL: CPT | Mod: ,,, | Performed by: CLINICAL MEDICAL LABORATORY

## 2024-08-05 PROCEDURE — 80061 LIPID PANEL: CPT | Mod: ,,, | Performed by: CLINICAL MEDICAL LABORATORY

## 2024-08-05 PROCEDURE — 82043 UR ALBUMIN QUANTITATIVE: CPT | Mod: ,,, | Performed by: CLINICAL MEDICAL LABORATORY

## 2024-08-05 RX ORDER — IBUPROFEN 800 MG/1
800 TABLET ORAL 3 TIMES DAILY PRN
Qty: 30 TABLET | Refills: 1 | Status: SHIPPED | OUTPATIENT
Start: 2024-08-05

## 2024-08-05 RX ORDER — MECLIZINE HYDROCHLORIDE 25 MG/1
25 TABLET ORAL 3 TIMES DAILY PRN
Qty: 30 TABLET | Refills: 1 | Status: SHIPPED | OUTPATIENT
Start: 2024-08-05

## 2024-08-05 RX ORDER — MOMETASONE FUROATE MONOHYDRATE 50 UG/1
2 SPRAY, METERED NASAL DAILY
Qty: 17 G | Refills: 3 | Status: SHIPPED | OUTPATIENT
Start: 2024-08-05

## 2024-08-05 RX ORDER — LISINOPRIL 2.5 MG/1
2.5 TABLET ORAL DAILY
Qty: 90 TABLET | Refills: 1 | Status: SHIPPED | OUTPATIENT
Start: 2024-08-05

## 2024-08-05 RX ORDER — FLUCONAZOLE 150 MG/1
TABLET ORAL
Qty: 3 TABLET | Refills: 0 | Status: CANCELLED | OUTPATIENT
Start: 2024-08-05

## 2024-08-05 RX ORDER — PRAVASTATIN SODIUM 20 MG/1
20 TABLET ORAL NIGHTLY
Qty: 90 TABLET | Refills: 1 | Status: SHIPPED | OUTPATIENT
Start: 2024-08-05

## 2024-08-05 RX ORDER — PROMETHAZINE HYDROCHLORIDE 25 MG/1
25 TABLET ORAL EVERY 6 HOURS PRN
Qty: 30 TABLET | Refills: 1 | Status: SHIPPED | OUTPATIENT
Start: 2024-08-05

## 2024-08-05 RX ORDER — INSULIN GLARGINE 100 [IU]/ML
15 INJECTION, SOLUTION SUBCUTANEOUS NIGHTLY
Qty: 18 ML | Refills: 1 | Status: SHIPPED | OUTPATIENT
Start: 2024-08-05

## 2024-08-05 RX ORDER — OMEPRAZOLE 40 MG/1
40 CAPSULE, DELAYED RELEASE ORAL DAILY
Qty: 90 CAPSULE | Refills: 1 | Status: SHIPPED | OUTPATIENT
Start: 2024-08-05

## 2024-08-05 RX ORDER — GABAPENTIN 800 MG/1
800 TABLET ORAL 3 TIMES DAILY
Qty: 270 TABLET | Refills: 1 | Status: SHIPPED | OUTPATIENT
Start: 2024-08-05

## 2024-08-05 RX ORDER — GLIPIZIDE 10 MG/1
10 TABLET ORAL 2 TIMES DAILY WITH MEALS
Qty: 180 TABLET | Refills: 1 | Status: SHIPPED | OUTPATIENT
Start: 2024-08-05

## 2024-08-05 RX ORDER — DULOXETIN HYDROCHLORIDE 60 MG/1
60 CAPSULE, DELAYED RELEASE ORAL 2 TIMES DAILY
Qty: 180 CAPSULE | Refills: 1 | Status: SHIPPED | OUTPATIENT
Start: 2024-08-05

## 2024-08-05 RX ORDER — SEMAGLUTIDE 2.68 MG/ML
2 INJECTION, SOLUTION SUBCUTANEOUS
Qty: 3 ML | Refills: 3 | Status: SHIPPED | OUTPATIENT
Start: 2024-08-05

## 2024-08-05 RX ORDER — HYDROCHLOROTHIAZIDE 25 MG/1
25 TABLET ORAL DAILY
Qty: 90 TABLET | Refills: 1 | Status: SHIPPED | OUTPATIENT
Start: 2024-08-05

## 2024-08-05 RX ORDER — KETOROLAC TROMETHAMINE 30 MG/ML
30 INJECTION, SOLUTION INTRAMUSCULAR; INTRAVENOUS
Status: COMPLETED | OUTPATIENT
Start: 2024-08-05 | End: 2024-08-05

## 2024-08-05 RX ADMIN — KETOROLAC TROMETHAMINE 30 MG: 30 INJECTION, SOLUTION INTRAMUSCULAR; INTRAVENOUS at 09:08

## 2024-08-06 ENCOUNTER — CLINICAL SUPPORT (OUTPATIENT)
Dept: REHABILITATION | Facility: HOSPITAL | Age: 49
End: 2024-08-06
Payer: COMMERCIAL

## 2024-08-06 DIAGNOSIS — R42 VERTIGO: Primary | ICD-10-CM

## 2024-08-06 PROCEDURE — 97161 PT EVAL LOW COMPLEX 20 MIN: CPT

## 2024-08-09 ENCOUNTER — OFFICE VISIT (OUTPATIENT)
Dept: SPINE | Facility: CLINIC | Age: 49
End: 2024-08-09
Payer: COMMERCIAL

## 2024-08-09 DIAGNOSIS — M51.36 DDD (DEGENERATIVE DISC DISEASE), LUMBAR: ICD-10-CM

## 2024-08-09 DIAGNOSIS — M54.12 CERVICAL RADICULOPATHY: ICD-10-CM

## 2024-08-09 DIAGNOSIS — M50.30 DDD (DEGENERATIVE DISC DISEASE), CERVICAL: ICD-10-CM

## 2024-08-09 DIAGNOSIS — M54.17 LUMBOSACRAL RADICULOPATHY: Primary | ICD-10-CM

## 2024-08-09 PROCEDURE — 99212 OFFICE O/P EST SF 10 MIN: CPT | Mod: PBBFAC | Performed by: ORTHOPAEDIC SURGERY

## 2024-08-09 PROCEDURE — 3061F NEG MICROALBUMINURIA REV: CPT | Mod: CPTII,,, | Performed by: ORTHOPAEDIC SURGERY

## 2024-08-09 PROCEDURE — 3066F NEPHROPATHY DOC TX: CPT | Mod: CPTII,,, | Performed by: ORTHOPAEDIC SURGERY

## 2024-08-09 PROCEDURE — 4010F ACE/ARB THERAPY RXD/TAKEN: CPT | Mod: CPTII,,, | Performed by: ORTHOPAEDIC SURGERY

## 2024-08-09 PROCEDURE — 3052F HG A1C>EQUAL 8.0%<EQUAL 9.0%: CPT | Mod: CPTII,,, | Performed by: ORTHOPAEDIC SURGERY

## 2024-08-09 PROCEDURE — 99999 PR PBB SHADOW E&M-EST. PATIENT-LVL II: CPT | Mod: PBBFAC,,, | Performed by: ORTHOPAEDIC SURGERY

## 2024-08-09 PROCEDURE — 99214 OFFICE O/P EST MOD 30 MIN: CPT | Mod: S$PBB,,, | Performed by: ORTHOPAEDIC SURGERY

## 2024-08-13 ENCOUNTER — CLINICAL SUPPORT (OUTPATIENT)
Dept: REHABILITATION | Facility: HOSPITAL | Age: 49
End: 2024-08-13
Payer: COMMERCIAL

## 2024-08-13 DIAGNOSIS — R42 VERTIGO: Primary | ICD-10-CM

## 2024-08-13 PROCEDURE — 97110 THERAPEUTIC EXERCISES: CPT

## 2024-08-13 NOTE — PROGRESS NOTES
[facility]   Physical Therapy Treatment Note      Name: Georgiana Zayas  Clinic Number: [id number]    Therapy Diagnosis:   Encounter Diagnosis   Name Primary?    Vertigo Yes     Physician: Rambo Canchola MD    Visit Date: 8/13/2024    Physician Orders: PT Eval and Treat   Medical Diagnosis from Referral: Vertigo  Evaluation Date: 8/6/2024  Authorization Period Expiration: pending approval  Plan of Care Expiration: 9/13/24  Progress Note Due: 9/13/24  Visit # / Visits authorized: pending 10  FOTO: 54/100    PTA Visit #: 0/5     Time In: 802  Time Out: 830  Total Billable Time: 28 minutes    Subjective     Pt reports: no dizziness but she fell the other day hurting her left leg.  She  will be  compliant with home exercise program.  Response to previous treatment: evaluation  Functional change: she fell and strained a muscle in her left leg.    Pain: 5/10  Location: left thigh      Objective      Objective Measures updated at progress report unless specified.     Treatment     Georgiana received the treatments listed below:      therapeutic exercises to develop strength, ROM, flexibility, and reduce dizziness for 28 minutes including:  Epley maneuver to right and left  Cervical levator and upper trap stretch 5 x 10 second hold  Cervical rotation right and left 1 x 10  Scapular retraction and shoulder extension red band 2 x 10      Patient Education and Home Exercises       Education provided:   -   Georgiana received verbal and tactile cues to facilitate proper execution of exercises and body mechanics.    Written Home Exercises Provided: yes. Exercises were reviewed and Georgiana was able to demonstrate them prior to the end of the session.  Georgiana demonstrated good  understanding of the education provided. See EMR under Patient Instructions for exercises provided during therapy sessions    Assessment     Patient presents today with no complaints of dizziness. She was instructed in Epley Maneuver  so she can perform at home in case of future dizziness episodes.    Georgiana Is progressing well towards her goals.   Pt prognosis is Good.     Pt will continue to benefit from skilled outpatient physical therapy to address the deficits listed in the problem list box on initial evaluation, provide pt/family education and to maximize pt's level of independence in the home and community environment.     Pt's spiritual, cultural and educational needs considered and pt agreeable to plan of care and goals.     Anticipated barriers to physical therapy: none    Goals:   Short Term Goals: 3 weeks   Patient will be independent with home exercise program  Patient will report less than 2 episodes of dizziness per week.     Long Term Goals: 5 weeks   Patient will improve cervical active range of motion by 20 degrees to improve functional mobility.  Patient will resolve dizziness symptoms.    Plan     Plan of care Certification: 8/6/2024 to 9/13/24.     Outpatient Physical Therapy 2 times weekly for 5 weeks to include the following interventions: Cervical/Lumbar Traction, Electrical Stimulation IFC, Manual Therapy, Moist Heat/ Ice, Neuromuscular Re-ed, Patient Education, Therapeutic Exercise, and Ultrasound.     Win Nieves, PT

## 2024-08-15 ENCOUNTER — CLINICAL SUPPORT (OUTPATIENT)
Dept: REHABILITATION | Facility: HOSPITAL | Age: 49
End: 2024-08-15
Payer: COMMERCIAL

## 2024-08-15 DIAGNOSIS — R42 VERTIGO: Primary | ICD-10-CM

## 2024-08-15 PROCEDURE — 97110 THERAPEUTIC EXERCISES: CPT

## 2024-08-15 NOTE — PLAN OF CARE
[facility]  Physical Therapy Discharge Note    Name: Georgiana Zayas  Clinic Number: [id number]    Therapy Diagnosis:   Encounter Diagnosis   Name Primary?    Vertigo Yes     Physician: Rambo Canchola MD    Visit Date: 8/15/2024     Physician Orders: PT Eval and Treat   Medical Diagnosis from Referral: Vertigo  Evaluation Date: 8/6/2024  Authorization Period Expiration: 8/6/24 to 10/6/24  Plan of Care Expiration: 9/13/24  Progress Note Due: 9/13/24  Visit # / Visits authorized: 3/10  FOTO: 54/100    Objective       Range of Motion/Strength:      CERVICAL AROM Pain/Dysfunction with Movement   Flexion 40     Extension 40     Right side bending 25     Left side bending 35     Right rotation 57 mild   Left rotation 42 mild        ASSESSMENT      Patient presents today with no complaints of dizziness. She is now asymptomatic from dizziness and has met all her STG's and LTG's. She will discharge from present therapy and she will return next week for evaluation of her back pain per order from Dr. Maciel.     Discharge reason: Patient has met all of his/her goals    Discharge FOTO Score: 56    Goals:   Short Term Goals: 3 weeks   Patient will be independent with home exercise program. Met  Patient will report less than 2 episodes of dizziness per week. Met     Long Term Goals: 5 weeks   Patient will improve cervical active range of motion by 20 degrees to improve functional mobility. Met  Patient will resolve dizziness symptoms. Met       PLAN   This patient is discharged from Physical Therapy      Win Nieves, PT

## 2024-08-15 NOTE — PROGRESS NOTES
[facility]   Physical Therapy Treatment Note      Name: Georgiana Zayas  Clinic Number: [id number]    Therapy Diagnosis:   Encounter Diagnosis   Name Primary?    Vertigo Yes     Physician: Rambo Canchola MD    Visit Date: 8/15/2024    Physician Orders: PT Eval and Treat   Medical Diagnosis from Referral: Vertigo  Evaluation Date: 8/6/2024  Authorization Period Expiration: 8/6/24 to 10/6/24  Plan of Care Expiration: 9/13/24  Progress Note Due: 9/13/24  Visit # / Visits authorized: 3/10  FOTO: 54/100    PTA Visit #: 0/5     Time In: 810  Time Out: 833  Total Billable Time: 23 minutes    Subjective     Pt reports: no dizziness for several days now. My back is hurting though since I fell the other day.  She  is  compliant with home exercise program.  Response to previous treatment: good  Functional change: back pain.    Pain: 8/10  Location: low back     Objective      Range of Motion/Strength:      CERVICAL AROM Pain/Dysfunction with Movement   Flexion 40     Extension 40    Right side bending 25     Left side bending 35     Right rotation 57 mild   Left rotation 42 mild        Treatment     Georgiana received the treatments listed below:      therapeutic exercises to develop strength, ROM, flexibility, and reduce dizziness for 23 minutes including:  Epley maneuver to right and left  Cervical levator and upper trap stretch 5 x 10 second hold  Cervical rotation right and left 1 x 10  Scapular retraction and shoulder extension red band 2 x 10      Patient Education and Home Exercises       Education provided:   -   Georgiana received verbal and tactile cues to facilitate proper execution of exercises and body mechanics. She also received education on how to properly perform the Apley Maneuver.     Written Home Exercises Provided: Patient instructed to cont prior HEP.     Assessment     Patient presents today with no complaints of dizziness. She is now asymptomatic from dizziness and has met all  her STG's and LTG's. She will discharge from present therapy and she will return next week for evaluation of her back pain per order from Dr. Maciel.    Georgiana Is progressing well towards her goals.   Pt prognosis is Good.     Pt's spiritual, cultural and educational needs considered and pt agreeable to plan of care and goals.     Anticipated barriers to physical therapy: none    Goals:   Short Term Goals: 3 weeks   Patient will be independent with home exercise program. Met  Patient will report less than 2 episodes of dizziness per week. Met     Long Term Goals: 5 weeks   Patient will improve cervical active range of motion by 20 degrees to improve functional mobility. Met  Patient will resolve dizziness symptoms. Met    Plan     Discharge therapy services.    Win Nieves, PT

## 2024-08-19 DIAGNOSIS — Z12.11 COLON CANCER SCREENING: Primary | ICD-10-CM

## 2024-08-19 RX ORDER — POLYETHYLENE GLYCOL 3350, SODIUM SULFATE ANHYDROUS, SODIUM BICARBONATE, SODIUM CHLORIDE, POTASSIUM CHLORIDE 236; 22.74; 6.74; 5.86; 2.97 G/4L; G/4L; G/4L; G/4L; G/4L
4 POWDER, FOR SOLUTION ORAL ONCE
Qty: 4000 ML | Refills: 0 | Status: SHIPPED | OUTPATIENT
Start: 2024-08-19 | End: 2024-08-19

## 2024-08-20 ENCOUNTER — CLINICAL SUPPORT (OUTPATIENT)
Dept: REHABILITATION | Facility: HOSPITAL | Age: 49
End: 2024-08-20
Payer: COMMERCIAL

## 2024-08-20 DIAGNOSIS — M54.12 CERVICAL RADICULOPATHY: ICD-10-CM

## 2024-08-20 DIAGNOSIS — M54.17 LUMBOSACRAL RADICULOPATHY: Primary | ICD-10-CM

## 2024-08-20 PROCEDURE — 97162 PT EVAL MOD COMPLEX 30 MIN: CPT

## 2024-08-20 NOTE — PLAN OF CARE
[facility]   Physical Therapy Initial Evaluation     Date: 8/20/2024   Name: Georgiana Zayas  Clinic Number: [id number]    Therapy Diagnosis:   Encounter Diagnoses   Name Primary?    Lumbosacral radiculopathy Yes    Cervical radiculopathy      Physician: Ari Maciel MD     Physician Orders: PT Eval and Treat   2-3 times per week for 6 week s  Medical Diagnosis from Referral: M54.17 (ICD-10-CM) - Lumbosacral radiculopathy  M54.12 (ICD-10-CM) - Cervical radiculopathy  Evaluation Date: 8/20/2024  Authorization Period Expiration: pending approval  Plan of Care Expiration: 10/4/24  Progress Note Due: 10/4/24  Visit # / Visits authorized: pending approval   FOTO: 32/100    Precautions: Standard     Time In: 850  Time Out: 1015  Total Appointment Time (timed & untimed codes): 25 minutes      SUBJECTIVE     Date of onset: none    History of current condition - Georgiana reports: she has had chronic back pain for years that always bothers her on a daily basis. She reports she also had a fall about a couple of weeks ago that made her back pain worse. She states it is in her low back that radiates down her right leg.    Falls: yes    Imaging, bone scan films/MRI: IMAGING:  X-rays lumbar spine reviewed show:  On the AP there is normal coronal alignment.  There are 5 non-rib-bearing lumbar vertebrae.  On the lateral there is decreased lumbar lordosis.  There is spondylotic disease with decreased disc height and osteophyte formation noted.  No fractures or listhesis noted.  No instability on flexion-extension views.     MRI lumbar spine 02/27/2024 reviewed shows:   At L3-4 there is moderate bilateral foraminal stenosis  At L4-5 there is moderate bilateral foraminal stenosis   At L5-S1 there is moderate bilateral foraminal stenosis       Prior Therapy: yes  Social History:    Occupation: none  Prior Level of Function: independent  Current Level of Function: independent    Pain:  Current 8/10, worst  10/10, best 6/10   Location: right back  right leg  Description: Aching, Dull, and Sharp  Aggravating Factors: Sitting, Standing, and Bending  Easing Factors: pain medication and heating pad    Patients goals: to see if therapy will help my back pain     Medical History:   Past Medical History:   Diagnosis Date    Arthritis     Back injury     Depression     Diabetes mellitus     Diabetes mellitus, type 2     Difficulty swallowing     Fatigue     GERD (gastroesophageal reflux disease)     Hearing difficulty     Hypertension     Irritable bowel syndrome without diarrhea     Liver disease     Loss of appetite     Sleep apnea        Surgical History:   Georgiana Zayas  has a past surgical history that includes Tonsillectomy;  section; Carpal tunnel release (Bilateral); Stapedes surgery (Bilateral); Injection of anesthetic agent around medial branch nerves innervating lumbar facet joint (Bilateral, 2021); Left heart catheterization (Left, 2021); Injection of anesthetic agent around medial branch nerves innervating lumbar facet joint (Bilateral, 2022); Injection of anesthetic agent around medial branch nerves innervating lumbar facet joint (Bilateral, 2023); and Radiofrequency ablation of lumbar medial branch nerve at single level (Bilateral, 2023).    Medications:   Georgiana has a current medication list which includes the following prescription(s): aripiprazole, aripiprazole, benztropine, carbamide peroxide, ed a-hist dm, cyclobenzaprine, endal (dm-triprolidine), duloxetine, fluconazole, gabapentin, glipizide, hydrochlorothiazide, hydroxyzine pamoate, ibuprofen, lantus solostar u-100 insulin, lisinopril, meclizine, methocarbamol, mometasone, omeprazole, oxycodone-acetaminophen, pravastatin, promethazine, risperidone, ozempic, and trazodone.    Allergies:   Review of patient's allergies indicates:   Allergen Reactions    Codeine Itching            Objective     Posture Alignment: slouched  posture; forward posture, rounded shoulders  Palpation: tender lumbar, sacral, right hip    Special Tests:   Right  Left    SLR test < 60 degrees Negative Negative   SLR test > 60 degrees Negative Negative   Piriformis test Negative Negative   LUIS F test Positive Positive   SI distraction Negative Negative   SI compression Negative Negative   Slump test negative negative     LOWER EXTREMITY STRENGTH:   Left Right   Quadriceps 5/5 5/5   Hamstrings 4-/5 4-/5     Iliopsoas 3-/5 3-/5   Glute Med 2+/5 2+/5   Hip IR 3-/5 3-/5   Hip ER 3-/5 3-/5   Hip Ext 2+ 2+   Ankle DF 3+/5 3+/5   Ankle PF 3-/5 3-/5     Flexibility:    Right Left   HAMSTRING -20 -24        TRUNK STRENGTH:  Flexion 2+   Extension 3-/5   Left Rotation 3-/5   Right Rotation 3-/5         LUMBAR SPINE AROM:   Flexion: 40   Extension: 10   Left Sidebend: 15   Right Sidebend: 15   Left Rotation: 25   Right Rotation: 35     REPEATED MOTIONS - 10x  Flexion: pain   Extension: pain     DTR's:   Left Right   Patella Tendon 2+ 2+   Achilles Tendon 2+ 2+     Dermatomes: Sensation: Light Touch: Intact  Saddle Sensation: Intact  Myotomes: Intact      GAIT: ambulates with no assistive device with independently.     GAIT DEVIATIONS: displays decreased step length    Pt/family was provided educational information, including: role of PT, goals for PT, scheduling - pt verbalized understanding. Discussed insurance limitations with pt.       Limitation/Restriction for FOTO Lumbar Survey    Therapist reviewed FOTO scores for Georgiana aZyas on 8/20/2024.   FOTO documents entered into [employer] - see Media section.    Limitation Score: 32%               PATIENT EDUCATION AND HOME EXERCISES     Education provided:   - evaluation results discussed with patient.    Written Home Exercises Provided:  to be completed once approved for visits .     ASSESSMENT     Georgiana is a 48 y.o. female referred to outpatient Physical Therapy with a medical diagnosis of Lumbosacral radiculopathy.  Patient presents with low back pain with pain radiating to right LE, decreased back and hip ROM, muscle weakness, decreased posture and limited functional mobility due to pain.    Patient prognosis is Good.   Patient will benefit from skilled outpatient Physical Therapy to address the deficits stated above and in the chart below, provide patient /family education, and to maximize patientt's level of independence.     Plan of care discussed with patient: Yes  Patient's spiritual, cultural and educational needs considered and patient is agreeable to the plan of care and goals as stated below:     Anticipated Barriers for therapy: none    Medical Necessity is demonstrated by the following  History  Co-morbidities and personal factors that may impact the plan of care Co-morbidities:   Past Medical History:   Diagnosis Date    Arthritis     Back injury     Depression     Diabetes mellitus     Diabetes mellitus, type 2     Difficulty swallowing     Fatigue     GERD (gastroesophageal reflux disease)     Hearing difficulty     Hypertension     Irritable bowel syndrome without diarrhea     Liver disease     Loss of appetite     Sleep apnea        Personal Factors:   no deficits     moderate   Examination  Body Structures and Functions, activity limitations and participation restrictions that may impact the plan of care Body Regions:   back  lower extremities  trunk    Body Systems:    gross symmetry  ROM  strength  gait    Participation Restrictions:   none    Activity limitations:   Learning and applying knowledge  no deficits    General Tasks and Commands  no deficits    Communication  no deficits    Mobility  lifting and carrying objects  walking    Self care  no deficits    Domestic Life  no deficits    Interactions/Relationships  no deficits    Life Areas  no deficits    Community and Social Life  no deficits         moderate   Clinical Presentation evolving clinical presentation with changing clinical characteristics  moderate   Decision Making/ Complexity Score: moderate       Short Term GOALS: 3 weeks. Pt agrees with goals set.  1. Patient demonstrates independence with HEP.   2. Patient demonstrates independence with Postural Awareness and  body mechanics.   3. Patient will report pain of 5/10 at worst, on 0-10 pain scale, with all daily activities.  4. Patient increase bilateral hamstring flexibility by 5 degrees to improve tolerance to functional activities with reduced pain.   5. Patient demonstrates increased exercise tolerance to 25 minutes or greater to improve tolerance to functional activities and community mobility with pain no greater than 5/10.       Long Term GOALS: 6 weeks. Pt agrees with goals set.  1. Patient demonstrates increased strength BLE's to 3+/5 or greater to improve tolerance to functional activities.   2. Patient demonstrates improved overall function per FOTO Lumbar Survey Discharge Score to 40 or greater.   3. Patient will increase trunk/spine ROM by 10 degrees to improve functional mobility during work activities and ADL's.  4. Patient will increase core muscle strength flexion and extension to 3-/5 to improve functional mobility.  5. Patient will report pain of 3/10 at worst, on 0-10 pain scale, with all daily activities to improve quality of life.      PLAN   Plan of care Certification: 8/20/2024 to 10/4/24.    Outpatient Physical Therapy 2 times weekly for 6 weeks to include the following interventions: Electrical Stimulation IFC, Manual Therapy, Moist Heat/ Ice, Neuromuscular Re-ed, Patient Education, Therapeutic Activities, Therapeutic Exercise, and Ultrasound.     Win Nieves, PT      I CERTIFY THE NEED FOR THESE SERVICES FURNISHED UNDER THIS PLAN OF TREATMENT AND WHILE UNDER MY CARE   Physician's comments:     Physician's Signature: ___________________________________________________

## 2024-08-22 ENCOUNTER — CLINICAL SUPPORT (OUTPATIENT)
Dept: REHABILITATION | Facility: HOSPITAL | Age: 49
End: 2024-08-22
Payer: COMMERCIAL

## 2024-08-22 DIAGNOSIS — M54.17 LUMBOSACRAL RADICULOPATHY: Primary | ICD-10-CM

## 2024-08-22 PROCEDURE — 97014 ELECTRIC STIMULATION THERAPY: CPT

## 2024-08-22 PROCEDURE — 97110 THERAPEUTIC EXERCISES: CPT

## 2024-08-22 NOTE — PROGRESS NOTES
[facility]   Physical Therapy Treatment Note      Name: Georgiana Zayas  Clinic Number: [id number]    Therapy Diagnosis:   Encounter Diagnosis   Name Primary?    Lumbosacral radiculopathy Yes     Physician: Rambo Canchola MD    Visit Date: 8/22/2024    Physician Orders: PT Eval and Treat   2-3 times per week for 6 week s  Medical Diagnosis from Referral: M54.17 (ICD-10-CM) - Lumbosacral radiculopathy  M54.12 (ICD-10-CM) - Cervical radiculopathy  Evaluation Date: 8/20/2024  Authorization Period Expiration: 10/20/24  Plan of Care Expiration: 10/4/24  Progress Note Due: 10/4/24  Visit # / Visits authorized: 2/13  FOTO: 32/100     Precautions: Standard     PTA Visit #: 0/5     Time In: 803  Time Out: 846  Total Billable Time: 43 minutes    Subjective     Pt reports: back pain and clicking when I walk.  She  will be  compliant with home exercise program.  Response to previous treatment: evaluation  Functional change: none    Pain: 7/10  Location: right low back      Objective      Objective Measures updated at progress report unless specified.     Treatment     Georgiana received the treatments listed below:      therapeutic exercises to bilateral LE's and core to develop strength, endurance, ROM, flexibility, and core stabilization for 28 minutes including:  Scifit bike 5 minutes level 2  Pelvic tilts, bridging 1 x 10  SLR, side abduction, clam shells 1 x 10  Supine SKTC stretch and piriformis stretch 10 x 10 sec    supervised modalities after being cleared for contradictions: IFC Electrical Stimulation:  Georgiana received IFC Electrical Stimulation for pain control applied to the lumbosacral concurrently with moist heat (appropriate towel layers). Pt received stimulation at 100 % scan at a frequency of 80/150 Hz at 24 cV for 15 minutes. Georgiana tolderated treatment well without any adverse effects.          Patient Education and Home Exercises       Education provided:   -   Georgiana received  verbal and tactile cues to facilitate proper execution of exercises and body mechanics.    Written Home Exercises Provided: yes. Exercises were reviewed and Georgiana was able to demonstrate them prior to the end of the session.  Georgiana demonstrated good  understanding of the education provided. See EMR under Patient Instructions for exercises provided during therapy sessions    Assessment     Georgiana presents today with continued low back pain and discomfort in back when she walks. Today she began her core and hip strengthening and stretching exercises which she tolerated well. She reported decreased back pain to 5-6/10 following her treatment.    Georgiana Is progressing well towards her goals.   Pt prognosis is Good.     Pt will continue to benefit from skilled outpatient physical therapy to address the deficits listed in the problem list box on initial evaluation, provide pt/family education and to maximize pt's level of independence in the home and community environment.     Pt's spiritual, cultural and educational needs considered and pt agreeable to plan of care and goals.     Anticipated barriers to physical therapy: none    Goals:   Short Term GOALS: 3 weeks. Pt agrees with goals set.  1. Patient demonstrates independence with HEP.   2. Patient demonstrates independence with Postural Awareness and  body mechanics.   3. Patient will report pain of 5/10 at worst, on 0-10 pain scale, with all daily activities.  4. Patient increase bilateral hamstring flexibility by 5 degrees to improve tolerance to functional activities with reduced pain.   5. Patient demonstrates increased exercise tolerance to 25 minutes or greater to improve tolerance to functional activities and community mobility with pain no greater than 5/10.         Long Term GOALS: 6 weeks. Pt agrees with goals set.  1. Patient demonstrates increased strength BLE's to 3+/5 or greater to improve tolerance to functional activities.   2. Patient demonstrates  improved overall function per FOTO Lumbar Survey Discharge Score to 40 or greater.   3. Patient will increase trunk/spine ROM by 10 degrees to improve functional mobility during work activities and ADL's.  4. Patient will increase core muscle strength flexion and extension to 3-/5 to improve functional mobility.  5. Patient will report pain of 3/10 at worst, on 0-10 pain scale, with all daily activities to improve quality of life.    Plan     Plan of care Certification: 8/20/2024 to 10/4/24.     Outpatient Physical Therapy 2 times weekly for 6 weeks to include the following interventions: Electrical Stimulation IFC, Manual Therapy, Moist Heat/ Ice, Neuromuscular Re-ed, Patient Education, Therapeutic Activities, Therapeutic Exercise, and Ultrasound.     Win Nieves, PT

## 2024-08-27 ENCOUNTER — CLINICAL SUPPORT (OUTPATIENT)
Dept: REHABILITATION | Facility: HOSPITAL | Age: 49
End: 2024-08-27
Payer: COMMERCIAL

## 2024-08-27 DIAGNOSIS — M54.17 LUMBOSACRAL RADICULOPATHY: Primary | ICD-10-CM

## 2024-08-27 PROCEDURE — 97014 ELECTRIC STIMULATION THERAPY: CPT | Mod: CQ

## 2024-08-27 PROCEDURE — 97110 THERAPEUTIC EXERCISES: CPT | Mod: CQ

## 2024-08-27 NOTE — PROGRESS NOTES
[facility]   Physical Therapy Treatment Note      Name: Georgiana Zayas  Clinic Number: [id number]    Therapy Diagnosis:   Encounter Diagnosis   Name Primary?    Lumbosacral radiculopathy Yes     Physician: Ari Maciel MD    Visit Date: 8/27/2024    Physician Orders: PT Eval and Treat   2-3 times per week for 6 week s  Medical Diagnosis from Referral: M54.17 (ICD-10-CM) - Lumbosacral radiculopathy  M54.12 (ICD-10-CM) - Cervical radiculopathy  Evaluation Date: 8/20/2024  Authorization Period Expiration: 10/20/24  Plan of Care Expiration: 10/4/24  Progress Note Due: 10/4/24  Visit # / Visits authorized: 3/13  FOTO: 32/100     Precautions: Standard     PTA Visit #: 1/5     Time In: 812  Time Out: 842  Total Billable Time: 30 minutes    Subjective     Pt reports: low back pain  She  is somewhat  compliant with home exercise program.  Response to previous treatment: evaluation  Functional change: none    Pain: 8/10  Location: right low back      Objective      Objective Measures updated at progress report unless specified.     Treatment     Georgiana received the treatments listed below:      therapeutic exercises to bilateral LE's and core to develop strength, endurance, ROM, flexibility, and core stabilization for 20 minutes including:  Scifit bike 5 minutes level 2  Pelvic tilts, bridging 2 x 10  SLR, side abduction, clam shells 1 x 10  Supine SKTC stretch and piriformis stretch 10 x 10 sec - not today     supervised modalities after being cleared for contradictions: IFC Electrical Stimulation:  Georgiana received IFC Electrical Stimulation for pain control applied to the lumbosacral concurrently with moist heat (appropriate towel layers). Pt received stimulation at 100 % scan at a frequency of 80/150 Hz at 24 cV for 10 minutes. Georgiana tolderated treatment well without any adverse effects.          Patient Education and Home Exercises       Education provided:   -   Georgiana received verbal  and tactile cues to facilitate proper execution of exercises and body mechanics.    Written Home Exercises Provided: yes. Exercises were reviewed and Georgiana was able to demonstrate them prior to the end of the session.  Georgiana demonstrated good  understanding of the education provided. See EMR under Patient Instructions for exercises provided during therapy sessions    Assessment   Georgiana presents today with moderate/severe low back pain. She tolerated treatment okay today with some pain performing supine exercises. She states she does her HEP when she feels like it, but sometimes she does not feel like it.     Georgiana Is progressing well towards her goals.   Pt prognosis is Good.     Pt will continue to benefit from skilled outpatient physical therapy to address the deficits listed in the problem list box on initial evaluation, provide pt/family education and to maximize pt's level of independence in the home and community environment.     Pt's spiritual, cultural and educational needs considered and pt agreeable to plan of care and goals.     Anticipated barriers to physical therapy: none    Goals:   Short Term GOALS: 3 weeks. Pt agrees with goals set.  1. Patient demonstrates independence with HEP.   2. Patient demonstrates independence with Postural Awareness and  body mechanics.   3. Patient will report pain of 5/10 at worst, on 0-10 pain scale, with all daily activities.  4. Patient increase bilateral hamstring flexibility by 5 degrees to improve tolerance to functional activities with reduced pain.   5. Patient demonstrates increased exercise tolerance to 25 minutes or greater to improve tolerance to functional activities and community mobility with pain no greater than 5/10.         Long Term GOALS: 6 weeks. Pt agrees with goals set.  1. Patient demonstrates increased strength BLE's to 3+/5 or greater to improve tolerance to functional activities.   2. Patient demonstrates improved overall function per FOTO  Lumbar Survey Discharge Score to 40 or greater.   3. Patient will increase trunk/spine ROM by 10 degrees to improve functional mobility during work activities and ADL's.  4. Patient will increase core muscle strength flexion and extension to 3-/5 to improve functional mobility.  5. Patient will report pain of 3/10 at worst, on 0-10 pain scale, with all daily activities to improve quality of life.    Plan     Plan of care Certification: 8/20/2024 to 10/4/24.     Outpatient Physical Therapy 2 times weekly for 6 weeks to include the following interventions: Electrical Stimulation IFC, Manual Therapy, Moist Heat/ Ice, Neuromuscular Re-ed, Patient Education, Therapeutic Activities, Therapeutic Exercise, and Ultrasound.     Natasha De Luna, PTA

## 2024-09-03 ENCOUNTER — CLINICAL SUPPORT (OUTPATIENT)
Dept: REHABILITATION | Facility: HOSPITAL | Age: 49
End: 2024-09-03
Payer: COMMERCIAL

## 2024-09-03 DIAGNOSIS — M54.17 LUMBOSACRAL RADICULOPATHY: Primary | ICD-10-CM

## 2024-09-03 PROCEDURE — 97110 THERAPEUTIC EXERCISES: CPT

## 2024-09-03 PROCEDURE — 97014 ELECTRIC STIMULATION THERAPY: CPT

## 2024-09-03 NOTE — PROGRESS NOTES
[facility]   Physical Therapy Treatment Note      Name: Georgiana Zayas  Clinic Number: [id number]    Therapy Diagnosis:   Encounter Diagnosis   Name Primary?    Lumbosacral radiculopathy Yes     Physician: Ari Maciel MD    Visit Date: 9/3/2024    Physician Orders: PT Eval and Treat   2-3 times per week for 6 week s  Medical Diagnosis from Referral: M54.17 (ICD-10-CM) - Lumbosacral radiculopathy  M54.12 (ICD-10-CM) - Cervical radiculopathy  Evaluation Date: 8/20/2024  Authorization Period Expiration: 10/20/24  Plan of Care Expiration: 10/4/24  Progress Note Due: 10/4/24  Visit # / Visits authorized: 4/13  FOTO: 32/100     Precautions: Standard     PTA Visit #: 0/5     Time In: 804  Time Out: 844  Total Billable Time: 40 minutes    Subjective     Pt reports: my back really hurts driving over the speed bumps in the road.  She  is somewhat  compliant with home exercise program.  Response to previous treatment: good  Functional change: increased low back pain    Pain: 9/10  Location: bilateral low back      Objective      Objective Measures updated at progress report unless specified.     Treatment     Georgiana received the treatments listed below:      therapeutic exercises to bilateral LE's and core to develop strength, endurance, ROM, flexibility, and core stabilization for 25 minutes including:  Not today - Scifit bike 5 minutes level 2  Mini crunch, bridging 2 x 10  SLR, side abduction, clam shells 1 x 10  Supine SKTC stretch and piriformis stretch 10 x 10 sec    supervised modalities after being cleared for contradictions: IFC Electrical Stimulation:  Georgiana received IFC Electrical Stimulation for pain control applied to the lumbosacral concurrently with moist heat (appropriate towel layers). Pt received stimulation at 100 % scan at a frequency of 80/150 Hz at 19.5 cV for 15 minutes. Georgiana tolderated treatment well without any adverse effects.          Patient Education and Home  Exercises       Education provided:   -   Goergiana received verbal and tactile cues to facilitate proper execution of exercises and body mechanics.    Written Home Exercises Provided: Patient instructed to cont prior HEP.   Assessment   Georgiana presents today with severe low back pain. She tolerated treatment fair today with some pain performing supine exercises. She reports that pain increases when she drives and hits the speed bumps. She felt some mild relief following her IFC and moist heat.    Georgiana Is progressing fair towards her goals.   Pt prognosis is Fair.     Pt will continue to benefit from skilled outpatient physical therapy to address the deficits listed in the problem list box on initial evaluation, provide pt/family education and to maximize pt's level of independence in the home and community environment.     Pt's spiritual, cultural and educational needs considered and pt agreeable to plan of care and goals.     Anticipated barriers to physical therapy: none    Goals:   Short Term GOALS: 3 weeks. Pt agrees with goals set.  1. Patient demonstrates independence with HEP.   2. Patient demonstrates independence with Postural Awareness and  body mechanics.   3. Patient will report pain of 5/10 at worst, on 0-10 pain scale, with all daily activities.  4. Patient increase bilateral hamstring flexibility by 5 degrees to improve tolerance to functional activities with reduced pain.   5. Patient demonstrates increased exercise tolerance to 25 minutes or greater to improve tolerance to functional activities and community mobility with pain no greater than 5/10.         Long Term GOALS: 6 weeks. Pt agrees with goals set.  1. Patient demonstrates increased strength BLE's to 3+/5 or greater to improve tolerance to functional activities.   2. Patient demonstrates improved overall function per FOTO Lumbar Survey Discharge Score to 40 or greater.   3. Patient will increase trunk/spine ROM by 10 degrees to improve  functional mobility during work activities and ADL's.  4. Patient will increase core muscle strength flexion and extension to 3-/5 to improve functional mobility.  5. Patient will report pain of 3/10 at worst, on 0-10 pain scale, with all daily activities to improve quality of life.    Plan     Plan of care Certification: 8/20/2024 to 10/4/24.     Outpatient Physical Therapy 2 times weekly for 6 weeks to include the following interventions: Electrical Stimulation IFC, Manual Therapy, Moist Heat/ Ice, Neuromuscular Re-ed, Patient Education, Therapeutic Activities, Therapeutic Exercise, and Ultrasound.     Win Nieves, PT

## 2024-09-05 ENCOUNTER — PATIENT MESSAGE (OUTPATIENT)
Dept: ADMINISTRATIVE | Facility: HOSPITAL | Age: 49
End: 2024-09-05

## 2024-09-05 ENCOUNTER — OFFICE VISIT (OUTPATIENT)
Dept: FAMILY MEDICINE | Facility: CLINIC | Age: 49
End: 2024-09-05
Payer: COMMERCIAL

## 2024-09-05 VITALS
DIASTOLIC BLOOD PRESSURE: 89 MMHG | RESPIRATION RATE: 14 BRPM | WEIGHT: 200 LBS | SYSTOLIC BLOOD PRESSURE: 128 MMHG | OXYGEN SATURATION: 96 % | HEIGHT: 64 IN | TEMPERATURE: 99 F | HEART RATE: 103 BPM | BODY MASS INDEX: 34.15 KG/M2

## 2024-09-05 DIAGNOSIS — R42 DIZZINESS: ICD-10-CM

## 2024-09-05 DIAGNOSIS — M47.817 LUMBOSACRAL SPONDYLOSIS WITHOUT MYELOPATHY: Primary | Chronic | ICD-10-CM

## 2024-09-05 DIAGNOSIS — R51.9 NONINTRACTABLE EPISODIC HEADACHE, UNSPECIFIED HEADACHE TYPE: ICD-10-CM

## 2024-09-05 PROCEDURE — 99213 OFFICE O/P EST LOW 20 MIN: CPT | Mod: 25,,, | Performed by: FAMILY MEDICINE

## 2024-09-05 PROCEDURE — 4010F ACE/ARB THERAPY RXD/TAKEN: CPT | Mod: CPTII,,, | Performed by: FAMILY MEDICINE

## 2024-09-05 PROCEDURE — 3061F NEG MICROALBUMINURIA REV: CPT | Mod: CPTII,,, | Performed by: FAMILY MEDICINE

## 2024-09-05 PROCEDURE — 1159F MED LIST DOCD IN RCRD: CPT | Mod: CPTII,,, | Performed by: FAMILY MEDICINE

## 2024-09-05 PROCEDURE — 3079F DIAST BP 80-89 MM HG: CPT | Mod: CPTII,,, | Performed by: FAMILY MEDICINE

## 2024-09-05 PROCEDURE — 3052F HG A1C>EQUAL 8.0%<EQUAL 9.0%: CPT | Mod: CPTII,,, | Performed by: FAMILY MEDICINE

## 2024-09-05 PROCEDURE — 3074F SYST BP LT 130 MM HG: CPT | Mod: CPTII,,, | Performed by: FAMILY MEDICINE

## 2024-09-05 PROCEDURE — 3008F BODY MASS INDEX DOCD: CPT | Mod: CPTII,,, | Performed by: FAMILY MEDICINE

## 2024-09-05 PROCEDURE — 96372 THER/PROPH/DIAG INJ SC/IM: CPT | Mod: ,,, | Performed by: FAMILY MEDICINE

## 2024-09-05 PROCEDURE — 3066F NEPHROPATHY DOC TX: CPT | Mod: CPTII,,, | Performed by: FAMILY MEDICINE

## 2024-09-05 RX ORDER — TRAZODONE HYDROCHLORIDE 150 MG/1
150 TABLET ORAL NIGHTLY
COMMUNITY
Start: 2024-07-24

## 2024-09-05 RX ORDER — KETOROLAC TROMETHAMINE 30 MG/ML
30 INJECTION, SOLUTION INTRAMUSCULAR; INTRAVENOUS
Status: COMPLETED | OUTPATIENT
Start: 2024-09-05 | End: 2024-09-05

## 2024-09-05 RX ORDER — FLUTICASONE PROPIONATE 50 MCG
1 SPRAY, SUSPENSION (ML) NASAL
COMMUNITY
Start: 2024-08-29

## 2024-09-05 RX ADMIN — KETOROLAC TROMETHAMINE 30 MG: 30 INJECTION, SOLUTION INTRAMUSCULAR; INTRAVENOUS at 10:09

## 2024-09-05 NOTE — PROGRESS NOTES
Clinic Note    Patient Name: Georgiana Zayas  : 1975  MRN: [id number]    Chief Complaint   Patient presents with    Back Pain     Pt is here with back pain that is in her lower back and radiates down her right leg. She states this has been going for a few years and does go to the pain clinic for medication.     Dizziness     Pt also c/o dizziness and headaches that she states has been going on a while as well. She is not sure if it is fluid in her ear or her blood pressure. She is here to get checked out.        HPI:    Ms. Georgiana Zayas is a 48 y.o. female who presents to clinic today with CC of chronic back pain. Reports she does follow with pain management. Reports, however, she has been having HA and dizzy spells. States this contributed to a fall that she had yesterday. Denies hitting her head. Denies LOC. Reports that she did not have any injury other than a flare with her back pain as a result of the fall. States she feels like she pulled something in her back.  Reports that these headaches are new and the dizziness is not significantly improved with meclizine as previously prescribed. Denies ears feeling stopped up. Reports some ear pain.  Blood pressure is normal.   Patient also requests a new letter listing her chronic medical issues as she is trying to get approved for disability. She has been unable to work for over a year due to inability to stand for long periods of time or do heavy lifting. She was previously working in a nursing home.   Patient is, otherwise, without complaints.     Medications:  Medication List with Changes/Refills   Current Medications    ARIPIPRAZOLE (ABILIFY) 2 MG TAB    Take 1 tablet (2 mg total) by mouth once daily.    ARIPIPRAZOLE (ABILIFY) 5 MG TAB    Take 5 mg by mouth.    BENZTROPINE (COGENTIN) 0.5 MG TABLET    Take 0.5 mg by mouth once daily.    CARBAMIDE PEROXIDE (DEBROX) 6.5 % OTIC SOLUTION    Place 5 drops into the left ear 2 (two) times daily. X 4 days     CHLORPHENIRAMINE-PHENYLEPH-DM (ED A-HIST DM) 4-10-10 MG TAB    Take 1 tablet by mouth every 8 (eight) hours as needed (congestion or cough).    CYCLOBENZAPRINE (FLEXERIL) 10 MG TABLET    Take 1 tablet (10 mg total) by mouth 3 (three) times daily as needed for Muscle spasms (may cause drowsiness).    DEXTROMETHORPHAN-TRIPROLIDINE (ENDAL, DM-TRIPROLIDINE,) 10-1.25 MG/5 ML SOLN    Take 5 mLs by mouth every 6 (six) hours as needed (congestion or cough).    DULOXETINE (CYMBALTA) 60 MG CAPSULE    Take 1 capsule (60 mg total) by mouth 2 (two) times daily.    FLUCONAZOLE (DIFLUCAN) 150 MG TAB    Take one tablet by mouth every 72 hours X 3 doses if needed for yeast infection after completion of antibiotics.    FLUTICASONE PROPIONATE (FLONASE) 50 MCG/ACTUATION NASAL SPRAY    1 spray by Each Nostril route as needed.    GABAPENTIN (NEURONTIN) 800 MG TABLET    Take 1 tablet (800 mg total) by mouth 3 (three) times daily.    GLIPIZIDE (GLUCOTROL) 10 MG TABLET    Take 1 tablet (10 mg total) by mouth 2 (two) times daily with meals.    HYDROCHLOROTHIAZIDE (HYDRODIURIL) 25 MG TABLET    Take 1 tablet (25 mg total) by mouth once daily.    HYDROXYZINE PAMOATE (VISTARIL) 25 MG CAP    Take 1 capsule (25 mg total) by mouth 3 (three) times daily.    IBUPROFEN (ADVIL,MOTRIN) 800 MG TABLET    Take 1 tablet (800 mg total) by mouth 3 (three) times daily as needed for Pain.    INSULIN GLARGINE U-100, LANTUS, (LANTUS SOLOSTAR U-100 INSULIN) 100 UNIT/ML (3 ML) INPN PEN    Inject 15 Units into the skin every evening.    LISINOPRIL (PRINIVIL,ZESTRIL) 2.5 MG TABLET    Take 1 tablet (2.5 mg total) by mouth once daily.    MECLIZINE (ANTIVERT) 25 MG TABLET    Take 1 tablet (25 mg total) by mouth 3 (three) times daily as needed for Dizziness.    METHOCARBAMOL (ROBAXIN) 500 MG TAB    Take 500 mg by mouth 3 (three) times daily.    MOMETASONE (NASONEX) 50 MCG/ACTUATION NASAL SPRAY    2 sprays by Nasal route once daily.    OMEPRAZOLE (PRILOSEC) 40 MG CAPSULE     Take 1 capsule (40 mg total) by mouth Daily.    OXYCODONE-ACETAMINOPHEN (PERCOCET)  MG PER TABLET    Take 1 tablet by mouth every 6 (six) hours as needed.     PRAVASTATIN (PRAVACHOL) 20 MG TABLET    Take 1 tablet (20 mg total) by mouth every evening.    PROMETHAZINE (PHENERGAN) 25 MG TABLET    Take 1 tablet (25 mg total) by mouth every 6 (six) hours as needed for Nausea.    RISPERIDONE (RISPERDAL) 3 MG TAB    Take 1 tablet (3 mg total) by mouth once daily.    SEMAGLUTIDE (OZEMPIC) 2 MG/DOSE (8 MG/3 ML) PNIJ    Inject 2 mg into the skin every 7 days.    TRAZODONE (DESYREL) 150 MG TABLET    Take 150 mg by mouth nightly.   Discontinued Medications    TRAZODONE (DESYREL) 100 MG TABLET    Take 1 tablet (100 mg total) by mouth every evening.        Allergies: Codeine      Past Medical History:    Past Medical History:   Diagnosis Date    Arthritis     Back injury     Depression     Diabetes mellitus     Diabetes mellitus, type 2     Difficulty swallowing     Fatigue     GERD (gastroesophageal reflux disease)     Hearing difficulty     Hypertension     Irritable bowel syndrome without diarrhea     Liver disease     Loss of appetite     Sleep apnea        Past Surgical History:    Past Surgical History:   Procedure Laterality Date    CARPAL TUNNEL RELEASE Bilateral      SECTION      INJECTION OF ANESTHETIC AGENT AROUND MEDIAL BRANCH NERVES INNERVATING LUMBAR FACET JOINT Bilateral 2021    Procedure: BLOCK, NERVE, FACET JOINT, LUMBAR, MEDIAL BRANCH;  Surgeon: Amari Razo MD;  Location: [facility];  Service: Pain Management;  Laterality: Bilateral;  Bilateral L3-S1 Facet Injection    INJECTION OF ANESTHETIC AGENT AROUND MEDIAL BRANCH NERVES INNERVATING LUMBAR FACET JOINT Bilateral 2022    Procedure: BLOCK, NERVE, FACET JOINT, LUMBAR, MEDIAL BRANCH;  Surgeon: Amari Razo MD;  Location: [facility];  Service: Pain Management;  Laterality: Bilateral;  Bilateral L3-S1 FI     INJECTION OF ANESTHETIC AGENT AROUND MEDIAL BRANCH NERVES INNERVATING LUMBAR FACET JOINT Bilateral 1/11/2023    Procedure: BLOCK, NERVE, FACET JOINT, LUMBAR, MEDIAL BRANCH;  Surgeon: Amari Razo MD;  Location: [facility];  Service: Pain Management;  Laterality: Bilateral;  Bilateral L3-S1 FI    LEFT HEART CATHETERIZATION Left 12/21/2021    Procedure: Left heart cath;  Surgeon: Jeremy Rojo DO;  Location: [facility] CATH LAB;  Service: Cardiology;  Laterality: Left;    RADIOFREQUENCY ABLATION OF LUMBAR MEDIAL BRANCH NERVE AT SINGLE LEVEL Bilateral 4/12/2023    Procedure: RADIOFREQUENCY ABLATION, NERVE, SPINAL, LUMBAR, MEDIAL BRANCH, 1 LEVEL;  Surgeon: Amari Razo MD;  Location: [facility];  Service: Pain Management;  Laterality: Bilateral;  Bilateral L3-5 RFTC    STAPEDES SURGERY Bilateral     TONSILLECTOMY           Social History:    Social History     Tobacco Use   Smoking Status Never    Passive exposure: Never   Smokeless Tobacco Never     Social History     Substance and Sexual Activity   Alcohol Use Not Currently     Social History     Substance and Sexual Activity   Drug Use Never         Family History:    Family History   Problem Relation Name Age of Onset    Cancer Mother      Diabetes Mother      Hypertension Mother      Kidney failure Other      Heart disease Other         Review of Systems:    Review of Systems   Constitutional:  Negative for appetite change, chills, fatigue, fever and unexpected weight change.   Eyes:  Negative for visual disturbance.   Respiratory:  Negative for cough and shortness of breath.    Cardiovascular:  Negative for chest pain and leg swelling.   Gastrointestinal:  Positive for nausea. Negative for abdominal pain, change in bowel habit, constipation, diarrhea and vomiting.        Denies constipation currently but reports this is an intermittent issue as a result of her pain medication   Musculoskeletal:  Positive for back pain. Negative for  "arthralgias.   Integumentary:  Negative for rash.   Neurological:  Positive for dizziness and headaches.   Psychiatric/Behavioral:  The patient is not nervous/anxious.         Vitals:    Vitals:    09/05/24 0945   BP: 128/89   BP Location: Left arm   Patient Position: Sitting   BP Method: Large (Automatic)   Pulse: 103   Resp: 14   Temp: 98.7 °F (37.1 °C)   TempSrc: Oral   SpO2: 96%   Weight: 90.7 kg (200 lb)   Height: 5' 4" (1.626 m)       Body mass index is 34.33 kg/m².    Wt Readings from Last 3 Encounters:   09/05/24 0945 90.7 kg (200 lb)   08/05/24 0846 91 kg (200 lb 9.6 oz)   07/12/24 1150 90.3 kg (199 lb)        Physical Exam:    Physical Exam  Constitutional:       General: She is not in acute distress.     Appearance: Normal appearance. She is obese.   HENT:      Right Ear: Tympanic membrane normal.      Left Ear: Tympanic membrane normal.      Nose: Nose normal.      Mouth/Throat:      Mouth: Mucous membranes are moist.      Pharynx: Oropharynx is clear.   Eyes:      Conjunctiva/sclera: Conjunctivae normal.   Cardiovascular:      Rate and Rhythm: Normal rate and regular rhythm.      Heart sounds: Normal heart sounds. No murmur heard.  Pulmonary:      Effort: Pulmonary effort is normal. No respiratory distress.      Breath sounds: Normal breath sounds. No wheezing, rhonchi or rales.   Abdominal:      General: Bowel sounds are normal.      Palpations: Abdomen is soft.      Tenderness: There is no abdominal tenderness.   Musculoskeletal:         General: Tenderness present. No swelling. Normal range of motion.      Cervical back: Neck supple.      Right lower leg: No edema.      Left lower leg: No edema.   Skin:     Findings: No rash.   Neurological:      General: No focal deficit present.      Mental Status: She is alert. Mental status is at baseline.   Psychiatric:         Mood and Affect: Mood normal.         Assessment/Plan:   1. Lumbosacral spondylosis without myelopathy  -     ketorolac injection 30 " mg    2. Nonintractable episodic headache, unspecified headache type  -     CT Head Without Contrast; Future; Expected date: 09/05/2024  -     ketorolac injection 30 mg    3. Dizziness  -     CT Head Without Contrast; Future; Expected date: 09/05/2024     Letter written describing chronic medical conditions and time off work. Patient aware I do not do disability exams.     Active Problem List with Overview Notes    Diagnosis Date Noted    Type 2 diabetes mellitus with diabetic neuropathy, with long-term current use of insulin 08/25/2021    Essential hypertension 08/25/2021    Anxiety 01/23/2023    Other hyperlipidemia 07/07/2022    Morbid obesity 01/17/2022    Depression 01/23/2023    Femoral artery pseudo-aneurysm, right 01/17/2022    Other sleep apnea 11/30/2021    Lumbosacral spondylosis without myelopathy 09/08/2021    Gastroesophageal reflux disease 08/25/2021    Chronic bilateral low back pain with right-sided sciatica 07/07/2022    Seasonal allergies 07/07/2022    Muscle spasm 07/07/2022    Lumbosacral radiculopathy 08/20/2024    Vertigo 08/06/2024          RTC as scheduled for chronic follow up. RTC sooner if needed.  Patient voiced understanding and is agreeable to plan.      Lisandra Mcdaniel MD    Family Medicine

## 2024-09-05 NOTE — LETTER
September 5, 2024        Georgiana Zayas  [address] [place] [address]             [facility]  [address] [place] [address] [place] [address]  Phone: [phone]  Fax: [phone]   Patient: Georgiana Zayas   MR Number: [id number]   YOB: 1975   Date of Visit: 9/5/2024     To Whom it may concern:    Ms. Georgiana Zayas is an established patient in this clinic. She has been unable to work for over one year due to her chronic back pain. She is unable to stand for greater than one our or drive for longer than one hour due to requiring frequent position changes for her back pain. She is unable to do any heavy lifting. She has, more recently, been experiencing some dizziness and headaches. She recently reported a fall due to dizziness. Patient remains unable to return to work.    Sincerely,              Elisa Mcdaniel MD

## 2024-09-06 ENCOUNTER — TELEPHONE (OUTPATIENT)
Dept: FAMILY MEDICINE | Facility: CLINIC | Age: 49
End: 2024-09-06
Payer: COMMERCIAL

## 2024-09-06 NOTE — TELEPHONE ENCOUNTER
----- Message from Maryam Tanner LPN sent at 9/6/2024 10:11 AM CDT -----    ----- Message -----  From: Elisa Mcdaniel MD  Sent: 8/16/2024  11:43 AM CDT  To: Maryam Tanner LPN    Please call patient regarding lab results. HbA1C is 8.7. it is improved from previous but not at goal. Recommend increasing lantus from 15 units to 20 units SC nightly. Low carb diet. Exercise. Home blood glucose monitoring. Call with persistently elevated readings so additional medication adjustments can be made.  Labs, otherwise, ok/stable. Thanks!   [FreeTextEntry1] : Small cell lung cancer - at least stage IIB\par PET/CT (6/20) No distant metastases\par 9/8/20 Brain MRI- No mets\par -Radiation finished 11/18/20\par s/p cisplatin etoposide x 4 cycles (9/16/20 - 11/20/20) with radiation followed by 2 cycles of carboplatin and etoposide (Carbo AUC 4 and dose reduce etoposide 10%)\par 1/15/21 Restaging PET/CT shows excellent response. Bone uptake- post traumatic s/p mechanical fall\par Chemotherapy induced agranulocytosis- counts gradually improving\par MRI brain (3/4/21)- DENIS\par Relapsed refractory SCLC - 5/2021\par Brain, Liver, bone, Adrenal, Lung mets\par He has developed diffuse metastatic disease within 90 days of completing his platinum based chemotherapy \par Liver biopsy- cw SCLC\par Foundation panel - TMB >10%. No targetable mutations\par completed WBRT and right hip radiation in June 2021 with Dr. Hidalgo\par C2 - Lurbinectidine was held due to thrombocytopenia plts < 100\par 7/22/21 - C3 - reducing Lurbinectidin by 15% due to counts.\par s/p C6  lurbinectidin keytruda (6/9/21- 9/29/21) \par POD\par Started 3rd line topotecan x 5 days every 21 days - cycle 1 on  (10/11/21) \par Seen today for follow up\par Has been trying to do best to attend his daughter's wedding on 11/12/21\par Did not pursue physical therapy\par Labs reviewed, analyzed and discussed\par Advised to resume topotecan 2 mg QD x 5 days Q21-28 days\par He will start after his daughter's wedding\par Understands his grave prognosis\par Rising LFTs concerning for POD\par Hold off NSAIDs, ASA, blood thinners\par Encouraged to pursue home PT\par IVH given today\par \par #Bone mets - Xgeva given 10/29/21\par \par #Deconditioned/Social/Anxiety\par Patient lives with his wife, weak, and shakiness with anxiety \par Fall precautions stressed\par to continue with PT at home\par Advised to meet up with Dr. Cintron/Ritu for palliative support. \par \par #Weight loss\par Likely related to POD\par Small frequent meals encouraged\par \par Follow up in 1 week with chair for possible transfusion\par CBC, CMP, type and screen

## 2024-09-06 NOTE — TELEPHONE ENCOUNTER
Contacted pt to notify her of lab results. We discussed her A1C and instructions given by Dr. Berman to increase Lantus to 20 units nightly as well as making sure we watch our diet and exercise. Pt is aware to continue monitoring glucose at home and to notify us if they read consistently high. Pt verbalized understanding.

## 2024-09-17 ENCOUNTER — CLINICAL SUPPORT (OUTPATIENT)
Dept: REHABILITATION | Facility: HOSPITAL | Age: 49
End: 2024-09-17
Payer: COMMERCIAL

## 2024-09-17 DIAGNOSIS — M54.17 LUMBOSACRAL RADICULOPATHY: Primary | ICD-10-CM

## 2024-09-17 PROCEDURE — 97014 ELECTRIC STIMULATION THERAPY: CPT

## 2024-09-17 PROCEDURE — 97110 THERAPEUTIC EXERCISES: CPT

## 2024-09-17 NOTE — PROGRESS NOTES
[facility]   Physical Therapy Treatment Note      Name: Georgiana Zayas  Clinic Number: [id number]    Therapy Diagnosis:   Encounter Diagnosis   Name Primary?    Lumbosacral radiculopathy Yes     Physician: Ari Maciel MD    Visit Date: 9/17/2024    Physician Orders: PT Eval and Treat   2-3 times per week for 6 week s  Medical Diagnosis from Referral: M54.17 (ICD-10-CM) - Lumbosacral radiculopathy  M54.12 (ICD-10-CM) - Cervical radiculopathy  Evaluation Date: 8/20/2024  Authorization Period Expiration: 10/20/24  Plan of Care Expiration: 10/4/24  Progress Note Due: 10/4/24  Visit # / Visits authorized: 5/13  FOTO: 32/100     Precautions: Standard     PTA Visit #: 0/5     Time In: 803  Time Out: 843  Total Billable Time: 40 minutes    Subjective     Pt reports: my back still hurts a lot.  She  reports being  compliant with home exercise program.  Response to previous treatment: fair  Functional change: continued low back pain    Pain: 8/10  Location: bilateral low back      Objective      Objective Measures updated at progress report unless specified.     Treatment     Georgiana received the treatments listed below:      therapeutic exercises to bilateral LE's and core to develop strength, endurance, ROM, flexibility, and core stabilization for 25 minutes including:  Scifit bike 5 minutes level 2.5  Pelvic tilts, bridging 2 x 10  SLR, side abduction, clam shells 2 x 10  Supine SKTC stretch and hamstring stretch 10 x 10 sec    +++need FOTO and measure STG's next visit    supervised modalities after being cleared for contradictions: IFC Electrical Stimulation:  Georgiana received IFC Electrical Stimulation for pain control applied to the lumbosacral concurrently with moist heat (appropriate towel layers). Pt received stimulation at 100 % scan at a frequency of 80/150 Hz at 15.5 cV for 15 minutes. Georgiana tolderated treatment well without any adverse effects.          Patient Education and Home  Exercises       Education provided:   -   Georgiana received verbal and tactile cues to facilitate proper execution of exercises and body mechanics.    Written Home Exercises Provided: Patient instructed to cont prior HEP.   Assessment   Georgiana presents today with moderately severe low back pain. She tolerated her exercises well today and reports some mild relief following  IFC and moist heat modalities.    Georgiana Is progressing fair towards her goals.   Pt prognosis is Fair.     Pt will continue to benefit from skilled outpatient physical therapy to address the deficits listed in the problem list box on initial evaluation, provide pt/family education and to maximize pt's level of independence in the home and community environment.     Pt's spiritual, cultural and educational needs considered and pt agreeable to plan of care and goals.     Anticipated barriers to physical therapy: none    Goals:   Short Term GOALS: 3 weeks. Pt agrees with goals set.  1. Patient demonstrates independence with HEP. Met.  2. Patient demonstrates independence with Postural Awareness and  body mechanics.   3. Patient will report pain of 5/10 at worst, on 0-10 pain scale, with all daily activities.  4. Patient increase bilateral hamstring flexibility by 5 degrees to improve tolerance to functional activities with reduced pain.   5. Patient demonstrates increased exercise tolerance to 25 minutes or greater to improve tolerance to functional activities and community mobility with pain no greater than 5/10.         Long Term GOALS: 6 weeks. Pt agrees with goals set.  1. Patient demonstrates increased strength BLE's to 3+/5 or greater to improve tolerance to functional activities.   2. Patient demonstrates improved overall function per FOTO Lumbar Survey Discharge Score to 40 or greater.   3. Patient will increase trunk/spine ROM by 10 degrees to improve functional mobility during work activities and ADL's.  4. Patient will increase core muscle  strength flexion and extension to 3-/5 to improve functional mobility.  5. Patient will report pain of 3/10 at worst, on 0-10 pain scale, with all daily activities to improve quality of life.    Plan     Plan of care Certification: 8/20/2024 to 10/4/24.     Outpatient Physical Therapy 2 times weekly for 6 weeks to include the following interventions: Electrical Stimulation IFC, Manual Therapy, Moist Heat/ Ice, Neuromuscular Re-ed, Patient Education, Therapeutic Activities, Therapeutic Exercise, and Ultrasound.     Win Nieves, PT

## 2024-09-20 ENCOUNTER — CLINICAL SUPPORT (OUTPATIENT)
Dept: REHABILITATION | Facility: HOSPITAL | Age: 49
End: 2024-09-20
Payer: COMMERCIAL

## 2024-09-20 DIAGNOSIS — M54.17 LUMBOSACRAL RADICULOPATHY: Primary | ICD-10-CM

## 2024-09-20 PROCEDURE — 97014 ELECTRIC STIMULATION THERAPY: CPT | Mod: CQ

## 2024-09-20 PROCEDURE — 97110 THERAPEUTIC EXERCISES: CPT | Mod: CQ

## 2024-09-20 NOTE — PROGRESS NOTES
[facility]   Physical Therapy Treatment Note      Name: Georgiana Zayas  Clinic Number: [id number]    Therapy Diagnosis:   Encounter Diagnosis   Name Primary?    Lumbosacral radiculopathy Yes     Physician: Ari Maciel MD    Visit Date: 9/20/2024    Physician Orders: PT Eval and Treat   2-3 times per week for 6 week s  Medical Diagnosis from Referral: M54.17 (ICD-10-CM) - Lumbosacral radiculopathy  M54.12 (ICD-10-CM) - Cervical radiculopathy  Evaluation Date: 8/20/2024  Authorization Period Expiration: 10/20/24  Plan of Care Expiration: 10/4/24  Progress Note Due: 10/4/24  Visit # / Visits authorized: 6/13  FOTO: 32/100     Precautions: Standard     PTA Visit #: 1/5     Time In: 807  Time Out: 845  Total Billable Time: 38 minutes    Subjective     Pt reports: severe back pain.  She  reports being  compliant with home exercise program.  Response to previous treatment: fair  Functional change: continued low back pain    Pain: 8/10  Location: bilateral low back      Objective      Objective Measures updated at progress report unless specified.     Treatment     Georgiana received the treatments listed below:      therapeutic exercises to bilateral LE's and core to develop strength, endurance, ROM, flexibility, and core stabilization for 23 minutes including:  Scifit bike 5 minutes level 2.5  Pelvic tilts, bridging 2 x 10 ( just 10 today)  SLR, side abduction, clam shells 2 x 10 (just 10 on each today)  Supine SKTC stretch and hamstring stretch 10 x 10 sec       supervised modalities after being cleared for contradictions: IFC Electrical Stimulation:  Georgiana received IFC Electrical Stimulation for pain control applied to the lumbosacral concurrently with moist heat (appropriate towel layers). Pt received stimulation at 100 % scan at a frequency of 80/150 Hz at 15.5 cV for 15 minutes. Georgiana tolderated treatment well without any adverse effects.      FOTO next visit++    Patient Education  and Home Exercises       Education provided:   -   Georgiana received verbal and tactile cues to facilitate proper execution of exercises and body mechanics.    Written Home Exercises Provided: Patient instructed to cont prior HEP.   Assessment   Georgiana presents to OP Physical Therapy with severe LBP, Patient expressed pain was a 10/10, but did not feel the need to go to ER. Cervical measurements were taken and ROM decreased partially due to severe pain this morning. Measure again when patient's pain has been decreased.    Georgiana Is progressing fair towards her goals.   Pt prognosis is Fair.     Pt will continue to benefit from skilled outpatient physical therapy to address the deficits listed in the problem list box on initial evaluation, provide pt/family education and to maximize pt's level of independence in the home and community environment.     Pt's spiritual, cultural and educational needs considered and pt agreeable to plan of care and goals.     Anticipated barriers to physical therapy: none    Goals:   Short Term GOALS: 3 weeks. Pt agrees with goals set.  1. Patient demonstrates independence with HEP. Met.  2. Patient demonstrates independence with Postural Awareness and  body mechanics. Met  3. Patient will report pain of 5/10 at worst, on 0-10 pain scale, with all daily activities.  4. Patient increase bilateral hamstring flexibility by 5 degrees to improve tolerance to functional activities with reduced pain.   5. Patient demonstrates increased exercise tolerance to 25 minutes or greater to improve tolerance to functional activities and community mobility with pain no greater than 5/10.         Long Term GOALS: 6 weeks. Pt agrees with goals set.  1. Patient demonstrates increased strength BLE's to 3+/5 or greater to improve tolerance to functional activities.   2. Patient demonstrates improved overall function per FOTO Lumbar Survey Discharge Score to 40 or greater.   3. Patient will increase trunk/spine  ROM by 10 degrees to improve functional mobility during work activities and ADL's.  4. Patient will increase core muscle strength flexion and extension to 3-/5 to improve functional mobility.  5. Patient will report pain of 3/10 at worst, on 0-10 pain scale, with all daily activities to improve quality of life.    Plan     Plan of care Certification: 8/20/2024 to 10/4/24.     Outpatient Physical Therapy 2 times weekly for 6 weeks to include the following interventions: Electrical Stimulation IFC, Manual Therapy, Moist Heat/ Ice, Neuromuscular Re-ed, Patient Education, Therapeutic Activities, Therapeutic Exercise, and Ultrasound.     Natasha De Luna, PTA

## 2024-09-25 ENCOUNTER — OFFICE VISIT (OUTPATIENT)
Dept: FAMILY MEDICINE | Facility: CLINIC | Age: 49
End: 2024-09-25
Payer: COMMERCIAL

## 2024-09-25 ENCOUNTER — HOSPITAL ENCOUNTER (OUTPATIENT)
Dept: RADIOLOGY | Facility: HOSPITAL | Age: 49
Discharge: HOME | End: 2024-09-25
Attending: FAMILY MEDICINE
Payer: COMMERCIAL

## 2024-09-25 VITALS
RESPIRATION RATE: 18 BRPM | WEIGHT: 198.81 LBS | HEART RATE: 102 BPM | HEIGHT: 64 IN | TEMPERATURE: 99 F | DIASTOLIC BLOOD PRESSURE: 88 MMHG | SYSTOLIC BLOOD PRESSURE: 130 MMHG | BODY MASS INDEX: 33.94 KG/M2 | OXYGEN SATURATION: 97 %

## 2024-09-25 DIAGNOSIS — M54.41 CHRONIC BILATERAL LOW BACK PAIN WITH RIGHT-SIDED SCIATICA: Primary | Chronic | ICD-10-CM

## 2024-09-25 DIAGNOSIS — R51.9 NONINTRACTABLE EPISODIC HEADACHE, UNSPECIFIED HEADACHE TYPE: ICD-10-CM

## 2024-09-25 DIAGNOSIS — R42 DIZZINESS: ICD-10-CM

## 2024-09-25 DIAGNOSIS — R10.30 LOWER ABDOMINAL PAIN: ICD-10-CM

## 2024-09-25 DIAGNOSIS — G89.29 CHRONIC BILATERAL LOW BACK PAIN WITH RIGHT-SIDED SCIATICA: Primary | Chronic | ICD-10-CM

## 2024-09-25 DIAGNOSIS — K59.09 CHRONIC CONSTIPATION: ICD-10-CM

## 2024-09-25 LAB
BILIRUB SERPL-MCNC: NORMAL MG/DL
BLOOD URINE, POC: NORMAL
CLARITY, UA: CLEAR
COLOR, UA: YELLOW
GLUCOSE UR QL STRIP: NORMAL
KETONES UR QL STRIP: NORMAL
LEUKOCYTE ESTERASE URINE, POC: NORMAL
NITRITE, POC UA: NORMAL
PH, POC UA: 7
PROTEIN, POC: NORMAL
SPECIFIC GRAVITY, POC UA: 1.02
UROBILINOGEN, POC UA: 0.2

## 2024-09-25 PROCEDURE — 70450 CT HEAD/BRAIN W/O DYE: CPT | Mod: TC

## 2024-09-25 PROCEDURE — 1159F MED LIST DOCD IN RCRD: CPT | Mod: CPTII,,, | Performed by: FAMILY MEDICINE

## 2024-09-25 PROCEDURE — 3066F NEPHROPATHY DOC TX: CPT | Mod: CPTII,,, | Performed by: FAMILY MEDICINE

## 2024-09-25 PROCEDURE — 81003 URINALYSIS AUTO W/O SCOPE: CPT | Mod: QW,,, | Performed by: FAMILY MEDICINE

## 2024-09-25 PROCEDURE — 3079F DIAST BP 80-89 MM HG: CPT | Mod: CPTII,,, | Performed by: FAMILY MEDICINE

## 2024-09-25 PROCEDURE — 3052F HG A1C>EQUAL 8.0%<EQUAL 9.0%: CPT | Mod: CPTII,,, | Performed by: FAMILY MEDICINE

## 2024-09-25 PROCEDURE — 70450 CT HEAD/BRAIN W/O DYE: CPT | Mod: 26,,, | Performed by: RADIOLOGY

## 2024-09-25 PROCEDURE — 4010F ACE/ARB THERAPY RXD/TAKEN: CPT | Mod: CPTII,,, | Performed by: FAMILY MEDICINE

## 2024-09-25 PROCEDURE — 99213 OFFICE O/P EST LOW 20 MIN: CPT | Mod: 25,,, | Performed by: FAMILY MEDICINE

## 2024-09-25 PROCEDURE — 3061F NEG MICROALBUMINURIA REV: CPT | Mod: CPTII,,, | Performed by: FAMILY MEDICINE

## 2024-09-25 PROCEDURE — 3075F SYST BP GE 130 - 139MM HG: CPT | Mod: CPTII,,, | Performed by: FAMILY MEDICINE

## 2024-09-25 PROCEDURE — 3008F BODY MASS INDEX DOCD: CPT | Mod: CPTII,,, | Performed by: FAMILY MEDICINE

## 2024-09-25 PROCEDURE — 96372 THER/PROPH/DIAG INJ SC/IM: CPT | Mod: ,,, | Performed by: FAMILY MEDICINE

## 2024-09-25 RX ORDER — POLYETHYLENE GLYCOL 3350 17 G/17G
POWDER, FOR SOLUTION ORAL
Qty: 1530 G | Refills: 3 | Status: SHIPPED | OUTPATIENT
Start: 2024-09-25

## 2024-09-25 RX ORDER — KETOROLAC TROMETHAMINE 30 MG/ML
30 INJECTION, SOLUTION INTRAMUSCULAR; INTRAVENOUS
Status: COMPLETED | OUTPATIENT
Start: 2024-09-25 | End: 2024-09-25

## 2024-09-25 RX ADMIN — KETOROLAC TROMETHAMINE 30 MG: 30 INJECTION, SOLUTION INTRAMUSCULAR; INTRAVENOUS at 09:09

## 2024-09-25 NOTE — PROGRESS NOTES
Clinic Note    Patient Name: Georgiana Zayas  : 1975  MRN: [id number]    Chief Complaint   Patient presents with    Back Pain     Lower back.     Leg Pain     Bilateral leg    Pelvic Pain     States has not had menstrual cycle this month yet.        HPI:    Ms. Georgiana Zayas is a 48 y.o. female who presents to clinic today with CC of low back and bilateral leg pain. Patient has chronic back pain and follows with Pain Management. Patient also reports some pelvic pain stating she has not yet had her cycle this month. States it feels like it is about to start. Reports, however, she also has chronic constipation. States she has not been able to take prescription medications for constipation due to GI side effects. Reports her last bowel movement was today after a laxative but states she does not feel like it worked very well. Admits dull cramping pain in abdomen could related to constipation. Denies nausea or vomiting. States she is not currently on a good bowel regimen.   Denies dysuria.  Patient denies fever.  Patient is, otherwise, without complaints.     Medications:  Medication List with Changes/Refills   Current Medications    ARIPIPRAZOLE (ABILIFY) 2 MG TAB    Take 1 tablet (2 mg total) by mouth once daily.    ARIPIPRAZOLE (ABILIFY) 5 MG TAB    Take 5 mg by mouth.    BENZTROPINE (COGENTIN) 0.5 MG TABLET    Take 0.5 mg by mouth once daily.    CARBAMIDE PEROXIDE (DEBROX) 6.5 % OTIC SOLUTION    Place 5 drops into the left ear 2 (two) times daily. X 4 days    CHLORPHENIRAMINE-PHENYLEPH-DM (ED A-HIST DM) 4-10-10 MG TAB    Take 1 tablet by mouth every 8 (eight) hours as needed (congestion or cough).    CYCLOBENZAPRINE (FLEXERIL) 10 MG TABLET    Take 1 tablet (10 mg total) by mouth 3 (three) times daily as needed for Muscle spasms (may cause drowsiness).    DEXTROMETHORPHAN-TRIPROLIDINE (ENDAL, DM-TRIPROLIDINE,) 10-1.25 MG/5 ML SOLN    Take 5 mLs by mouth every 6 (six) hours as needed (congestion or cough).     DULOXETINE (CYMBALTA) 60 MG CAPSULE    Take 1 capsule (60 mg total) by mouth 2 (two) times daily.    FLUCONAZOLE (DIFLUCAN) 150 MG TAB    Take one tablet by mouth every 72 hours X 3 doses if needed for yeast infection after completion of antibiotics.    FLUTICASONE PROPIONATE (FLONASE) 50 MCG/ACTUATION NASAL SPRAY    1 spray by Each Nostril route as needed.    GABAPENTIN (NEURONTIN) 800 MG TABLET    Take 1 tablet (800 mg total) by mouth 3 (three) times daily.    GLIPIZIDE (GLUCOTROL) 10 MG TABLET    Take 1 tablet (10 mg total) by mouth 2 (two) times daily with meals.    HYDROCHLOROTHIAZIDE (HYDRODIURIL) 25 MG TABLET    Take 1 tablet (25 mg total) by mouth once daily.    HYDROXYZINE PAMOATE (VISTARIL) 25 MG CAP    Take 1 capsule (25 mg total) by mouth 3 (three) times daily.    IBUPROFEN (ADVIL,MOTRIN) 800 MG TABLET    Take 1 tablet (800 mg total) by mouth 3 (three) times daily as needed for Pain.    INSULIN GLARGINE U-100, LANTUS, (LANTUS SOLOSTAR U-100 INSULIN) 100 UNIT/ML (3 ML) INPN PEN    Inject 15 Units into the skin every evening.    LISINOPRIL (PRINIVIL,ZESTRIL) 2.5 MG TABLET    Take 1 tablet (2.5 mg total) by mouth once daily.    MECLIZINE (ANTIVERT) 25 MG TABLET    Take 1 tablet (25 mg total) by mouth 3 (three) times daily as needed for Dizziness.    METHOCARBAMOL (ROBAXIN) 500 MG TAB    Take 500 mg by mouth 3 (three) times daily.    MOMETASONE (NASONEX) 50 MCG/ACTUATION NASAL SPRAY    2 sprays by Nasal route once daily.    OMEPRAZOLE (PRILOSEC) 40 MG CAPSULE    Take 1 capsule (40 mg total) by mouth Daily.    OXYCODONE-ACETAMINOPHEN (PERCOCET)  MG PER TABLET    Take 1 tablet by mouth every 6 (six) hours as needed.     PRAVASTATIN (PRAVACHOL) 20 MG TABLET    Take 1 tablet (20 mg total) by mouth every evening.    PROMETHAZINE (PHENERGAN) 25 MG TABLET    Take 1 tablet (25 mg total) by mouth every 6 (six) hours as needed for Nausea.    RISPERIDONE (RISPERDAL) 3 MG TAB    Take 1 tablet (3 mg total) by  mouth once daily.    SEMAGLUTIDE (OZEMPIC) 2 MG/DOSE (8 MG/3 ML) PNIJ    Inject 2 mg into the skin every 7 days.    TRAZODONE (DESYREL) 150 MG TABLET    Take 150 mg by mouth nightly.        Allergies: Codeine      Past Medical History:    Past Medical History:   Diagnosis Date    Arthritis     Back injury     Depression     Diabetes mellitus     Diabetes mellitus, type 2     Difficulty swallowing     Fatigue     GERD (gastroesophageal reflux disease)     Hearing difficulty     Hypertension     Irritable bowel syndrome without diarrhea     Liver disease     Loss of appetite     Sleep apnea        Past Surgical History:    Past Surgical History:   Procedure Laterality Date    CARPAL TUNNEL RELEASE Bilateral      SECTION      INJECTION OF ANESTHETIC AGENT AROUND MEDIAL BRANCH NERVES INNERVATING LUMBAR FACET JOINT Bilateral 2021    Procedure: BLOCK, NERVE, FACET JOINT, LUMBAR, MEDIAL BRANCH;  Surgeon: Amari Razo MD;  [facility];  Service: Pain Management;  Laterality: Bilateral;  Bilateral L3-S1 Facet Injection    INJECTION OF ANESTHETIC AGENT AROUND MEDIAL BRANCH NERVES INNERVATING LUMBAR FACET JOINT Bilateral 2022    Procedure: BLOCK, NERVE, FACET JOINT, LUMBAR, MEDIAL BRANCH;  Surgeon: Amari Razo MD;  [facility];  Service: Pain Management;  Laterality: Bilateral;  Bilateral L3-S1 FI    INJECTION OF ANESTHETIC AGENT AROUND MEDIAL BRANCH NERVES INNERVATING LUMBAR FACET JOINT Bilateral 2023    Procedure: BLOCK, NERVE, FACET JOINT, LUMBAR, MEDIAL BRANCH;  Surgeon: Amari Razo MD;  [facility];  Service: Pain Management;  Laterality: Bilateral;  Bilateral L3-S1 FI    LEFT HEART CATHETERIZATION Left 2021    Procedure: Left heart cath;  Surgeon: Jeremy Rojo DO;  Location: [facility] CATH LAB;  Service: Cardiology;  Laterality: Left;    RADIOFREQUENCY ABLATION OF LUMBAR MEDIAL BRANCH NERVE AT SINGLE LEVEL Bilateral  "4/12/2023    Procedure: RADIOFREQUENCY ABLATION, NERVE, SPINAL, LUMBAR, MEDIAL BRANCH, 1 LEVEL;  Surgeon: Amari Razo MD;  Location: [facility];  Service: Pain Management;  Laterality: Bilateral;  Bilateral L3-5 RFTC    STAPEDES SURGERY Bilateral     TONSILLECTOMY           Social History:    Social History     Tobacco Use   Smoking Status Never    Passive exposure: Never   Smokeless Tobacco Never     Social History     Substance and Sexual Activity   Alcohol Use Not Currently     Social History     Substance and Sexual Activity   Drug Use Never         Family History:    Family History   Problem Relation Name Age of Onset    Cancer Mother      Diabetes Mother      Hypertension Mother      Kidney failure Other      Heart disease Other         Review of Systems:    Review of Systems   Constitutional:  Negative for appetite change, chills, fatigue, fever and unexpected weight change.   Eyes:  Negative for visual disturbance.   Respiratory:  Negative for cough and shortness of breath.    Cardiovascular:  Negative for chest pain and leg swelling.   Gastrointestinal:  Positive for abdominal pain and constipation. Negative for blood in stool, change in bowel habit, diarrhea, nausea and vomiting.   Musculoskeletal:  Positive for arthralgias and back pain.   Integumentary:  Negative for rash.   Neurological:  Negative for dizziness and headaches.   Psychiatric/Behavioral:  The patient is not nervous/anxious.         Vitals:    Vitals:    09/25/24 0912   BP: 130/88   BP Location: Left arm   Patient Position: Sitting   BP Method: Medium (Automatic)   Pulse: 102   Resp: 18   Temp: 98.6 °F (37 °C)   TempSrc: Oral   SpO2: 97%   Weight: 90.2 kg (198 lb 12.8 oz)   Height: 5' 4" (1.626 m)       Body mass index is 34.12 kg/m².    Wt Readings from Last 3 Encounters:   09/25/24 0912 90.2 kg (198 lb 12.8 oz)   09/05/24 0945 90.7 kg (200 lb)   08/05/24 0846 91 kg (200 lb 9.6 oz)        Physical Exam:    Physical " Exam  Constitutional:       General: She is not in acute distress.     Appearance: Normal appearance. She is obese.   HENT:      Nose: Nose normal.      Mouth/Throat:      Mouth: Mucous membranes are moist.      Pharynx: Oropharynx is clear.   Eyes:      Conjunctiva/sclera: Conjunctivae normal.   Cardiovascular:      Rate and Rhythm: Normal rate and regular rhythm.      Heart sounds: Normal heart sounds. No murmur heard.  Pulmonary:      Effort: Pulmonary effort is normal. No respiratory distress.      Breath sounds: Normal breath sounds. No wheezing, rhonchi or rales.   Abdominal:      General: Bowel sounds are normal.      Palpations: Abdomen is soft.      Tenderness: There is no abdominal tenderness. There is no guarding or rebound.   Musculoskeletal:         General: Tenderness present. No swelling. Normal range of motion.      Cervical back: Neck supple.      Right lower leg: No edema.      Left lower leg: No edema.   Skin:     Findings: No rash.   Neurological:      General: No focal deficit present.      Mental Status: She is alert. Mental status is at baseline.   Psychiatric:         Mood and Affect: Mood normal.       Assessment/Plan:   1. Chronic bilateral low back pain with right-sided sciatica  -     ketorolac injection 30 mg    2. Lower abdominal pain  -     POCT URINALYSIS W/O SCOPE    3. Chronic constipation  -     polyethylene glycol (GLYCOLAX) 17 gram/dose powder; Mix one capful (17 grams) with 8 ounces of coffee or water and drink daily for constipation.  Dispense: 1530 g; Refill: 3         Active Problem List with Overview Notes    Diagnosis Date Noted    Type 2 diabetes mellitus with diabetic neuropathy, with long-term current use of insulin 08/25/2021    Essential hypertension 08/25/2021    Anxiety 01/23/2023    Other hyperlipidemia 07/07/2022    Morbid obesity 01/17/2022    Depression 01/23/2023    Femoral artery pseudo-aneurysm, right 01/17/2022    Other sleep apnea 11/30/2021    Lumbosacral  spondylosis without myelopathy 09/08/2021    Gastroesophageal reflux disease 08/25/2021    Chronic bilateral low back pain with right-sided sciatica 07/07/2022    Seasonal allergies 07/07/2022    Muscle spasm 07/07/2022    Lumbosacral radiculopathy 08/20/2024    Vertigo 08/06/2024      RTC as scheduled for chronic follow up. RTC sooner if needed.  Patient voiced understanding and is agreeable to plan.      Lisandra Mcdaniel MD    Family Medicine

## 2024-09-30 ENCOUNTER — TELEPHONE (OUTPATIENT)
Dept: FAMILY MEDICINE | Facility: CLINIC | Age: 49
End: 2024-09-30
Payer: COMMERCIAL

## 2024-09-30 NOTE — TELEPHONE ENCOUNTER
Contacted pt in regards to urine results. Informed pt of urine results. Pt voiced understanding and had no further questions.     ----- Message from Elisa Mcdaniel MD sent at 9/25/2024 12:36 PM CDT -----  Please contact the patient and let them know that their results were stable/ok and do not require any change in treatment. It does not appear that she has a UTI. Thanks!

## 2024-09-30 NOTE — TELEPHONE ENCOUNTER
Contacted pt in regards to CT results. Informed pt of results. Pt voiced understanding and had no further questions.     ----- Message from Elisa Mcdaniel MD sent at 9/25/2024  2:32 PM CDT -----  Please contact the patient and let them know that their results were stable/ok and do not require any change in treatment. CT head was normal. Thanks!

## 2024-10-01 ENCOUNTER — CLINICAL SUPPORT (OUTPATIENT)
Dept: REHABILITATION | Facility: HOSPITAL | Age: 49
End: 2024-10-01
Payer: COMMERCIAL

## 2024-10-01 DIAGNOSIS — M54.17 LUMBOSACRAL RADICULOPATHY: Primary | ICD-10-CM

## 2024-10-01 PROCEDURE — 97014 ELECTRIC STIMULATION THERAPY: CPT

## 2024-10-01 PROCEDURE — 97110 THERAPEUTIC EXERCISES: CPT

## 2024-10-01 NOTE — PROGRESS NOTES
[facility]   Physical Therapy Treatment Note      Name: Georgiana Zayas  Clinic Number: [id number]    Therapy Diagnosis:   Encounter Diagnosis   Name Primary?    Lumbosacral radiculopathy Yes     Physician: Ari Maciel MD    Visit Date: 10/1/2024    Physician Orders: PT Eval and Treat   2-3 times per week for 6 week s  Medical Diagnosis from Referral: M54.17 (ICD-10-CM) - Lumbosacral radiculopathy  M54.12 (ICD-10-CM) - Cervical radiculopathy  Evaluation Date: 8/20/2024  Authorization Period Expiration: 10/20/24  Plan of Care Expiration: 10/4/24  Progress Note Due: 10/4/24  Visit # / Visits authorized: 7/13  FOTO: 32/100  FOTO: 33/100  Precautions: Standard     PTA Visit #: 0/5     Time In: 818  Time Out: 848  Total Billable Time: 30 minutes    Subjective     Pt reports: continued sever low back pain.  She  reports being  compliant with home exercise program.  Response to previous treatment: fair  Functional change: continued low back pain    Pain: 8/10  Location: bilateral low back      Objective      Flexibility: 10/1/24    Right Left   HAMSTRING -18 -19        Treatment     Georgiana received the treatments listed below:      therapeutic exercises to bilateral LE's and core to develop strength, endurance, ROM, flexibility, and core stabilization for 20 minutes including:  Not today-Scifit bike 5 minutes level 2.5  Bilateral hamstring stretch 5 x 10 seconds  Pelvic tilts, bridging 1 x 10   SLR 1 x 10  Not today-side abduction, clam shells 2 x 10   Not today-Supine SKTC stretch      supervised modalities after being cleared for contradictions: IFC Electrical Stimulation:  Georgiana received IFC Electrical Stimulation for pain control applied to the lumbosacral concurrently with moist heat (appropriate towel layers). Pt received stimulation at 100 % scan at a frequency of 80/150 Hz at 12.5 cV for 10 minutes. Georgiana tolderated treatment well without any adverse effects.        Patient  Education and Home Exercises       Education provided:   -   Georgiana received verbal and tactile cues to facilitate proper execution of exercises and body mechanics.    Written Home Exercises Provided: Patient instructed to cont prior HEP.   Assessment   Georgiana presents to OP Physical Therapy with moderately severe LBP. She has completed 7 therapy visits with no significant change in her back pain since her initial visit. Most of the therapeutic exercises elicit a pain response. She did demonstrate some minimal improvement in hamstring flexibility per objective measurements. I recommended to patient to discuss with her doctor injections for pain management since therapy is having very little benefit in pain management. She agreed that she would discuss this on her next visit.    Georgiana Is progressing fair towards her goals.   Pt prognosis is Fair.     Pt will continue to benefit from skilled outpatient physical therapy to address the deficits listed in the problem list box on initial evaluation, provide pt/family education and to maximize pt's level of independence in the home and community environment.     Pt's spiritual, cultural and educational needs considered and pt agreeable to plan of care and goals.     Anticipated barriers to physical therapy: none    Goals:   Short Term GOALS: 3 weeks. Pt agrees with goals set.  1. Patient demonstrates independence with HEP. Met.  2. Patient demonstrates independence with Postural Awareness and  body mechanics. Met  3. Patient will report pain of 5/10 at worst, on 0-10 pain scale, with all daily activities.  4. Patient increase bilateral hamstring flexibility by 5 degrees to improve tolerance to functional activities with reduced pain. Met  5. Patient demonstrates increased exercise tolerance to 25 minutes or greater to improve tolerance to functional activities and community mobility with pain no greater than 5/10.         Long Term GOALS: 6 weeks. Pt agrees with goals  set.  1. Patient demonstrates increased strength BLE's to 3+/5 or greater to improve tolerance to functional activities.   2. Patient demonstrates improved overall function per FOTO Lumbar Survey Discharge Score to 40 or greater.   3. Patient will increase trunk/spine ROM by 10 degrees to improve functional mobility during work activities and ADL's.  4. Patient will increase core muscle strength flexion and extension to 3-/5 to improve functional mobility.  5. Patient will report pain of 3/10 at worst, on 0-10 pain scale, with all daily activities to improve quality of life.    Plan     Plan of care Certification: 8/20/2024 to 10/4/24.     Outpatient Physical Therapy 2 times weekly for 6 weeks to include the following interventions: Electrical Stimulation IFC, Manual Therapy, Moist Heat/ Ice, Neuromuscular Re-ed, Patient Education, Therapeutic Activities, Therapeutic Exercise, and Ultrasound.     Win Nieves, PT

## 2024-10-03 ENCOUNTER — CLINICAL SUPPORT (OUTPATIENT)
Dept: REHABILITATION | Facility: HOSPITAL | Age: 49
End: 2024-10-03
Payer: COMMERCIAL

## 2024-10-03 DIAGNOSIS — M54.17 LUMBOSACRAL RADICULOPATHY: Primary | ICD-10-CM

## 2024-10-03 PROCEDURE — 97110 THERAPEUTIC EXERCISES: CPT

## 2024-10-03 NOTE — PLAN OF CARE
[facility]  Physical Therapy Plan of Care Note     Name: Georgiana Zayas  Clinic Number: [id number]    Therapy Diagnosis:   Encounter Diagnosis   Name Primary?    Lumbosacral radiculopathy Yes     Physician: Ari Maciel MD    Visit Date: 10/3/2024     Physician Orders: PT Eval and Treat   2-3 times per week for 6 weeks  Medical Diagnosis from Referral: M54.17 (ICD-10-CM) - Lumbosacral radiculopathy  M54.12 (ICD-10-CM) - Cervical radiculopathy  Evaluation Date: 8/20/2024  Authorization Period Expiration: 10/20/24  Plan of Care Expiration: 10/18/24  Progress Note Due: 10/18/24  Visit # / Visits authorized: 8/13  FOTO: 32/100  FOTO: 33/100  Precautions: Standard     Precautions:  none  Functional Level Prior to Evaluation:  independent    Subjective     Update: Georgiana continues with chronic back pain 7-8/10 and core and hip muscle weakness.    Objective      Update:     Flexibility: 10/1/24    Right Left   HAMSTRING -18 -19      10/2/24  LUMBAR SPINE AROM:   Flexion: 40   Extension: 10   Left Sidebend: 15   Right Sidebend: 15   Left Rotation: 25/40   Right Rotation: 35/38          Assessment     Update: Chronic back pain with minimal reduction in pain complaints to date. Some improvement in spine ROM.    Previous Short Term Goals Status:   partially met  New Short Term Goals Status:   none  Long Term Goal Status: continue per initial plan of care.  Reasons for Recertification of Therapy:  to improve muscle strength in core and hips; improve pain symptoms.    GOALS  Short Term GOALS: 3 weeks. Pt agrees with goals set.  1. Patient demonstrates independence with HEP. Met.  2. Patient demonstrates independence with Postural Awareness and  body mechanics. Met  3. Patient will report pain of 5/10 at worst, on 0-10 pain scale, with all daily activities.  4. Patient increase bilateral hamstring flexibility by 5 degrees to improve tolerance to functional activities with reduced pain. Met  5.  Patient demonstrates increased exercise tolerance to 25 minutes or greater to improve tolerance to functional activities and community mobility with pain no greater than 5/10.         Long Term GOALS: 6 weeks. Pt agrees with goals set.  1. Patient demonstrates increased strength BLE's to 3+/5 or greater to improve tolerance to functional activities.   2. Patient demonstrates improved overall function per FOTO Lumbar Survey Discharge Score to 40 or greater.   3. Patient will increase trunk/spine ROM by 10 degrees to improve functional mobility during work activities and ADL's. Met for rotation.  4. Patient will increase core muscle strength flexion and extension to 3-/5 to improve functional mobility.  5. Patient will report pain of 3/10 at worst, on 0-10 pain scale, with all daily activities to improve quality of life.    Plan     Updated Certification Period: 10/3/24 to 10/18/24   Recommended Treatment Plan: 2 times per week for 2 weeks:  08654 [therapeutic exercise] and 45677 [unattended electrical stimulation].  Other Recommendations: none    Win Nieves, PT

## 2024-10-03 NOTE — PROGRESS NOTES
[facility]   Physical Therapy Treatment Note      Name: Georgiana Zayas  Clinic Number: [id number]    Therapy Diagnosis:   Encounter Diagnosis   Name Primary?    Lumbosacral radiculopathy Yes     Physician: Ari Maciel MD    Visit Date: 10/3/2024    Physician Orders: PT Eval and Treat   2-3 times per week for 6 weeks  Medical Diagnosis from Referral: M54.17 (ICD-10-CM) - Lumbosacral radiculopathy  M54.12 (ICD-10-CM) - Cervical radiculopathy  Evaluation Date: 8/20/2024  Authorization Period Expiration: 10/20/24  Plan of Care Expiration: 10/18/24  Progress Note Due: 10/18/24  Visit # / Visits authorized: 8/13  FOTO: 32/100  FOTO: 33/100  Precautions: Standard     PTA Visit #: 0/5     Time In: 814  Time Out: 844  Total Billable Time: 30 minutes    Subjective     Pt reports: my back is about the same, it was really hurting bad last night.  She  reports being  compliant with home exercise program.  Response to previous treatment: fair  Functional change: continued low back pain    Pain: 7/10  Location: bilateral low back      Objective      Flexibility: 10/1/24    Right Left   HAMSTRING -18 -19      10/2/24  LUMBAR SPINE AROM:   Flexion: 40   Extension: 10   Left Sidebend: 15   Right Sidebend: 15   Left Rotation: 25/40   Right Rotation: 35/38       Treatment     Georgiana received the treatments listed below:      therapeutic exercises to bilateral LE's and core to develop strength, endurance, ROM, flexibility, and core stabilization for 30 minutes including:  Scifit bike 5 minutes level 2.5 60-70 SPM  Bilateral hamstring stretch 5 x 10 seconds  Not today-Supine SKTC stretch  Supine trunk rotation 1 x 10 (pain)  Pelvic tilts, bridging 1 x 10  (pain)  SLR 1 x 10   side abduction, clam shells 1 x 10       Not today-supervised modalities after being cleared for contradictions: IFC Electrical Stimulation:  Georgiana received IFC Electrical Stimulation for pain control applied to the ()  concurrently with moist heat (appropriate towel layers). Pt received stimulation at () % scan at a frequency of () Hz at () cV for 10 minutes. Georgiana tolderated treatment well without any adverse effects.        Patient Education and Home Exercises       Education provided:   -   Georgiana received verbal and tactile cues to facilitate proper execution of exercises and body mechanics.    Written Home Exercises Provided: Patient instructed to cont prior HEP.   Assessment   Georgiana presents to OP Physical Therapy with continued moderately severe LBP. She has completed 8 therapy visits with no significant reduction in her back pain since her initial visit. She continues to report pain with exercise and activity during her therapy session. Her spine ROM has improved for trunk rotation but no change in extension or side bending.    Georgiana Is progressing fair towards her goals.   Pt prognosis is Fair.     Pt will continue to benefit from skilled outpatient physical therapy to address the deficits listed in the problem list box on initial evaluation, provide pt/family education and to maximize pt's level of independence in the home and community environment.     Pt's spiritual, cultural and educational needs considered and pt agreeable to plan of care and goals.     Anticipated barriers to physical therapy: none    Goals:   Short Term GOALS: 3 weeks. Pt agrees with goals set.  1. Patient demonstrates independence with HEP. Met.  2. Patient demonstrates independence with Postural Awareness and  body mechanics. Met  3. Patient will report pain of 5/10 at worst, on 0-10 pain scale, with all daily activities.  4. Patient increase bilateral hamstring flexibility by 5 degrees to improve tolerance to functional activities with reduced pain. Met  5. Patient demonstrates increased exercise tolerance to 25 minutes or greater to improve tolerance to functional activities and community mobility with pain no greater than 5/10.         Long  Term GOALS: 6 weeks. Pt agrees with goals set.  1. Patient demonstrates increased strength BLE's to 3+/5 or greater to improve tolerance to functional activities.   2. Patient demonstrates improved overall function per FOTO Lumbar Survey Discharge Score to 40 or greater.   3. Patient will increase trunk/spine ROM by 10 degrees to improve functional mobility during work activities and ADL's. Met for rotation.  4. Patient will increase core muscle strength flexion and extension to 3-/5 to improve functional mobility.  5. Patient will report pain of 3/10 at worst, on 0-10 pain scale, with all daily activities to improve quality of life.    Plan     Plan of care Certification: 10/3/24 to 10/20/24.     Outpatient Physical Therapy 2 times weekly for 6 weeks to include the following interventions: Electrical Stimulation IFC, Manual Therapy, Moist Heat/ Ice, Neuromuscular Re-ed, Patient Education, Therapeutic Activities, Therapeutic Exercise, and Ultrasound.     Win Nieves, PT

## 2024-10-08 ENCOUNTER — CLINICAL SUPPORT (OUTPATIENT)
Dept: REHABILITATION | Facility: HOSPITAL | Age: 49
End: 2024-10-08
Payer: COMMERCIAL

## 2024-10-08 ENCOUNTER — HOSPITAL ENCOUNTER (EMERGENCY)
Facility: HOSPITAL | Age: 49
Discharge: HOME | End: 2024-10-08
Payer: COMMERCIAL

## 2024-10-08 VITALS
HEIGHT: 64 IN | DIASTOLIC BLOOD PRESSURE: 95 MMHG | WEIGHT: 200 LBS | OXYGEN SATURATION: 99 % | SYSTOLIC BLOOD PRESSURE: 140 MMHG | BODY MASS INDEX: 34.15 KG/M2 | RESPIRATION RATE: 19 BRPM | TEMPERATURE: 98 F | HEART RATE: 100 BPM

## 2024-10-08 DIAGNOSIS — M79.674 PAIN OF TOE OF RIGHT FOOT: Primary | ICD-10-CM

## 2024-10-08 DIAGNOSIS — M79.671 RIGHT FOOT PAIN: ICD-10-CM

## 2024-10-08 DIAGNOSIS — M54.17 LUMBOSACRAL RADICULOPATHY: Primary | ICD-10-CM

## 2024-10-08 PROCEDURE — 96372 THER/PROPH/DIAG INJ SC/IM: CPT | Performed by: PHYSICIAN ASSISTANT

## 2024-10-08 PROCEDURE — 99284 EMERGENCY DEPT VISIT MOD MDM: CPT | Mod: 25

## 2024-10-08 PROCEDURE — 97110 THERAPEUTIC EXERCISES: CPT

## 2024-10-08 PROCEDURE — 63600175 PHARM REV CODE 636 W HCPCS: Performed by: PHYSICIAN ASSISTANT

## 2024-10-08 PROCEDURE — 99284 EMERGENCY DEPT VISIT MOD MDM: CPT | Mod: ,,, | Performed by: PHYSICIAN ASSISTANT

## 2024-10-08 PROCEDURE — 97014 ELECTRIC STIMULATION THERAPY: CPT

## 2024-10-08 RX ORDER — KETOROLAC TROMETHAMINE 30 MG/ML
30 INJECTION, SOLUTION INTRAMUSCULAR; INTRAVENOUS
Status: COMPLETED | OUTPATIENT
Start: 2024-10-08 | End: 2024-10-08

## 2024-10-08 RX ADMIN — KETOROLAC TROMETHAMINE 30 MG: 30 INJECTION, SOLUTION INTRAMUSCULAR at 09:10

## 2024-10-08 NOTE — PROGRESS NOTES
[facility]   Physical Therapy Treatment Note      Name: Georgiana Zayas  Clinic Number: [id number]    Therapy Diagnosis:   Encounter Diagnosis   Name Primary?    Lumbosacral radiculopathy Yes     Physician: Ari Maciel MD    Visit Date: 10/8/2024    Physician Orders: PT Eval and Treat   2-3 times per week for 6 weeks  Medical Diagnosis from Referral: M54.17 (ICD-10-CM) - Lumbosacral radiculopathy  M54.12 (ICD-10-CM) - Cervical radiculopathy  Evaluation Date: 8/20/2024  Authorization Period Expiration: 10/20/24  Plan of Care Expiration: 10/18/24  Progress Note Due: 10/18/24  Visit # / Visits authorized: 9/13  FOTO: 32/100  FOTO: 33/100  Precautions: Standard     PTA Visit #: 0/5     Time In: 800  Time Out: 839  Total Billable Time: 39 minutes    Subjective     Pt reports: no change in pain intensity. I also stubbed my toe this morning.  She  reports being  compliant with home exercise program.  Response to previous treatment: fair  Functional change: continued low back pain    Pain: 7/10  Location: bilateral low back      Objective      Flexibility: 10/1/24    Right Left   HAMSTRING -18 -19      10/2/24  LUMBAR SPINE AROM:   Flexion: 40   Extension: 10   Left Sidebend: 15   Right Sidebend: 15   Left Rotation: 40   Right Rotation: 38       10/8/24  LOWER EXTREMITY STRENGTH:    Left Right   Quadriceps 5/5 5/5   Hamstrings 4-/5 4-/5      Iliopsoas 3/5 3/5   Glute Med 3-/5 3-/5   Hip IR 3-/5 3-/5   Hip ER 3-/5 3-/5   Hip Ext 2+ 2+   Ankle DF 3+/5 3+/5   Ankle PF 3-/5 3-/5        Treatment     Georgiana received the treatments listed below:      therapeutic exercises to bilateral LE's and core to develop strength, endurance, ROM, flexibility, and core stabilization for 24 minutes including:  Scifit bike 5 minutes level 2.8 60-70 SPM  Not today-k.;Bilateral hamstring stretch 5 x 10 seconds  Supine SKTC stretch 10 x 10 seconds  Supine trunk rotation 1 x 10 (pain)  Pelvic tilts, bridging 1 x 10   (pain)  SLR 1 x 10   Not today-side abduction, clam shells 1 x 10       supervised modalities after being cleared for contradictions: IFC Electrical Stimulation:  Georgiana received IFC Electrical Stimulation for pain control applied to the lumbar concurrently with moist heat (appropriate towel layers). Pt received stimulation at 100 % scan at a frequency of 80/150 Hz at 16.0 cV for 15 minutes. Georgiana tolderated treatment well without any adverse effects.        Patient Education and Home Exercises       Education provided:   -   Georgiana received verbal and tactile cues to facilitate proper execution of exercises and body mechanics.    Written Home Exercises Provided: Patient instructed to cont prior HEP.   Assessment   Georgiana presents to OP Physical Therapy with continued moderately severe LBP. No change in pain intensity during exercises but some decrease in back pain following IFC estim. Some improvement in hip muscle strength per objective measurements.    Georgiana Is progressing fair towards her goals.   Pt prognosis is Fair.     Pt will continue to benefit from skilled outpatient physical therapy to address the deficits listed in the problem list box on initial evaluation, provide pt/family education and to maximize pt's level of independence in the home and community environment.     Pt's spiritual, cultural and educational needs considered and pt agreeable to plan of care and goals.     Anticipated barriers to physical therapy: none    Goals:   Short Term GOALS: 3 weeks. Pt agrees with goals set.  1. Patient demonstrates independence with HEP. Met.  2. Patient demonstrates independence with Postural Awareness and  body mechanics. Met  3. Patient will report pain of 5/10 at worst, on 0-10 pain scale, with all daily activities.  4. Patient increase bilateral hamstring flexibility by 5 degrees to improve tolerance to functional activities with reduced pain. Met  5. Patient demonstrates increased exercise tolerance  to 25 minutes or greater to improve tolerance to functional activities and community mobility with pain no greater than 5/10.         Long Term GOALS: 6 weeks. Pt agrees with goals set.  1. Patient demonstrates increased strength BLE's to 3+/5 or greater to improve tolerance to functional activities. Improved.  2. Patient demonstrates improved overall function per FOTO Lumbar Survey Discharge Score to 40 or greater.   3. Patient will increase trunk/spine ROM by 10 degrees to improve functional mobility during work activities and ADL's. Met for rotation.  4. Patient will increase core muscle strength flexion and extension to 3-/5 to improve functional mobility.  5. Patient will report pain of 3/10 at worst, on 0-10 pain scale, with all daily activities to improve quality of life.    Plan     Plan of care Certification: 10/3/24 to 10/18/24.     Outpatient Physical Therapy 2 times weekly for 6 weeks to include the following interventions: Electrical Stimulation IFC, Manual Therapy, Moist Heat/ Ice, Neuromuscular Re-ed, Patient Education, Therapeutic Activities, Therapeutic Exercise, and Ultrasound.     Win Nieves, PT

## 2024-10-08 NOTE — ED PROVIDER NOTES
Encounter Date: 10/8/2024       History     Chief Complaint   Patient presents with    Toe Injury     Patient is a 48-year-old female with history of right 2nd 3rd and 4th toe pain secondary to kicking her step stool last night.    She is a diabetic, and was concerned that she may have a fracture.    She is able to move her toes, is wearing sandals, and ambulated into the treatment room.  She has not taken any anti-inflammatories today.    She has a past medical history of hypertension, diabetes, sleep apnea, liver disease, hearing difficulty, arthritis, GERD, depression, low back pain, difficulty swallowing, irritable bowel syndrome, fatigue.    Past surgical history is positive for tonsillectomy, carpal tunnel release bilaterally, left heart catheterization  Patient denies any tobacco alcohol or drug use.          Review of patient's allergies indicates:   Allergen Reactions    Codeine Itching     Past Medical History:   Diagnosis Date    Arthritis     Back injury     Depression     Diabetes mellitus     Diabetes mellitus, type 2     Difficulty swallowing     Fatigue     GERD (gastroesophageal reflux disease)     Hearing difficulty     Hypertension     Irritable bowel syndrome without diarrhea     Liver disease     Loss of appetite     Sleep apnea      Past Surgical History:   Procedure Laterality Date    CARPAL TUNNEL RELEASE Bilateral      SECTION      INJECTION OF ANESTHETIC AGENT AROUND MEDIAL BRANCH NERVES INNERVATING LUMBAR FACET JOINT Bilateral 2021    Procedure: BLOCK, NERVE, FACET JOINT, LUMBAR, MEDIAL BRANCH;  Surgeon: Amari Razo MD;  Location: [facility];  Service: Pain Management;  Laterality: Bilateral;  Bilateral L3-S1 Facet Injection    INJECTION OF ANESTHETIC AGENT AROUND MEDIAL BRANCH NERVES INNERVATING LUMBAR FACET JOINT Bilateral 2022    Procedure: BLOCK, NERVE, FACET JOINT, LUMBAR, MEDIAL BRANCH;  Surgeon: Amari Razo MD;  Location: [facility];   Service: Pain Management;  Laterality: Bilateral;  Bilateral L3-S1 FI    INJECTION OF ANESTHETIC AGENT AROUND MEDIAL BRANCH NERVES INNERVATING LUMBAR FACET JOINT Bilateral 1/11/2023    Procedure: BLOCK, NERVE, FACET JOINT, LUMBAR, MEDIAL BRANCH;  Surgeon: Amari Razo MD;  Location: [facility];  Service: Pain Management;  Laterality: Bilateral;  Bilateral L3-S1 FI    LEFT HEART CATHETERIZATION Left 12/21/2021    Procedure: Left heart cath;  Surgeon: Jeremy Rojo DO;  Location: [facility] CATH LAB;  Service: Cardiology;  Laterality: Left;    RADIOFREQUENCY ABLATION OF LUMBAR MEDIAL BRANCH NERVE AT SINGLE LEVEL Bilateral 4/12/2023    Procedure: RADIOFREQUENCY ABLATION, NERVE, SPINAL, LUMBAR, MEDIAL BRANCH, 1 LEVEL;  Surgeon: Amari Razo MD;  Location: [facility];  Service: Pain Management;  Laterality: Bilateral;  Bilateral L3-5 RFTC    STAPEDES SURGERY Bilateral     TONSILLECTOMY       Family History   Problem Relation Name Age of Onset    Cancer Mother      Diabetes Mother      Hypertension Mother      Kidney failure Other      Heart disease Other       Social History     Tobacco Use    Smoking status: Never     Passive exposure: Never    Smokeless tobacco: Never   Substance Use Topics    Alcohol use: Not Currently    Drug use: Never     Review of Systems   Musculoskeletal:         2nd,3rd,and 4th Toe pain     All other systems reviewed and are negative.      Physical Exam     Initial Vitals [10/08/24 0850]   BP Pulse Resp Temp SpO2   (!) 151/109 101 18 98.1 °F (36.7 °C) 100 %      MAP       --         Physical Exam    Nursing note and vitals reviewed.  Constitutional: She appears well-developed and well-nourished. No distress.   HENT:   Head: Atraumatic.   Eyes: EOM are normal.   Cardiovascular:  Normal rate.           Pulmonary/Chest: No respiratory distress.   Musculoskeletal:      Comments: Patient's 2nd 3rd and 4th toes have normal appearance to them, she is able to move them  without any increased difficulty.    Patient was able to ambulate into treatment room under her own power.    There is no edema or ecchymosis     Neurological: She is alert and oriented to person, place, and time.   Skin: Skin is dry.   Psychiatric: She has a normal mood and affect.         Medical Screening Exam   See Full Note    ED Course   Procedures  Labs Reviewed - No data to display       Imaging Results              X-Ray Foot Complete Right (Final result)  Result time 10/08/24 09:39:06      Final result by Franklyn Ohara MD (10/08/24 09:39:06)                   Impression:      1. Mild edema about the dorsal aspect of the foot, no convincing acute displaced fracture or dislocation.      Electronically signed by: Franklyn Ohara MD  Date:    10/08/2024  Time:    09:39               Narrative:    EXAMINATION:  XR FOOT COMPLETE 3 VIEW RIGHT    CLINICAL HISTORY:  . Pain in right foot    TECHNIQUE:  AP, lateral, and oblique views of the right foot were performed.    COMPARISON:  None    FINDINGS:  Three views right foot.    There is hallux valgus.  No acute displaced fracture or dislocation of the foot.  No radiopaque foreign body.  There are degenerative changes along the dorsal aspect of the foot and calcaneus.  There is mild edema along the dorsal aspect of the foot.                                       Medications   ketorolac injection 30 mg (30 mg Intramuscular Given 10/8/24 0929)     Medical Decision Making  Patient is a 48-year-old female with history of right 2nd 3rd and 4th toe pain secondary to kicking her step stool last night.    She is a diabetic, and was concerned that she may have a fracture.    She is able to move her toes, is wearing sandals, and ambulated into the treatment room.  She has not taken any anti-inflammatories today.    She has a past medical history of hypertension, diabetes, sleep apnea, liver disease, hearing difficulty, arthritis, GERD, depression, low back pain,  difficulty swallowing, irritable bowel syndrome, fatigue.    Past surgical history is positive for tonsillectomy, carpal tunnel release bilaterally, left heart catheterization  Patient denies any tobacco alcohol or drug use.     No evidence of fracture on x-ray, patient will follow up with her primary care provider if symptoms continue.             Amount and/or Complexity of Data Reviewed  Radiology: ordered.    Risk  Prescription drug management.                                      Clinical Impression:   Final diagnoses:  [M79.671] Right foot pain  [M79.674] Pain of toe of right foot (Primary)        ED Disposition Condition    Discharge Stable          ED Prescriptions    None       Follow-up Information    None          Avel Rodriguez PA  10/08/24 1000

## 2024-10-08 NOTE — ED TRIAGE NOTES
C/o accidentally kicked a step stool yesterday and injured the 2nd,3rd and 4th toes on right foot. States she is a diabetic and felt she needed to get it checked out. Ambulated without any difficulty  
none

## 2024-10-09 ENCOUNTER — TELEPHONE (OUTPATIENT)
Dept: EMERGENCY MEDICINE | Facility: HOSPITAL | Age: 49
End: 2024-10-09
Payer: COMMERCIAL

## 2024-10-17 ENCOUNTER — CLINICAL SUPPORT (OUTPATIENT)
Dept: REHABILITATION | Facility: HOSPITAL | Age: 49
End: 2024-10-17
Payer: COMMERCIAL

## 2024-10-17 DIAGNOSIS — M54.17 LUMBOSACRAL RADICULOPATHY: Primary | ICD-10-CM

## 2024-10-17 PROCEDURE — 97110 THERAPEUTIC EXERCISES: CPT

## 2024-10-17 NOTE — PLAN OF CARE
[facility]  Physical Therapy Discharge Note    Name: Georgiana Zayas  Clinic Number: [id number]    Therapy Diagnosis:   Encounter Diagnosis   Name Primary?    Lumbosacral radiculopathy Yes     Physician: Ari Maciel MD    Visit Date: 10/17/2024     Physician Orders: PT Eval and Treat   2-3 times per week for 6 weeks  Medical Diagnosis from Referral: M54.17 (ICD-10-CM) - Lumbosacral radiculopathy  M54.12 (ICD-10-CM) - Cervical radiculopathy  Evaluation Date: 8/20/2024  Authorization Period Expiration: 10/20/24  Plan of Care Expiration: 10/18/24  Progress Note Due: 10/18/24  Visit # / Visits authorized: 10/13  FOTO: 32/100  FOTO: 33/100  Precautions: Standard       Date of Last visit: 10/17/24  Total Visits Received: 10    Objective       Flexibility: 10/1/24    Right Left   HAMSTRING -18 -19      10/2/24  LUMBAR SPINE AROM:   Flexion: 40   Extension: 10   Left Sidebend: 15   Right Sidebend: 15   Left Rotation: 40   Right Rotation: 38         10/8/24  LOWER EXTREMITY STRENGTH:    Left Right   Quadriceps 5/5 5/5   Hamstrings 4-/5 4-/5      Iliopsoas 3/5 3/5   Glute Med 3-/5 3-/5   Hip IR 3-/5 3-/5   Hip ER 3-/5 3-/5   Hip Ext 2+ 2+   Ankle DF 3+/5 3+/5   Ankle PF 3-/5 3-/5        ASSESSMENT        Discharge reason: Patient has reached the maximum rehab potential for the present time    Discharge FOTO Score: 33    Goals:   Short Term GOALS: 3 weeks. Pt agrees with goals set.  1. Patient demonstrates independence with HEP. Met.  2. Patient demonstrates independence with Postural Awareness and  body mechanics. Met  3. Patient will report pain of 5/10 at worst, on 0-10 pain scale, with all daily activities.  4. Patient increase bilateral hamstring flexibility by 5 degrees to improve tolerance to functional activities with reduced pain. Met  5. Patient demonstrates increased exercise tolerance to 25 minutes or greater to improve tolerance to functional activities and community mobility with  pain no greater than 5/10.         Long Term GOALS: 6 weeks. Pt agrees with goals set.  1. Patient demonstrates increased strength BLE's to 3+/5 or greater to improve tolerance to functional activities. Improved.  2. Patient demonstrates improved overall function per FOTO Lumbar Survey Discharge Score to 40 or greater.   3. Patient will increase trunk/spine ROM by 10 degrees to improve functional mobility during work activities and ADL's. Met for rotation.  4. Patient will increase core muscle strength flexion and extension to 3-/5 to improve functional mobility.  5. Patient will report pain of 3/10 at worst, on 0-10 pain scale, with all daily activities to improve quality of life.    PLAN   This patient is discharged from Physical Therapy      Win Nieves, PT

## 2024-10-17 NOTE — PROGRESS NOTES
[facility]   Physical Therapy Treatment Note      Name: Georgiana Zayas  Clinic Number: [id number]    Therapy Diagnosis:   Encounter Diagnosis   Name Primary?    Lumbosacral radiculopathy Yes     Physician: Ari Maciel MD    Visit Date: 10/17/2024    Physician Orders: PT Eval and Treat   2-3 times per week for 6 weeks  Medical Diagnosis from Referral: M54.17 (ICD-10-CM) - Lumbosacral radiculopathy  M54.12 (ICD-10-CM) - Cervical radiculopathy  Evaluation Date: 8/20/2024  Authorization Period Expiration: 10/20/24  Plan of Care Expiration: 10/18/24  Progress Note Due: 10/18/24  Visit # / Visits authorized: 10/13  FOTO: 32/100  FOTO: 33/100  Precautions: Standard     PTA Visit #: 0/5     Time In: 816  Time Out: 842  Total Billable Time: 26 minutes    Subjective     Pt reports: I am hurting this morning.  She  reports being  compliant with home exercise program.  Response to previous treatment: fair  Functional change: continued low back pain    Pain: 8/10  Location: bilateral low back      Objective      Flexibility: 10/1/24    Right Left   HAMSTRING -18 -19      10/2/24  LUMBAR SPINE AROM:   Flexion: 40   Extension: 10   Left Sidebend: 15   Right Sidebend: 15   Left Rotation: 40   Right Rotation: 38       10/8/24  LOWER EXTREMITY STRENGTH:    Left Right   Quadriceps 5/5 5/5   Hamstrings 4-/5 4-/5      Iliopsoas 3/5 3/5   Glute Med 3-/5 3-/5   Hip IR 3-/5 3-/5   Hip ER 3-/5 3-/5   Hip Ext 2+ 2+   Ankle DF 3+/5 3+/5   Ankle PF 3-/5 3-/5        Treatment     Georgiana received the treatments listed below:      therapeutic exercises to bilateral LE's and core to develop strength, endurance, ROM, flexibility, and core stabilization for 26 minutes including:  Scifit bike 5 minutes level 3.0 60-70 SPM  Bilateral hamstring stretch 10 x 10 seconds  Supine SKTC stretch 10 x 10 seconds  Pelvic tilts, bridging 1 x 10 each  (pain)  SLR 1 x 10   side abduction 1 x 10 (pain)        Patient Education and  Home Exercises       Education provided:   -   Georgiana received verbal and tactile cues to facilitate proper execution of exercises and body mechanics.    Written Home Exercises Provided: Patient instructed to cont prior HEP.   Assessment   Georgiana presents to OP Physical Therapy with continued moderately severe LBP. Therapy has not significantly improved her chronic pain and she continues to report pain with strengthening exercises and activity. She will discharge today from skilled services. Recommend she returns to MD for follow up to discuss her continued chronic pain.      Pt's spiritual, cultural and educational needs considered and pt agreeable to plan of care and goals.     Anticipated barriers to physical therapy: chronic pain    Goals:   Short Term GOALS: 3 weeks. Pt agrees with goals set.  1. Patient demonstrates independence with HEP. Met.  2. Patient demonstrates independence with Postural Awareness and  body mechanics. Met  3. Patient will report pain of 5/10 at worst, on 0-10 pain scale, with all daily activities.  4. Patient increase bilateral hamstring flexibility by 5 degrees to improve tolerance to functional activities with reduced pain. Met  5. Patient demonstrates increased exercise tolerance to 25 minutes or greater to improve tolerance to functional activities and community mobility with pain no greater than 5/10.         Long Term GOALS: 6 weeks. Pt agrees with goals set.  1. Patient demonstrates increased strength BLE's to 3+/5 or greater to improve tolerance to functional activities. Improved.  2. Patient demonstrates improved overall function per FOTO Lumbar Survey Discharge Score to 40 or greater.   3. Patient will increase trunk/spine ROM by 10 degrees to improve functional mobility during work activities and ADL's. Met for rotation.  4. Patient will increase core muscle strength flexion and extension to 3-/5 to improve functional mobility.  5. Patient will report pain of 3/10 at worst,  on 0-10 pain scale, with all daily activities to improve quality of life.    Plan     Discharge therapy services.    Win Nieves, PT

## 2024-10-31 ENCOUNTER — CLINICAL SUPPORT (OUTPATIENT)
Dept: FAMILY MEDICINE | Facility: CLINIC | Age: 49
End: 2024-10-31
Payer: COMMERCIAL

## 2024-10-31 DIAGNOSIS — Z23 IMMUNIZATION DUE: Primary | ICD-10-CM

## 2024-10-31 PROCEDURE — 90471 IMMUNIZATION ADMIN: CPT | Mod: ,,, | Performed by: NURSE PRACTITIONER

## 2024-10-31 PROCEDURE — 90656 IIV3 VACC NO PRSV 0.5 ML IM: CPT | Mod: ,,, | Performed by: NURSE PRACTITIONER

## 2024-11-25 ENCOUNTER — OFFICE VISIT (OUTPATIENT)
Dept: FAMILY MEDICINE | Facility: CLINIC | Age: 49
End: 2024-11-25
Payer: COMMERCIAL

## 2024-11-25 VITALS
RESPIRATION RATE: 17 BRPM | OXYGEN SATURATION: 100 % | BODY MASS INDEX: 33.8 KG/M2 | HEIGHT: 64 IN | SYSTOLIC BLOOD PRESSURE: 113 MMHG | DIASTOLIC BLOOD PRESSURE: 79 MMHG | HEART RATE: 107 BPM | WEIGHT: 198 LBS | TEMPERATURE: 98 F

## 2024-11-25 DIAGNOSIS — M62.838 MUSCLE SPASM: ICD-10-CM

## 2024-11-25 DIAGNOSIS — M47.817 LUMBOSACRAL SPONDYLOSIS WITHOUT MYELOPATHY: Chronic | ICD-10-CM

## 2024-11-25 DIAGNOSIS — E11.40 TYPE 2 DIABETES MELLITUS WITH DIABETIC NEUROPATHY, WITH LONG-TERM CURRENT USE OF INSULIN: Primary | ICD-10-CM

## 2024-11-25 DIAGNOSIS — J30.89 NON-SEASONAL ALLERGIC RHINITIS DUE TO OTHER ALLERGIC TRIGGER: ICD-10-CM

## 2024-11-25 DIAGNOSIS — E78.49 OTHER HYPERLIPIDEMIA: ICD-10-CM

## 2024-11-25 DIAGNOSIS — Z12.31 SCREENING MAMMOGRAM FOR BREAST CANCER: ICD-10-CM

## 2024-11-25 DIAGNOSIS — Z79.4 TYPE 2 DIABETES MELLITUS WITH DIABETIC NEUROPATHY, WITH LONG-TERM CURRENT USE OF INSULIN: Primary | ICD-10-CM

## 2024-11-25 DIAGNOSIS — I10 ESSENTIAL HYPERTENSION: ICD-10-CM

## 2024-11-25 LAB
ALBUMIN SERPL BCP-MCNC: 3.6 G/DL (ref 3.5–5)
ALBUMIN/GLOB SERPL: 1 {RATIO}
ALP SERPL-CCNC: 54 U/L (ref 40–150)
ALT SERPL W P-5'-P-CCNC: 25 U/L
ANION GAP SERPL CALCULATED.3IONS-SCNC: 12 MMOL/L (ref 7–16)
AST SERPL W P-5'-P-CCNC: 53 U/L (ref 5–34)
BASOPHILS # BLD AUTO: 0.04 K/UL (ref 0–0.2)
BASOPHILS NFR BLD AUTO: 0.8 % (ref 0–1)
BILIRUB SERPL-MCNC: 0.5 MG/DL
BUN SERPL-MCNC: 6 MG/DL (ref 7–19)
BUN/CREAT SERPL: 7 (ref 6–20)
CALCIUM SERPL-MCNC: 9.3 MG/DL (ref 8.4–10.2)
CHLORIDE SERPL-SCNC: 99 MMOL/L (ref 98–107)
CHOLEST SERPL-MCNC: 122 MG/DL
CHOLEST/HDLC SERPL: 2.6 {RATIO}
CO2 SERPL-SCNC: 31 MMOL/L (ref 22–29)
CREAT SERPL-MCNC: 0.86 MG/DL (ref 0.55–1.02)
CREAT UR-MCNC: 77 MG/DL (ref 15–325)
DIFFERENTIAL METHOD BLD: ABNORMAL
EGFR (NO RACE VARIABLE) (RUSH/TITUS): 83 ML/MIN/1.73M2
EOSINOPHIL # BLD AUTO: 0.2 K/UL (ref 0–0.5)
EOSINOPHIL NFR BLD AUTO: 4 % (ref 1–4)
ERYTHROCYTE [DISTWIDTH] IN BLOOD BY AUTOMATED COUNT: 13.4 % (ref 11.5–14.5)
EST. AVERAGE GLUCOSE BLD GHB EST-MCNC: 194 MG/DL
GLOBULIN SER-MCNC: 3.5 G/DL (ref 2–4)
GLUCOSE SERPL-MCNC: 136 MG/DL (ref 74–100)
HBA1C MFR BLD HPLC: 8.4 %
HCT VFR BLD AUTO: 37.7 % (ref 38–47)
HDLC SERPL-MCNC: 47 MG/DL (ref 35–60)
HGB BLD-MCNC: 12 G/DL (ref 12–16)
IMM GRANULOCYTES # BLD AUTO: 0.01 K/UL (ref 0–0.04)
IMM GRANULOCYTES NFR BLD: 0.2 % (ref 0–0.4)
LDLC SERPL CALC-MCNC: 62 MG/DL
LDLC/HDLC SERPL: 1.3 {RATIO}
LYMPHOCYTES # BLD AUTO: 2.54 K/UL (ref 1–4.8)
LYMPHOCYTES NFR BLD AUTO: 50.9 % (ref 27–41)
MCH RBC QN AUTO: 27.4 PG (ref 27–31)
MCHC RBC AUTO-ENTMCNC: 31.8 G/DL (ref 32–36)
MCV RBC AUTO: 86.1 FL (ref 80–96)
MICROALBUMIN UR-MCNC: <5 MG/DL
MICROALBUMIN/CREAT RATIO PNL UR: ABNORMAL
MONOCYTES # BLD AUTO: 0.34 K/UL (ref 0–0.8)
MONOCYTES NFR BLD AUTO: 6.8 % (ref 2–6)
MPC BLD CALC-MCNC: 10.8 FL (ref 9.4–12.4)
NEUTROPHILS # BLD AUTO: 1.86 K/UL (ref 1.8–7.7)
NEUTROPHILS NFR BLD AUTO: 37.3 % (ref 53–65)
NONHDLC SERPL-MCNC: 75 MG/DL
NRBC # BLD AUTO: 0 X10E3/UL
NRBC, AUTO (.00): 0 %
PLATELET # BLD AUTO: 348 K/UL (ref 150–400)
POTASSIUM SERPL-SCNC: 3.8 MMOL/L (ref 3.5–5.1)
PROT SERPL-MCNC: 7.1 G/DL (ref 6.4–8.3)
RBC # BLD AUTO: 4.38 M/UL (ref 4.2–5.4)
SODIUM SERPL-SCNC: 138 MMOL/L (ref 136–145)
TRIGL SERPL-MCNC: 64 MG/DL (ref 37–140)
VLDLC SERPL-MCNC: 13 MG/DL
WBC # BLD AUTO: 4.99 K/UL (ref 4.5–11)

## 2024-11-25 PROCEDURE — 85025 COMPLETE CBC W/AUTO DIFF WBC: CPT | Mod: ,,, | Performed by: CLINICAL MEDICAL LABORATORY

## 2024-11-25 PROCEDURE — 3008F BODY MASS INDEX DOCD: CPT | Mod: CPTII,,, | Performed by: FAMILY MEDICINE

## 2024-11-25 PROCEDURE — 82043 UR ALBUMIN QUANTITATIVE: CPT | Mod: ,,, | Performed by: CLINICAL MEDICAL LABORATORY

## 2024-11-25 PROCEDURE — 80061 LIPID PANEL: CPT | Mod: ,,, | Performed by: CLINICAL MEDICAL LABORATORY

## 2024-11-25 PROCEDURE — 99214 OFFICE O/P EST MOD 30 MIN: CPT | Mod: 25,,, | Performed by: FAMILY MEDICINE

## 2024-11-25 PROCEDURE — 3078F DIAST BP <80 MM HG: CPT | Mod: CPTII,,, | Performed by: FAMILY MEDICINE

## 2024-11-25 PROCEDURE — 3052F HG A1C>EQUAL 8.0%<EQUAL 9.0%: CPT | Mod: CPTII,,, | Performed by: FAMILY MEDICINE

## 2024-11-25 PROCEDURE — 1159F MED LIST DOCD IN RCRD: CPT | Mod: CPTII,,, | Performed by: FAMILY MEDICINE

## 2024-11-25 PROCEDURE — 3061F NEG MICROALBUMINURIA REV: CPT | Mod: CPTII,,, | Performed by: FAMILY MEDICINE

## 2024-11-25 PROCEDURE — 4010F ACE/ARB THERAPY RXD/TAKEN: CPT | Mod: CPTII,,, | Performed by: FAMILY MEDICINE

## 2024-11-25 PROCEDURE — 3066F NEPHROPATHY DOC TX: CPT | Mod: CPTII,,, | Performed by: FAMILY MEDICINE

## 2024-11-25 PROCEDURE — 96372 THER/PROPH/DIAG INJ SC/IM: CPT | Mod: ,,, | Performed by: FAMILY MEDICINE

## 2024-11-25 PROCEDURE — 82570 ASSAY OF URINE CREATININE: CPT | Mod: ,,, | Performed by: CLINICAL MEDICAL LABORATORY

## 2024-11-25 PROCEDURE — 3074F SYST BP LT 130 MM HG: CPT | Mod: CPTII,,, | Performed by: FAMILY MEDICINE

## 2024-11-25 PROCEDURE — 80053 COMPREHEN METABOLIC PANEL: CPT | Mod: ,,, | Performed by: CLINICAL MEDICAL LABORATORY

## 2024-11-25 PROCEDURE — 83036 HEMOGLOBIN GLYCOSYLATED A1C: CPT | Mod: ,,, | Performed by: CLINICAL MEDICAL LABORATORY

## 2024-11-25 RX ORDER — CYCLOBENZAPRINE HCL 10 MG
10 TABLET ORAL 3 TIMES DAILY PRN
Qty: 90 TABLET | Refills: 1 | Status: SHIPPED | OUTPATIENT
Start: 2024-11-25

## 2024-11-25 RX ORDER — KETOROLAC TROMETHAMINE 30 MG/ML
30 INJECTION, SOLUTION INTRAMUSCULAR; INTRAVENOUS
Status: COMPLETED | OUTPATIENT
Start: 2024-11-25 | End: 2024-11-25

## 2024-11-25 RX ORDER — CHLORPHENIRAMINE MALEATE, DEXTROMETHORPHAN HYDROBROMIDE, AND PHENYLEPHRINE HYDROCHLORIDE 4; 10; 10 MG/1; MG/1; MG/1
1 TABLET, COATED ORAL EVERY 8 HOURS PRN
Qty: 30 TABLET | Refills: 2 | Status: SHIPPED | OUTPATIENT
Start: 2024-11-25

## 2024-11-25 RX ADMIN — KETOROLAC TROMETHAMINE 30 MG: 30 INJECTION, SOLUTION INTRAMUSCULAR; INTRAVENOUS at 09:11

## 2024-11-25 NOTE — PROGRESS NOTES
Clinic Note    Patient Name: Georgiana Zayas  : 1975  MRN: [id number]    Chief Complaint   Patient presents with    Follow-up       HPI:    Ms. Georgiana Zayas is a 48 y.o. female who presents to clinic today with CC of follow up on chronic disease processes including Type II DM, HTN, HLD, obesity, GERD, lumbosacral spondylosis with associated chronic back pain, anxiety, depression, and seasonal allergies.  Reports flare with chronic pain. Requests toradol injection today.   Patient reports chronic issues are, otherwise, well controlled on current medication regimen.  Denies problems or side effects with medications.  Patient is, otherwise, without complaints.     Medications:  Medication List with Changes/Refills   Current Medications    ARIPIPRAZOLE (ABILIFY) 2 MG TAB    Take 1 tablet (2 mg total) by mouth once daily.    ARIPIPRAZOLE (ABILIFY) 5 MG TAB    Take 5 mg by mouth.    BENZTROPINE (COGENTIN) 0.5 MG TABLET    Take 0.5 mg by mouth once daily.    CARBAMIDE PEROXIDE (DEBROX) 6.5 % OTIC SOLUTION    Place 5 drops into the left ear 2 (two) times daily. X 4 days    DEXTROMETHORPHAN-TRIPROLIDINE (ENDAL, DM-TRIPROLIDINE,) 10-1.25 MG/5 ML SOLN    Take 5 mLs by mouth every 6 (six) hours as needed (congestion or cough).    DULOXETINE (CYMBALTA) 60 MG CAPSULE    Take 1 capsule (60 mg total) by mouth 2 (two) times daily.    FLUCONAZOLE (DIFLUCAN) 150 MG TAB    Take one tablet by mouth every 72 hours X 3 doses if needed for yeast infection after completion of antibiotics.    FLUTICASONE PROPIONATE (FLONASE) 50 MCG/ACTUATION NASAL SPRAY    1 spray by Each Nostril route as needed.    GABAPENTIN (NEURONTIN) 800 MG TABLET    Take 1 tablet (800 mg total) by mouth 3 (three) times daily.    GLIPIZIDE (GLUCOTROL) 10 MG TABLET    Take 1 tablet (10 mg total) by mouth 2 (two) times daily with meals.    HYDROCHLOROTHIAZIDE (HYDRODIURIL) 25 MG TABLET    Take 1 tablet (25 mg total) by mouth once daily.    HYDROXYZINE PAMOATE  (VISTARIL) 25 MG CAP    Take 1 capsule (25 mg total) by mouth 3 (three) times daily.    IBUPROFEN (ADVIL,MOTRIN) 800 MG TABLET    Take 1 tablet (800 mg total) by mouth 3 (three) times daily as needed for Pain.    INSULIN GLARGINE U-100, LANTUS, (LANTUS SOLOSTAR U-100 INSULIN) 100 UNIT/ML (3 ML) INPN PEN    Inject 15 Units into the skin every evening.    LISINOPRIL (PRINIVIL,ZESTRIL) 2.5 MG TABLET    Take 1 tablet (2.5 mg total) by mouth once daily.    MECLIZINE (ANTIVERT) 25 MG TABLET    Take 1 tablet (25 mg total) by mouth 3 (three) times daily as needed for Dizziness.    METHOCARBAMOL (ROBAXIN) 500 MG TAB    Take 500 mg by mouth 3 (three) times daily.    MOMETASONE (NASONEX) 50 MCG/ACTUATION NASAL SPRAY    2 sprays by Nasal route once daily.    OMEPRAZOLE (PRILOSEC) 40 MG CAPSULE    Take 1 capsule (40 mg total) by mouth Daily.    OXYCODONE-ACETAMINOPHEN (PERCOCET)  MG PER TABLET    Take 1 tablet by mouth every 6 (six) hours as needed.     POLYETHYLENE GLYCOL (GLYCOLAX) 17 GRAM/DOSE POWDER    Mix one capful (17 grams) with 8 ounces of coffee or water and drink daily for constipation.    PRAVASTATIN (PRAVACHOL) 20 MG TABLET    Take 1 tablet (20 mg total) by mouth every evening.    PROMETHAZINE (PHENERGAN) 25 MG TABLET    Take 1 tablet (25 mg total) by mouth every 6 (six) hours as needed for Nausea.    RISPERIDONE (RISPERDAL) 3 MG TAB    Take 1 tablet (3 mg total) by mouth once daily.    SEMAGLUTIDE (OZEMPIC) 2 MG/DOSE (8 MG/3 ML) PNIJ    Inject 2 mg into the skin every 7 days.    TRAZODONE (DESYREL) 150 MG TABLET    Take 150 mg by mouth nightly.   Changed and/or Refilled Medications    Modified Medication Previous Medication    CHLORPHENIRAMINE-PHENYLEPH-DM (ED A-HIST DM) 4-10-10 MG TAB chlorpheniramine-phenyleph-DM (ED A-HIST DM) 4-10-10 mg Tab       Take 1 tablet by mouth every 8 (eight) hours as needed (congestion or cough).    Take 1 tablet by mouth every 8 (eight) hours as needed (congestion or cough).     CYCLOBENZAPRINE (FLEXERIL) 10 MG TABLET cyclobenzaprine (FLEXERIL) 10 MG tablet       Take 1 tablet (10 mg total) by mouth 3 (three) times daily as needed for Muscle spasms (may cause drowsiness).    Take 1 tablet (10 mg total) by mouth 3 (three) times daily as needed for Muscle spasms (may cause drowsiness).        Allergies: Codeine      Past Medical History:    Past Medical History:   Diagnosis Date    Arthritis     Back injury     Depression     Diabetes mellitus     Diabetes mellitus, type 2     Difficulty swallowing     Fatigue     GERD (gastroesophageal reflux disease)     Hearing difficulty     Hypertension     Irritable bowel syndrome without diarrhea     Liver disease     Loss of appetite     Sleep apnea        Past Surgical History:    Past Surgical History:   Procedure Laterality Date    CARPAL TUNNEL RELEASE Bilateral      SECTION      INJECTION OF ANESTHETIC AGENT AROUND MEDIAL BRANCH NERVES INNERVATING LUMBAR FACET JOINT Bilateral 2021    Procedure: BLOCK, NERVE, FACET JOINT, LUMBAR, MEDIAL BRANCH;  Surgeon: Amari Razo MD;  Location: [facility]IN [facility];  Service: Pain Management;  Laterality: Bilateral;  Bilateral L3-S1 Facet Injection    INJECTION OF ANESTHETIC AGENT AROUND MEDIAL BRANCH NERVES INNERVATING LUMBAR FACET JOINT Bilateral 2022    Procedure: BLOCK, NERVE, FACET JOINT, LUMBAR, MEDIAL BRANCH;  Surgeon: Amari Razo MD;  Location: [facility]IN [facility];  Service: Pain Management;  Laterality: Bilateral;  Bilateral L3-S1 FI    INJECTION OF ANESTHETIC AGENT AROUND MEDIAL BRANCH NERVES INNERVATING LUMBAR FACET JOINT Bilateral 2023    Procedure: BLOCK, NERVE, FACET JOINT, LUMBAR, MEDIAL BRANCH;  Surgeon: Amari Razo MD;  Location: [facility]IN [facility];  Service: Pain Management;  Laterality: Bilateral;  Bilateral L3-S1 FI    LEFT HEART CATHETERIZATION Left 2021    Procedure: Left heart cath;  Surgeon: Jeremy Rojo DO;  Location: [facility] CATH  "LAB;  Service: Cardiology;  Laterality: Left;    RADIOFREQUENCY ABLATION OF LUMBAR MEDIAL BRANCH NERVE AT SINGLE LEVEL Bilateral 4/12/2023    Procedure: RADIOFREQUENCY ABLATION, NERVE, SPINAL, LUMBAR, MEDIAL BRANCH, 1 LEVEL;  Surgeon: Amari Razo MD;  Location: [facility] PAIN [facility];  Service: Pain Management;  Laterality: Bilateral;  Bilateral L3-5 RFTC    STAPEDES SURGERY Bilateral     TONSILLECTOMY           Social History:    Social History     Tobacco Use   Smoking Status Never    Passive exposure: Never   Smokeless Tobacco Never     Social History     Substance and Sexual Activity   Alcohol Use Not Currently     Social History     Substance and Sexual Activity   Drug Use Never         Family History:    Family History   Problem Relation Name Age of Onset    Cancer Mother      Diabetes Mother      Hypertension Mother      Kidney failure Other      Heart disease Other         Review of Systems:    Review of Systems   Constitutional:  Negative for appetite change, chills, fatigue, fever and unexpected weight change.   Eyes:  Negative for visual disturbance.   Respiratory:  Negative for cough and shortness of breath.    Cardiovascular:  Negative for chest pain and leg swelling.   Gastrointestinal:  Negative for abdominal pain, change in bowel habit, constipation, diarrhea, nausea and vomiting.   Musculoskeletal:  Positive for arthralgias and back pain.   Integumentary:  Negative for rash.   Neurological:  Negative for dizziness and headaches.   Psychiatric/Behavioral:  The patient is not nervous/anxious.         Vitals:    Vitals:    11/25/24 0853   BP: 113/79   BP Location: Left arm   Patient Position: Sitting   Pulse: 107   Resp: 17   Temp: 98.3 °F (36.8 °C)   TempSrc: Oral   SpO2: 100%   Weight: 89.8 kg (198 lb)   Height: 5' 4" (1.626 m)       Body mass index is 33.99 kg/m².    Wt Readings from Last 3 Encounters:   11/25/24 0853 89.8 kg (198 lb)   10/08/24 0850 90.7 kg (200 lb)   09/25/24 0912 90.2 kg " (198 lb 12.8 oz)        Physical Exam:    Physical Exam  Constitutional:       General: She is not in acute distress.     Appearance: Normal appearance. She is obese.   HENT:      Nose: Nose normal.      Mouth/Throat:      Mouth: Mucous membranes are moist.      Pharynx: Oropharynx is clear.   Eyes:      Conjunctiva/sclera: Conjunctivae normal.   Cardiovascular:      Rate and Rhythm: Normal rate and regular rhythm.      Heart sounds: Normal heart sounds. No murmur heard.  Pulmonary:      Effort: Pulmonary effort is normal. No respiratory distress.      Breath sounds: Normal breath sounds. No wheezing, rhonchi or rales.   Abdominal:      General: Bowel sounds are normal.      Palpations: Abdomen is soft.      Tenderness: There is no abdominal tenderness. There is no guarding or rebound.   Musculoskeletal:      Cervical back: Neck supple.      Right lower leg: No edema.      Left lower leg: No edema.   Skin:     Findings: No rash.   Neurological:      General: No focal deficit present.      Mental Status: She is alert. Mental status is at baseline.   Psychiatric:         Mood and Affect: Mood normal.           Assessment/Plan:   1. Type 2 diabetes mellitus with diabetic neuropathy, with long-term current use of insulin  -     Hemoglobin A1C; Future; Expected date: 11/25/2024  -     CBC Auto Differential; Future; Expected date: 11/25/2024  -     Comprehensive Metabolic Panel; Future; Expected date: 11/25/2024  -     Lipid Panel; Future; Expected date: 11/25/2024  -     Microalbumin/Creatinine Ratio, Urine    2. Muscle spasm  -     cyclobenzaprine (FLEXERIL) 10 MG tablet; Take 1 tablet (10 mg total) by mouth 3 (three) times daily as needed for Muscle spasms (may cause drowsiness).  Dispense: 90 tablet; Refill: 1    3. Non-seasonal allergic rhinitis due to other allergic trigger  -     chlorpheniramine-phenyleph-DM (ED A-HIST DM) 4-10-10 mg Tab; Take 1 tablet by mouth every 8 (eight) hours as needed (congestion or  cough).  Dispense: 30 tablet; Refill: 2    4. Essential hypertension  -     CBC Auto Differential; Future; Expected date: 11/25/2024  -     Comprehensive Metabolic Panel; Future; Expected date: 11/25/2024  -     Lipid Panel; Future; Expected date: 11/25/2024  -     Microalbumin/Creatinine Ratio, Urine    5. Other hyperlipidemia  -     CBC Auto Differential; Future; Expected date: 11/25/2024  -     Comprehensive Metabolic Panel; Future; Expected date: 11/25/2024  -     Lipid Panel; Future; Expected date: 11/25/2024    6. Lumbosacral spondylosis without myelopathy  -     ketorolac injection 30 mg    7. Screening mammogram for breast cancer  -     Mammo Digital Screening Bilat w/ Carlos; Future; Expected date: 11/25/2024         Active Problem List with Overview Notes    Diagnosis Date Noted    Type 2 diabetes mellitus with diabetic neuropathy, with long-term current use of insulin 08/25/2021    Essential hypertension 08/25/2021    Anxiety 01/23/2023    Other hyperlipidemia 07/07/2022    Morbid obesity 01/17/2022    Depression 01/23/2023    Femoral artery pseudo-aneurysm, right 01/17/2022    Other sleep apnea 11/30/2021    Lumbosacral spondylosis without myelopathy 09/08/2021    Gastroesophageal reflux disease 08/25/2021    Chronic bilateral low back pain with right-sided sciatica 07/07/2022    Seasonal allergies 07/07/2022    Muscle spasm 07/07/2022    Lumbosacral radiculopathy 08/20/2024    Vertigo 08/06/2024        Health Maintenance:  Health Maintenance   Topic Date Due    Hepatitis C Screening  Never done    TETANUS VACCINE  Never done    Colorectal Cancer Screening  Never done    Mammogram  09/14/2024    Eye Exam  10/23/2024    Hemoglobin A1c  11/05/2024    Foot Exam  08/05/2025    Lipid Panel  08/05/2025    Low Dose Statin  11/25/2025   Patient reports current insurance does not cover diabetic eye exam.  Colonoscopy scheduled  Mammogram ordered today.    RTC in 4 months for chronic follow up.  RTC sooner if  needed.   Patient voiced understanding and is agreeable to plan.      Lisandra Mcdaniel MD    Family Medicine

## 2024-11-27 ENCOUNTER — TELEPHONE (OUTPATIENT)
Dept: FAMILY MEDICINE | Facility: CLINIC | Age: 49
End: 2024-11-27
Payer: COMMERCIAL

## 2024-11-27 NOTE — TELEPHONE ENCOUNTER
Attempted to contact pt in regards to lab results. Pt was not available, was able to leave message for pt to return phone call.     ----- Message from Elisa Mcdaniel MD sent at 11/26/2024  5:14 PM CST -----  Please call patient regarding lab results. DM control is improved but not at goal with HbA1C of 8.4. increase lantus to 20 units SC every evening from 15 units. Low carb diet. Exercise. Home blood glucose monitoring. Call with persistently elevated readings so additional medication adjustments can be made.  Labs, otherwise, ok/stable. Thanks!

## 2025-01-14 ENCOUNTER — OFFICE VISIT (OUTPATIENT)
Dept: FAMILY MEDICINE | Facility: CLINIC | Age: 50
End: 2025-01-14
Payer: COMMERCIAL

## 2025-01-14 VITALS
HEIGHT: 64 IN | RESPIRATION RATE: 18 BRPM | HEART RATE: 110 BPM | OXYGEN SATURATION: 100 % | WEIGHT: 197.38 LBS | SYSTOLIC BLOOD PRESSURE: 124 MMHG | TEMPERATURE: 98 F | DIASTOLIC BLOOD PRESSURE: 76 MMHG | BODY MASS INDEX: 33.7 KG/M2

## 2025-01-14 DIAGNOSIS — M47.817 LUMBOSACRAL SPONDYLOSIS WITHOUT MYELOPATHY: Chronic | ICD-10-CM

## 2025-01-14 DIAGNOSIS — Z11.3 SCREENING EXAMINATION FOR STD (SEXUALLY TRANSMITTED DISEASE): ICD-10-CM

## 2025-01-14 DIAGNOSIS — Z12.4 SCREENING FOR CERVICAL CANCER: Primary | ICD-10-CM

## 2025-01-14 PROCEDURE — 87389 HIV-1 AG W/HIV-1&-2 AB AG IA: CPT | Mod: ,,, | Performed by: CLINICAL MEDICAL LABORATORY

## 2025-01-14 PROCEDURE — 99213 OFFICE O/P EST LOW 20 MIN: CPT | Mod: 25,,, | Performed by: FAMILY MEDICINE

## 2025-01-14 PROCEDURE — 3074F SYST BP LT 130 MM HG: CPT | Mod: CPTII,,, | Performed by: FAMILY MEDICINE

## 2025-01-14 PROCEDURE — 86780 TREPONEMA PALLIDUM: CPT | Mod: ,,, | Performed by: CLINICAL MEDICAL LABORATORY

## 2025-01-14 PROCEDURE — 3008F BODY MASS INDEX DOCD: CPT | Mod: CPTII,,, | Performed by: FAMILY MEDICINE

## 2025-01-14 PROCEDURE — 88141 CYTOPATH C/V INTERPRET: CPT | Mod: ,,, | Performed by: PATHOLOGY

## 2025-01-14 PROCEDURE — 1159F MED LIST DOCD IN RCRD: CPT | Mod: CPTII,,, | Performed by: FAMILY MEDICINE

## 2025-01-14 PROCEDURE — 3078F DIAST BP <80 MM HG: CPT | Mod: CPTII,,, | Performed by: FAMILY MEDICINE

## 2025-01-14 PROCEDURE — 88142 CYTOPATH C/V THIN LAYER: CPT | Mod: TC,GCY | Performed by: FAMILY MEDICINE

## 2025-01-14 PROCEDURE — 96372 THER/PROPH/DIAG INJ SC/IM: CPT | Mod: ,,, | Performed by: FAMILY MEDICINE

## 2025-01-14 PROCEDURE — 4010F ACE/ARB THERAPY RXD/TAKEN: CPT | Mod: CPTII,,, | Performed by: FAMILY MEDICINE

## 2025-01-14 RX ORDER — KETOROLAC TROMETHAMINE 30 MG/ML
30 INJECTION, SOLUTION INTRAMUSCULAR; INTRAVENOUS
Status: COMPLETED | OUTPATIENT
Start: 2025-01-14 | End: 2025-01-14

## 2025-01-14 RX ADMIN — KETOROLAC TROMETHAMINE 30 MG: 30 INJECTION, SOLUTION INTRAMUSCULAR; INTRAVENOUS at 03:01

## 2025-01-14 NOTE — PROGRESS NOTES
Clinic Note    Patient Name: Georgiana Zayas  : 1975  MRN: [id number]    Chief Complaint   Patient presents with    Back Pain    Gynecologic Exam       HPI:    Ms. Georgiana Zayas is a 49 y.o.  female who presents to clinic today with CC of chronic back pain. States she needs a letter to take to the welfare office stating that she remains unable to work due to her chronic back pain. She was previously working at the nursing home. She is unable to do any heavy lifting due to her chronic back pain. She has an  and is trying to get approved for disability. She states his office is supposed to send me some paperwork to complete as well.   Patient also reports she is due for a pap smear. Denies any abnormal bleeding or vaginal discharge. Reports she has has always had significant cramps during her cycle but denies any changes from her baseline.  Patient is, otherwise, without complaints.     Medications:  Medication List with Changes/Refills   Current Medications    ARIPIPRAZOLE (ABILIFY) 2 MG TAB    Take 1 tablet (2 mg total) by mouth once daily.    ARIPIPRAZOLE (ABILIFY) 5 MG TAB    Take 5 mg by mouth.    BENZTROPINE (COGENTIN) 0.5 MG TABLET    Take 0.5 mg by mouth once daily.    CARBAMIDE PEROXIDE (DEBROX) 6.5 % OTIC SOLUTION    Place 5 drops into the left ear 2 (two) times daily. X 4 days    CHLORPHENIRAMINE-PHENYLEPH-DM (ED A-HIST DM) 4-10-10 MG TAB    Take 1 tablet by mouth every 8 (eight) hours as needed (congestion or cough).    CYCLOBENZAPRINE (FLEXERIL) 10 MG TABLET    Take 1 tablet (10 mg total) by mouth 3 (three) times daily as needed for Muscle spasms (may cause drowsiness).    DEXTROMETHORPHAN-TRIPROLIDINE (ENDAL, DM-TRIPROLIDINE,) 10-1.25 MG/5 ML SOLN    Take 5 mLs by mouth every 6 (six) hours as needed (congestion or cough).    DULOXETINE (CYMBALTA) 60 MG CAPSULE    Take 1 capsule (60 mg total) by mouth 2 (two) times daily.    FLUCONAZOLE (DIFLUCAN) 150 MG TAB    Take one tablet by  mouth every 72 hours X 3 doses if needed for yeast infection after completion of antibiotics.    FLUTICASONE PROPIONATE (FLONASE) 50 MCG/ACTUATION NASAL SPRAY    1 spray by Each Nostril route as needed.    GABAPENTIN (NEURONTIN) 800 MG TABLET    Take 1 tablet (800 mg total) by mouth 3 (three) times daily.    GLIPIZIDE (GLUCOTROL) 10 MG TABLET    Take 1 tablet (10 mg total) by mouth 2 (two) times daily with meals.    HYDROCHLOROTHIAZIDE (HYDRODIURIL) 25 MG TABLET    Take 1 tablet (25 mg total) by mouth once daily.    HYDROXYZINE PAMOATE (VISTARIL) 25 MG CAP    Take 1 capsule (25 mg total) by mouth 3 (three) times daily.    IBUPROFEN (ADVIL,MOTRIN) 800 MG TABLET    Take 1 tablet (800 mg total) by mouth 3 (three) times daily as needed for Pain.    INSULIN GLARGINE U-100, LANTUS, (LANTUS SOLOSTAR U-100 INSULIN) 100 UNIT/ML (3 ML) INPN PEN    Inject 15 Units into the skin every evening.    LISINOPRIL (PRINIVIL,ZESTRIL) 2.5 MG TABLET    Take 1 tablet (2.5 mg total) by mouth once daily.    MECLIZINE (ANTIVERT) 25 MG TABLET    Take 1 tablet (25 mg total) by mouth 3 (three) times daily as needed for Dizziness.    METHOCARBAMOL (ROBAXIN) 500 MG TAB    Take 500 mg by mouth 3 (three) times daily.    MOMETASONE (NASONEX) 50 MCG/ACTUATION NASAL SPRAY    2 sprays by Nasal route once daily.    OMEPRAZOLE (PRILOSEC) 40 MG CAPSULE    Take 1 capsule (40 mg total) by mouth Daily.    OXYCODONE-ACETAMINOPHEN (PERCOCET)  MG PER TABLET    Take 1 tablet by mouth every 6 (six) hours as needed.     POLYETHYLENE GLYCOL (GLYCOLAX) 17 GRAM/DOSE POWDER    Mix one capful (17 grams) with 8 ounces of coffee or water and drink daily for constipation.    PRAVASTATIN (PRAVACHOL) 20 MG TABLET    Take 1 tablet (20 mg total) by mouth every evening.    PROMETHAZINE (PHENERGAN) 25 MG TABLET    Take 1 tablet (25 mg total) by mouth every 6 (six) hours as needed for Nausea.    RISPERIDONE (RISPERDAL) 3 MG TAB    Take 1 tablet (3 mg total) by mouth once  daily.    SEMAGLUTIDE (OZEMPIC) 2 MG/DOSE (8 MG/3 ML) PNIJ    Inject 2 mg into the skin every 7 days.    TRAZODONE (DESYREL) 150 MG TABLET    Take 150 mg by mouth nightly.        Allergies: Codeine      Past Medical History:    Past Medical History:   Diagnosis Date    Arthritis     Back injury     Depression     Diabetes mellitus     Diabetes mellitus, type 2     Difficulty swallowing     Fatigue     GERD (gastroesophageal reflux disease)     Hearing difficulty     Hypertension     Irritable bowel syndrome without diarrhea     Liver disease     Loss of appetite     Sleep apnea        Past Surgical History:    Past Surgical History:   Procedure Laterality Date    CARPAL TUNNEL RELEASE Bilateral      SECTION      INJECTION OF ANESTHETIC AGENT AROUND MEDIAL BRANCH NERVES INNERVATING LUMBAR FACET JOINT Bilateral 2021    Procedure: BLOCK, NERVE, FACET JOINT, LUMBAR, MEDIAL BRANCH;  Surgeon: Amari Razo MD;  Location: [facility];  Service: Pain Management;  Laterality: Bilateral;  Bilateral L3-S1 Facet Injection    INJECTION OF ANESTHETIC AGENT AROUND MEDIAL BRANCH NERVES INNERVATING LUMBAR FACET JOINT Bilateral 2022    Procedure: BLOCK, NERVE, FACET JOINT, LUMBAR, MEDIAL BRANCH;  Surgeon: Amari Razo MD;  Location: [facility];  Service: Pain Management;  Laterality: Bilateral;  Bilateral L3-S1 FI    INJECTION OF ANESTHETIC AGENT AROUND MEDIAL BRANCH NERVES INNERVATING LUMBAR FACET JOINT Bilateral 2023    Procedure: BLOCK, NERVE, FACET JOINT, LUMBAR, MEDIAL BRANCH;  Surgeon: Amari Razo MD;  Location: [facility];  Service: Pain Management;  Laterality: Bilateral;  Bilateral L3-S1 FI    LEFT HEART CATHETERIZATION Left 2021    Procedure: Left heart cath;  Surgeon: Jeremy Rojo DO;  Location: [facility] CATH LAB;  Service: Cardiology;  Laterality: Left;    RADIOFREQUENCY ABLATION OF LUMBAR MEDIAL BRANCH NERVE AT SINGLE LEVEL Bilateral 2023  "   Procedure: RADIOFREQUENCY ABLATION, NERVE, SPINAL, LUMBAR, MEDIAL BRANCH, 1 LEVEL;  Surgeon: Amari Razo MD;  Location: [facility];  Service: Pain Management;  Laterality: Bilateral;  Bilateral L3-5 RFTC    STAPEDES SURGERY Bilateral     TONSILLECTOMY           Social History:    Social History     Tobacco Use   Smoking Status Never    Passive exposure: Never   Smokeless Tobacco Never     Social History     Substance and Sexual Activity   Alcohol Use Not Currently     Social History     Substance and Sexual Activity   Drug Use Never         Family History:    Family History   Problem Relation Name Age of Onset    Cancer Mother      Diabetes Mother      Hypertension Mother      Kidney failure Other      Heart disease Other         Review of Systems:    Review of Systems   Constitutional:  Negative for appetite change, chills, fatigue, fever and unexpected weight change.   Eyes:  Negative for visual disturbance.   Respiratory:  Negative for cough and shortness of breath.    Cardiovascular:  Negative for chest pain and leg swelling.   Gastrointestinal:  Negative for abdominal pain, change in bowel habit, constipation, diarrhea, nausea and vomiting.   Genitourinary:  Negative for pelvic pain, vaginal bleeding, vaginal discharge, vaginal pain and vaginal dryness.   Musculoskeletal:  Positive for back pain. Negative for arthralgias.   Integumentary:  Negative for color change, rash, breast mass, breast discharge and breast tenderness.   Neurological:  Negative for dizziness.        Reports chronic, intermittent issues with headaches.   Psychiatric/Behavioral:  The patient is not nervous/anxious.    Breast: Negative for mass and tenderness       Vitals:    Vitals:    01/14/25 1348   BP: 124/76   BP Location: Left arm   Patient Position: Sitting   Pulse: 110   Resp: 18   Temp: 98.2 °F (36.8 °C)   TempSrc: Oral   SpO2: 100%   Weight: 89.5 kg (197 lb 6.4 oz)   Height: 5' 4" (1.626 m)       Body mass index " is 33.88 kg/m².    Wt Readings from Last 3 Encounters:   01/14/25 1348 89.5 kg (197 lb 6.4 oz)   11/25/24 0853 89.8 kg (198 lb)   10/08/24 0850 90.7 kg (200 lb)        Physical Exam:    Physical Exam  Exam conducted with a chaperone present.   Constitutional:       General: She is not in acute distress.     Appearance: Normal appearance. She is obese.   HENT:      Nose: Nose normal.      Mouth/Throat:      Mouth: Mucous membranes are moist.      Pharynx: Oropharynx is clear.   Eyes:      Conjunctiva/sclera: Conjunctivae normal.   Cardiovascular:      Rate and Rhythm: Normal rate and regular rhythm.      Heart sounds: Normal heart sounds. No murmur heard.  Pulmonary:      Effort: Pulmonary effort is normal. No respiratory distress.      Breath sounds: Normal breath sounds. No wheezing, rhonchi or rales.   Chest:   Breasts:     Breasts are symmetrical.      Right: Normal. No swelling, bleeding, inverted nipple, mass, nipple discharge, skin change or tenderness.      Left: Normal. No swelling, bleeding, inverted nipple, mass, nipple discharge, skin change or tenderness.   Abdominal:      General: Bowel sounds are normal.      Palpations: Abdomen is soft.      Tenderness: There is no abdominal tenderness.      Hernia: There is no hernia in the left inguinal area or right inguinal area.   Genitourinary:     Pubic Area: No rash.       Labia:         Right: No rash, tenderness, lesion or injury.         Left: No rash, tenderness, lesion or injury.       Urethra: No prolapse, urethral pain, urethral swelling or urethral lesion.      Vagina: Normal. No signs of injury and foreign body. No vaginal discharge, erythema, tenderness, bleeding, lesions or prolapsed vaginal walls.      Cervix: Normal. No cervical motion tenderness, discharge, friability, lesion, erythema, cervical bleeding or eversion.      Uterus: Normal. Not deviated, not enlarged, not fixed, not tender and no uterine prolapse.       Adnexa: Right adnexa normal  and left adnexa normal.        Right: No mass, tenderness or fullness.          Left: No mass, tenderness or fullness.     Musculoskeletal:      Cervical back: Neck supple.      Right lower leg: No edema.      Left lower leg: No edema.   Lymphadenopathy:      Upper Body:      Right upper body: No supraclavicular, axillary or pectoral adenopathy.      Left upper body: No supraclavicular, axillary or pectoral adenopathy.      Lower Body: No right inguinal adenopathy. No left inguinal adenopathy.   Skin:     Findings: No rash.   Neurological:      General: No focal deficit present.      Mental Status: She is alert. Mental status is at baseline.   Psychiatric:         Mood and Affect: Mood normal.       Assessment/Plan:   1. Screening for cervical cancer  -     ThinPrep Pap Test; Future; Expected date: 01/14/2025    2. Lumbosacral spondylosis without myelopathy  -     ketorolac injection 30 mg    3. Screening examination for STD (sexually transmitted disease)  -     HIV 1/2 Ag/Ab (4th Gen); Future; Expected date: 01/14/2025  -     Syphilis Antibody with reflex to RPR; Future; Expected date: 01/14/2025         Active Problem List with Overview Notes    Diagnosis Date Noted    Type 2 diabetes mellitus with diabetic neuropathy, with long-term current use of insulin 08/25/2021    Essential hypertension 08/25/2021    Anxiety 01/23/2023    Other hyperlipidemia 07/07/2022    Morbid obesity 01/17/2022    Depression 01/23/2023    Femoral artery pseudo-aneurysm, right 01/17/2022    Other sleep apnea 11/30/2021    Lumbosacral spondylosis without myelopathy 09/08/2021    Gastroesophageal reflux disease 08/25/2021    Chronic bilateral low back pain with right-sided sciatica 07/07/2022    Seasonal allergies 07/07/2022    Muscle spasm 07/07/2022    Lumbosacral radiculopathy 08/20/2024    Vertigo 08/06/2024          RTC as scheduled for chronic follow up. RTC sooner if needed.  Patient voiced understanding and is agreeable to plan.       Lisandra Mcdaniel MD    [facility]

## 2025-01-14 NOTE — LETTER
[facility]  [address]  [place] [address]  Phone: [phone]  Fax: [phone] January 16, 2025    Georgiana Zayas  [address] [place] [address] [place] [id number]      To Whom It May Concern:    Georgiana Zayas is unable to return to work due to chronic back pain. She cannot bend repeatedly or do any heavy lifting due to chronic issues she is experiencing with her back.    If you have any questions or concerns, please feel free to call my office.                                                              Sincerely,                                                                    Elisa Mcdaniel MD

## 2025-01-15 LAB
HIV 1+O+2 AB SERPL QL: NORMAL
SYPHILIS AB INTERPRETATION: NORMAL

## 2025-01-21 LAB
GH SERPL-MCNC: ABNORMAL NG/ML
INSULIN SERPL-ACNC: ABNORMAL U[IU]/ML
LAB AP CLINICAL INFORMATION: ABNORMAL
LAB AP GYN INTERPRETATION: ABNORMAL
LAB AP PAP DISCLAIMER COMMENTS: ABNORMAL
RENIN PLAS-CCNC: ABNORMAL NG/ML/H

## 2025-01-22 NOTE — PROGRESS NOTES
Patient notified of hiv and syphilis screening , and patient seen results on my chart for pap, has appointment in am .

## 2025-01-23 ENCOUNTER — OFFICE VISIT (OUTPATIENT)
Dept: FAMILY MEDICINE | Facility: CLINIC | Age: 50
End: 2025-01-23
Payer: COMMERCIAL

## 2025-01-23 VITALS
HEIGHT: 64 IN | HEART RATE: 105 BPM | BODY MASS INDEX: 32.95 KG/M2 | DIASTOLIC BLOOD PRESSURE: 100 MMHG | WEIGHT: 193 LBS | OXYGEN SATURATION: 97 % | TEMPERATURE: 99 F | SYSTOLIC BLOOD PRESSURE: 149 MMHG | RESPIRATION RATE: 18 BRPM

## 2025-01-23 DIAGNOSIS — G89.29 CHRONIC BILATERAL LOW BACK PAIN WITH RIGHT-SIDED SCIATICA: Primary | Chronic | ICD-10-CM

## 2025-01-23 DIAGNOSIS — Z79.4 TYPE 2 DIABETES MELLITUS WITH DIABETIC NEUROPATHY, WITH LONG-TERM CURRENT USE OF INSULIN: ICD-10-CM

## 2025-01-23 DIAGNOSIS — M54.41 CHRONIC BILATERAL LOW BACK PAIN WITH RIGHT-SIDED SCIATICA: Primary | Chronic | ICD-10-CM

## 2025-01-23 DIAGNOSIS — E11.40 TYPE 2 DIABETES MELLITUS WITH DIABETIC NEUROPATHY, WITH LONG-TERM CURRENT USE OF INSULIN: ICD-10-CM

## 2025-01-23 DIAGNOSIS — E78.49 OTHER HYPERLIPIDEMIA: ICD-10-CM

## 2025-01-23 DIAGNOSIS — K21.9 GASTROESOPHAGEAL REFLUX DISEASE WITHOUT ESOPHAGITIS: ICD-10-CM

## 2025-01-23 DIAGNOSIS — I10 ESSENTIAL HYPERTENSION: ICD-10-CM

## 2025-01-23 PROCEDURE — 3077F SYST BP >= 140 MM HG: CPT | Mod: CPTII,,, | Performed by: FAMILY MEDICINE

## 2025-01-23 PROCEDURE — 4010F ACE/ARB THERAPY RXD/TAKEN: CPT | Mod: CPTII,,, | Performed by: FAMILY MEDICINE

## 2025-01-23 PROCEDURE — 1159F MED LIST DOCD IN RCRD: CPT | Mod: CPTII,,, | Performed by: FAMILY MEDICINE

## 2025-01-23 PROCEDURE — 3008F BODY MASS INDEX DOCD: CPT | Mod: CPTII,,, | Performed by: FAMILY MEDICINE

## 2025-01-23 PROCEDURE — 3080F DIAST BP >= 90 MM HG: CPT | Mod: CPTII,,, | Performed by: FAMILY MEDICINE

## 2025-01-23 PROCEDURE — 99213 OFFICE O/P EST LOW 20 MIN: CPT | Mod: 25,,, | Performed by: FAMILY MEDICINE

## 2025-01-23 PROCEDURE — 96372 THER/PROPH/DIAG INJ SC/IM: CPT | Mod: ,,, | Performed by: FAMILY MEDICINE

## 2025-01-23 RX ORDER — GLIPIZIDE 10 MG/1
10 TABLET ORAL 2 TIMES DAILY WITH MEALS
Qty: 180 TABLET | Refills: 1 | Status: SHIPPED | OUTPATIENT
Start: 2025-01-23

## 2025-01-23 RX ORDER — INSULIN GLARGINE 100 [IU]/ML
15 INJECTION, SOLUTION SUBCUTANEOUS NIGHTLY
Qty: 18 ML | Refills: 1 | Status: SHIPPED | OUTPATIENT
Start: 2025-01-23

## 2025-01-23 RX ORDER — SEMAGLUTIDE 2.68 MG/ML
2 INJECTION, SOLUTION SUBCUTANEOUS
Qty: 3 ML | Refills: 3 | Status: SHIPPED | OUTPATIENT
Start: 2025-01-23

## 2025-01-23 RX ORDER — KETOROLAC TROMETHAMINE 30 MG/ML
30 INJECTION, SOLUTION INTRAMUSCULAR; INTRAVENOUS
Status: COMPLETED | OUTPATIENT
Start: 2025-01-23 | End: 2025-01-23

## 2025-01-23 RX ORDER — LISINOPRIL 2.5 MG/1
2.5 TABLET ORAL DAILY
Qty: 90 TABLET | Refills: 1 | Status: SHIPPED | OUTPATIENT
Start: 2025-01-23 | End: 2025-02-03 | Stop reason: DRUGHIGH

## 2025-01-23 RX ORDER — PRAVASTATIN SODIUM 20 MG/1
20 TABLET ORAL NIGHTLY
Qty: 90 TABLET | Refills: 1 | Status: SHIPPED | OUTPATIENT
Start: 2025-01-23

## 2025-01-23 RX ORDER — OMEPRAZOLE 40 MG/1
40 CAPSULE, DELAYED RELEASE ORAL DAILY
Qty: 90 CAPSULE | Refills: 1 | Status: SHIPPED | OUTPATIENT
Start: 2025-01-23

## 2025-01-23 RX ORDER — HYDROCHLOROTHIAZIDE 25 MG/1
25 TABLET ORAL DAILY
Qty: 90 TABLET | Refills: 1 | Status: SHIPPED | OUTPATIENT
Start: 2025-01-23

## 2025-01-23 RX ADMIN — KETOROLAC TROMETHAMINE 30 MG: 30 INJECTION, SOLUTION INTRAMUSCULAR; INTRAVENOUS at 11:01

## 2025-01-23 NOTE — PROGRESS NOTES
Clinic Note    Patient Name: Georgiana Zayas  : 1975  MRN: [id number]    Chief Complaint   Patient presents with    Follow-up     Pap smear results        HPI:    Ms. Georgiana Zayas is a 49 y.o. female who presents to clinic today with CC of need to discuss pap smear results.   Pap smear with ASCUS but HPV is pending. Called lab. They advised HPV should be resulted within the next several days but is not back yet. Explained to patient. She voiced understanding.  She also reports a flare with her chronic back pain. Requests a toradol injection.  She has a PMH significant for Type II DM, HTN, HLD, GERD, lumbosacral spondylosis, MATT, obesity, seasonal allergies, and anxiety/depression.  She follows with Pain Management and [facility] as well.  Patient reports chronic issues are well controlled on current medication regimen.  Patient denies problems or side effects with medications.  Requests refills.  Patient is, otherwise, without complaints.     Medications:  Medication List with Changes/Refills   Current Medications    ARIPIPRAZOLE (ABILIFY) 2 MG TAB    Take 1 tablet (2 mg total) by mouth once daily.    ARIPIPRAZOLE (ABILIFY) 5 MG TAB    Take 5 mg by mouth.    BENZTROPINE (COGENTIN) 0.5 MG TABLET    Take 0.5 mg by mouth once daily.    CARBAMIDE PEROXIDE (DEBROX) 6.5 % OTIC SOLUTION    Place 5 drops into the left ear 2 (two) times daily. X 4 days    CHLORPHENIRAMINE-PHENYLEPH-DM (ED A-HIST DM) 4-10-10 MG TAB    Take 1 tablet by mouth every 8 (eight) hours as needed (congestion or cough).    CYCLOBENZAPRINE (FLEXERIL) 10 MG TABLET    Take 1 tablet (10 mg total) by mouth 3 (three) times daily as needed for Muscle spasms (may cause drowsiness).    DEXTROMETHORPHAN-TRIPROLIDINE (ENDAL, DM-TRIPROLIDINE,) 10-1.25 MG/5 ML SOLN    Take 5 mLs by mouth every 6 (six) hours as needed (congestion or cough).    DULOXETINE (CYMBALTA) 60 MG CAPSULE    Take 1 capsule (60 mg total) by mouth 2 (two) times daily.    FLUCONAZOLE  (DIFLUCAN) 150 MG TAB    Take one tablet by mouth every 72 hours X 3 doses if needed for yeast infection after completion of antibiotics.    FLUTICASONE PROPIONATE (FLONASE) 50 MCG/ACTUATION NASAL SPRAY    1 spray by Each Nostril route as needed.    GABAPENTIN (NEURONTIN) 800 MG TABLET    Take 1 tablet (800 mg total) by mouth 3 (three) times daily.    GLIPIZIDE (GLUCOTROL) 10 MG TABLET    Take 1 tablet (10 mg total) by mouth 2 (two) times daily with meals.    HYDROCHLOROTHIAZIDE (HYDRODIURIL) 25 MG TABLET    Take 1 tablet (25 mg total) by mouth once daily.    HYDROXYZINE PAMOATE (VISTARIL) 25 MG CAP    Take 1 capsule (25 mg total) by mouth 3 (three) times daily.    IBUPROFEN (ADVIL,MOTRIN) 800 MG TABLET    Take 1 tablet (800 mg total) by mouth 3 (three) times daily as needed for Pain.    INSULIN GLARGINE U-100, LANTUS, (LANTUS SOLOSTAR U-100 INSULIN) 100 UNIT/ML (3 ML) INPN PEN    Inject 15 Units into the skin every evening.    LISINOPRIL (PRINIVIL,ZESTRIL) 2.5 MG TABLET    Take 1 tablet (2.5 mg total) by mouth once daily.    MECLIZINE (ANTIVERT) 25 MG TABLET    Take 1 tablet (25 mg total) by mouth 3 (three) times daily as needed for Dizziness.    METHOCARBAMOL (ROBAXIN) 500 MG TAB    Take 500 mg by mouth 3 (three) times daily.    MOMETASONE (NASONEX) 50 MCG/ACTUATION NASAL SPRAY    2 sprays by Nasal route once daily.    OMEPRAZOLE (PRILOSEC) 40 MG CAPSULE    Take 1 capsule (40 mg total) by mouth Daily.    OXYCODONE-ACETAMINOPHEN (PERCOCET)  MG PER TABLET    Take 1 tablet by mouth every 6 (six) hours as needed.     POLYETHYLENE GLYCOL (GLYCOLAX) 17 GRAM/DOSE POWDER    Mix one capful (17 grams) with 8 ounces of coffee or water and drink daily for constipation.    PRAVASTATIN (PRAVACHOL) 20 MG TABLET    Take 1 tablet (20 mg total) by mouth every evening.    PROMETHAZINE (PHENERGAN) 25 MG TABLET    Take 1 tablet (25 mg total) by mouth every 6 (six) hours as needed for Nausea.    RISPERIDONE (RISPERDAL) 3 MG TAB     Take 1 tablet (3 mg total) by mouth once daily.    SEMAGLUTIDE (OZEMPIC) 2 MG/DOSE (8 MG/3 ML) PNIJ    Inject 2 mg into the skin every 7 days.    TRAZODONE (DESYREL) 150 MG TABLET    Take 150 mg by mouth nightly.        Allergies: Codeine      Past Medical History:    Past Medical History:   Diagnosis Date    Arthritis     Back injury     Depression     Diabetes mellitus     Diabetes mellitus, type 2     Difficulty swallowing     Fatigue     GERD (gastroesophageal reflux disease)     Hearing difficulty     Hypertension     Irritable bowel syndrome without diarrhea     Liver disease     Loss of appetite     Sleep apnea        Past Surgical History:    Past Surgical History:   Procedure Laterality Date    CARPAL TUNNEL RELEASE Bilateral      SECTION      INJECTION OF ANESTHETIC AGENT AROUND MEDIAL BRANCH NERVES INNERVATING LUMBAR FACET JOINT Bilateral 2021    Procedure: BLOCK, NERVE, FACET JOINT, LUMBAR, MEDIAL BRANCH;  Surgeon: Amari Razo MD;  Location: [facility];  Service: Pain Management;  Laterality: Bilateral;  Bilateral L3-S1 Facet Injection    INJECTION OF ANESTHETIC AGENT AROUND MEDIAL BRANCH NERVES INNERVATING LUMBAR FACET JOINT Bilateral 2022    Procedure: BLOCK, NERVE, FACET JOINT, LUMBAR, MEDIAL BRANCH;  Surgeon: Amari Razo MD;  Location: [facility];  Service: Pain Management;  Laterality: Bilateral;  Bilateral L3-S1 FI    INJECTION OF ANESTHETIC AGENT AROUND MEDIAL BRANCH NERVES INNERVATING LUMBAR FACET JOINT Bilateral 2023    Procedure: BLOCK, NERVE, FACET JOINT, LUMBAR, MEDIAL BRANCH;  Surgeon: Amari Razo MD;  Location: [facility];  Service: Pain Management;  Laterality: Bilateral;  Bilateral L3-S1 FI    LEFT HEART CATHETERIZATION Left 2021    Procedure: Left heart cath;  Surgeon: Jeremy Rojo DO;  Location: [facility];  Service: Cardiology;  Laterality: Left;    RADIOFREQUENCY ABLATION OF LUMBAR MEDIAL BRANCH  "NERVE AT SINGLE LEVEL Bilateral 4/12/2023    Procedure: RADIOFREQUENCY ABLATION, NERVE, SPINAL, LUMBAR, MEDIAL BRANCH, 1 LEVEL;  Surgeon: Amari Razo MD;  Location: [facility];  Service: Pain Management;  Laterality: Bilateral;  Bilateral L3-5 RFTC    STAPEDES SURGERY Bilateral     TONSILLECTOMY           Social History:    Social History     Tobacco Use   Smoking Status Never    Passive exposure: Never   Smokeless Tobacco Never     Social History     Substance and Sexual Activity   Alcohol Use Not Currently     Social History     Substance and Sexual Activity   Drug Use Never         Family History:    Family History   Problem Relation Name Age of Onset    Cancer Mother      Diabetes Mother      Hypertension Mother      Kidney failure Other      Heart disease Other         Review of Systems:    Review of Systems   Constitutional:  Negative for appetite change, chills, fatigue, fever and unexpected weight change.   Eyes:  Negative for visual disturbance.   Respiratory:  Negative for cough and shortness of breath.    Cardiovascular:  Negative for chest pain and leg swelling.   Gastrointestinal:  Negative for abdominal pain, change in bowel habit, constipation, diarrhea, nausea and vomiting.   Musculoskeletal:  Positive for back pain. Negative for arthralgias.   Integumentary:  Negative for rash.   Neurological:  Negative for dizziness and headaches.   Psychiatric/Behavioral:  The patient is not nervous/anxious.         Vitals:    Vitals:    01/23/25 1050 01/23/25 1131   BP: (!) 141/94 (!) 149/100   BP Location: Left arm Left arm   Patient Position: Sitting Sitting   Pulse: 105    Resp: 18    Temp: 98.5 °F (36.9 °C)    TempSrc: Oral    SpO2: 97%    Weight: 87.5 kg (193 lb)    Height: 5' 4" (1.626 m)        Body mass index is 33.13 kg/m².    Wt Readings from Last 3 Encounters:   01/23/25 1050 87.5 kg (193 lb)   01/14/25 1348 89.5 kg (197 lb 6.4 oz)   11/25/24 0853 89.8 kg (198 lb)        Physical " Exam:    Physical Exam  Constitutional:       General: She is not in acute distress.     Appearance: Normal appearance. She is obese.   HENT:      Nose: Nose normal.      Mouth/Throat:      Mouth: Mucous membranes are moist.      Pharynx: Oropharynx is clear.   Eyes:      Conjunctiva/sclera: Conjunctivae normal.   Cardiovascular:      Rate and Rhythm: Normal rate and regular rhythm.      Heart sounds: Normal heart sounds. No murmur heard.  Pulmonary:      Effort: Pulmonary effort is normal. No respiratory distress.      Breath sounds: Normal breath sounds. No wheezing, rhonchi or rales.   Abdominal:      General: Bowel sounds are normal.      Palpations: Abdomen is soft.      Tenderness: There is no abdominal tenderness.   Musculoskeletal:         General: Tenderness present. No swelling. Normal range of motion.      Cervical back: Neck supple.      Right lower leg: No edema.      Left lower leg: No edema.   Skin:     Findings: No rash.   Neurological:      General: No focal deficit present.      Mental Status: She is alert. Mental status is at baseline.   Psychiatric:         Mood and Affect: Mood normal.         Assessment/Plan:   1. Chronic bilateral low back pain with right-sided sciatica  -     ketorolac injection 30 mg    2. Type 2 diabetes mellitus with diabetic neuropathy, with long-term current use of insulin  -     glipiZIDE (GLUCOTROL) 10 MG tablet; Take 1 tablet (10 mg total) by mouth 2 (two) times daily with meals.  Dispense: 180 tablet; Refill: 1  -     insulin glargine U-100, Lantus, (LANTUS SOLOSTAR U-100 INSULIN) 100 unit/mL (3 mL) InPn pen; Inject 15 Units into the skin every evening.  Dispense: 18 mL; Refill: 1  -     semaglutide (OZEMPIC) 2 mg/dose (8 mg/3 mL) PnIj; Inject 2 mg into the skin every 7 days.  Dispense: 3 mL; Refill: 3    3. Essential hypertension  -     lisinopriL (PRINIVIL,ZESTRIL) 2.5 MG tablet; Take 1 tablet (2.5 mg total) by mouth once daily.  Dispense: 90 tablet; Refill: 1  -      hydroCHLOROthiazide (HYDRODIURIL) 25 MG tablet; Take 1 tablet (25 mg total) by mouth once daily.  Dispense: 90 tablet; Refill: 1  - BP is elevated in clinic today. Patient reports normal readings at home. Will have nurse follow up with phone call with home BP reading in 1-2 weeks. If remains elevated patient will need to RTC for medication adjustments.  - DASH diet and exercise  - Encouraged medication compliance  - Continue home blood pressure monitoring. Call/RTC for persistently elevated readings.    4. Other hyperlipidemia  -     pravastatin (PRAVACHOL) 20 MG tablet; Take 1 tablet (20 mg total) by mouth every evening.  Dispense: 90 tablet; Refill: 1    5. Gastroesophageal reflux disease without esophagitis  -     omeprazole (PRILOSEC) 40 MG capsule; Take 1 capsule (40 mg total) by mouth Daily.  Dispense: 90 capsule; Refill: 1     Will call with pap smear results when HPV result is available.     Active Problem List with Overview Notes    Diagnosis Date Noted    Type 2 diabetes mellitus with diabetic neuropathy, with long-term current use of insulin 08/25/2021    Essential hypertension 08/25/2021    Anxiety 01/23/2023    Other hyperlipidemia 07/07/2022    Morbid obesity 01/17/2022    Depression 01/23/2023    Femoral artery pseudo-aneurysm, right 01/17/2022    Other sleep apnea 11/30/2021    Lumbosacral spondylosis without myelopathy 09/08/2021    Gastroesophageal reflux disease 08/25/2021    Chronic bilateral low back pain with right-sided sciatica 07/07/2022    Seasonal allergies 07/07/2022    Muscle spasm 07/07/2022    Lumbosacral radiculopathy 08/20/2024    Vertigo 08/06/2024      RTC as scheduled for chronic follow up. RTC sooner if needed.  Patient voiced understanding and is agreeable to plan.      Lisandra Mcdaniel MD    Family Medicine

## 2025-01-23 NOTE — Clinical Note
BP elevated in clinic. Patient reports normal readings at home. Please call to record home blood pressure reading. If remains elevated or if patient has not been monitoring they will need to RTC for follow up within 2 weeks of phone call. Thanks!

## 2025-01-29 ENCOUNTER — TELEPHONE (OUTPATIENT)
Dept: FAMILY MEDICINE | Facility: CLINIC | Age: 50
End: 2025-01-29
Payer: COMMERCIAL

## 2025-01-29 NOTE — TELEPHONE ENCOUNTER
Attempted to contact pt in regards to pap results. Pt was not available, was able to leave message for pt to return phone call.     ----- Message from Elisa Mcdaniel MD sent at 1/28/2025  3:58 PM CST -----  Please call patient regarding pap smear results. Pap smear revealed ASCUS. Typically, this is better interpreted once the HPV results are available for review. I did order this with her pap smear as I always do. I have discussed this with patient in clinic previously but HPV results were pending at the time. The lab, at that time, said they did these results in batches and it should be available for early this week. However, the lab called today and for some reason they were unable to process the remainder of the sample for the HPV results. This does not mean anything is wrong but it does mean that they are not going to be able to result the HPV result. Rather than trying to repeat her pap smear/HPV test here I recommend referral to OB-GYN as I know the patient was concerned about this. Please notify patient and place referral to OB-GY[facility] first available unless patient has a preference. Thanks!

## 2025-01-31 DIAGNOSIS — R87.629 ABNORMAL PAP SMEAR OF VAGINA: Primary | ICD-10-CM

## 2025-02-03 ENCOUNTER — OFFICE VISIT (OUTPATIENT)
Dept: FAMILY MEDICINE | Facility: CLINIC | Age: 50
End: 2025-02-03
Payer: COMMERCIAL

## 2025-02-03 VITALS
BODY MASS INDEX: 32.2 KG/M2 | WEIGHT: 188.63 LBS | HEART RATE: 100 BPM | TEMPERATURE: 99 F | SYSTOLIC BLOOD PRESSURE: 134 MMHG | HEIGHT: 64 IN | DIASTOLIC BLOOD PRESSURE: 92 MMHG | RESPIRATION RATE: 20 BRPM | OXYGEN SATURATION: 97 %

## 2025-02-03 DIAGNOSIS — M47.817 LUMBOSACRAL SPONDYLOSIS WITHOUT MYELOPATHY: Chronic | ICD-10-CM

## 2025-02-03 DIAGNOSIS — I10 ESSENTIAL HYPERTENSION: Primary | Chronic | ICD-10-CM

## 2025-02-03 DIAGNOSIS — H66.001 NON-RECURRENT ACUTE SUPPURATIVE OTITIS MEDIA OF RIGHT EAR WITHOUT SPONTANEOUS RUPTURE OF TYMPANIC MEMBRANE: ICD-10-CM

## 2025-02-03 PROCEDURE — 99214 OFFICE O/P EST MOD 30 MIN: CPT | Mod: 25,,, | Performed by: FAMILY MEDICINE

## 2025-02-03 PROCEDURE — 96372 THER/PROPH/DIAG INJ SC/IM: CPT | Mod: JZ,,, | Performed by: FAMILY MEDICINE

## 2025-02-03 PROCEDURE — 4010F ACE/ARB THERAPY RXD/TAKEN: CPT | Mod: CPTII,,, | Performed by: FAMILY MEDICINE

## 2025-02-03 PROCEDURE — 3008F BODY MASS INDEX DOCD: CPT | Mod: CPTII,,, | Performed by: FAMILY MEDICINE

## 2025-02-03 PROCEDURE — 1159F MED LIST DOCD IN RCRD: CPT | Mod: CPTII,,, | Performed by: FAMILY MEDICINE

## 2025-02-03 PROCEDURE — 3075F SYST BP GE 130 - 139MM HG: CPT | Mod: CPTII,,, | Performed by: FAMILY MEDICINE

## 2025-02-03 PROCEDURE — 3080F DIAST BP >= 90 MM HG: CPT | Mod: CPTII,,, | Performed by: FAMILY MEDICINE

## 2025-02-03 RX ORDER — FLUCONAZOLE 150 MG/1
TABLET ORAL
Qty: 3 TABLET | Refills: 0 | Status: SHIPPED | OUTPATIENT
Start: 2025-02-03 | End: 2025-02-24 | Stop reason: SDUPTHER

## 2025-02-03 RX ORDER — AMOXICILLIN 875 MG/1
875 TABLET, FILM COATED ORAL EVERY 12 HOURS
Qty: 20 TABLET | Refills: 0 | Status: SHIPPED | OUTPATIENT
Start: 2025-02-03 | End: 2025-02-13

## 2025-02-03 RX ORDER — BENZONATATE 100 MG/1
100 CAPSULE ORAL 3 TIMES DAILY PRN
Qty: 30 CAPSULE | Refills: 0 | Status: SHIPPED | OUTPATIENT
Start: 2025-02-03 | End: 2025-02-13

## 2025-02-03 RX ORDER — KETOROLAC TROMETHAMINE 30 MG/ML
30 INJECTION, SOLUTION INTRAMUSCULAR; INTRAVENOUS
Status: COMPLETED | OUTPATIENT
Start: 2025-02-03 | End: 2025-02-03

## 2025-02-03 RX ORDER — LISINOPRIL 5 MG/1
5 TABLET ORAL DAILY
Qty: 90 TABLET | Refills: 3 | Status: SHIPPED | OUTPATIENT
Start: 2025-02-03 | End: 2026-02-03

## 2025-02-03 RX ADMIN — KETOROLAC TROMETHAMINE 30 MG: 30 INJECTION, SOLUTION INTRAMUSCULAR; INTRAVENOUS at 09:02

## 2025-02-03 NOTE — PROGRESS NOTES
Clinic Note    Patient Name: Georgiana Zayas  : 1975  MRN: [id number]    Chief Complaint   Patient presents with    Results    Back Pain    Otalgia       HPI:    Ms. Georgiana Zayas is a 49 y.o. female who presents to clinic today with CC of discuss pap smear results, flare with chronic low back pain, and earache.   Patient reports she missed a call regarding her pap smear results.  Reports chronic back pain. Reports back feels stiff. Reports pain in bilateral legs as well (L greater than R). She follows with pain management but reports she continues to have chronic back pain.  Patient reports that her bilateral ears hurt. Reports cough and mild associated congestion. Denies fever.   BP is elevated today. Reports she does not routinely monitor her BP at home. Reports, however, BP was also elevated at the pain clinic recently.  Pap smear results revealed ASCUS. HPV screening was ordered at time of pap smear. However, lab called back and reported an issue with the sample and could not result the HPV. I previously recommended referral to OB-GYN for further evaluation rather than just repeating the pap smear here. The medical assistant called the patient 3 times with these results but was unable tor each her.  Patient is, otherwise, without complaints.     Medications:  Medication List with Changes/Refills   New Medications    AMOXICILLIN (AMOXIL) 875 MG TABLET    Take 1 tablet (875 mg total) by mouth every 12 (twelve) hours. for 10 days    BENZONATATE (TESSALON) 100 MG CAPSULE    Take 1 capsule (100 mg total) by mouth 3 (three) times daily as needed for Cough.    LISINOPRIL (PRINIVIL,ZESTRIL) 5 MG TABLET    Take 1 tablet (5 mg total) by mouth once daily.   Current Medications    ARIPIPRAZOLE (ABILIFY) 2 MG TAB    Take 1 tablet (2 mg total) by mouth once daily.    ARIPIPRAZOLE (ABILIFY) 5 MG TAB    Take 5 mg by mouth.    BENZTROPINE (COGENTIN) 0.5 MG TABLET    Take 0.5 mg by mouth once daily.    CARBAMIDE  PEROXIDE (DEBROX) 6.5 % OTIC SOLUTION    Place 5 drops into the left ear 2 (two) times daily. X 4 days    CHLORPHENIRAMINE-PHENYLEPH-DM (ED A-HIST DM) 4-10-10 MG TAB    Take 1 tablet by mouth every 8 (eight) hours as needed (congestion or cough).    CYCLOBENZAPRINE (FLEXERIL) 10 MG TABLET    Take 1 tablet (10 mg total) by mouth 3 (three) times daily as needed for Muscle spasms (may cause drowsiness).    DEXTROMETHORPHAN-TRIPROLIDINE (ENDAL, DM-TRIPROLIDINE,) 10-1.25 MG/5 ML SOLN    Take 5 mLs by mouth every 6 (six) hours as needed (congestion or cough).    DULOXETINE (CYMBALTA) 60 MG CAPSULE    Take 1 capsule (60 mg total) by mouth 2 (two) times daily.    FLUTICASONE PROPIONATE (FLONASE) 50 MCG/ACTUATION NASAL SPRAY    1 spray by Each Nostril route as needed.    GABAPENTIN (NEURONTIN) 800 MG TABLET    Take 1 tablet (800 mg total) by mouth 3 (three) times daily.    GLIPIZIDE (GLUCOTROL) 10 MG TABLET    Take 1 tablet (10 mg total) by mouth 2 (two) times daily with meals.    HYDROCHLOROTHIAZIDE (HYDRODIURIL) 25 MG TABLET    Take 1 tablet (25 mg total) by mouth once daily.    HYDROXYZINE PAMOATE (VISTARIL) 25 MG CAP    Take 1 capsule (25 mg total) by mouth 3 (three) times daily.    IBUPROFEN (ADVIL,MOTRIN) 800 MG TABLET    Take 1 tablet (800 mg total) by mouth 3 (three) times daily as needed for Pain.    INSULIN GLARGINE U-100, LANTUS, (LANTUS SOLOSTAR U-100 INSULIN) 100 UNIT/ML (3 ML) INPN PEN    Inject 15 Units into the skin every evening.    MECLIZINE (ANTIVERT) 25 MG TABLET    Take 1 tablet (25 mg total) by mouth 3 (three) times daily as needed for Dizziness.    METHOCARBAMOL (ROBAXIN) 500 MG TAB    Take 500 mg by mouth 3 (three) times daily.    MOMETASONE (NASONEX) 50 MCG/ACTUATION NASAL SPRAY    2 sprays by Nasal route once daily.    OMEPRAZOLE (PRILOSEC) 40 MG CAPSULE    Take 1 capsule (40 mg total) by mouth Daily.    OXYCODONE-ACETAMINOPHEN (PERCOCET)  MG PER TABLET    Take 1 tablet by mouth every 6 (six)  hours as needed.     POLYETHYLENE GLYCOL (GLYCOLAX) 17 GRAM/DOSE POWDER    Mix one capful (17 grams) with 8 ounces of coffee or water and drink daily for constipation.    PRAVASTATIN (PRAVACHOL) 20 MG TABLET    Take 1 tablet (20 mg total) by mouth every evening.    PROMETHAZINE (PHENERGAN) 25 MG TABLET    Take 1 tablet (25 mg total) by mouth every 6 (six) hours as needed for Nausea.    RISPERIDONE (RISPERDAL) 3 MG TAB    Take 1 tablet (3 mg total) by mouth once daily.    SEMAGLUTIDE (OZEMPIC) 2 MG/DOSE (8 MG/3 ML) PNIJ    Inject 2 mg into the skin every 7 days.    TRAZODONE (DESYREL) 150 MG TABLET    Take 150 mg by mouth nightly.   Changed and/or Refilled Medications    Modified Medication Previous Medication    FLUCONAZOLE (DIFLUCAN) 150 MG TAB fluconazole (DIFLUCAN) 150 MG Tab       Take one tablet by mouth every 72 hours X 3 doses if needed for yeast infection after completion of antibiotics.    Take one tablet by mouth every 72 hours X 3 doses if needed for yeast infection after completion of antibiotics.   Discontinued Medications    LISINOPRIL (PRINIVIL,ZESTRIL) 2.5 MG TABLET    Take 1 tablet (2.5 mg total) by mouth once daily.        Allergies: Codeine      Past Medical History:    Past Medical History:   Diagnosis Date    Arthritis     Back injury     Depression     Diabetes mellitus     Diabetes mellitus, type 2     Difficulty swallowing     Fatigue     GERD (gastroesophageal reflux disease)     Hearing difficulty     Hypertension     Irritable bowel syndrome without diarrhea     Liver disease     Loss of appetite     Sleep apnea        Past Surgical History:    Past Surgical History:   Procedure Laterality Date    CARPAL TUNNEL RELEASE Bilateral      SECTION      INJECTION OF ANESTHETIC AGENT AROUND MEDIAL BRANCH NERVES INNERVATING LUMBAR FACET JOINT Bilateral 2021    Procedure: BLOCK, NERVE, FACET JOINT, LUMBAR, MEDIAL BRANCH;  Surgeon: Amari Razo MD;  Location: [facility];   Service: Pain Management;  Laterality: Bilateral;  Bilateral L3-S1 Facet Injection    INJECTION OF ANESTHETIC AGENT AROUND MEDIAL BRANCH NERVES INNERVATING LUMBAR FACET JOINT Bilateral 5/4/2022    Procedure: BLOCK, NERVE, FACET JOINT, LUMBAR, MEDIAL BRANCH;  Surgeon: Amari Razo MD;  Location: [facility];  Service: Pain Management;  Laterality: Bilateral;  Bilateral L3-S1 FI    INJECTION OF ANESTHETIC AGENT AROUND MEDIAL BRANCH NERVES INNERVATING LUMBAR FACET JOINT Bilateral 1/11/2023    Procedure: BLOCK, NERVE, FACET JOINT, LUMBAR, MEDIAL BRANCH;  Surgeon: Amari Razo MD;  Location: [facility];  Service: Pain Management;  Laterality: Bilateral;  Bilateral L3-S1 FI    LEFT HEART CATHETERIZATION Left 12/21/2021    Procedure: Left heart cath;  Surgeon: Jeremy Rojo DO;  Location: [facility] CATH LAB;  Service: Cardiology;  Laterality: Left;    RADIOFREQUENCY ABLATION OF LUMBAR MEDIAL BRANCH NERVE AT SINGLE LEVEL Bilateral 4/12/2023    Procedure: RADIOFREQUENCY ABLATION, NERVE, SPINAL, LUMBAR, MEDIAL BRANCH, 1 LEVEL;  Surgeon: Amari Razo MD;  Location: [facility];  Service: Pain Management;  Laterality: Bilateral;  Bilateral L3-5 RFTC    STAPEDES SURGERY Bilateral     TONSILLECTOMY           Social History:    Social History     Tobacco Use   Smoking Status Never    Passive exposure: Never   Smokeless Tobacco Never     Social History     Substance and Sexual Activity   Alcohol Use Not Currently     Social History     Substance and Sexual Activity   Drug Use Never         Family History:    Family History   Problem Relation Name Age of Onset    Cancer Mother      Diabetes Mother      Hypertension Mother      Kidney failure Other      Heart disease Other         Review of Systems:    Review of Systems   Constitutional:  Negative for appetite change, chills, fatigue, fever and unexpected weight change.   HENT:  Positive for nasal congestion and ear pain.    Eyes:   "Negative for visual disturbance.   Respiratory:  Positive for cough. Negative for shortness of breath.    Cardiovascular:  Negative for chest pain and leg swelling.   Gastrointestinal:  Negative for abdominal pain, change in bowel habit, constipation, diarrhea, nausea and vomiting.   Musculoskeletal:  Positive for arthralgias and back pain.   Integumentary:  Negative for rash.   Neurological:  Negative for dizziness and headaches.   Psychiatric/Behavioral:  The patient is not nervous/anxious.         Vitals:    Vitals:    02/03/25 0839 02/03/25 0912   BP: (!) 148/94 (!) 134/92   BP Location: Left arm Left arm   Patient Position: Sitting Sitting   Pulse: 100    Resp: 20    Temp: 98.9 °F (37.2 °C)    TempSrc: Oral    SpO2: 97%    Weight: 85.5 kg (188 lb 9.6 oz)    Height: 5' 4" (1.626 m)        Body mass index is 32.37 kg/m².    Wt Readings from Last 3 Encounters:   02/03/25 0839 85.5 kg (188 lb 9.6 oz)   01/23/25 1050 87.5 kg (193 lb)   01/14/25 1348 89.5 kg (197 lb 6.4 oz)        Physical Exam:    Physical Exam  Constitutional:       General: She is not in acute distress.     Appearance: Normal appearance.   HENT:      Ears:      Comments: + fluid level  + erythematous R TM     Nose: Congestion present.      Mouth/Throat:      Mouth: Mucous membranes are moist.      Pharynx: Oropharynx is clear.   Eyes:      Conjunctiva/sclera: Conjunctivae normal.   Cardiovascular:      Rate and Rhythm: Normal rate and regular rhythm.      Heart sounds: Normal heart sounds. No murmur heard.  Pulmonary:      Effort: Pulmonary effort is normal. No respiratory distress.      Breath sounds: Normal breath sounds. No wheezing, rhonchi or rales.   Abdominal:      General: Bowel sounds are normal.      Palpations: Abdomen is soft.      Tenderness: There is no abdominal tenderness.   Musculoskeletal:         General: Tenderness present. No swelling. Normal range of motion.      Cervical back: Neck supple.      Right lower leg: No edema.     "  Left lower leg: No edema.   Skin:     Findings: No rash.   Neurological:      General: No focal deficit present.      Mental Status: She is alert. Mental status is at baseline.   Psychiatric:         Mood and Affect: Mood normal.         Assessment/Plan:   1. Essential hypertension  -     lisinopriL (PRINIVIL,ZESTRIL) 5 MG tablet; Take 1 tablet (5 mg total) by mouth once daily.  Dispense: 90 tablet; Refill: 3 - dose increase    2. Lumbosacral spondylosis without myelopathy  -     ketorolac injection 30 mg    3. Non-recurrent acute suppurative otitis media of right ear without spontaneous rupture of tympanic membrane  -     amoxicillin (AMOXIL) 875 MG tablet; Take 1 tablet (875 mg total) by mouth every 12 (twelve) hours. for 10 days  Dispense: 20 tablet; Refill: 0  -     benzonatate (TESSALON) 100 MG capsule; Take 1 capsule (100 mg total) by mouth 3 (three) times daily as needed for Cough.  Dispense: 30 capsule; Refill: 0  -     fluconazole (DIFLUCAN) 150 MG Tab; Take one tablet by mouth every 72 hours X 3 doses if needed for yeast infection after completion of antibiotics.  Dispense: 3 tablet; Refill: 0    Patient previously referred to OB-GYN due to ASCUS on  pap smear. Lab unable to result HPV results. Sent message to referral clerk to schedule OB-GYN appt. Advised patient if she does not hear from this appt this week to call back and we will check on it for her. Voiced understanding.     Active Problem List with Overview Notes    Diagnosis Date Noted    Type 2 diabetes mellitus with diabetic neuropathy, with long-term current use of insulin 08/25/2021    Essential hypertension 08/25/2021    Anxiety 01/23/2023    Other hyperlipidemia 07/07/2022    Morbid obesity 01/17/2022    Depression 01/23/2023    Femoral artery pseudo-aneurysm, right 01/17/2022    Other sleep apnea 11/30/2021    Lumbosacral spondylosis without myelopathy 09/08/2021    Gastroesophageal reflux disease 08/25/2021    Chronic bilateral low back  pain with right-sided sciatica 07/07/2022    Seasonal allergies 07/07/2022    Muscle spasm 07/07/2022    Lumbosacral radiculopathy 08/20/2024    Vertigo 08/06/2024          RTC in 3 weeks for follow up on HTN medication changes. RTC sooner if needed.  Patient voiced understanding and is agreeable to plan.      Lisandra Mcdaniel MD    Family Medicine

## 2025-02-04 ENCOUNTER — OFFICE VISIT (OUTPATIENT)
Dept: OBSTETRICS AND GYNECOLOGY | Facility: CLINIC | Age: 50
End: 2025-02-04
Payer: COMMERCIAL

## 2025-02-04 VITALS
SYSTOLIC BLOOD PRESSURE: 147 MMHG | DIASTOLIC BLOOD PRESSURE: 98 MMHG | HEART RATE: 111 BPM | WEIGHT: 190.25 LBS | TEMPERATURE: 98 F | BODY MASS INDEX: 32.48 KG/M2 | HEIGHT: 64 IN | RESPIRATION RATE: 18 BRPM | OXYGEN SATURATION: 98 %

## 2025-02-04 DIAGNOSIS — R10.2 PELVIC PAIN IN FEMALE: ICD-10-CM

## 2025-02-04 DIAGNOSIS — Z01.419 VISIT FOR GYNECOLOGIC EXAMINATION: Primary | ICD-10-CM

## 2025-02-04 DIAGNOSIS — R87.610 ATYPICAL SQUAMOUS CELLS OF UNDETERMINED SIGNIFICANCE (ASCUS) ON PAPANICOLAOU SMEAR OF CERVIX: ICD-10-CM

## 2025-02-04 DIAGNOSIS — E66.811 OBESITY (BMI 30.0-34.9): ICD-10-CM

## 2025-02-04 DIAGNOSIS — N89.8 VAGINAL ODOR: ICD-10-CM

## 2025-02-04 LAB
BACTERIAL VAGINOSIS DNA (OHS): NEGATIVE
CANDIDA GLABRATA/KRUSEI DNA (OHS): DETECTED
CANDIDA SPECIES DNA (OHS): NOT DETECTED
HPV 16: NEGATIVE
HPV 18: NEGATIVE
HPV OTHER: NEGATIVE
TRICHOMONAS VAGINALIS DNA (OHS): NOT DETECTED

## 2025-02-04 PROCEDURE — 4010F ACE/ARB THERAPY RXD/TAKEN: CPT | Mod: CPTII,,, | Performed by: ADVANCED PRACTICE MIDWIFE

## 2025-02-04 PROCEDURE — 3008F BODY MASS INDEX DOCD: CPT | Mod: CPTII,,, | Performed by: ADVANCED PRACTICE MIDWIFE

## 2025-02-04 PROCEDURE — 1159F MED LIST DOCD IN RCRD: CPT | Mod: CPTII,,, | Performed by: ADVANCED PRACTICE MIDWIFE

## 2025-02-04 PROCEDURE — 99396 PREV VISIT EST AGE 40-64: CPT | Mod: ,,, | Performed by: ADVANCED PRACTICE MIDWIFE

## 2025-02-04 PROCEDURE — 87624 HPV HI-RISK TYP POOLED RSLT: CPT | Mod: ,,, | Performed by: CLINICAL MEDICAL LABORATORY

## 2025-02-04 PROCEDURE — 3077F SYST BP >= 140 MM HG: CPT | Mod: CPTII,,, | Performed by: ADVANCED PRACTICE MIDWIFE

## 2025-02-04 PROCEDURE — 81515 NFCT DS BV&VAGINITIS DNA ALG: CPT | Mod: QW,,, | Performed by: CLINICAL MEDICAL LABORATORY

## 2025-02-04 PROCEDURE — 3080F DIAST BP >= 90 MM HG: CPT | Mod: CPTII,,, | Performed by: ADVANCED PRACTICE MIDWIFE

## 2025-02-06 ENCOUNTER — HOSPITAL ENCOUNTER (EMERGENCY)
Facility: HOSPITAL | Age: 50
Discharge: HOME | End: 2025-02-06
Payer: COMMERCIAL

## 2025-02-06 ENCOUNTER — PATIENT OUTREACH (OUTPATIENT)
Facility: HOSPITAL | Age: 50
End: 2025-02-06
Payer: COMMERCIAL

## 2025-02-06 VITALS
WEIGHT: 190 LBS | HEIGHT: 64 IN | RESPIRATION RATE: 18 BRPM | DIASTOLIC BLOOD PRESSURE: 96 MMHG | SYSTOLIC BLOOD PRESSURE: 111 MMHG | OXYGEN SATURATION: 98 % | HEART RATE: 88 BPM | TEMPERATURE: 98 F | BODY MASS INDEX: 32.44 KG/M2

## 2025-02-06 DIAGNOSIS — M25.531 ACUTE WRIST PAIN, RIGHT: Primary | ICD-10-CM

## 2025-02-06 DIAGNOSIS — M25.571 ACUTE RIGHT ANKLE PAIN: ICD-10-CM

## 2025-02-06 PROBLEM — R10.2 PELVIC PAIN IN FEMALE: Status: ACTIVE | Noted: 2025-02-06

## 2025-02-06 PROBLEM — R87.610 ATYPICAL SQUAMOUS CELLS OF UNDETERMINED SIGNIFICANCE (ASCUS) ON PAPANICOLAOU SMEAR OF CERVIX: Status: ACTIVE | Noted: 2025-02-06

## 2025-02-06 PROBLEM — N89.8 VAGINAL ODOR: Status: ACTIVE | Noted: 2025-02-06

## 2025-02-06 PROBLEM — E66.811 OBESITY (BMI 30.0-34.9): Status: ACTIVE | Noted: 2025-02-06

## 2025-02-06 PROCEDURE — 99284 EMERGENCY DEPT VISIT MOD MDM: CPT | Mod: ,,, | Performed by: NURSE PRACTITIONER

## 2025-02-06 PROCEDURE — 99284 EMERGENCY DEPT VISIT MOD MDM: CPT | Mod: 25

## 2025-02-06 PROCEDURE — 96372 THER/PROPH/DIAG INJ SC/IM: CPT | Performed by: NURSE PRACTITIONER

## 2025-02-06 PROCEDURE — 63600175 PHARM REV CODE 636 W HCPCS: Mod: JZ,TB | Performed by: NURSE PRACTITIONER

## 2025-02-06 RX ORDER — KETOROLAC TROMETHAMINE 30 MG/ML
60 INJECTION, SOLUTION INTRAMUSCULAR; INTRAVENOUS
Status: COMPLETED | OUTPATIENT
Start: 2025-02-06 | End: 2025-02-06

## 2025-02-06 RX ORDER — ORPHENADRINE CITRATE 30 MG/ML
60 INJECTION INTRAMUSCULAR; INTRAVENOUS
Status: COMPLETED | OUTPATIENT
Start: 2025-02-06 | End: 2025-02-06

## 2025-02-06 RX ADMIN — ORPHENADRINE CITRATE 60 MG: 60 INJECTION INTRAMUSCULAR; INTRAVENOUS at 04:02

## 2025-02-06 RX ADMIN — KETOROLAC TROMETHAMINE 60 MG: 30 INJECTION, SOLUTION INTRAMUSCULAR at 04:02

## 2025-02-06 NOTE — ED PROVIDER NOTES
Encounter Date: 2025       History     Chief Complaint   Patient presents with    Fall     C/O fall 3 days ago forward into closet  and inability to get up due to pain in wrists, also c/o of twisting right foot yesterday when right leg gave away with her     Presents with right wrist pain and right ankle pain.  Reports falling in the claws at the other day resulting in the wrist injury.  Yesterday she rolled her right ankle.  There is no obvious injury or deformity.      Review of patient's allergies indicates:   Allergen Reactions    Codeine Itching     Past Medical History:   Diagnosis Date    Arthritis     Back injury     Depression     Diabetes mellitus     Diabetes mellitus, type 2     Difficulty swallowing     Fatigue     GERD (gastroesophageal reflux disease)     Hearing difficulty     Hypertension     Irritable bowel syndrome without diarrhea     Liver disease     Loss of appetite     Sleep apnea      Past Surgical History:   Procedure Laterality Date    CARPAL TUNNEL RELEASE Bilateral      SECTION      INJECTION OF ANESTHETIC AGENT AROUND MEDIAL BRANCH NERVES INNERVATING LUMBAR FACET JOINT Bilateral 2021    Procedure: BLOCK, NERVE, FACET JOINT, LUMBAR, MEDIAL BRANCH;  Surgeon: Amari Razo MD;  [facility]: [facility];  Service: Pain Management;  Laterality: Bilateral;  Bilateral L3-S1 Facet Injection    INJECTION OF ANESTHETIC AGENT AROUND MEDIAL BRANCH NERVES INNERVATING LUMBAR FACET JOINT Bilateral 2022    Procedure: BLOCK, NERVE, FACET JOINT, LUMBAR, MEDIAL BRANCH;  Surgeon: Amari Razo MD;  [facility];  Service: Pain Management;  Laterality: Bilateral;  Bilateral L3-S1 FI    INJECTION OF ANESTHETIC AGENT AROUND MEDIAL BRANCH NERVES INNERVATING LUMBAR FACET JOINT Bilateral 2023    Procedure: BLOCK, NERVE, FACET JOINT, LUMBAR, MEDIAL BRANCH;  Surgeon: Amari Razo MD;  [facility];  Service: Pain Management;   Laterality: Bilateral;  Bilateral L3-S1 FI    LEFT HEART CATHETERIZATION Left 12/21/2021    Procedure: Left heart cath;  Surgeon: Jeremy Rojo DO;  Location: [facility] CATH LAB;  Service: Cardiology;  Laterality: Left;    RADIOFREQUENCY ABLATION OF LUMBAR MEDIAL BRANCH NERVE AT SINGLE LEVEL Bilateral 4/12/2023    Procedure: RADIOFREQUENCY ABLATION, NERVE, SPINAL, LUMBAR, MEDIAL BRANCH, 1 LEVEL;  Surgeon: Amari Razo MD;  Location: [facility];  Service: Pain Management;  Laterality: Bilateral;  Bilateral L3-5 RFTC    STAPEDES SURGERY Bilateral     TONSILLECTOMY       Family History   Problem Relation Name Age of Onset    Cancer Mother      Diabetes Mother      Hypertension Mother      Kidney failure Other      Heart disease Other       Social History     Tobacco Use    Smoking status: Never     Passive exposure: Never    Smokeless tobacco: Never   Substance Use Topics    Alcohol use: Not Currently    Drug use: Never     Review of Systems   Respiratory: Negative.     Cardiovascular: Negative.    Musculoskeletal:  Positive for arthralgias.   Neurological: Negative.        Physical Exam     Initial Vitals [02/06/25 1525]   BP Pulse Resp Temp SpO2   (!) 135/115 106 18 97.8 °F (36.6 °C) 98 %      MAP       --         Physical Exam    Nursing note and vitals reviewed.  Constitutional: No distress.   HENT:   Head: Normocephalic and atraumatic.   Eyes: EOM are normal. Pupils are equal, round, and reactive to light.   Neck: Neck supple.   Cardiovascular:  Normal rate.           Pulmonary/Chest: No respiratory distress.   Musculoskeletal:         General: Normal range of motion.      Cervical back: Neck supple.      Comments: Normal right wrist and right ankle exam.  No edema, ecchymosis, or crepitus.  Palpable pulses, capillary refill brisk, sensorium intact.     Neurological: She is alert. GCS score is 15. GCS eye subscore is 4. GCS verbal subscore is 5. GCS motor subscore is 6.   Skin: Skin is warm and  dry. Capillary refill takes less than 2 seconds.         Medical Screening Exam   See Full Note    ED Course   Procedures  Labs Reviewed - No data to display       Imaging Results    None          Medications   ketorolac injection 60 mg (has no administration in time range)   orphenadrine injection 60 mg (has no administration in time range)     Medical Decision Making  Possible strain versus sprain of right wrist and ankle.  Minimal suspicion of bony or ligamentous injury.  Follow up pain treatment, prescribed Percocet 10/325 mg q.i.d. as ibuprofen and tizanidine at home.  We will give Toradol and Norflex.    Risk  Prescription drug management.                                      Clinical Impression:   Final diagnoses:  [M25.531] Acute wrist pain, right (Primary)  [M25.571] Acute right ankle pain        ED Disposition Condition    Discharge Stable          ED Prescriptions    None       Follow-up Information    None          Moses Rae, DERIK  02/06/25 [id number]

## 2025-02-06 NOTE — PROGRESS NOTES
Population Health Chart Review & Patient Outreach Details    Blood Pressure Check     Health Maintenance Topics Addressed and Outreach Outcomes / Actions Taken:  Blood Pressure Control  [x] Called pt. No answer. L/m. Portal message sent to patient. Need remote Bp reading.     [x] Comment placed in chart and upcoming appointment note that blood pressure needs to be less than 139/89.

## 2025-02-07 NOTE — PROGRESS NOTES
Patient ID:  Georgiana Zayas is a 49 y.o. female      Chief Complaint:   Chief Complaint   Patient presents with    Abnormal Pap Smear     Patient is here today for a pap smear. She had one a week ago and it come back abnormal and she was referred here by Dr. Berman.         HPI:  Georgiana presents as a new patient. Sent in by PCP for HPV testing. Pap was obtained 25 ASCUS, no HPV resulted. C/O lower abd/pelvic pain and vaginal odor.   LMP: Patient's last menstrual period was 2024 (approximate).   Sexually active:  no  Contraceptive: none      Past Medical History:   Diagnosis Date    Arthritis     Back injury     Depression     Diabetes mellitus     Diabetes mellitus, type 2     Difficulty swallowing     Fatigue     GERD (gastroesophageal reflux disease)     Hearing difficulty     Hypertension     Irritable bowel syndrome without diarrhea     Liver disease     Loss of appetite     Sleep apnea      Past Surgical History:   Procedure Laterality Date    CARPAL TUNNEL RELEASE Bilateral      SECTION      INJECTION OF ANESTHETIC AGENT AROUND MEDIAL BRANCH NERVES INNERVATING LUMBAR FACET JOINT Bilateral 2021    Procedure: BLOCK, NERVE, FACET JOINT, LUMBAR, MEDIAL BRANCH;  Surgeon: Amari Razo MD;  Location: [facility];  Service: Pain Management;  Laterality: Bilateral;  Bilateral L3-S1 Facet Injection    INJECTION OF ANESTHETIC AGENT AROUND MEDIAL BRANCH NERVES INNERVATING LUMBAR FACET JOINT Bilateral 2022    Procedure: BLOCK, NERVE, FACET JOINT, LUMBAR, MEDIAL BRANCH;  Surgeon: Amari Razo MD;  Location: [facility];  Service: Pain Management;  Laterality: Bilateral;  Bilateral L3-S1 FI    INJECTION OF ANESTHETIC AGENT AROUND MEDIAL BRANCH NERVES INNERVATING LUMBAR FACET JOINT Bilateral 2023    Procedure: BLOCK, NERVE, FACET JOINT, LUMBAR, MEDIAL BRANCH;  Surgeon: Amari Razo MD;  Location: [facility];  Service: Pain Management;  Laterality:  "Bilateral;  Bilateral L3-S1 FI    LEFT HEART CATHETERIZATION Left 2021    Procedure: Left heart cath;  Surgeon: Jeremy Rojo DO;  Location: [facility] CATH LAB;  Service: Cardiology;  Laterality: Left;    RADIOFREQUENCY ABLATION OF LUMBAR MEDIAL BRANCH NERVE AT SINGLE LEVEL Bilateral 2023    Procedure: RADIOFREQUENCY ABLATION, NERVE, SPINAL, LUMBAR, MEDIAL BRANCH, 1 LEVEL;  Surgeon: Amair Razo MD;  Location: [facility]IN [facility];  Service: Pain Management;  Laterality: Bilateral;  Bilateral L3-5 RFTC    STAPEDES SURGERY Bilateral     TONSILLECTOMY         OB History          1    Para   1    Term   1            AB        Living             SAB        IAB        Ectopic        Multiple        Live Births                     BP (!) 147/98 (BP Location: Left arm, Patient Position: Sitting)   Pulse (!) 111   Temp 98.2 °F (36.8 °C) (Oral)   Resp 18   Ht 5' 4" (1.626 m)   Wt 86.3 kg (190 lb 4 oz)   LMP 2024 (Approximate)   SpO2 98%   BMI 32.66 kg/m²   Wt Readings from Last 3 Encounters:   25 86.2 kg (190 lb)   25 86.3 kg (190 lb 4 oz)   25 85.5 kg (188 lb 9.6 oz)          ROS:  Review of Systems   Constitutional: Negative.    Eyes: Negative.    Respiratory: Negative.     Cardiovascular: Negative.    Gastrointestinal: Negative.    Endocrine: Negative.    Genitourinary:  Positive for pelvic pain and vaginal odor.   Musculoskeletal: Negative.    Integumentary:  Negative.   Neurological: Negative.    Hematological: Negative.    Psychiatric/Behavioral: Negative.     Breast: negative.           PHYSICAL EXAM:  Physical Exam   GENERAL: Well-developed, well-nourished obese female in no acute distress  NECK: Supple with full range of motion. No adenopathy, masses, or thyromegaly  SKIN: Normal to inspection with no rashes, lesions, or ulcers  LUNGS: Clear bilaterally with no rales, rhonchi, or wheezes   CARDIOVASCULAR AUSCULTATION: Normal heart rate and " rhythm  BREASTS: No masses, lumps, discharge, tenderness, or skin changes  LYMPH NODES: No axillary, or inguinal adenopathy  ABDOMEN: Large. Soft, non tender, without masses. No palpable hernia or  hepatosplenomegaly  VULVA: General appearance normal; external genitalia without lesions or rash  URETHRA: Normal size and location with no lesions or prolapse  VAGINA: Mucosa normal, no discharge or lesions  CERVIX: Pink without lesions, discharge or tenderness  UTERUS: Regular, mobile, and non tender;  consistency is normal. No evidence of adnexa masses, tenderness, or nodularity  ANUS: No lesions or relaxation.  LOWER EXTREMITIES: No edema or tenderness  PSYCHIATRIC: Patient is oriented to person, place, and time. Mood and affect are normal      Assessment:  Georgiana was seen today for abnormal pap smear.    Diagnoses and all orders for this visit:    Visit for gynecologic examination    Pelvic pain in female  -     US Pelvis Complete Non OB; Future    Vaginal odor  -     Vaginosis Screen by DNA Probe; Future  -     Vaginosis Screen by DNA Probe    Atypical squamous cells of undetermined significance (ASCUS) on Papanicolaou smear of cervix  -     HPV DNA probe, amplified; Future  -     HPV DNA probe, amplified    Obesity (BMI 30.0-34.9)          ICD-10-CM ICD-9-CM    1. Visit for gynecologic examination  Z01.419 V72.31       2. Pelvic pain in female  R10.2 625.9 US Pelvis Complete Non OB      3. Vaginal odor  N89.8 625.8 Vaginosis Screen by DNA Probe      Vaginosis Screen by DNA Probe      4. Atypical squamous cells of undetermined significance (ASCUS) on Papanicolaou smear of cervix  R87.610 795.01 HPV DNA probe, amplified      HPV DNA probe, amplified      5. Obesity (BMI 30.0-34.9)  E66.811 278.00           Plan:  Vaginosis and HPV specimens obtained  Will call or send My Chart message after results obtained  Pelvic US ordered, will have scheduled  Discussed ASCUS Pap, does not mean cancer, may need additional testing  if HPV positive  Discussed HPV    Follow up in about 1 year (around 2/4/2026) for annual gyn exam.

## 2025-02-08 ENCOUNTER — TELEPHONE (OUTPATIENT)
Dept: EMERGENCY MEDICINE | Facility: HOSPITAL | Age: 50
End: 2025-02-08
Payer: COMMERCIAL

## 2025-02-11 ENCOUNTER — TELEPHONE (OUTPATIENT)
Dept: FAMILY MEDICINE | Facility: CLINIC | Age: 50
End: 2025-02-11
Payer: COMMERCIAL

## 2025-02-11 NOTE — TELEPHONE ENCOUNTER
----- Message from Nurse Waite sent at 2/11/2025 11:23 AM CST -----  Regarding: FW: bp check    ----- Message -----  From: Mariann Richardson LPN  Sent: 1/27/2025   8:39 AM CST  To: Mariann Richardson LPN  Subject: bp check                                         Bp check

## 2025-02-11 NOTE — TELEPHONE ENCOUNTER
First attempt unable to contact , at this time. Message left to return call back at earliest convenience.   (1) Outpatient Area

## 2025-02-12 ENCOUNTER — TELEPHONE (OUTPATIENT)
Dept: FAMILY MEDICINE | Facility: CLINIC | Age: 50
End: 2025-02-12
Payer: COMMERCIAL

## 2025-02-19 ENCOUNTER — TELEPHONE (OUTPATIENT)
Dept: FAMILY MEDICINE | Facility: CLINIC | Age: 50
End: 2025-02-19
Payer: COMMERCIAL

## 2025-02-19 ENCOUNTER — HOSPITAL ENCOUNTER (EMERGENCY)
Facility: HOSPITAL | Age: 50
Discharge: HOME | End: 2025-02-19
Payer: COMMERCIAL

## 2025-02-19 VITALS
HEIGHT: 64 IN | WEIGHT: 200 LBS | TEMPERATURE: 98 F | BODY MASS INDEX: 34.15 KG/M2 | SYSTOLIC BLOOD PRESSURE: 145 MMHG | RESPIRATION RATE: 18 BRPM | DIASTOLIC BLOOD PRESSURE: 90 MMHG | HEART RATE: 115 BPM | OXYGEN SATURATION: 100 %

## 2025-02-19 DIAGNOSIS — S69.91XD RIGHT WRIST INJURY, SUBSEQUENT ENCOUNTER: Primary | ICD-10-CM

## 2025-02-19 PROCEDURE — 99283 EMERGENCY DEPT VISIT LOW MDM: CPT | Mod: 25

## 2025-02-19 PROCEDURE — 29125 APPL SHORT ARM SPLINT STATIC: CPT | Mod: RT

## 2025-02-19 PROCEDURE — 99284 EMERGENCY DEPT VISIT MOD MDM: CPT | Mod: GF,,, | Performed by: NURSE PRACTITIONER

## 2025-02-19 NOTE — ED PROVIDER NOTES
Encounter Date: 2025       History     Chief Complaint   Patient presents with    Wrist Pain     States fell on the 3rd and came here on the 6th and did not have an xry      Patient presents with persistent right wrist pain.  Occurred after falling in her closed with a couple of weeks ago.  Was evaluated here several days after the fall and there was minimal to low suspicion of fracture or dislocation.  No acute change, but reports pain is still present.  Pain is diffuse.  There is no obvious deformity and range of motion is intact.      Review of patient's allergies indicates:   Allergen Reactions    Codeine Itching     Past Medical History:   Diagnosis Date    Arthritis     Back injury     Chronic back pain     sees clayton hays    Depression     Diabetes mellitus     Diabetes mellitus, type 2     Difficulty swallowing     Fatigue     GERD (gastroesophageal reflux disease)     Hearing difficulty     Hypertension     Irritable bowel syndrome without diarrhea     Liver disease     Loss of appetite     Sleep apnea      Past Surgical History:   Procedure Laterality Date    CARPAL TUNNEL RELEASE Bilateral      SECTION      INJECTION OF ANESTHETIC AGENT AROUND MEDIAL BRANCH NERVES INNERVATING LUMBAR FACET JOINT Bilateral 2021    Procedure: BLOCK, NERVE, FACET JOINT, LUMBAR, MEDIAL BRANCH;  Surgeon: Amari Razo MD;  Location: [facility];  Service: Pain Management;  Laterality: Bilateral;  Bilateral L3-S1 Facet Injection    INJECTION OF ANESTHETIC AGENT AROUND MEDIAL BRANCH NERVES INNERVATING LUMBAR FACET JOINT Bilateral 2022    Procedure: BLOCK, NERVE, FACET JOINT, LUMBAR, MEDIAL BRANCH;  Surgeon: Amari Razo MD;  Location: [facility];  Service: Pain Management;  Laterality: Bilateral;  Bilateral L3-S1 FI    INJECTION OF ANESTHETIC AGENT AROUND MEDIAL BRANCH NERVES INNERVATING LUMBAR FACET JOINT Bilateral 2023    Procedure: BLOCK, NERVE, FACET JOINT, LUMBAR,  MEDIAL BRANCH;  Surgeon: Amari Razo MD;  Location: [facility];  Service: Pain Management;  Laterality: Bilateral;  Bilateral L3-S1 FI    LEFT HEART CATHETERIZATION Left 12/21/2021    Procedure: Left heart cath;  Surgeon: Jeremy Rojo DO;  Location: [facility] C[facility];  Service: Cardiology;  Laterality: Left;    RADIOFREQUENCY ABLATION OF LUMBAR MEDIAL BRANCH NERVE AT SINGLE LEVEL Bilateral 4/12/2023    Procedure: RADIOFREQUENCY ABLATION, NERVE, SPINAL, LUMBAR, MEDIAL BRANCH, 1 LEVEL;  Surgeon: Amari Razo MD;  Location: [facility];  Service: Pain Management;  Laterality: Bilateral;  Bilateral L3-5 RFTC    STAPEDES SURGERY Bilateral     TONSILLECTOMY       Family History   Problem Relation Name Age of Onset    Cancer Mother      Diabetes Mother      Hypertension Mother      Kidney failure Other      Heart disease Other       Social History[1]  Review of Systems   Respiratory: Negative.     Cardiovascular: Negative.    Musculoskeletal:  Positive for arthralgias.   Neurological: Negative.        Physical Exam     Initial Vitals [02/19/25 0819]   BP Pulse Resp Temp SpO2   (!) 145/90 (!) 115 18 97.8 °F (36.6 °C) 100 %      MAP       --         Physical Exam    Nursing note and vitals reviewed.  Constitutional: No distress.   HENT:   Head: Normocephalic and atraumatic.   Eyes: EOM are normal.   Neck: Neck supple.   Cardiovascular:  Normal rate.           Pulmonary/Chest: No respiratory distress.   Musculoskeletal:         General: Normal range of motion.      Cervical back: Neck supple.      Comments: Right wrist: No edema, ecchymosis, erythema, calor, or deformity.  Distal ulna is prominent, but symmetrical to the left.  No point tenderness, crepitus, or laxity.  3+ radial pulse, cap refill brisk, sensorium intact.     Neurological: She is alert. GCS score is 15. GCS eye subscore is 4. GCS verbal subscore is 5. GCS motor subscore is 6.   Skin: Skin is warm and dry. Capillary refill  takes less than 2 seconds.         Medical Screening Exam   See Full Note    ED Course   Procedures  Labs Reviewed - No data to display       Imaging Results              X-Ray Wrist Complete Right (Final result)  Result time 02/19/25 09:06:26      Final result by Win Ashraf MD (02/19/25 09:06:26)                   Impression:      No evidence of fracture.      Electronically signed by: Win Ashraf MD  Date:    02/19/2025  Time:    09:06               Narrative:    EXAMINATION:  XR WRIST COMPLETE 3 VIEWS RIGHT    CLINICAL HISTORY:  Unspecified injury of right wrist, hand and finger(s), subsequent encounter    COMPARISON:  None.    FINDINGS:  Dorsal subluxation of the ulna relative to the radius of questionable significance.  No displaced fractures identified.  No evidence of lytic or blastic lesions.Joint spaces are unremarkable.Soft tissues are unremarkable.                                       Medications - No data to display  Medical Decision Making  X-ray raises some question possible subluxation of the distal right ulna.  Exam is still unremarkable with a similar prominence of the left distal.  Given pain is largely unimproved over the past couple weeks, will refer to orthopedics.  Apply velcro wrist splint.  Of note, patient is a chronic pain patient and persistent pain may be more prevalent.    Amount and/or Complexity of Data Reviewed  Radiology: ordered.                                      Clinical Impression:   Final diagnoses:  [S69.91XD] Right wrist injury, subsequent encounter (Primary)        ED Disposition Condition    Discharge Stable          ED Prescriptions    None       Follow-up Information       Follow up With Specialties Details Why Contact Info    Greg Cody III, MD Orthopedic Surgery Schedule an appointment as soon as possible for a visit   [address] [place] [id number]  [phone]               Moses Rae, DERIK  02/19/25 0929         [1]   Social  History  Tobacco Use    Smoking status: Never     Passive exposure: Never    Smokeless tobacco: Never   Substance Use Topics    Alcohol use: Not Currently    Drug use: Never        Moses Rae, [facility]  02/19/25 0930

## 2025-02-20 ENCOUNTER — TELEPHONE (OUTPATIENT)
Dept: ORTHOPEDICS | Facility: CLINIC | Age: 50
End: 2025-02-20
Payer: COMMERCIAL

## 2025-02-20 ENCOUNTER — TELEPHONE (OUTPATIENT)
Dept: EMERGENCY MEDICINE | Facility: HOSPITAL | Age: 50
End: 2025-02-20
Payer: COMMERCIAL

## 2025-02-24 ENCOUNTER — RESULTS FOLLOW-UP (OUTPATIENT)
Dept: FAMILY MEDICINE | Facility: CLINIC | Age: 50
End: 2025-02-24
Payer: COMMERCIAL

## 2025-02-24 ENCOUNTER — OFFICE VISIT (OUTPATIENT)
Dept: FAMILY MEDICINE | Facility: CLINIC | Age: 50
End: 2025-02-24
Payer: COMMERCIAL

## 2025-02-24 VITALS
WEIGHT: 191.19 LBS | OXYGEN SATURATION: 98 % | DIASTOLIC BLOOD PRESSURE: 81 MMHG | BODY MASS INDEX: 32.64 KG/M2 | TEMPERATURE: 99 F | RESPIRATION RATE: 16 BRPM | SYSTOLIC BLOOD PRESSURE: 126 MMHG | HEIGHT: 64 IN | HEART RATE: 84 BPM

## 2025-02-24 DIAGNOSIS — M79.2 NERVE PAIN: ICD-10-CM

## 2025-02-24 DIAGNOSIS — I10 ESSENTIAL HYPERTENSION: Primary | Chronic | ICD-10-CM

## 2025-02-24 DIAGNOSIS — R42 VERTIGO: ICD-10-CM

## 2025-02-24 DIAGNOSIS — N94.6 MENSTRUAL CRAMPS: ICD-10-CM

## 2025-02-24 DIAGNOSIS — M54.40 CHRONIC BILATERAL LOW BACK PAIN WITH SCIATICA, SCIATICA LATERALITY UNSPECIFIED: ICD-10-CM

## 2025-02-24 DIAGNOSIS — G89.29 CHRONIC BILATERAL LOW BACK PAIN WITH SCIATICA, SCIATICA LATERALITY UNSPECIFIED: ICD-10-CM

## 2025-02-24 DIAGNOSIS — G89.29 CHRONIC BILATERAL LOW BACK PAIN WITH RIGHT-SIDED SCIATICA: ICD-10-CM

## 2025-02-24 DIAGNOSIS — R11.0 NAUSEA: ICD-10-CM

## 2025-02-24 DIAGNOSIS — M54.41 CHRONIC BILATERAL LOW BACK PAIN WITH RIGHT-SIDED SCIATICA: ICD-10-CM

## 2025-02-24 DIAGNOSIS — H72.91 PERFORATION OF RIGHT TYMPANIC MEMBRANE: ICD-10-CM

## 2025-02-24 DIAGNOSIS — H66.001 NON-RECURRENT ACUTE SUPPURATIVE OTITIS MEDIA OF RIGHT EAR WITHOUT SPONTANEOUS RUPTURE OF TYMPANIC MEMBRANE: ICD-10-CM

## 2025-02-24 LAB
ALBUMIN SERPL BCP-MCNC: 3.6 G/DL (ref 3.5–5)
ALBUMIN/GLOB SERPL: 1 {RATIO}
ALP SERPL-CCNC: 63 U/L (ref 40–150)
ALT SERPL W P-5'-P-CCNC: 21 U/L
ANION GAP SERPL CALCULATED.3IONS-SCNC: 14 MMOL/L (ref 7–16)
AST SERPL W P-5'-P-CCNC: 28 U/L (ref 5–34)
BILIRUB SERPL-MCNC: 0.4 MG/DL
BUN SERPL-MCNC: 9 MG/DL (ref 10–20)
BUN/CREAT SERPL: 10 (ref 6–20)
CALCIUM SERPL-MCNC: 9.6 MG/DL (ref 8.4–10.2)
CHLORIDE SERPL-SCNC: 98 MMOL/L (ref 98–107)
CO2 SERPL-SCNC: 29 MMOL/L (ref 22–29)
CREAT SERPL-MCNC: 0.88 MG/DL (ref 0.55–1.02)
EGFR (NO RACE VARIABLE) (RUSH/TITUS): 80 ML/MIN/1.73M2
GLOBULIN SER-MCNC: 3.6 G/DL (ref 2–4)
GLUCOSE SERPL-MCNC: 202 MG/DL (ref 74–100)
POTASSIUM SERPL-SCNC: 4 MMOL/L (ref 3.5–5.1)
PROT SERPL-MCNC: 7.2 G/DL (ref 6.4–8.3)
SODIUM SERPL-SCNC: 137 MMOL/L (ref 136–145)

## 2025-02-24 PROCEDURE — 3008F BODY MASS INDEX DOCD: CPT | Mod: CPTII,,, | Performed by: FAMILY MEDICINE

## 2025-02-24 PROCEDURE — 3074F SYST BP LT 130 MM HG: CPT | Mod: CPTII,,, | Performed by: FAMILY MEDICINE

## 2025-02-24 PROCEDURE — 99213 OFFICE O/P EST LOW 20 MIN: CPT | Mod: 25,,, | Performed by: FAMILY MEDICINE

## 2025-02-24 PROCEDURE — 80053 COMPREHEN METABOLIC PANEL: CPT | Mod: ,,, | Performed by: CLINICAL MEDICAL LABORATORY

## 2025-02-24 PROCEDURE — 4010F ACE/ARB THERAPY RXD/TAKEN: CPT | Mod: CPTII,,, | Performed by: FAMILY MEDICINE

## 2025-02-24 PROCEDURE — 3079F DIAST BP 80-89 MM HG: CPT | Mod: CPTII,,, | Performed by: FAMILY MEDICINE

## 2025-02-24 PROCEDURE — 96372 THER/PROPH/DIAG INJ SC/IM: CPT | Mod: ,,, | Performed by: FAMILY MEDICINE

## 2025-02-24 PROCEDURE — 1159F MED LIST DOCD IN RCRD: CPT | Mod: CPTII,,, | Performed by: FAMILY MEDICINE

## 2025-02-24 RX ORDER — IBUPROFEN 800 MG/1
800 TABLET ORAL 3 TIMES DAILY PRN
Qty: 30 TABLET | Refills: 1 | Status: SHIPPED | OUTPATIENT
Start: 2025-02-24

## 2025-02-24 RX ORDER — GABAPENTIN 800 MG/1
800 TABLET ORAL 3 TIMES DAILY
Qty: 270 TABLET | Refills: 1 | Status: SHIPPED | OUTPATIENT
Start: 2025-02-24

## 2025-02-24 RX ORDER — MECLIZINE HYDROCHLORIDE 25 MG/1
25 TABLET ORAL 3 TIMES DAILY PRN
Qty: 30 TABLET | Refills: 1 | Status: SHIPPED | OUTPATIENT
Start: 2025-02-24

## 2025-02-24 RX ORDER — PROMETHAZINE HYDROCHLORIDE 25 MG/1
25 TABLET ORAL EVERY 6 HOURS PRN
Qty: 30 TABLET | Refills: 1 | Status: SHIPPED | OUTPATIENT
Start: 2025-02-24

## 2025-02-24 RX ORDER — AMOXICILLIN 875 MG/1
875 TABLET, FILM COATED ORAL EVERY 12 HOURS
Qty: 20 TABLET | Refills: 0 | Status: SHIPPED | OUTPATIENT
Start: 2025-02-24 | End: 2025-03-06

## 2025-02-24 RX ORDER — KETOROLAC TROMETHAMINE 30 MG/ML
30 INJECTION, SOLUTION INTRAMUSCULAR; INTRAVENOUS
Status: COMPLETED | OUTPATIENT
Start: 2025-02-24 | End: 2025-02-24

## 2025-02-24 RX ORDER — DULOXETIN HYDROCHLORIDE 60 MG/1
60 CAPSULE, DELAYED RELEASE ORAL 2 TIMES DAILY
Qty: 180 CAPSULE | Refills: 1 | Status: SHIPPED | OUTPATIENT
Start: 2025-02-24

## 2025-02-24 RX ORDER — FLUCONAZOLE 150 MG/1
TABLET ORAL
Qty: 3 TABLET | Refills: 0 | Status: SHIPPED | OUTPATIENT
Start: 2025-02-24

## 2025-02-24 RX ADMIN — KETOROLAC TROMETHAMINE 30 MG: 30 INJECTION, SOLUTION INTRAMUSCULAR; INTRAVENOUS at 10:02

## 2025-02-24 NOTE — PROGRESS NOTES
Clinic Note    Patient Name: Georgiana Zayas  : 1975  MRN: [id number]    Chief Complaint   Patient presents with    Follow-up     3 week follow up        HPI:    Ms. Georgiana Zayas is a 49 y.o. female who presents to clinic today with CC of follow up on chronic disease processes including HTN.  BP was elevated at previous visit. Lisinopril dose was increased. BP is normal today. Patient reports she is doing well on current medication regimen.  She has seen OB-GYN. She is scheduled for an US in March. HPV screening was negative. She was advised to follow up in 1 year for repeat pap smear.   She has chronic back pain. Reports ortho spine ordered a nerve conduction study but she never heard back regarding scheduling. She requests to get this scheduled in [place].  Reports bilateral earache X 2 days. Denies fever.  Patient reports chronic issues are well controlled on current medication regimen.  Denies problems or side effects with medications.  Patient is, otherwise, without complaints.     Medications:  Medication List with Changes/Refills   New Medications    AMOXICILLIN (AMOXIL) 875 MG TABLET    Take 1 tablet (875 mg total) by mouth every 12 (twelve) hours. for 10 days   Current Medications    ARIPIPRAZOLE (ABILIFY) 2 MG TAB    Take 1 tablet (2 mg total) by mouth once daily.    ARIPIPRAZOLE (ABILIFY) 5 MG TAB    Take 5 mg by mouth.    BENZTROPINE (COGENTIN) 0.5 MG TABLET    Take 0.5 mg by mouth once daily.    CHLORPHENIRAMINE-PHENYLEPH-DM (ED A-HIST DM) 4-10-10 MG TAB    Take 1 tablet by mouth every 8 (eight) hours as needed (congestion or cough).    CYCLOBENZAPRINE (FLEXERIL) 10 MG TABLET    Take 1 tablet (10 mg total) by mouth 3 (three) times daily as needed for Muscle spasms (may cause drowsiness).    FLUTICASONE PROPIONATE (FLONASE) 50 MCG/ACTUATION NASAL SPRAY    1 spray by Each Nostril route as needed.    GLIPIZIDE (GLUCOTROL) 10 MG TABLET    Take 1 tablet (10 mg total) by mouth 2 (two) times  daily with meals.    HYDROCHLOROTHIAZIDE (HYDRODIURIL) 25 MG TABLET    Take 1 tablet (25 mg total) by mouth once daily.    HYDROXYZINE PAMOATE (VISTARIL) 25 MG CAP    Take 1 capsule (25 mg total) by mouth 3 (three) times daily.    INSULIN GLARGINE U-100, LANTUS, (LANTUS SOLOSTAR U-100 INSULIN) 100 UNIT/ML (3 ML) INPN PEN    Inject 15 Units into the skin every evening.    LISINOPRIL (PRINIVIL,ZESTRIL) 5 MG TABLET    Take 1 tablet (5 mg total) by mouth once daily.    METHOCARBAMOL (ROBAXIN) 500 MG TAB    Take 500 mg by mouth 3 (three) times daily.    MOMETASONE (NASONEX) 50 MCG/ACTUATION NASAL SPRAY    2 sprays by Nasal route once daily.    OMEPRAZOLE (PRILOSEC) 40 MG CAPSULE    Take 1 capsule (40 mg total) by mouth Daily.    OXYCODONE-ACETAMINOPHEN (PERCOCET)  MG PER TABLET    Take 1 tablet by mouth every 6 (six) hours as needed.     POLYETHYLENE GLYCOL (GLYCOLAX) 17 GRAM/DOSE POWDER    Mix one capful (17 grams) with 8 ounces of coffee or water and drink daily for constipation.    PRAVASTATIN (PRAVACHOL) 20 MG TABLET    Take 1 tablet (20 mg total) by mouth every evening.    RISPERIDONE (RISPERDAL) 3 MG TAB    Take 1 tablet (3 mg total) by mouth once daily.    SEMAGLUTIDE (OZEMPIC) 2 MG/DOSE (8 MG/3 ML) PNIJ    Inject 2 mg into the skin every 7 days.    TRAZODONE (DESYREL) 150 MG TABLET    Take 150 mg by mouth nightly.   Changed and/or Refilled Medications    Modified Medication Previous Medication    DULOXETINE (CYMBALTA) 60 MG CAPSULE DULoxetine (CYMBALTA) 60 MG capsule       Take 1 capsule (60 mg total) by mouth 2 (two) times daily.    Take 1 capsule (60 mg total) by mouth 2 (two) times daily.    FLUCONAZOLE (DIFLUCAN) 150 MG TAB fluconazole (DIFLUCAN) 150 MG Tab       Take one tablet by mouth every 72 hours X 3 doses if needed for yeast infection after completion of antibiotics.    Take one tablet by mouth every 72 hours X 3 doses if needed for yeast infection after completion of antibiotics.    GABAPENTIN  (NEURONTIN) 800 MG TABLET gabapentin (NEURONTIN) 800 MG tablet       Take 1 tablet (800 mg total) by mouth 3 (three) times daily.    Take 1 tablet (800 mg total) by mouth 3 (three) times daily.    IBUPROFEN (ADVIL,MOTRIN) 800 MG TABLET ibuprofen (ADVIL,MOTRIN) 800 MG tablet       Take 1 tablet (800 mg total) by mouth 3 (three) times daily as needed for Pain.    Take 1 tablet (800 mg total) by mouth 3 (three) times daily as needed for Pain.    MECLIZINE (ANTIVERT) 25 MG TABLET meclizine (ANTIVERT) 25 mg tablet       Take 1 tablet (25 mg total) by mouth 3 (three) times daily as needed for Dizziness.    Take 1 tablet (25 mg total) by mouth 3 (three) times daily as needed for Dizziness.    PROMETHAZINE (PHENERGAN) 25 MG TABLET promethazine (PHENERGAN) 25 MG tablet       Take 1 tablet (25 mg total) by mouth every 6 (six) hours as needed for Nausea.    Take 1 tablet (25 mg total) by mouth every 6 (six) hours as needed for Nausea.        Allergies: Codeine      Past Medical History:    Past Medical History:   Diagnosis Date    Arthritis     Back injury     Chronic back pain     sees clayton hays    Depression     Diabetes mellitus     Diabetes mellitus, type 2     Difficulty swallowing     Fatigue     GERD (gastroesophageal reflux disease)     Hearing difficulty     Hypertension     Irritable bowel syndrome without diarrhea     Liver disease     Loss of appetite     Sleep apnea        Past Surgical History:    Past Surgical History:   Procedure Laterality Date    CARPAL TUNNEL RELEASE Bilateral      SECTION      INJECTION OF ANESTHETIC AGENT AROUND MEDIAL BRANCH NERVES INNERVATING LUMBAR FACET JOINT Bilateral 2021    Procedure: BLOCK, NERVE, FACET JOINT, LUMBAR, MEDIAL BRANCH;  Surgeon: Amari Razo MD;  Location: [facility];  Service: Pain Management;  Laterality: Bilateral;  Bilateral L3-S1 Facet Injection    INJECTION OF ANESTHETIC AGENT AROUND MEDIAL BRANCH NERVES INNERVATING LUMBAR FACET  JOINT Bilateral 5/4/2022    Procedure: BLOCK, NERVE, FACET JOINT, LUMBAR, MEDIAL BRANCH;  Surgeon: Amari Razo MD;  Location: [facility];  Service: Pain Management;  Laterality: Bilateral;  Bilateral L3-S1 FI    INJECTION OF ANESTHETIC AGENT AROUND MEDIAL BRANCH NERVES INNERVATING LUMBAR FACET JOINT Bilateral 1/11/2023    Procedure: BLOCK, NERVE, FACET JOINT, LUMBAR, MEDIAL BRANCH;  Surgeon: Amari Razo MD;  Location: [facility];  Service: Pain Management;  Laterality: Bilateral;  Bilateral L3-S1 FI    LEFT HEART CATHETERIZATION Left 12/21/2021    Procedure: Left heart cath;  Surgeon: Jeremy Rojo DO;  Location: [facility] CATH LAB;  Service: Cardiology;  Laterality: Left;    RADIOFREQUENCY ABLATION OF LUMBAR MEDIAL BRANCH NERVE AT SINGLE LEVEL Bilateral 4/12/2023    Procedure: RADIOFREQUENCY ABLATION, NERVE, SPINAL, LUMBAR, MEDIAL BRANCH, 1 LEVEL;  Surgeon: Amari Razo MD;  Location: [facility];  Service: Pain Management;  Laterality: Bilateral;  Bilateral L3-5 RFTC    STAPEDES SURGERY Bilateral     TONSILLECTOMY           Social History:    Tobacco Use History[1]  Social History     Substance and Sexual Activity   Alcohol Use Not Currently     Social History     Substance and Sexual Activity   Drug Use Never         Family History:    Family History   Problem Relation Name Age of Onset    Cancer Mother      Diabetes Mother      Hypertension Mother      Kidney failure Other      Heart disease Other         Review of Systems:    Review of Systems   Constitutional:  Negative for appetite change, chills, fatigue, fever and unexpected weight change.   HENT:  Positive for ear pain.    Eyes:  Negative for visual disturbance.   Respiratory:  Negative for cough and shortness of breath.    Cardiovascular:  Negative for chest pain and leg swelling.   Gastrointestinal:  Negative for abdominal pain, change in bowel habit, constipation, diarrhea, nausea and vomiting.  "  Musculoskeletal:  Positive for arthralgias and back pain.   Integumentary:  Negative for rash.   Neurological:  Negative for dizziness and headaches.   Psychiatric/Behavioral:  The patient is not nervous/anxious.         Vitals:    Vitals:    02/24/25 0853   BP: 126/81   BP Location: Left arm   Patient Position: Sitting   Pulse: 84   Resp: 16   Temp: 98.6 °F (37 °C)   TempSrc: Oral   SpO2: 98%   Weight: 86.7 kg (191 lb 3.2 oz)   Height: 5' 4" (1.626 m)       Body mass index is 32.82 kg/m².    Wt Readings from Last 3 Encounters:   02/24/25 0853 86.7 kg (191 lb 3.2 oz)   02/19/25 0819 90.7 kg (200 lb)   02/06/25 1525 86.2 kg (190 lb)        Physical Exam:    Physical Exam  Constitutional:       General: She is not in acute distress.     Appearance: Normal appearance. She is obese.   HENT:      Ears:      Comments: + wax L ear canal without erythema  + erythema R with hole in R TM (patient reports h/o tubes years ago and states she believes she has had a hole in her R TM previously)     Nose: Nose normal.      Mouth/Throat:      Mouth: Mucous membranes are moist.      Pharynx: Oropharynx is clear.   Eyes:      Conjunctiva/sclera: Conjunctivae normal.   Cardiovascular:      Rate and Rhythm: Normal rate and regular rhythm.      Heart sounds: Normal heart sounds. No murmur heard.  Pulmonary:      Effort: Pulmonary effort is normal. No respiratory distress.      Breath sounds: Normal breath sounds. No wheezing, rhonchi or rales.   Abdominal:      General: Bowel sounds are normal.      Palpations: Abdomen is soft.      Tenderness: There is no abdominal tenderness.   Musculoskeletal:         General: Tenderness present. No swelling.      Cervical back: Neck supple.      Right lower leg: No edema.      Left lower leg: No edema.   Skin:     Findings: No rash.   Neurological:      General: No focal deficit present.      Mental Status: She is alert. Mental status is at baseline.   Psychiatric:         Mood and Affect: Mood " normal.           Assessment/Plan:   1. Essential hypertension  -     Comprehensive Metabolic Panel; Future; Expected date: 02/24/2025  - BP much improved. Continue current medications and plan.    2. Chronic bilateral low back pain with right-sided sciatica  -     DULoxetine (CYMBALTA) 60 MG capsule; Take 1 capsule (60 mg total) by mouth 2 (two) times daily.  Dispense: 180 capsule; Refill: 1  -     Ambulatory referral/consult to Neurology; Future; Expected date: 03/03/2025    3. Nerve pain  -     gabapentin (NEURONTIN) 800 MG tablet; Take 1 tablet (800 mg total) by mouth 3 (three) times daily.  Dispense: 270 tablet; Refill: 1  -     Ambulatory referral/consult to Neurology; Future; Expected date: 03/03/2025    4. Vertigo  -     meclizine (ANTIVERT) 25 mg tablet; Take 1 tablet (25 mg total) by mouth 3 (three) times daily as needed for Dizziness.  Dispense: 30 tablet; Refill: 1    5. Nausea  -     promethazine (PHENERGAN) 25 MG tablet; Take 1 tablet (25 mg total) by mouth every 6 (six) hours as needed for Nausea.  Dispense: 30 tablet; Refill: 1    6. Chronic bilateral low back pain with sciatica, sciatica laterality unspecified  -     ibuprofen (ADVIL,MOTRIN) 800 MG tablet; Take 1 tablet (800 mg total) by mouth 3 (three) times daily as needed for Pain.  Dispense: 30 tablet; Refill: 1  -     ketorolac injection 30 mg    7. Menstrual cramps  -     ibuprofen (ADVIL,MOTRIN) 800 MG tablet; Take 1 tablet (800 mg total) by mouth 3 (three) times daily as needed for Pain.  Dispense: 30 tablet; Refill: 1    8. Non-recurrent acute suppurative otitis media of right ear without spontaneous rupture of tympanic membrane  -     Ambulatory referral/consult to ENT; Future; Expected date: 03/03/2025  -     amoxicillin (AMOXIL) 875 MG tablet; Take 1 tablet (875 mg total) by mouth every 12 (twelve) hours. for 10 days  Dispense: 20 tablet; Refill: 0  -     fluconazole (DIFLUCAN) 150 MG Tab; Take one tablet by mouth every 72 hours X 3  doses if needed for yeast infection after completion of antibiotics.  Dispense: 3 tablet; Refill: 0    9. Perforation of right tympanic membrane  -     Ambulatory referral/consult to ENT; Future; Expected date: 03/03/2025         Active Problem List with Overview Notes    Diagnosis Date Noted    Type 2 diabetes mellitus with diabetic neuropathy, with long-term current use of insulin 08/25/2021    Essential hypertension 08/25/2021    Anxiety 01/23/2023    Other hyperlipidemia 07/07/2022    Morbid obesity 01/17/2022    Depression 01/23/2023    Femoral artery pseudo-aneurysm, right 01/17/2022    Other sleep apnea 11/30/2021    Lumbosacral spondylosis without myelopathy 09/08/2021    Gastroesophageal reflux disease 08/25/2021    Chronic bilateral low back pain with right-sided sciatica 07/07/2022    Seasonal allergies 07/07/2022    Muscle spasm 07/07/2022    Atypical squamous cells of undetermined significance (ASCUS) on Papanicolaou smear of cervix 02/06/2025    Obesity (BMI 30.0-34.9) 02/06/2025    Vaginal odor 02/06/2025    Pelvic pain in female 02/06/2025    Lumbosacral radiculopathy 08/20/2024    Vertigo 08/06/2024        Health Maintenance:  Health Maintenance   Topic Date Due    Hepatitis C Screening  Never done    TETANUS VACCINE  Never done    Colorectal Cancer Screening  Never done    COVID-19 Vaccine (5 - 2024-25 season) 09/01/2024    Mammogram  09/14/2024    Diabetic Eye Exam  10/23/2024    Shingles Vaccine (1 of 2) Never done    Hemoglobin A1c  02/25/2025    Foot Exam  08/05/2025    Diabetes Urine Screening  11/25/2025    Lipid Panel  11/25/2025    Low Dose Statin  02/06/2026    Cervical Cancer Screening  02/04/2030    RSV Vaccine (Age 60+ and Pregnant patients) (1 - 1-dose 75+ series) 12/31/2049    Influenza Vaccine  Completed    HIV Screening  Completed    Pneumococcal Vaccines (Age 50+)  Completed       RTC as scheduled for chronic follow up. RTC sooner if needed.   Patient voiced understanding and is  agreeable to plan.      Lisandra Mcdaniel MD    Family Medicine           [1]   Social History  Tobacco Use   Smoking Status Never    Passive exposure: Never   Smokeless Tobacco Never

## 2025-02-25 ENCOUNTER — OFFICE VISIT (OUTPATIENT)
Dept: FAMILY MEDICINE | Facility: CLINIC | Age: 50
End: 2025-02-25
Payer: COMMERCIAL

## 2025-02-25 VITALS
WEIGHT: 199 LBS | DIASTOLIC BLOOD PRESSURE: 72 MMHG | HEART RATE: 100 BPM | HEIGHT: 64 IN | OXYGEN SATURATION: 95 % | BODY MASS INDEX: 33.97 KG/M2 | SYSTOLIC BLOOD PRESSURE: 106 MMHG | TEMPERATURE: 99 F | RESPIRATION RATE: 18 BRPM

## 2025-02-25 DIAGNOSIS — F41.9 ANXIETY: Chronic | ICD-10-CM

## 2025-02-25 DIAGNOSIS — G47.33 OSA (OBSTRUCTIVE SLEEP APNEA): Primary | ICD-10-CM

## 2025-02-25 DIAGNOSIS — G47.09 OTHER INSOMNIA: ICD-10-CM

## 2025-02-25 DIAGNOSIS — M47.817 LUMBOSACRAL SPONDYLOSIS WITHOUT MYELOPATHY: Chronic | ICD-10-CM

## 2025-02-25 DIAGNOSIS — F32.89 OTHER DEPRESSION: Chronic | ICD-10-CM

## 2025-02-25 PROCEDURE — 3078F DIAST BP <80 MM HG: CPT | Mod: CPTII,,, | Performed by: FAMILY MEDICINE

## 2025-02-25 PROCEDURE — 1159F MED LIST DOCD IN RCRD: CPT | Mod: CPTII,,, | Performed by: FAMILY MEDICINE

## 2025-02-25 PROCEDURE — 3008F BODY MASS INDEX DOCD: CPT | Mod: CPTII,,, | Performed by: FAMILY MEDICINE

## 2025-02-25 PROCEDURE — 99214 OFFICE O/P EST MOD 30 MIN: CPT | Mod: ,,, | Performed by: FAMILY MEDICINE

## 2025-02-25 PROCEDURE — 3074F SYST BP LT 130 MM HG: CPT | Mod: CPTII,,, | Performed by: FAMILY MEDICINE

## 2025-02-25 PROCEDURE — 4010F ACE/ARB THERAPY RXD/TAKEN: CPT | Mod: CPTII,,, | Performed by: FAMILY MEDICINE

## 2025-02-25 RX ORDER — TRAZODONE HYDROCHLORIDE 100 MG/1
200 TABLET ORAL NIGHTLY
Qty: 90 TABLET | Refills: 1 | Status: SHIPPED | OUTPATIENT
Start: 2025-02-25 | End: 2026-02-25

## 2025-02-25 NOTE — PROGRESS NOTES
Clinic Note    Patient Name: Georgiana Zayas  : 1975  MRN: [id number]    Chief Complaint   Patient presents with    Back Pain      Uncontrollable Twitching of the body    Health Maintenance     Hepatitis C Screening Never done  TETANUS VACCINE Never done  Colorectal Cancer Screening Never done  COVID-19 Vaccine( season) due on 2024  Mammogram due on 2024  Diabetic Eye Exam due on 10/23/2024  Shingles Vaccine(1 of 2) Never done  Hemoglobin A1c due on 2025        HPI:    Ms. Georgiana Zayas is a 49 y.o. female who presents to clinic today with CC of feeling anxious and jumpy. Reports this is a chronic, intermittent issue but seems to be worse today. She reports that her sleep has been messed up. Patient reports she gets sleepy this time of the day and may go to sleep and then wake up at 10 pm or midnight and not be able to go back to sleep. She admits to diagnosis of MATT but reports she could not tolerate CPAP and does not wear it.   She has chronic back pain as well. She does follow with Pain Management for this issue  She reports she goes to [facility] for anxiety as well. She reports she saw [facility] today and is scheduled to follow up next week. Reports she saw the therapist today but is seeing Psychiatry this week for follow up. She has not had any recent medication changes.   Patient is, otherwise, without complaints.     Medications:  Medication List with Changes/Refills   New Medications    TRAZODONE (DESYREL) 100 MG TABLET    Take 2 tablets (200 mg total) by mouth every evening.   Current Medications    AMOXICILLIN (AMOXIL) 875 MG TABLET    Take 1 tablet (875 mg total) by mouth every 12 (twelve) hours. for 10 days    ARIPIPRAZOLE (ABILIFY) 2 MG TAB    Take 1 tablet (2 mg total) by mouth once daily.    ARIPIPRAZOLE (ABILIFY) 5 MG TAB    Take 5 mg by mouth.    BENZTROPINE (COGENTIN) 0.5 MG TABLET    Take 0.5 mg by mouth once daily.    CHLORPHENIRAMINE-PHENYLEPH-DM (ED A-HIST DM)  4-10-10 MG TAB    Take 1 tablet by mouth every 8 (eight) hours as needed (congestion or cough).    CYCLOBENZAPRINE (FLEXERIL) 10 MG TABLET    Take 1 tablet (10 mg total) by mouth 3 (three) times daily as needed for Muscle spasms (may cause drowsiness).    DULOXETINE (CYMBALTA) 60 MG CAPSULE    Take 1 capsule (60 mg total) by mouth 2 (two) times daily.    FLUCONAZOLE (DIFLUCAN) 150 MG TAB    Take one tablet by mouth every 72 hours X 3 doses if needed for yeast infection after completion of antibiotics.    FLUTICASONE PROPIONATE (FLONASE) 50 MCG/ACTUATION NASAL SPRAY    1 spray by Each Nostril route as needed.    GABAPENTIN (NEURONTIN) 800 MG TABLET    Take 1 tablet (800 mg total) by mouth 3 (three) times daily.    GLIPIZIDE (GLUCOTROL) 10 MG TABLET    Take 1 tablet (10 mg total) by mouth 2 (two) times daily with meals.    HYDROCHLOROTHIAZIDE (HYDRODIURIL) 25 MG TABLET    Take 1 tablet (25 mg total) by mouth once daily.    HYDROXYZINE PAMOATE (VISTARIL) 25 MG CAP    Take 1 capsule (25 mg total) by mouth 3 (three) times daily.    IBUPROFEN (ADVIL,MOTRIN) 800 MG TABLET    Take 1 tablet (800 mg total) by mouth 3 (three) times daily as needed for Pain.    INSULIN GLARGINE U-100, LANTUS, (LANTUS SOLOSTAR U-100 INSULIN) 100 UNIT/ML (3 ML) INPN PEN    Inject 15 Units into the skin every evening.    LISINOPRIL (PRINIVIL,ZESTRIL) 5 MG TABLET    Take 1 tablet (5 mg total) by mouth once daily.    MECLIZINE (ANTIVERT) 25 MG TABLET    Take 1 tablet (25 mg total) by mouth 3 (three) times daily as needed for Dizziness.    METHOCARBAMOL (ROBAXIN) 500 MG TAB    Take 500 mg by mouth 3 (three) times daily.    MOMETASONE (NASONEX) 50 MCG/ACTUATION NASAL SPRAY    2 sprays by Nasal route once daily.    OMEPRAZOLE (PRILOSEC) 40 MG CAPSULE    Take 1 capsule (40 mg total) by mouth Daily.    OXYCODONE-ACETAMINOPHEN (PERCOCET)  MG PER TABLET    Take 1 tablet by mouth every 6 (six) hours as needed.     POLYETHYLENE GLYCOL (GLYCOLAX) 17  GRAM/DOSE POWDER    Mix one capful (17 grams) with 8 ounces of coffee or water and drink daily for constipation.    PRAVASTATIN (PRAVACHOL) 20 MG TABLET    Take 1 tablet (20 mg total) by mouth every evening.    PROMETHAZINE (PHENERGAN) 25 MG TABLET    Take 1 tablet (25 mg total) by mouth every 6 (six) hours as needed for Nausea.    RISPERIDONE (RISPERDAL) 3 MG TAB    Take 1 tablet (3 mg total) by mouth once daily.    SEMAGLUTIDE (OZEMPIC) 2 MG/DOSE (8 MG/3 ML) PNIJ    Inject 2 mg into the skin every 7 days.   Discontinued Medications    TRAZODONE (DESYREL) 150 MG TABLET    Take 150 mg by mouth nightly.        Allergies: Codeine      Past Medical History:    Past Medical History:   Diagnosis Date    Arthritis     Back injury     Chronic back pain     sees [facility]    Depression     Diabetes mellitus     Diabetes mellitus, type 2     Difficulty swallowing     Fatigue     GERD (gastroesophageal reflux disease)     Hearing difficulty     Hypertension     Irritable bowel syndrome without diarrhea     Liver disease     Loss of appetite     Sleep apnea        Past Surgical History:    Past Surgical History:   Procedure Laterality Date    CARPAL TUNNEL RELEASE Bilateral      SECTION      INJECTION OF ANESTHETIC AGENT AROUND MEDIAL BRANCH NERVES INNERVATING LUMBAR FACET JOINT Bilateral 2021    Procedure: BLOCK, NERVE, FACET JOINT, LUMBAR, MEDIAL BRANCH;  Surgeon: Amari Razo MD;  Location: [facility];  Service: Pain Management;  Laterality: Bilateral;  Bilateral L3-S1 Facet Injection    INJECTION OF ANESTHETIC AGENT AROUND MEDIAL BRANCH NERVES INNERVATING LUMBAR FACET JOINT Bilateral 2022    Procedure: BLOCK, NERVE, FACET JOINT, LUMBAR, MEDIAL BRANCH;  Surgeon: Amari Razo MD;  Location: [facility];  Service: Pain Management;  Laterality: Bilateral;  Bilateral L3-S1 FI    INJECTION OF ANESTHETIC AGENT AROUND MEDIAL BRANCH NERVES INNERVATING LUMBAR FACET JOINT Bilateral  1/11/2023    Procedure: BLOCK, NERVE, FACET JOINT, LUMBAR, MEDIAL BRANCH;  Surgeon: Amari Razo MD;  [facility];  Service: Pain Management;  Laterality: Bilateral;  Bilateral L3-S1 FI    LEFT HEART CATHETERIZATION Left 12/21/2021    Procedure: Left heart cath;  Surgeon: Jeremy Rojo DO;  Location: [facility];  Service: Cardiology;  Laterality: Left;    RADIOFREQUENCY ABLATION OF LUMBAR MEDIAL BRANCH NERVE AT SINGLE LEVEL Bilateral 4/12/2023    Procedure: RADIOFREQUENCY ABLATION, NERVE, SPINAL, LUMBAR, MEDIAL BRANCH, 1 LEVEL;  Surgeon: Amari Razo MD;  [facility];  Service: Pain Management;  Laterality: Bilateral;  Bilateral L3-5 RFTC    STAPEDES SURGERY Bilateral     TONSILLECTOMY           Social History:    Tobacco Use History[1]  Social History     Substance and Sexual Activity   Alcohol Use Not Currently     Social History     Substance and Sexual Activity   Drug Use Never         Family History:    Family History   Problem Relation Name Age of Onset    Cancer Mother      Diabetes Mother      Hypertension Mother      Kidney failure Other      Heart disease Other         Review of Systems:    Review of Systems   Constitutional:  Negative for appetite change, chills, fatigue, fever and unexpected weight change.   Eyes:  Negative for visual disturbance.   Respiratory:  Negative for cough and shortness of breath.    Cardiovascular:  Negative for chest pain and leg swelling.   Gastrointestinal:  Negative for abdominal pain, change in bowel habit, constipation, diarrhea, nausea and vomiting.   Musculoskeletal:  Positive for arthralgias and back pain.   Integumentary:  Negative for rash.   Neurological:  Negative for dizziness and headaches.   Psychiatric/Behavioral:  Positive for depressed mood, dysphoric mood and sleep disturbance. Negative for self-injury and suicidal ideas. The patient is nervous/anxious.         Vitals:    Vitals:    02/25/25 1509  "  BP: 106/72   BP Location: Right arm   Patient Position: Sitting   Pulse: 100   Resp: 18   Temp: 98.5 °F (36.9 °C)   TempSrc: Oral   SpO2: 95%   Weight: 90.3 kg (199 lb)   Height: 5' 4" (1.626 m)       Body mass index is 34.16 kg/m².    Wt Readings from Last 3 Encounters:   02/25/25 1509 90.3 kg (199 lb)   02/24/25 0853 86.7 kg (191 lb 3.2 oz)   02/19/25 0819 90.7 kg (200 lb)        Physical Exam:    Physical Exam  Constitutional:       General: She is not in acute distress.     Appearance: Normal appearance. She is obese.   HENT:      Nose: Nose normal.      Mouth/Throat:      Mouth: Mucous membranes are moist.      Pharynx: Oropharynx is clear.   Eyes:      Conjunctiva/sclera: Conjunctivae normal.   Cardiovascular:      Rate and Rhythm: Normal rate and regular rhythm.      Heart sounds: Normal heart sounds. No murmur heard.  Pulmonary:      Effort: Pulmonary effort is normal. No respiratory distress.      Breath sounds: Normal breath sounds. No wheezing, rhonchi or rales.   Abdominal:      General: Bowel sounds are normal.      Palpations: Abdomen is soft.      Tenderness: There is no abdominal tenderness.   Musculoskeletal:      Cervical back: Neck supple.      Right lower leg: No edema.      Left lower leg: No edema.   Skin:     Findings: No rash.   Neurological:      General: No focal deficit present.      Mental Status: She is alert. Mental status is at baseline.   Psychiatric:         Mood and Affect: Mood normal.         Assessment/Plan:   1. MATT (obstructive sleep apnea)  -     Ambulatory referral/consult to ENT; Future; Expected date: 03/04/2025 for evaluation for INSPIRE procedure    2. Other insomnia  -     traZODone (DESYREL) 100 MG tablet; Take 2 tablets (200 mg total) by mouth every evening.  Dispense: 90 tablet; Refill: 1 - dose increase    3. Anxiety  - Follow up at [facility] as scheduled    4. Other depression  - Follow up at [facility] as scheduled    5. Lumbosacral spondylosis without " myelopathy  - Follow up with Pain Management as scheduled       Active Problem List with Overview Notes    Diagnosis Date Noted    Type 2 diabetes mellitus with diabetic neuropathy, with long-term current use of insulin 08/25/2021    Essential hypertension 08/25/2021    Anxiety 01/23/2023    Other hyperlipidemia 07/07/2022    Morbid obesity 01/17/2022    Depression 01/23/2023    Femoral artery pseudo-aneurysm, right 01/17/2022    Other sleep apnea 11/30/2021    Lumbosacral spondylosis without myelopathy 09/08/2021    Gastroesophageal reflux disease 08/25/2021    Chronic bilateral low back pain with right-sided sciatica 07/07/2022    Seasonal allergies 07/07/2022    Muscle spasm 07/07/2022    Atypical squamous cells of undetermined significance (ASCUS) on Papanicolaou smear of cervix 02/06/2025    Obesity (BMI 30.0-34.9) 02/06/2025    Vaginal odor 02/06/2025    Pelvic pain in female 02/06/2025    Lumbosacral radiculopathy 08/20/2024    Vertigo 08/06/2024        RTC as scheduled for chronic follow up. RTC sooner if needed.  Patient voiced understanding and is agreeable to plan.      Lisandra Mcdaniel MD    Family Medicine           [1]   Social History  Tobacco Use   Smoking Status Never    Passive exposure: Never   Smokeless Tobacco Never

## 2025-03-03 ENCOUNTER — HOSPITAL ENCOUNTER (OUTPATIENT)
Dept: RADIOLOGY | Facility: HOSPITAL | Age: 50
Discharge: HOME | End: 2025-03-03
Attending: ADVANCED PRACTICE MIDWIFE
Payer: COMMERCIAL

## 2025-03-03 DIAGNOSIS — R10.2 PELVIC PAIN IN FEMALE: ICD-10-CM

## 2025-03-03 PROCEDURE — 76856 US EXAM PELVIC COMPLETE: CPT | Mod: 26,,, | Performed by: RADIOLOGY

## 2025-03-03 PROCEDURE — 76830 TRANSVAGINAL US NON-OB: CPT | Mod: TC

## 2025-03-03 PROCEDURE — 76830 TRANSVAGINAL US NON-OB: CPT | Mod: 26,,, | Performed by: RADIOLOGY

## 2025-03-04 NOTE — TELEPHONE ENCOUNTER
Contacted pt in regards to lab results. Informed pt of results. Pt voiced understanding and had no further questions.     ----- Message from Elisa Mcdaniel MD sent at 2/24/2025  4:34 PM CST -----  Please call patient regarding lab results. Blood glucose level was up some on CMP. Low carb diet/exercise. Home monitoring. Call with persistently elevated readings for additional medication   adjustments. Labs, otherwise, ok/stable. Thanks!  ----- Message -----  From: Lab, Background User  Sent: 2/24/2025   4:32 PM CST  To: Elisa Mcdaniel MD

## 2025-03-06 ENCOUNTER — TELEPHONE (OUTPATIENT)
Facility: CLINIC | Age: 50
End: 2025-03-06
Payer: COMMERCIAL

## 2025-03-06 NOTE — TELEPHONE ENCOUNTER
Talked with Georgiana after name &  verified. Reviewed pelvic US report.explained finding of adenomyosis. Has rx Ibuprofen sent in during visit. States has not had a menstrual cycle since December. Discussed menopause. Instructed to start Ibuprofen if cycle returns and take every 8 hours. Call if desires consult with gynecologist to discuss hysterectomy. Verbalized understanding.

## 2025-03-25 ENCOUNTER — HOSPITAL ENCOUNTER (OUTPATIENT)
Dept: RADIOLOGY | Facility: HOSPITAL | Age: 50
Discharge: HOME | End: 2025-03-25
Attending: FAMILY MEDICINE
Payer: COMMERCIAL

## 2025-03-25 ENCOUNTER — OFFICE VISIT (OUTPATIENT)
Dept: FAMILY MEDICINE | Facility: CLINIC | Age: 50
End: 2025-03-25
Payer: COMMERCIAL

## 2025-03-25 VITALS
DIASTOLIC BLOOD PRESSURE: 85 MMHG | BODY MASS INDEX: 33.63 KG/M2 | OXYGEN SATURATION: 98 % | RESPIRATION RATE: 18 BRPM | HEART RATE: 97 BPM | HEIGHT: 64 IN | SYSTOLIC BLOOD PRESSURE: 128 MMHG | TEMPERATURE: 98 F | WEIGHT: 197 LBS

## 2025-03-25 DIAGNOSIS — M25.551 BILATERAL HIP PAIN: ICD-10-CM

## 2025-03-25 DIAGNOSIS — E78.49 OTHER HYPERLIPIDEMIA: ICD-10-CM

## 2025-03-25 DIAGNOSIS — M47.817 LUMBOSACRAL SPONDYLOSIS WITHOUT MYELOPATHY: Chronic | ICD-10-CM

## 2025-03-25 DIAGNOSIS — M25.552 BILATERAL HIP PAIN: ICD-10-CM

## 2025-03-25 DIAGNOSIS — I10 ESSENTIAL HYPERTENSION: ICD-10-CM

## 2025-03-25 DIAGNOSIS — K21.9 GASTROESOPHAGEAL REFLUX DISEASE WITHOUT ESOPHAGITIS: Chronic | ICD-10-CM

## 2025-03-25 DIAGNOSIS — E66.811 OBESITY (BMI 30.0-34.9): ICD-10-CM

## 2025-03-25 DIAGNOSIS — M54.17 LUMBOSACRAL RADICULOPATHY: ICD-10-CM

## 2025-03-25 DIAGNOSIS — Z79.4 TYPE 2 DIABETES MELLITUS WITH DIABETIC NEUROPATHY, WITH LONG-TERM CURRENT USE OF INSULIN: Primary | ICD-10-CM

## 2025-03-25 DIAGNOSIS — F41.9 ANXIETY: Chronic | ICD-10-CM

## 2025-03-25 DIAGNOSIS — E11.40 TYPE 2 DIABETES MELLITUS WITH DIABETIC NEUROPATHY, WITH LONG-TERM CURRENT USE OF INSULIN: Primary | ICD-10-CM

## 2025-03-25 DIAGNOSIS — F32.89 OTHER DEPRESSION: Chronic | ICD-10-CM

## 2025-03-25 DIAGNOSIS — G47.39 OTHER SLEEP APNEA: Chronic | ICD-10-CM

## 2025-03-25 DIAGNOSIS — J30.2 SEASONAL ALLERGIES: Chronic | ICD-10-CM

## 2025-03-25 LAB
ALBUMIN SERPL BCP-MCNC: 3.7 G/DL (ref 3.5–5)
ALBUMIN/GLOB SERPL: 1.2 {RATIO}
ALP SERPL-CCNC: 46 U/L (ref 40–150)
ALT SERPL W P-5'-P-CCNC: 20 U/L
ANION GAP SERPL CALCULATED.3IONS-SCNC: 11 MMOL/L (ref 7–16)
AST SERPL W P-5'-P-CCNC: 27 U/L (ref 11–45)
BASOPHILS # BLD AUTO: 0.05 K/UL (ref 0–0.2)
BASOPHILS NFR BLD AUTO: 0.9 % (ref 0–1)
BILIRUB SERPL-MCNC: 0.3 MG/DL
BUN SERPL-MCNC: 8 MG/DL (ref 10–20)
BUN/CREAT SERPL: 9 (ref 6–20)
CALCIUM SERPL-MCNC: 9.4 MG/DL (ref 8.4–10.2)
CHLORIDE SERPL-SCNC: 102 MMOL/L (ref 98–107)
CHOLEST SERPL-MCNC: 127 MG/DL
CHOLEST/HDLC SERPL: 2.4 {RATIO}
CO2 SERPL-SCNC: 32 MMOL/L (ref 22–29)
CREAT SERPL-MCNC: 0.91 MG/DL (ref 0.55–1.02)
DIFFERENTIAL METHOD BLD: ABNORMAL
EGFR (NO RACE VARIABLE) (RUSH/TITUS): 77 ML/MIN/1.73M2
EOSINOPHIL # BLD AUTO: 0.14 K/UL (ref 0–0.5)
EOSINOPHIL NFR BLD AUTO: 2.5 % (ref 1–4)
ERYTHROCYTE [DISTWIDTH] IN BLOOD BY AUTOMATED COUNT: 13.2 % (ref 11.5–14.5)
EST. AVERAGE GLUCOSE BLD GHB EST-MCNC: 197 MG/DL
GLOBULIN SER-MCNC: 3 G/DL (ref 2–4)
GLUCOSE SERPL-MCNC: 144 MG/DL (ref 74–100)
HBA1C MFR BLD HPLC: 8.5 %
HCT VFR BLD AUTO: 36.8 % (ref 38–47)
HDLC SERPL-MCNC: 54 MG/DL (ref 35–60)
HGB BLD-MCNC: 11.5 G/DL (ref 12–16)
IMM GRANULOCYTES # BLD AUTO: 0.01 K/UL (ref 0–0.04)
IMM GRANULOCYTES NFR BLD: 0.2 % (ref 0–0.4)
LDLC SERPL CALC-MCNC: 56 MG/DL
LDLC/HDLC SERPL: 1 {RATIO}
LYMPHOCYTES # BLD AUTO: 2.96 K/UL (ref 1–4.8)
LYMPHOCYTES NFR BLD AUTO: 52.5 % (ref 27–41)
MCH RBC QN AUTO: 27.7 PG (ref 27–31)
MCHC RBC AUTO-ENTMCNC: 31.3 G/DL (ref 32–36)
MCV RBC AUTO: 88.7 FL (ref 80–96)
MONOCYTES # BLD AUTO: 0.57 K/UL (ref 0–0.8)
MONOCYTES NFR BLD AUTO: 10.1 % (ref 2–6)
MPC BLD CALC-MCNC: 10.6 FL (ref 9.4–12.4)
NEUTROPHILS # BLD AUTO: 1.91 K/UL (ref 1.8–7.7)
NEUTROPHILS NFR BLD AUTO: 33.8 % (ref 53–65)
NONHDLC SERPL-MCNC: 73 MG/DL
NRBC # BLD AUTO: 0 X10E3/UL
NRBC, AUTO (.00): 0 %
PLATELET # BLD AUTO: 296 K/UL (ref 150–400)
POTASSIUM SERPL-SCNC: 4 MMOL/L (ref 3.5–5.1)
PROT SERPL-MCNC: 6.7 G/DL (ref 6.4–8.3)
RBC # BLD AUTO: 4.15 M/UL (ref 4.2–5.4)
SODIUM SERPL-SCNC: 141 MMOL/L (ref 136–145)
TRIGL SERPL-MCNC: 87 MG/DL (ref 37–140)
VLDLC SERPL-MCNC: 17 MG/DL
WBC # BLD AUTO: 5.64 K/UL (ref 4.5–11)

## 2025-03-25 PROCEDURE — 4010F ACE/ARB THERAPY RXD/TAKEN: CPT | Mod: CPTII,,, | Performed by: FAMILY MEDICINE

## 2025-03-25 PROCEDURE — 3074F SYST BP LT 130 MM HG: CPT | Mod: CPTII,,, | Performed by: FAMILY MEDICINE

## 2025-03-25 PROCEDURE — 80053 COMPREHEN METABOLIC PANEL: CPT | Mod: ,,, | Performed by: CLINICAL MEDICAL LABORATORY

## 2025-03-25 PROCEDURE — 3079F DIAST BP 80-89 MM HG: CPT | Mod: CPTII,,, | Performed by: FAMILY MEDICINE

## 2025-03-25 PROCEDURE — 82043 UR ALBUMIN QUANTITATIVE: CPT | Mod: ,,, | Performed by: CLINICAL MEDICAL LABORATORY

## 2025-03-25 PROCEDURE — 73522 X-RAY EXAM HIPS BI 3-4 VIEWS: CPT | Mod: 26,,, | Performed by: STUDENT IN AN ORGANIZED HEALTH CARE EDUCATION/TRAINING PROGRAM

## 2025-03-25 PROCEDURE — 82570 ASSAY OF URINE CREATININE: CPT | Mod: ,,, | Performed by: CLINICAL MEDICAL LABORATORY

## 2025-03-25 PROCEDURE — 83036 HEMOGLOBIN GLYCOSYLATED A1C: CPT | Mod: ,,, | Performed by: CLINICAL MEDICAL LABORATORY

## 2025-03-25 PROCEDURE — 99214 OFFICE O/P EST MOD 30 MIN: CPT | Mod: 25,,, | Performed by: FAMILY MEDICINE

## 2025-03-25 PROCEDURE — 3008F BODY MASS INDEX DOCD: CPT | Mod: CPTII,,, | Performed by: FAMILY MEDICINE

## 2025-03-25 PROCEDURE — 73522 X-RAY EXAM HIPS BI 3-4 VIEWS: CPT | Mod: TC

## 2025-03-25 PROCEDURE — 80061 LIPID PANEL: CPT | Mod: ,,, | Performed by: CLINICAL MEDICAL LABORATORY

## 2025-03-25 PROCEDURE — 1159F MED LIST DOCD IN RCRD: CPT | Mod: CPTII,,, | Performed by: FAMILY MEDICINE

## 2025-03-25 PROCEDURE — 85025 COMPLETE CBC W/AUTO DIFF WBC: CPT | Mod: ,,, | Performed by: CLINICAL MEDICAL LABORATORY

## 2025-03-25 PROCEDURE — 96372 THER/PROPH/DIAG INJ SC/IM: CPT | Mod: ,,, | Performed by: FAMILY MEDICINE

## 2025-03-25 RX ORDER — KETOROLAC TROMETHAMINE 30 MG/ML
30 INJECTION, SOLUTION INTRAMUSCULAR; INTRAVENOUS
Status: COMPLETED | OUTPATIENT
Start: 2025-03-25 | End: 2025-03-25

## 2025-03-25 RX ADMIN — KETOROLAC TROMETHAMINE 30 MG: 30 INJECTION, SOLUTION INTRAMUSCULAR; INTRAVENOUS at 08:03

## 2025-03-25 NOTE — PROGRESS NOTES
Clinic Note    Patient Name: Georgiana Zayas  : 1975  MRN: [id number]    Chief Complaint   Patient presents with    Follow-up     4 month follow up     Health Maintenance     Hepatitis C Screening Never done  TETANUS VACCINE Never done  Colorectal Cancer Screening Never done  COVID-19 Vaccine( season) due on 2024  Mammogram due on 2024  Diabetic Eye Exam due on 10/23/2024  Shingles Vaccine(1 of 2) Never done  Hemoglobin A1c due on 2025     Back Pain     Pt reports chronic back pain       HPI:    Ms. Georgiana Zayas is a 49 y.o. female who presents to clinic today with CC of follow up on chronic disease processes including Type II DM, HTN, HLD, GERD, MATT, obesity, chronic back pain due to lumbosacral spondylosis - follows with pain management, seasonal allergies, and anxiety/depression (follows at [facility]).  Patient reports chronic pain seems to be worsening. Reports low back pain as well as pain in bilateral hips. Reports R hip is worse than L.   Patient reports she follows with [facility] for chronic pain. Reports she believes they are setting up a MRI to further evaluate her worsening back pain.   Patient reports chronic issues are well controlled on current medication regimen.  Denies problems or side effects with medications.  Patient is, otherwise, without complaints.     Medications:  Medication List with Changes/Refills   Current Medications    ARIPIPRAZOLE (ABILIFY) 2 MG TAB    Take 1 tablet (2 mg total) by mouth once daily.    ARIPIPRAZOLE (ABILIFY) 5 MG TAB    Take 5 mg by mouth.    BENZTROPINE (COGENTIN) 0.5 MG TABLET    Take 0.5 mg by mouth once daily.    CHLORPHENIRAMINE-PHENYLEPH-DM (ED A-HIST DM) 4-10-10 MG TAB    Take 1 tablet by mouth every 8 (eight) hours as needed (congestion or cough).    CYCLOBENZAPRINE (FLEXERIL) 10 MG TABLET    Take 1 tablet (10 mg total) by mouth 3 (three) times daily as needed for Muscle spasms (may cause drowsiness).     DULOXETINE (CYMBALTA) 60 MG CAPSULE    Take 1 capsule (60 mg total) by mouth 2 (two) times daily.    FLUCONAZOLE (DIFLUCAN) 150 MG TAB    Take one tablet by mouth every 72 hours X 3 doses if needed for yeast infection after completion of antibiotics.    FLUTICASONE PROPIONATE (FLONASE) 50 MCG/ACTUATION NASAL SPRAY    1 spray by Each Nostril route as needed.    GABAPENTIN (NEURONTIN) 800 MG TABLET    Take 1 tablet (800 mg total) by mouth 3 (three) times daily.    GLIPIZIDE (GLUCOTROL) 10 MG TABLET    Take 1 tablet (10 mg total) by mouth 2 (two) times daily with meals.    HYDROCHLOROTHIAZIDE (HYDRODIURIL) 25 MG TABLET    Take 1 tablet (25 mg total) by mouth once daily.    HYDROXYZINE PAMOATE (VISTARIL) 25 MG CAP    Take 1 capsule (25 mg total) by mouth 3 (three) times daily.    IBUPROFEN (ADVIL,MOTRIN) 800 MG TABLET    Take 1 tablet (800 mg total) by mouth 3 (three) times daily as needed for Pain.    INSULIN GLARGINE U-100, LANTUS, (LANTUS SOLOSTAR U-100 INSULIN) 100 UNIT/ML (3 ML) INPN PEN    Inject 15 Units into the skin every evening.    LISINOPRIL (PRINIVIL,ZESTRIL) 5 MG TABLET    Take 1 tablet (5 mg total) by mouth once daily.    MECLIZINE (ANTIVERT) 25 MG TABLET    Take 1 tablet (25 mg total) by mouth 3 (three) times daily as needed for Dizziness.    METHOCARBAMOL (ROBAXIN) 500 MG TAB    Take 500 mg by mouth 3 (three) times daily.    MOMETASONE (NASONEX) 50 MCG/ACTUATION NASAL SPRAY    2 sprays by Nasal route once daily.    OMEPRAZOLE (PRILOSEC) 40 MG CAPSULE    Take 1 capsule (40 mg total) by mouth Daily.    OXYCODONE-ACETAMINOPHEN (PERCOCET)  MG PER TABLET    Take 1 tablet by mouth every 6 (six) hours as needed.     POLYETHYLENE GLYCOL (GLYCOLAX) 17 GRAM/DOSE POWDER    Mix one capful (17 grams) with 8 ounces of coffee or water and drink daily for constipation.    PRAVASTATIN (PRAVACHOL) 20 MG TABLET    Take 1 tablet (20 mg total) by mouth every evening.    PROMETHAZINE (PHENERGAN) 25 MG TABLET    Take 1  tablet (25 mg total) by mouth every 6 (six) hours as needed for Nausea.    RISPERIDONE (RISPERDAL) 3 MG TAB    Take 1 tablet (3 mg total) by mouth once daily.    SEMAGLUTIDE (OZEMPIC) 2 MG/DOSE (8 MG/3 ML) PNIJ    Inject 2 mg into the skin every 7 days.    TRAZODONE (DESYREL) 100 MG TABLET    Take 2 tablets (200 mg total) by mouth every evening.        Allergies: Codeine      Past Medical History:    Past Medical History:   Diagnosis Date    Arthritis     Back injury     Chronic back pain     sees [facility]    Depression     Diabetes mellitus     Diabetes mellitus, type 2     Difficulty swallowing     Fatigue     GERD (gastroesophageal reflux disease)     Hearing difficulty     Hypertension     Irritable bowel syndrome without diarrhea     Liver disease     Loss of appetite     Sleep apnea        Past Surgical History:    Past Surgical History:   Procedure Laterality Date    CARPAL TUNNEL RELEASE Bilateral      SECTION      INJECTION OF ANESTHETIC AGENT AROUND MEDIAL BRANCH NERVES INNERVATING LUMBAR FACET JOINT Bilateral 2021    Procedure: BLOCK, NERVE, FACET JOINT, LUMBAR, MEDIAL BRANCH;  Surgeon: Amari Razo MD;  Location: [facility] PAIN [facility];  Service: Pain Management;  Laterality: Bilateral;  Bilateral L3-S1 Facet Injection    INJECTION OF ANESTHETIC AGENT AROUND MEDIAL BRANCH NERVES INNERVATING LUMBAR FACET JOINT Bilateral 2022    Procedure: BLOCK, NERVE, FACET JOINT, LUMBAR, MEDIAL BRANCH;  Surgeon: Amari Razo MD;  Location: [facility];  Service: Pain Management;  Laterality: Bilateral;  Bilateral L3-S1 FI    INJECTION OF ANESTHETIC AGENT AROUND MEDIAL BRANCH NERVES INNERVATING LUMBAR FACET JOINT Bilateral 2023    Procedure: BLOCK, NERVE, FACET JOINT, LUMBAR, MEDIAL BRANCH;  Surgeon: Amari Razo MD;  Location: [facility];  Service: Pain Management;  Laterality: Bilateral;  Bilateral L3-S1 FI    LEFT HEART CATHETERIZATION Left 2021     "Procedure: Left heart cath;  Surgeon: Jeremy Rojo DO;  Location: [facility] CATH LAB;  Service: Cardiology;  Laterality: Left;    RADIOFREQUENCY ABLATION OF LUMBAR MEDIAL BRANCH NERVE AT SINGLE LEVEL Bilateral 4/12/2023    Procedure: RADIOFREQUENCY ABLATION, NERVE, SPINAL, LUMBAR, MEDIAL BRANCH, 1 LEVEL;  Surgeon: Amari Razo MD;  Location: [facility];  Service: Pain Management;  Laterality: Bilateral;  Bilateral L3-5 RFTC    STAPEDES SURGERY Bilateral     TONSILLECTOMY           Social History:    Tobacco Use History[1]  Social History     Substance and Sexual Activity   Alcohol Use Not Currently     Social History     Substance and Sexual Activity   Drug Use Never         Family History:    Family History   Problem Relation Name Age of Onset    Cancer Mother      Diabetes Mother      Hypertension Mother      Kidney failure Other      Heart disease Other         Review of Systems:    Review of Systems   Constitutional:  Negative for appetite change, chills, fatigue, fever and unexpected weight change.   Eyes:  Negative for visual disturbance.   Respiratory:  Negative for cough and shortness of breath.    Cardiovascular:  Negative for chest pain and leg swelling.   Gastrointestinal:  Negative for abdominal pain, change in bowel habit, constipation, diarrhea, nausea and vomiting.   Musculoskeletal:  Positive for arthralgias and back pain.   Integumentary:  Negative for rash.   Neurological:  Negative for dizziness and headaches.   Psychiatric/Behavioral:  Negative for self-injury, sleep disturbance and suicidal ideas. The patient is not nervous/anxious.         Vitals:    Vitals:    03/25/25 0825   BP: 128/85   BP Location: Left arm   Pulse: 97   Resp: 18   Temp: 98.2 °F (36.8 °C)   TempSrc: Oral   SpO2: 98%   Weight: 89.4 kg (197 lb)   Height: 5' 4" (1.626 m)       Body mass index is 33.81 kg/m².    Wt Readings from Last 3 Encounters:   03/25/25 0825 89.4 kg (197 lb)   02/25/25 1509 90.3 kg (199 " lb)   02/24/25 0853 86.7 kg (191 lb 3.2 oz)        Physical Exam:    Physical Exam  Constitutional:       General: She is not in acute distress.     Appearance: Normal appearance. She is obese.   HENT:      Nose: Nose normal.      Mouth/Throat:      Mouth: Mucous membranes are moist.      Pharynx: Oropharynx is clear.   Eyes:      Conjunctiva/sclera: Conjunctivae normal.   Cardiovascular:      Rate and Rhythm: Normal rate and regular rhythm.      Heart sounds: Normal heart sounds. No murmur heard.  Pulmonary:      Effort: Pulmonary effort is normal. No respiratory distress.      Breath sounds: Normal breath sounds. No wheezing, rhonchi or rales.   Abdominal:      General: Bowel sounds are normal.      Palpations: Abdomen is soft.      Tenderness: There is no abdominal tenderness.   Musculoskeletal:         General: No swelling or tenderness. Normal range of motion.      Cervical back: Neck supple.      Right lower leg: No edema.      Left lower leg: No edema.   Skin:     Findings: No rash.   Neurological:      General: No focal deficit present.      Mental Status: She is alert. Mental status is at baseline.   Psychiatric:         Mood and Affect: Mood normal.           Assessment/Plan:   1. Type 2 diabetes mellitus with diabetic neuropathy, with long-term current use of insulin  -     CBC Auto Differential; Future; Expected date: 03/25/2025  -     Comprehensive Metabolic Panel; Future; Expected date: 03/25/2025  -     Lipid Panel; Future; Expected date: 03/25/2025  -     Hemoglobin A1C; Future; Expected date: 03/25/2025  -     Microalbumin/Creatinine Ratio, Urine    2. Gastroesophageal reflux disease without esophagitis  The current medical regimen is effective;  continue present plan and medications.    3. Essential hypertension  -     CBC Auto Differential; Future; Expected date: 03/25/2025  -     Comprehensive Metabolic Panel; Future; Expected date: 03/25/2025  -     Lipid Panel; Future; Expected date:  03/25/2025  -     Microalbumin/Creatinine Ratio, Urine    4. Other hyperlipidemia  -     CBC Auto Differential; Future; Expected date: 03/25/2025  -     Comprehensive Metabolic Panel; Future; Expected date: 03/25/2025  -     Lipid Panel; Future; Expected date: 03/25/2025    5. Seasonal allergies  The current medical regimen is effective;  continue present plan and medications.    6. Anxiety  The current medical regimen is effective;  continue present plan and medications.  - Follow up at [facility] as scheduled    7. Other depression  The current medical regimen is effective;  continue present plan and medications.  - Follow up at [facility] as scheduled    8. Lumbosacral spondylosis without myelopathy  -     ketorolac injection 30 mg    9. Lumbosacral radiculopathy  -     ketorolac injection 30 mg    10. Other sleep apnea  - Patient reports she was unable to tolerate CPAP.  - She was previously referred to ENT at [facility] for evaluation for INSPIRE  - She reports she missed her appt with ENT yesterday. Message sent to referral clerk to see if they can reschedule.     11. Obesity (BMI 30.0-34.9)  - BMI discussed with patient.  - Diet and exercise  - Diet discussed and educational information provided  - Recommend 30 minutes of exercise at least 5 days per week.    12. Bilateral hip pain  -     X-Ray Hip 2 or 3 views Right with Pelvis when performed; Future; Expected date: 03/25/2025  -     X-Ray Hip 2 or 3 views Left with Pelvis when performed; Future; Expected date: 03/25/2025  -     ketorolac injection 30 mg         Active Problem List with Overview Notes    Diagnosis Date Noted    Type 2 diabetes mellitus with diabetic neuropathy, with long-term current use of insulin 08/25/2021    Essential hypertension 08/25/2021    Anxiety 01/23/2023    Other hyperlipidemia 07/07/2022    Morbid obesity 01/17/2022    Depression 01/23/2023    Femoral artery pseudo-aneurysm, right 01/17/2022    Other sleep apnea 11/30/2021    Lumbosacral  spondylosis without myelopathy 09/08/2021    Gastroesophageal reflux disease 08/25/2021    Chronic bilateral low back pain with right-sided sciatica 07/07/2022    Seasonal allergies 07/07/2022    Muscle spasm 07/07/2022    Atypical squamous cells of undetermined significance (ASCUS) on Papanicolaou smear of cervix 02/06/2025    Obesity (BMI 30.0-34.9) 02/06/2025    Vaginal odor 02/06/2025    Pelvic pain in female 02/06/2025    Lumbosacral radiculopathy 08/20/2024    Vertigo 08/06/2024        Health Maintenance:  Health Maintenance   Topic Date Due    Hepatitis C Screening  Never done    TETANUS VACCINE  Never done    Colorectal Cancer Screening  Never done    COVID-19 Vaccine (5 - 2024-25 season) 09/01/2024    Mammogram  09/14/2024    Diabetic Eye Exam  10/23/2024    Shingles Vaccine (1 of 2) Never done    Hemoglobin A1c  02/25/2025    Foot Exam  08/05/2025    Diabetes Urine Screening  11/25/2025    Lipid Panel  11/25/2025    Low Dose Statin  02/06/2026    Cervical Cancer Screening  02/04/2030    RSV Vaccine (Age 60+ and Pregnant patients) (1 - 1-dose 75+ series) 12/31/2049    Influenza Vaccine  Completed    HIV Screening  Completed    Pneumococcal Vaccines (Age 50+)  Completed       RTC in 3 months for chronic follow up.  RTC sooner if needed.   Patient voiced understanding and is agreeable to plan.      Lisandra Mcdaniel MD    Family Medicine           [1]   Social History  Tobacco Use   Smoking Status Never    Passive exposure: Never   Smokeless Tobacco Never

## 2025-03-26 LAB
CREAT UR-MCNC: 138 MG/DL (ref 15–325)
MICROALBUMIN UR-MCNC: 0.9 MG/DL
MICROALBUMIN/CREAT RATIO PNL UR: 6.5 MG/G (ref 0–30)

## 2025-03-27 ENCOUNTER — RESULTS FOLLOW-UP (OUTPATIENT)
Dept: FAMILY MEDICINE | Facility: CLINIC | Age: 50
End: 2025-03-27

## 2025-03-27 ENCOUNTER — RESULTS FOLLOW-UP (OUTPATIENT)
Dept: FAMILY MEDICINE | Facility: CLINIC | Age: 50
End: 2025-03-27
Payer: COMMERCIAL

## 2025-03-27 DIAGNOSIS — E11.40 TYPE 2 DIABETES MELLITUS WITH DIABETIC NEUROPATHY, WITH LONG-TERM CURRENT USE OF INSULIN: ICD-10-CM

## 2025-03-27 DIAGNOSIS — Z79.4 TYPE 2 DIABETES MELLITUS WITH DIABETIC NEUROPATHY, WITH LONG-TERM CURRENT USE OF INSULIN: ICD-10-CM

## 2025-03-27 DIAGNOSIS — M25.552 BILATERAL HIP PAIN: ICD-10-CM

## 2025-03-27 DIAGNOSIS — M25.551 BILATERAL HIP PAIN: ICD-10-CM

## 2025-03-27 DIAGNOSIS — M25.531 RIGHT WRIST PAIN: Primary | ICD-10-CM

## 2025-03-27 RX ORDER — INSULIN GLARGINE 100 [IU]/ML
20 INJECTION, SOLUTION SUBCUTANEOUS NIGHTLY
Qty: 18 ML | Refills: 1 | Status: SHIPPED | OUTPATIENT
Start: 2025-03-27

## 2025-03-27 NOTE — PROGRESS NOTES
Patient notified of lab results over the phone. Patient states she is taking Ozempic and lantus as prescribed. Patient instructed to increase Lantus to 20units every evening. Patient voiced understanding.

## 2025-04-04 ENCOUNTER — HOSPITAL ENCOUNTER (OUTPATIENT)
Dept: RADIOLOGY | Facility: HOSPITAL | Age: 50
Discharge: HOME | End: 2025-04-04
Attending: NURSE PRACTITIONER
Payer: COMMERCIAL

## 2025-04-04 ENCOUNTER — OFFICE VISIT (OUTPATIENT)
Dept: ORTHOPEDICS | Facility: CLINIC | Age: 50
End: 2025-04-04
Payer: COMMERCIAL

## 2025-04-04 VITALS
HEIGHT: 64 IN | WEIGHT: 191 LBS | SYSTOLIC BLOOD PRESSURE: 118 MMHG | DIASTOLIC BLOOD PRESSURE: 78 MMHG | OXYGEN SATURATION: 98 % | RESPIRATION RATE: 18 BRPM | HEART RATE: 117 BPM | BODY MASS INDEX: 32.61 KG/M2

## 2025-04-04 DIAGNOSIS — M25.552 BILATERAL HIP PAIN: ICD-10-CM

## 2025-04-04 DIAGNOSIS — M70.61 GREATER TROCHANTERIC BURSITIS OF BOTH HIPS: ICD-10-CM

## 2025-04-04 DIAGNOSIS — M25.531 RIGHT WRIST PAIN: ICD-10-CM

## 2025-04-04 DIAGNOSIS — M70.62 GREATER TROCHANTERIC BURSITIS OF BOTH HIPS: ICD-10-CM

## 2025-04-04 DIAGNOSIS — M17.0 PRIMARY OSTEOARTHRITIS OF BOTH KNEES: Primary | ICD-10-CM

## 2025-04-04 DIAGNOSIS — M17.0 PRIMARY OSTEOARTHRITIS OF BOTH KNEES: ICD-10-CM

## 2025-04-04 DIAGNOSIS — M25.551 BILATERAL HIP PAIN: ICD-10-CM

## 2025-04-04 PROCEDURE — 99215 OFFICE O/P EST HI 40 MIN: CPT | Mod: PBBFAC | Performed by: NURSE PRACTITIONER

## 2025-04-04 PROCEDURE — 99999PBSHW PR PBB SHADOW TECHNICAL ONLY FILED TO HB: Mod: PBBFAC,,,

## 2025-04-04 PROCEDURE — 20610 DRAIN/INJ JOINT/BURSA W/O US: CPT | Mod: PBBFAC,LT | Performed by: NURSE PRACTITIONER

## 2025-04-04 PROCEDURE — 99999 PR PBB SHADOW E&M-EST. PATIENT-LVL V: CPT | Mod: PBBFAC,,, | Performed by: NURSE PRACTITIONER

## 2025-04-04 PROCEDURE — 20610 DRAIN/INJ JOINT/BURSA W/O US: CPT | Mod: PBBFAC,RT | Performed by: NURSE PRACTITIONER

## 2025-04-04 RX ORDER — TRIAMCINOLONE ACETONIDE 40 MG/ML
40 INJECTION, SUSPENSION INTRA-ARTICULAR; INTRAMUSCULAR
Status: DISCONTINUED | OUTPATIENT
Start: 2025-04-04 | End: 2025-04-04 | Stop reason: HOSPADM

## 2025-04-04 RX ORDER — NAPROXEN 500 MG/1
500 TABLET ORAL 2 TIMES DAILY WITH MEALS
Qty: 30 TABLET | Refills: 0 | Status: SHIPPED | OUTPATIENT
Start: 2025-04-04

## 2025-04-04 RX ADMIN — TRIAMCINOLONE ACETONIDE 40 MG: 40 INJECTION, SUSPENSION INTRA-ARTICULAR; INTRAMUSCULAR at 10:04

## 2025-04-04 NOTE — PROGRESS NOTES
ASSESSMENT:      ICD-10-CM ICD-9-CM   1. Primary osteoarthritis of both knees  M17.0 715.16   2. Right wrist pain  M25.531 719.43   3. Bilateral hip pain  M25.551 719.45    M25.552    4. Greater trochanteric bursitis of both hips  M70.61 726.5    M70.62        PLAN:     -Findings and treatment options were discussed with the patient  -All questions answered  Natural history and expected course discussed. Questions answered.  Educational materials distributed.  NSAIDs per medication orders.  Steroid injection for trochanteric bursitis. See procedure note.  Removable brace to her right wrist.  MRI right wrist.  Bilateral greater troch injections today.  Naproxen p.o. b.i.d..  There are no Patient Instructions on file for this visit.    IMAGING:  X-Ray Hip 3 or 4 views Bilateral  Result Date: 3/27/2025  EXAMINATION: XR HIP 3 OR 4 VIEWS BILATERAL CLINICAL HISTORY: .  Pain in right hip TECHNIQUE: AP view of the pelvis.  Frogleg lateral view and AP view of the bilateral hips. COMPARISON: 04/06/2017 FINDINGS: Bilateral hip joint spaces appear maintained.  No acute fracture, dislocation, or osseous destruction.  Calcification adjacent to the right femoral greater trochanter which may represent heterotopic ossification or enthesopathy.     As above Electronically signed by: Duncan Bowman Date:    03/27/2025 Time:    08:22           CC: Hip pain  49 y.o. Female who presents as a new patient to me for evaluation of bilateral hip pain.    Pain has been present for about 2 years.  She has a occasional pain in her groin area but complains of tenderness and pain on the lateral side of both hips.  Very tender to touch.  She also has a history of back pain with radiating pain down her leg.  Complaining of right wrist pain today as well.  She was seen in February after a fall.  Put in a brace.  She is not wearing her brace today.  Reports she is still having pain in her wrist joint.  Painful with motion or picking anything  up.  Mechanism of injury: No specific injury  Location of the pain: groin and lateral  Occupation:  Mechanical symptoms, such as catching and popping of the hip are present.    Symptoms are worsened with activity.  Better with rest. Treatment thus far has included rest, activity modifications, and oral medications.    she has not  had formal physical therapy  she has not had previous advanced imaging such as MRI.   she has not  had previous hip injections.   she has not  had previous hip or back surgery.   She has been to physical therapy for back pain.   Here today to discuss diagnosis and treatment options.      Patient fell into her closet on March 3. She states she was unable to get up and had to have her son help her up.   She states she fell onto her wrist.   She report her wrist pain goes into her hand.       Review of Systems  All other review of symptoms were reviewed and found to be noncontributory.     PAST MEDICAL HISTORY:   Past Medical History:   Diagnosis Date    Arthritis     Back injury     Chronic back pain     sees [facility]    Depression     Diabetes mellitus     Diabetes mellitus, type 2     Difficulty swallowing     Fatigue     GERD (gastroesophageal reflux disease)     Hearing difficulty     Hypertension     Irritable bowel syndrome without diarrhea     Liver disease     Loss of appetite     Sleep apnea        PAST SURGICAL HISTORY:   Past Surgical History:   Procedure Laterality Date    CARPAL TUNNEL RELEASE Bilateral      SECTION      INJECTION OF ANESTHETIC AGENT AROUND MEDIAL BRANCH NERVES INNERVATING LUMBAR FACET JOINT Bilateral 2021    Procedure: BLOCK, NERVE, FACET JOINT, LUMBAR, MEDIAL BRANCH;  Surgeon: Amari Razo MD;  Location: [facility];  Service: Pain Management;  Laterality: Bilateral;  Bilateral L3-S1 Facet Injection    INJECTION OF ANESTHETIC AGENT AROUND MEDIAL BRANCH NERVES INNERVATING LUMBAR FACET JOINT Bilateral 2022  "   Procedure: BLOCK, NERVE, FACET JOINT, LUMBAR, MEDIAL BRANCH;  Surgeon: Amari Razo MD;  Location: [facility];  Service: Pain Management;  Laterality: Bilateral;  Bilateral L3-S1 FI    INJECTION OF ANESTHETIC AGENT AROUND MEDIAL BRANCH NERVES INNERVATING LUMBAR FACET JOINT Bilateral 1/11/2023    Procedure: BLOCK, NERVE, FACET JOINT, LUMBAR, MEDIAL BRANCH;  Surgeon: Amari Razo MD;  Location: [facility];  Service: Pain Management;  Laterality: Bilateral;  Bilateral L3-S1 FI    LEFT HEART CATHETERIZATION Left 12/21/2021    Procedure: Left heart cath;  Surgeon: Jeremy Rojo DO;  Location[facility];  Service: Cardiology;  Laterality: Left;    RADIOFREQUENCY ABLATION OF LUMBAR MEDIAL BRANCH NERVE AT SINGLE LEVEL Bilateral 4/12/2023    Procedure: RADIOFREQUENCY ABLATION, NERVE, SPINAL, LUMBAR, MEDIAL BRANCH, 1 LEVEL;  Surgeon: Amari Razo MD;  Location: [facility];  Service: Pain Management;  Laterality: Bilateral;  Bilateral L3-5 RFTC    STAPEDES SURGERY Bilateral     TONSILLECTOMY         FAMILY HISTORY:   Family History   Problem Relation Name Age of Onset    Cancer Mother      Diabetes Mother      Hypertension Mother      Kidney failure Other      Heart disease Other         SOCIAL HISTORY:   Social History[1]    MEDICATIONS:   Current Medications[2]    ALLERGIES:   Review of patient's allergies indicates:   Allergen Reactions    Codeine Itching        PHYSICAL EXAMINATION:  /78 (BP Location: Left arm, Patient Position: Sitting)   Pulse (!) 117   Resp 18   Ht 5' 4" (1.626 m)   Wt 86.6 kg (191 lb)   LMP 12/20/2024 (Approximate)   SpO2 98%   BMI 32.79 kg/m²               Right Hip Exam     Inspection   Swelling: absent  Bruising: absent    Tenderness   The patient tender to palpation of the trochanteric bursa.    Range of Motion   Extension:  normal   Flexion:  normal   External rotation:  normal   Internal rotation:  normal "     Tests   Pain w/ forced internal rotation (LUIS F): absent  Pain w/ forced external rotation (FADIR): present  Log Roll: negative    Other   Sensation: normal  Left Hip Exam     Inspection   Swelling: absent  Bruising: absent    Tenderness   The patient tender to palpation of the trochanteric bursa.    Range of Motion   Extension:  normal   Flexion:  normal   External rotation:  normal   Internal rotation: normal     Tests   Pain w/ forced internal rotation (LUIS F): absent  Pain w/ forced external rotation (FADIR): present  Log Roll: negative    Other   Sensation: normal          Right Hand/Wrist Exam     Inspection   Effusion: Wrist - absent   Bruising: Wrist - absent     Tenderness   The patient is tender to palpation of the ulnar area and radial area.          Muscle Strength   Right Upper Extremity   Wrist extension: 4/5   Wrist flexion: 4/5   : 4/5     Vascular Exam     Right Pulses  Dorsalis Pedis:      2+          Left Pulses  Dorsalis Pedis:      2+          Capillary Refill  Right Hand: normal capillary refill        Orders Placed This Encounter   Procedures    X-Ray Hip 3 or 4 views Bilateral     Standing Status:   Future     Expected Date:   4/4/2025     Expiration Date:   4/4/2026     May the Radiologist modify the order per protocol to meet the clinical needs of the patient?:   Yes     Release to patient:   Immediate     Large Joint Aspiration/Injection: L greater trochanteric bursa    Date/Time: 4/4/2025 10:00 AM    Performed by: Natalie Dixon FNP  Authorized by: Natalie Dixon FNP    Consent Done?:  Yes (Verbal)  Indications:  Pain  Local anesthetic:  Bupivacaine 0.25% without epinephrine    Details:  Needle Size:  22 G  Approach:  Lateral  Location:  Hip  Site:  L greater trochanteric bursa  Medications:  40 mg triamcinolone acetonide 40 mg/mL  Patient tolerance:  Patient tolerated the procedure well with no immediate complications  Large Joint Aspiration/Injection: R greater  trochanteric bursa    Date/Time: 4/4/2025 10:00 AM    Performed by: Natalie Dixon FNP  Authorized by: Natalie Dixon FNP    Consent Done?:  Yes (Verbal)  Indications:  Pain  Local anesthetic:  Bupivacaine 0.25% without epinephrine  Anesthetic total (ml):  4      Details:  Needle Size:  22 G  Approach:  Lateral  Location:  Hip  Site:  R greater trochanteric bursa  Medications:  40 mg triamcinolone acetonide 40 mg/mL  Patient tolerance:  Patient tolerated the procedure well with no immediate complications             [1]  Social History  Socioeconomic History    Marital status: Single   Tobacco Use    Smoking status: Never     Passive exposure: Never    Smokeless tobacco: Never   Substance and Sexual Activity    Alcohol use: Not Currently    Drug use: Never    Sexual activity: Not Currently     Birth control/protection: Condom     Social Drivers of Health     Financial Resource Strain: High Risk (2/23/2025)    Overall Financial Resource Strain (CARDIA)     Difficulty of Paying Living Expenses: Very hard   Food Insecurity: Food Insecurity Present (2/23/2025)    Hunger Vital Sign     Worried About Running Out of Food in the Last Year: Sometimes true     Ran Out of Food in the Last Year: Sometimes true   Transportation Needs: No Transportation Needs (2/23/2025)    PRAPARE - Transportation     Lack of Transportation (Medical): No     Lack of Transportation (Non-Medical): No   Physical Activity: Unknown (2/23/2025)    Exercise Vital Sign     Days of Exercise per Week: 0 days   Stress: No Stress Concern Present (2/23/2025)    [employer] [place] [employer] - Occupational Stress Questionnaire     Feeling of Stress : Only a little   Housing Stability: Low Risk  (2/23/2025)    Housing Stability Vital Sign     Unable to Pay for Housing in the Last Year: No     Number of Times Moved in the Last Year: 0     Homeless in the Last Year: No   [2]    Current Outpatient Medications:     ARIPiprazole  (ABILIFY) 2 MG Tab, Take 1 tablet (2 mg total) by mouth once daily., Disp: 90 tablet, Rfl: 1    ARIPiprazole (ABILIFY) 5 MG Tab, Take 5 mg by mouth., Disp: , Rfl:     benztropine (COGENTIN) 0.5 MG tablet, Take 0.5 mg by mouth once daily., Disp: , Rfl:     chlorpheniramine-phenyleph-DM (ED A-HIST DM) 4-10-10 mg Tab, Take 1 tablet by mouth every 8 (eight) hours as needed (congestion or cough)., Disp: 30 tablet, Rfl: 2    cyclobenzaprine (FLEXERIL) 10 MG tablet, Take 1 tablet (10 mg total) by mouth 3 (three) times daily as needed for Muscle spasms (may cause drowsiness)., Disp: 90 tablet, Rfl: 1    DULoxetine (CYMBALTA) 60 MG capsule, Take 1 capsule (60 mg total) by mouth 2 (two) times daily., Disp: 180 capsule, Rfl: 1    gabapentin (NEURONTIN) 800 MG tablet, Take 1 tablet (800 mg total) by mouth 3 (three) times daily., Disp: 270 tablet, Rfl: 1    glipiZIDE (GLUCOTROL) 10 MG tablet, Take 1 tablet (10 mg total) by mouth 2 (two) times daily with meals., Disp: 180 tablet, Rfl: 1    hydroCHLOROthiazide (HYDRODIURIL) 25 MG tablet, Take 1 tablet (25 mg total) by mouth once daily., Disp: 90 tablet, Rfl: 1    hydrOXYzine pamoate (VISTARIL) 25 MG Cap, Take 1 capsule (25 mg total) by mouth 3 (three) times daily., Disp: 270 capsule, Rfl: 1    ibuprofen (ADVIL,MOTRIN) 800 MG tablet, Take 1 tablet (800 mg total) by mouth 3 (three) times daily as needed for Pain., Disp: 30 tablet, Rfl: 1    insulin glargine U-100, Lantus, (LANTUS SOLOSTAR U-100 INSULIN) 100 unit/mL (3 mL) InPn pen, Inject 20 Units into the skin every evening., Disp: 18 mL, Rfl: 1    lisinopriL (PRINIVIL,ZESTRIL) 5 MG tablet, Take 1 tablet (5 mg total) by mouth once daily., Disp: 90 tablet, Rfl: 3    meclizine (ANTIVERT) 25 mg tablet, Take 1 tablet (25 mg total) by mouth 3 (three) times daily as needed for Dizziness., Disp: 30 tablet, Rfl: 1    mometasone (NASONEX) 50 mcg/actuation nasal spray, 2 sprays by Nasal route once daily., Disp: 17 g, Rfl: 3     omeprazole (PRILOSEC) 40 MG capsule, Take 1 capsule (40 mg total) by mouth Daily., Disp: 90 capsule, Rfl: 1    oxyCODONE-acetaminophen (PERCOCET)  mg per tablet, Take 1 tablet by mouth every 6 (six) hours as needed. , Disp: , Rfl:     polyethylene glycol (GLYCOLAX) 17 gram/dose powder, Mix one capful (17 grams) with 8 ounces of coffee or water and drink daily for constipation., Disp: 1530 g, Rfl: 3    pravastatin (PRAVACHOL) 20 MG tablet, Take 1 tablet (20 mg total) by mouth every evening., Disp: 90 tablet, Rfl: 1    promethazine (PHENERGAN) 25 MG tablet, Take 1 tablet (25 mg total) by mouth every 6 (six) hours as needed for Nausea., Disp: 30 tablet, Rfl: 1    risperiDONE (RISPERDAL) 3 MG Tab, Take 1 tablet (3 mg total) by mouth once daily., Disp: 90 tablet, Rfl: 1    semaglutide (OZEMPIC) 2 mg/dose (8 mg/3 mL) PnIj, Inject 2 mg into the skin every 7 days., Disp: 3 mL, Rfl: 3    traZODone (DESYREL) 100 MG tablet, Take 2 tablets (200 mg total) by mouth every evening., Disp: 90 tablet, Rfl: 1    fluconazole (DIFLUCAN) 150 MG Tab, Take one tablet by mouth every 72 hours X 3 doses if needed for yeast infection after completion of antibiotics. (Patient not taking: Reported on 4/4/2025), Disp: 3 tablet, Rfl: 0    fluticasone propionate (FLONASE) 50 mcg/actuation nasal spray, 1 spray by Each Nostril route as needed., Disp: , Rfl:     methocarbamoL (ROBAXIN) 500 MG Tab, Take 500 mg by mouth 3 (three) times daily. (Patient not taking: Reported on 9/5/2024), Disp: , Rfl:     naproxen (NAPROSYN) 500 MG tablet, Take 1 tablet (500 mg total) by mouth 2 (two) times daily with meals., Disp: 30 tablet, Rfl: 0

## 2025-04-21 DIAGNOSIS — Z12.11 COLON CANCER SCREENING: Primary | ICD-10-CM

## 2025-04-21 RX ORDER — POLYETHYLENE GLYCOL 3350, SODIUM SULFATE ANHYDROUS, SODIUM BICARBONATE, SODIUM CHLORIDE, POTASSIUM CHLORIDE 236; 22.74; 6.74; 5.86; 2.97 G/4L; G/4L; G/4L; G/4L; G/4L
4 POWDER, FOR SOLUTION ORAL ONCE
Qty: 4000 ML | Refills: 0 | Status: SHIPPED | OUTPATIENT
Start: 2025-04-21 | End: 2025-04-21

## 2025-05-05 ENCOUNTER — OFFICE VISIT (OUTPATIENT)
Dept: FAMILY MEDICINE | Facility: CLINIC | Age: 50
End: 2025-05-05
Payer: COMMERCIAL

## 2025-05-05 VITALS
HEART RATE: 100 BPM | WEIGHT: 191 LBS | SYSTOLIC BLOOD PRESSURE: 114 MMHG | RESPIRATION RATE: 18 BRPM | OXYGEN SATURATION: 98 % | DIASTOLIC BLOOD PRESSURE: 78 MMHG | BODY MASS INDEX: 32.61 KG/M2 | TEMPERATURE: 98 F | HEIGHT: 64 IN

## 2025-05-05 DIAGNOSIS — K21.9 GASTROESOPHAGEAL REFLUX DISEASE WITHOUT ESOPHAGITIS: ICD-10-CM

## 2025-05-05 DIAGNOSIS — J02.9 SORE THROAT: ICD-10-CM

## 2025-05-05 DIAGNOSIS — J01.00 ACUTE NON-RECURRENT MAXILLARY SINUSITIS: Primary | ICD-10-CM

## 2025-05-05 LAB
CTP QC/QA: YES
MOLECULAR STREP A: NEGATIVE

## 2025-05-05 PROCEDURE — 3061F NEG MICROALBUMINURIA REV: CPT | Mod: CPTII,,, | Performed by: FAMILY MEDICINE

## 2025-05-05 PROCEDURE — 3078F DIAST BP <80 MM HG: CPT | Mod: CPTII,,, | Performed by: FAMILY MEDICINE

## 2025-05-05 PROCEDURE — 3066F NEPHROPATHY DOC TX: CPT | Mod: CPTII,,, | Performed by: FAMILY MEDICINE

## 2025-05-05 PROCEDURE — 3074F SYST BP LT 130 MM HG: CPT | Mod: CPTII,,, | Performed by: FAMILY MEDICINE

## 2025-05-05 PROCEDURE — 4010F ACE/ARB THERAPY RXD/TAKEN: CPT | Mod: CPTII,,, | Performed by: FAMILY MEDICINE

## 2025-05-05 PROCEDURE — 87651 STREP A DNA AMP PROBE: CPT | Mod: QW,,, | Performed by: FAMILY MEDICINE

## 2025-05-05 PROCEDURE — 96372 THER/PROPH/DIAG INJ SC/IM: CPT | Mod: ,,, | Performed by: FAMILY MEDICINE

## 2025-05-05 PROCEDURE — 3052F HG A1C>EQUAL 8.0%<EQUAL 9.0%: CPT | Mod: CPTII,,, | Performed by: FAMILY MEDICINE

## 2025-05-05 PROCEDURE — 3008F BODY MASS INDEX DOCD: CPT | Mod: CPTII,,, | Performed by: FAMILY MEDICINE

## 2025-05-05 PROCEDURE — 99213 OFFICE O/P EST LOW 20 MIN: CPT | Mod: 25,,, | Performed by: FAMILY MEDICINE

## 2025-05-05 PROCEDURE — 1159F MED LIST DOCD IN RCRD: CPT | Mod: CPTII,,, | Performed by: FAMILY MEDICINE

## 2025-05-05 RX ORDER — CEFTRIAXONE 1 G/1
1 INJECTION, POWDER, FOR SOLUTION INTRAMUSCULAR; INTRAVENOUS
Status: COMPLETED | OUTPATIENT
Start: 2025-05-05 | End: 2025-05-05

## 2025-05-05 RX ORDER — FLUCONAZOLE 150 MG/1
TABLET ORAL
Qty: 3 TABLET | Refills: 0 | Status: SHIPPED | OUTPATIENT
Start: 2025-05-05

## 2025-05-05 RX ORDER — METHYLPREDNISOLONE ACETATE 40 MG/ML
40 INJECTION, SUSPENSION INTRA-ARTICULAR; INTRALESIONAL; INTRAMUSCULAR; SOFT TISSUE
Status: COMPLETED | OUTPATIENT
Start: 2025-05-05 | End: 2025-05-05

## 2025-05-05 RX ORDER — DEXAMETHASONE SODIUM PHOSPHATE 4 MG/ML
4 INJECTION, SOLUTION INTRA-ARTICULAR; INTRALESIONAL; INTRAMUSCULAR; INTRAVENOUS; SOFT TISSUE
Status: COMPLETED | OUTPATIENT
Start: 2025-05-05 | End: 2025-05-05

## 2025-05-05 RX ORDER — SUCRALFATE 1 G/10ML
1 SUSPENSION ORAL 2 TIMES DAILY
Qty: 414 ML | Refills: 0 | Status: SHIPPED | OUTPATIENT
Start: 2025-05-05

## 2025-05-05 RX ORDER — CHLORPHENIRAMINE MALEATE, DEXTROMETHORPHAN HYDROBROMIDE, AND PHENYLEPHRINE HYDROCHLORIDE 4; 10; 10 MG/1; MG/1; MG/1
1 TABLET, COATED ORAL EVERY 8 HOURS PRN
Qty: 30 TABLET | Refills: 2 | Status: SHIPPED | OUTPATIENT
Start: 2025-05-05

## 2025-05-05 RX ORDER — AMOXICILLIN 875 MG/1
875 TABLET, FILM COATED ORAL EVERY 12 HOURS
Qty: 20 TABLET | Refills: 0 | Status: SHIPPED | OUTPATIENT
Start: 2025-05-05 | End: 2025-05-15

## 2025-05-05 RX ADMIN — METHYLPREDNISOLONE ACETATE 40 MG: 40 INJECTION, SUSPENSION INTRA-ARTICULAR; INTRALESIONAL; INTRAMUSCULAR; SOFT TISSUE at 03:05

## 2025-05-05 RX ADMIN — CEFTRIAXONE 1 G: 1 INJECTION, POWDER, FOR SOLUTION INTRAMUSCULAR; INTRAVENOUS at 03:05

## 2025-05-05 RX ADMIN — DEXAMETHASONE SODIUM PHOSPHATE 4 MG: 4 INJECTION, SOLUTION INTRA-ARTICULAR; INTRALESIONAL; INTRAMUSCULAR; INTRAVENOUS; SOFT TISSUE at 03:05

## 2025-05-05 NOTE — LETTER
May 5, 2025      [facility]  [address] [place] [address]  Phone: [phone]  Fax: [phone]       Patient: Georgiana Zayas   YOB: 1975  Date of Visit: 05/05/2025    To Whom It May Concern:    Nadia Zayas  was at [facility] on 05/05/2025. The patient is currently unable to work due to chronic back pain. If you have any questions or concerns, or if I can be of further assistance, please do not hesitate to contact me.    Sincerely,    Elisa Mcdaniel MD

## 2025-05-05 NOTE — PROGRESS NOTES
Clinic Note    Patient Name: Georgiana Zayas  : 1975  MRN: [id number]    Chief Complaint   Patient presents with    Back Pain    Sore Throat    Health Maintenance     Hepatitis C Screening Never done  TETANUS VACCINE Never done  Colorectal Cancer Screening Never done  COVID-19 Vaccine(2024- season) due on 2024  Mammogram due on 2024  Diabetic Eye Exam due on 10/23/2024  Shingles Vaccine(1 of 2) Never done     Sinus Problem    Heartburn    Dizziness       HPI:    Ms. Georgiana Zayas is a 49 y.o. female who presents to clinic today with CC of chronic back pain, sore throat, sinus pressure/congestion, dizziness, and acid reflux.   Patient reports back pain is chronic. Denies any new or recent injuries.  Patient denies fever.  Patient is, otherwise, without complaints.     Medications:  Medication List with Changes/Refills   Current Medications    ARIPIPRAZOLE (ABILIFY) 2 MG TAB    Take 1 tablet (2 mg total) by mouth once daily.    ARIPIPRAZOLE (ABILIFY) 5 MG TAB    Take 5 mg by mouth.    BENZTROPINE (COGENTIN) 0.5 MG TABLET    Take 0.5 mg by mouth once daily.    CHLORPHENIRAMINE-PHENYLEPH-DM (ED A-HIST DM) 4-10-10 MG TAB    Take 1 tablet by mouth every 8 (eight) hours as needed (congestion or cough).    CYCLOBENZAPRINE (FLEXERIL) 10 MG TABLET    Take 1 tablet (10 mg total) by mouth 3 (three) times daily as needed for Muscle spasms (may cause drowsiness).    DULOXETINE (CYMBALTA) 60 MG CAPSULE    Take 1 capsule (60 mg total) by mouth 2 (two) times daily.    FLUCONAZOLE (DIFLUCAN) 150 MG TAB    Take one tablet by mouth every 72 hours X 3 doses if needed for yeast infection after completion of antibiotics.    FLUTICASONE PROPIONATE (FLONASE) 50 MCG/ACTUATION NASAL SPRAY    1 spray by Each Nostril route as needed.    GABAPENTIN (NEURONTIN) 800 MG TABLET    Take 1 tablet (800 mg total) by mouth 3 (three) times daily.    GLIPIZIDE (GLUCOTROL) 10 MG TABLET    Take 1 tablet (10 mg total) by mouth 2  (two) times daily with meals.    HYDROCHLOROTHIAZIDE (HYDRODIURIL) 25 MG TABLET    Take 1 tablet (25 mg total) by mouth once daily.    HYDROXYZINE PAMOATE (VISTARIL) 25 MG CAP    Take 1 capsule (25 mg total) by mouth 3 (three) times daily.    IBUPROFEN (ADVIL,MOTRIN) 800 MG TABLET    Take 1 tablet (800 mg total) by mouth 3 (three) times daily as needed for Pain.    INSULIN GLARGINE U-100, LANTUS, (LANTUS SOLOSTAR U-100 INSULIN) 100 UNIT/ML (3 ML) INPN PEN    Inject 20 Units into the skin every evening.    LISINOPRIL (PRINIVIL,ZESTRIL) 5 MG TABLET    Take 1 tablet (5 mg total) by mouth once daily.    MECLIZINE (ANTIVERT) 25 MG TABLET    Take 1 tablet (25 mg total) by mouth 3 (three) times daily as needed for Dizziness.    METHOCARBAMOL (ROBAXIN) 500 MG TAB    Take 500 mg by mouth 3 (three) times daily.    MOMETASONE (NASONEX) 50 MCG/ACTUATION NASAL SPRAY    2 sprays by Nasal route once daily.    NAPROXEN (NAPROSYN) 500 MG TABLET    Take 1 tablet (500 mg total) by mouth 2 (two) times daily with meals.    OMEPRAZOLE (PRILOSEC) 40 MG CAPSULE    Take 1 capsule (40 mg total) by mouth Daily.    OXYCODONE-ACETAMINOPHEN (PERCOCET)  MG PER TABLET    Take 1 tablet by mouth every 6 (six) hours as needed.     POLYETHYLENE GLYCOL (GLYCOLAX) 17 GRAM/DOSE POWDER    Mix one capful (17 grams) with 8 ounces of coffee or water and drink daily for constipation.    PRAVASTATIN (PRAVACHOL) 20 MG TABLET    Take 1 tablet (20 mg total) by mouth every evening.    PROMETHAZINE (PHENERGAN) 25 MG TABLET    Take 1 tablet (25 mg total) by mouth every 6 (six) hours as needed for Nausea.    RISPERIDONE (RISPERDAL) 3 MG TAB    Take 1 tablet (3 mg total) by mouth once daily.    SEMAGLUTIDE (OZEMPIC) 2 MG/DOSE (8 MG/3 ML) PNIJ    Inject 2 mg into the skin every 7 days.    TRAZODONE (DESYREL) 100 MG TABLET    Take 2 tablets (200 mg total) by mouth every evening.        Allergies: Codeine      Past Medical History:    Past Medical History:    Diagnosis Date    Arthritis     Back injury     Chronic back pain     sees clayton hays    Depression     Diabetes mellitus     Diabetes mellitus, type 2     Difficulty swallowing     Fatigue     GERD (gastroesophageal reflux disease)     Hearing difficulty     Hypertension     Irritable bowel syndrome without diarrhea     Liver disease     Loss of appetite     Sleep apnea        Past Surgical History:    Past Surgical History:   Procedure Laterality Date    CARPAL TUNNEL RELEASE Bilateral      SECTION      INJECTION OF ANESTHETIC AGENT AROUND MEDIAL BRANCH NERVES INNERVATING LUMBAR FACET JOINT Bilateral 2021    Procedure: BLOCK, NERVE, FACET JOINT, LUMBAR, MEDIAL BRANCH;  Surgeon: Amari Razo MD;  Location: [facility];  Service: Pain Management;  Laterality: Bilateral;  Bilateral L3-S1 Facet Injection    INJECTION OF ANESTHETIC AGENT AROUND MEDIAL BRANCH NERVES INNERVATING LUMBAR FACET JOINT Bilateral 2022    Procedure: BLOCK, NERVE, FACET JOINT, LUMBAR, MEDIAL BRANCH;  Surgeon: Amari Razo MD;  Location: [facility];  Service: Pain Management;  Laterality: Bilateral;  Bilateral L3-S1 FI    INJECTION OF ANESTHETIC AGENT AROUND MEDIAL BRANCH NERVES INNERVATING LUMBAR FACET JOINT Bilateral 2023    Procedure: BLOCK, NERVE, FACET JOINT, LUMBAR, MEDIAL BRANCH;  Surgeon: Amari Razo MD;  Location: [facility];  Service: Pain Management;  Laterality: Bilateral;  Bilateral L3-S1 FI    LEFT HEART CATHETERIZATION Left 2021    Procedure: Left heart cath;  Surgeon: Jeremy Rojo DO;  Location: [facility] CATH LAB;  Service: Cardiology;  Laterality: Left;    RADIOFREQUENCY ABLATION OF LUMBAR MEDIAL BRANCH NERVE AT SINGLE LEVEL Bilateral 2023    Procedure: RADIOFREQUENCY ABLATION, NERVE, SPINAL, LUMBAR, MEDIAL BRANCH, 1 LEVEL;  Surgeon: Amari Razo MD;  Location: [facility];  Service: Pain Management;  Laterality: Bilateral;   "Bilateral L3-5 RFTC    STAPEDES SURGERY Bilateral     TONSILLECTOMY           Social History:    Tobacco Use History[1]  Social History     Substance and Sexual Activity   Alcohol Use Not Currently     Social History     Substance and Sexual Activity   Drug Use Never         Family History:    Family History   Problem Relation Name Age of Onset    Cancer Mother      Diabetes Mother      Hypertension Mother      Kidney failure Other      Heart disease Other         Review of Systems:    Review of Systems   Constitutional:  Negative for appetite change, chills, fatigue, fever and unexpected weight change.   HENT:  Positive for ear discharge, postnasal drip, rhinorrhea, sinus pressure/congestion and sore throat.    Eyes:  Negative for visual disturbance.   Respiratory:  Positive for cough. Negative for shortness of breath.    Cardiovascular:  Negative for chest pain and leg swelling.   Gastrointestinal:  Positive for constipation, nausea and reflux. Negative for abdominal pain, blood in stool, change in bowel habit, diarrhea and vomiting.        Reports she was supposed to have a colonoscopy today but needs to reschedule. States she did not have this done because she is sick.   Musculoskeletal:  Negative for arthralgias.   Integumentary:  Negative for rash.   Neurological:  Positive for dizziness and headaches.   Psychiatric/Behavioral:  The patient is not nervous/anxious.       Vitals:    Vitals:    05/05/25 1356   BP: 114/78   BP Location: Left arm   Patient Position: Sitting   Pulse: 100   Resp: 18   Temp: 98.3 °F (36.8 °C)   TempSrc: Oral   SpO2: 98%   Weight: 86.6 kg (191 lb)   Height: 5' 4" (1.626 m)       Body mass index is 32.79 kg/m².    Wt Readings from Last 3 Encounters:   05/05/25 1356 86.6 kg (191 lb)   04/04/25 1016 86.6 kg (191 lb)   03/25/25 0825 89.4 kg (197 lb)        Physical Exam:    Physical Exam  Constitutional:       General: She is not in acute distress.     Appearance: Normal appearance. "   HENT:      Ears:      Comments: + fluid level     Nose: Congestion present.      Mouth/Throat:      Mouth: Mucous membranes are moist.      Pharynx: Oropharynx is clear. Posterior oropharyngeal erythema present. No oropharyngeal exudate.   Eyes:      Conjunctiva/sclera: Conjunctivae normal.   Cardiovascular:      Rate and Rhythm: Normal rate and regular rhythm.      Heart sounds: Normal heart sounds. No murmur heard.  Pulmonary:      Effort: Pulmonary effort is normal. No respiratory distress.      Breath sounds: Normal breath sounds. No wheezing, rhonchi or rales.   Abdominal:      General: Bowel sounds are normal.      Palpations: Abdomen is soft.      Tenderness: There is no abdominal tenderness.   Musculoskeletal:      Cervical back: Neck supple.      Right lower leg: No edema.      Left lower leg: No edema.   Skin:     Findings: No rash.   Neurological:      General: No focal deficit present.      Mental Status: She is alert. Mental status is at baseline.   Psychiatric:         Mood and Affect: Mood normal.         Results:  Rapid strep: negative    Assessment/Plan:   1. Acute non-recurrent maxillary sinusitis  -     dexAMETHasone injection 4 mg  -     methylPREDNISolone acetate injection 40 mg  -     amoxicillin (AMOXIL) 875 MG tablet; Take 1 tablet (875 mg total) by mouth every 12 (twelve) hours. for 10 days  Dispense: 20 tablet; Refill: 0  -     cefTRIAXone injection 1 g  -     fluconazole (DIFLUCAN) 150 MG Tab; Take one tablet by mouth every 72 hours X 3 doses if needed for yeast infection after completion of antibiotics.  Dispense: 3 tablet; Refill: 0  -     chlorpheniramine-phenyleph-DM (ED A-HIST DM) 4-10-10 mg Tab; Take 1 tablet by mouth every 8 (eight) hours as needed (congestion or cough).  Dispense: 30 tablet; Refill: 2    2. Sore throat  -     POCT Strep A, Molecular    3. Gastroesophageal reflux disease without esophagitis  -     sucralfate (CARAFATE) 100 mg/mL suspension; Take 10 mLs (1 g total)  by mouth 2 (two) times daily. For reflux/irritation  Dispense: 414 mL; Refill: 0         Active Problem List with Overview Notes    Diagnosis Date Noted    Type 2 diabetes mellitus with diabetic neuropathy, with long-term current use of insulin 08/25/2021    Essential hypertension 08/25/2021    Anxiety 01/23/2023    Other hyperlipidemia 07/07/2022    Morbid obesity 01/17/2022    Depression 01/23/2023    Femoral artery pseudo-aneurysm, right 01/17/2022    Other sleep apnea 11/30/2021    Lumbosacral spondylosis without myelopathy 09/08/2021    Gastroesophageal reflux disease 08/25/2021    Chronic bilateral low back pain with right-sided sciatica 07/07/2022    Seasonal allergies 07/07/2022    Muscle spasm 07/07/2022    Atypical squamous cells of undetermined significance (ASCUS) on Papanicolaou smear of cervix 02/06/2025    Obesity (BMI 30.0-34.9) 02/06/2025    Vaginal odor 02/06/2025    Pelvic pain in female 02/06/2025    Lumbosacral radiculopathy 08/20/2024    Vertigo 08/06/2024        RTC as scheduled for chronic follow up. RTC sooner if needed.  Patient voiced understanding and is agreeable to plan.      Lisandra Mcdaniel MD    Family Medicine           [1]   Social History  Tobacco Use   Smoking Status Never    Passive exposure: Never   Smokeless Tobacco Never

## 2025-05-29 ENCOUNTER — OFFICE VISIT (OUTPATIENT)
Dept: FAMILY MEDICINE | Facility: CLINIC | Age: 50
End: 2025-05-29
Payer: COMMERCIAL

## 2025-05-29 VITALS
SYSTOLIC BLOOD PRESSURE: 160 MMHG | RESPIRATION RATE: 17 BRPM | HEIGHT: 64 IN | WEIGHT: 192 LBS | TEMPERATURE: 99 F | BODY MASS INDEX: 32.78 KG/M2 | DIASTOLIC BLOOD PRESSURE: 100 MMHG | OXYGEN SATURATION: 98 % | HEART RATE: 104 BPM

## 2025-05-29 DIAGNOSIS — N89.8 VAGINAL ITCHING: ICD-10-CM

## 2025-05-29 DIAGNOSIS — R35.0 FREQUENT URINATION: ICD-10-CM

## 2025-05-29 DIAGNOSIS — G89.4 CHRONIC PAIN SYNDROME: Primary | ICD-10-CM

## 2025-05-29 DIAGNOSIS — R42 DIZZINESS: ICD-10-CM

## 2025-05-29 LAB
BILIRUB SERPL-MCNC: NORMAL MG/DL
BLOOD URINE, POC: NORMAL
CLARITY, UA: CLEAR
COLOR, UA: NORMAL
GLUCOSE UR QL STRIP: 100
KETONES UR QL STRIP: NORMAL
LEUKOCYTE ESTERASE URINE, POC: NORMAL
NITRITE, POC UA: NORMAL
PH, POC UA: 5.5
PROTEIN, POC: NORMAL
SPECIFIC GRAVITY, POC UA: 1.01
UROBILINOGEN, POC UA: 0.2

## 2025-05-29 RX ORDER — KETOROLAC TROMETHAMINE 30 MG/ML
30 INJECTION, SOLUTION INTRAMUSCULAR; INTRAVENOUS
Status: COMPLETED | OUTPATIENT
Start: 2025-05-29 | End: 2025-05-29

## 2025-05-29 RX ORDER — FLUCONAZOLE 150 MG/1
TABLET ORAL
Qty: 2 TABLET | Refills: 0 | Status: SHIPPED | OUTPATIENT
Start: 2025-05-29

## 2025-05-29 RX ADMIN — KETOROLAC TROMETHAMINE 30 MG: 30 INJECTION, SOLUTION INTRAMUSCULAR; INTRAVENOUS at 10:05

## 2025-05-29 NOTE — PROGRESS NOTES
Clinic Note    Georgiana Zayas is a 49 y.o. female     Chief Complaint:   Chief Complaint   Patient presents with    Back Pain     Lower back pain     Vaginal Itching     Patient states that she has itching and dryness with stomach pain.        Subjective:    Patient complains of low back pain. Denies injury. Reports chronic pain. Requesting injection. Goes to pain treatment.   Patient reports vaginal itching and irritation. Lower abdominal pressure. Denies dysuria. States also feels has skin tear to vaginal area from sex. Denies discharge or odor.  Patient also reports dizziness. States she has meclizine as it is chronic issue that comes and goes but would like it documented she has dizziness.     Back Pain  Pertinent negatives include no abdominal pain, chest pain, dysuria, fever or headaches.   Vaginal Itching  The patient's pertinent negatives include no vaginal discharge. Associated symptoms include back pain. Pertinent negatives include no abdominal pain, constipation, diarrhea, dysuria, fever, headaches, nausea or vomiting.        Allergies:   Review of patient's allergies indicates:   Allergen Reactions    Codeine Itching        Past Medical History:  Past Medical History:   Diagnosis Date    Arthritis     Back injury     Chronic back pain     sees clayton hays    Depression     Diabetes mellitus     Diabetes mellitus, type 2     Difficulty swallowing     Fatigue     GERD (gastroesophageal reflux disease)     Hearing difficulty     Hypertension     Irritable bowel syndrome without diarrhea     Liver disease     Loss of appetite     Sleep apnea         Current Medications:  Current Medications[1]       Review of Systems   Constitutional:  Negative for fever.   Respiratory:  Negative for cough and shortness of breath.    Cardiovascular:  Negative for chest pain, palpitations and leg swelling.   Gastrointestinal:  Negative for abdominal pain, constipation, diarrhea, nausea and vomiting.   Genitourinary:   "Negative for dysuria and vaginal discharge.   Musculoskeletal:  Positive for back pain.   Neurological:  Positive for dizziness. Negative for headaches.          Objective:    BP (!) 160/100 (BP Location: Left arm, Patient Position: Sitting)   Pulse 104   Temp 98.5 °F (36.9 °C) (Oral)   Resp 17   Ht 5' 4" (1.626 m)   Wt 87.1 kg (192 lb)   SpO2 98%   BMI 32.96 kg/m²      Physical Exam  Exam conducted with a chaperone present.   Constitutional:       Appearance: Normal appearance.   Eyes:      Extraocular Movements: Extraocular movements intact.   Cardiovascular:      Rate and Rhythm: Normal rate and regular rhythm.      Pulses: Normal pulses.      Heart sounds: Normal heart sounds.   Pulmonary:      Effort: Pulmonary effort is normal.      Breath sounds: Normal breath sounds.   Abdominal:      Palpations: Abdomen is soft.      Tenderness: There is no abdominal tenderness.   Genitourinary:         Comments: Small skin tear to vaginal area. Skin is thin and pink. No drainage. Does not appear infected.   Neurological:      Mental Status: She is alert and oriented to person, place, and time.   Psychiatric:         Mood and Affect: Affect is flat.          Assessment and Plan:    1. Chronic pain syndrome    2. Frequent urination    3. Vaginal itching    4. Dizziness         Chronic pain syndrome  -     ketorolac injection 30 mg  -f/u with pain treatment as scheduled    Frequent urination  -     POCT URINALYSIS W/O SCOPE    Vaginal itching  -     fluconazole (DIFLUCAN) 150 MG Tab; May repeat in 72 hours if needed  Dispense: 2 tablet; Refill: 0    Dizziness  -continue meclizine prn  -monitor bp. If persistently > 140/90 f/u further management        There are no Patient Instructions on file for this visit.   Follow up if symptoms worsen or fail to improve.          [1]   Current Outpatient Medications:     ARIPiprazole (ABILIFY) 2 MG Tab, Take 1 tablet (2 mg total) by mouth once daily., Disp: 90 tablet, Rfl: 1    " ARIPiprazole (ABILIFY) 5 MG Tab, Take 5 mg by mouth., Disp: , Rfl:     benztropine (COGENTIN) 0.5 MG tablet, Take 0.5 mg by mouth once daily., Disp: , Rfl:     chlorpheniramine-phenyleph-DM (ED A-HIST DM) 4-10-10 mg Tab, Take 1 tablet by mouth every 8 (eight) hours as needed (congestion or cough)., Disp: 30 tablet, Rfl: 2    cyclobenzaprine (FLEXERIL) 10 MG tablet, Take 1 tablet (10 mg total) by mouth 3 (three) times daily as needed for Muscle spasms (may cause drowsiness)., Disp: 90 tablet, Rfl: 1    DULoxetine (CYMBALTA) 60 MG capsule, Take 1 capsule (60 mg total) by mouth 2 (two) times daily., Disp: 180 capsule, Rfl: 1    fluconazole (DIFLUCAN) 150 MG Tab, May repeat in 72 hours if needed, Disp: 2 tablet, Rfl: 0    gabapentin (NEURONTIN) 800 MG tablet, Take 1 tablet (800 mg total) by mouth 3 (three) times daily., Disp: 270 tablet, Rfl: 1    glipiZIDE (GLUCOTROL) 10 MG tablet, Take 1 tablet (10 mg total) by mouth 2 (two) times daily with meals., Disp: 180 tablet, Rfl: 1    hydroCHLOROthiazide (HYDRODIURIL) 25 MG tablet, Take 1 tablet (25 mg total) by mouth once daily., Disp: 90 tablet, Rfl: 1    hydrOXYzine pamoate (VISTARIL) 25 MG Cap, Take 1 capsule (25 mg total) by mouth 3 (three) times daily., Disp: 270 capsule, Rfl: 1    ibuprofen (ADVIL,MOTRIN) 800 MG tablet, Take 1 tablet (800 mg total) by mouth 3 (three) times daily as needed for Pain., Disp: 30 tablet, Rfl: 1    insulin glargine U-100, Lantus, (LANTUS SOLOSTAR U-100 INSULIN) 100 unit/mL (3 mL) InPn pen, Inject 20 Units into the skin every evening., Disp: 18 mL, Rfl: 1    lisinopriL (PRINIVIL,ZESTRIL) 5 MG tablet, Take 1 tablet (5 mg total) by mouth once daily., Disp: 90 tablet, Rfl: 3    meclizine (ANTIVERT) 25 mg tablet, Take 1 tablet (25 mg total) by mouth 3 (three) times daily as needed for Dizziness., Disp: 30 tablet, Rfl: 1    methocarbamoL (ROBAXIN) 500 MG Tab, Take 500 mg by mouth 3 (three) times daily. (Patient not taking: Reported on 9/5/2024),  Disp: , Rfl:     mometasone (NASONEX) 50 mcg/actuation nasal spray, 2 sprays by Nasal route once daily., Disp: 17 g, Rfl: 3    naproxen (NAPROSYN) 500 MG tablet, Take 1 tablet (500 mg total) by mouth 2 (two) times daily with meals., Disp: 30 tablet, Rfl: 0    omeprazole (PRILOSEC) 40 MG capsule, Take 1 capsule (40 mg total) by mouth Daily., Disp: 90 capsule, Rfl: 1    oxyCODONE-acetaminophen (PERCOCET)  mg per tablet, Take 1 tablet by mouth every 6 (six) hours as needed. , Disp: , Rfl:     polyethylene glycol (GLYCOLAX) 17 gram/dose powder, Mix one capful (17 grams) with 8 ounces of coffee or water and drink daily for constipation., Disp: 1530 g, Rfl: 3    pravastatin (PRAVACHOL) 20 MG tablet, Take 1 tablet (20 mg total) by mouth every evening., Disp: 90 tablet, Rfl: 1    promethazine (PHENERGAN) 25 MG tablet, Take 1 tablet (25 mg total) by mouth every 6 (six) hours as needed for Nausea., Disp: 30 tablet, Rfl: 1    risperiDONE (RISPERDAL) 3 MG Tab, Take 1 tablet (3 mg total) by mouth once daily., Disp: 90 tablet, Rfl: 1    semaglutide (OZEMPIC) 2 mg/dose (8 mg/3 mL) PnIj, Inject 2 mg into the skin every 7 days., Disp: 3 mL, Rfl: 3    sucralfate (CARAFATE) 100 mg/mL suspension, Take 10 mLs (1 g total) by mouth 2 (two) times daily. For reflux/irritation, Disp: 414 mL, Rfl: 0    traZODone (DESYREL) 100 MG tablet, Take 2 tablets (200 mg total) by mouth every evening., Disp: 90 tablet, Rfl: 1  No current facility-administered medications for this visit.

## 2025-06-24 ENCOUNTER — OFFICE VISIT (OUTPATIENT)
Dept: FAMILY MEDICINE | Facility: CLINIC | Age: 50
End: 2025-06-24
Payer: COMMERCIAL

## 2025-06-24 VITALS
TEMPERATURE: 98 F | WEIGHT: 188 LBS | DIASTOLIC BLOOD PRESSURE: 81 MMHG | HEART RATE: 100 BPM | HEIGHT: 64 IN | RESPIRATION RATE: 17 BRPM | BODY MASS INDEX: 32.1 KG/M2 | OXYGEN SATURATION: 98 % | SYSTOLIC BLOOD PRESSURE: 131 MMHG

## 2025-06-24 DIAGNOSIS — F41.9 ANXIETY: Chronic | ICD-10-CM

## 2025-06-24 DIAGNOSIS — R42 VERTIGO: ICD-10-CM

## 2025-06-24 DIAGNOSIS — M47.817 LUMBOSACRAL SPONDYLOSIS WITHOUT MYELOPATHY: Chronic | ICD-10-CM

## 2025-06-24 DIAGNOSIS — Z79.4 TYPE 2 DIABETES MELLITUS WITH DIABETIC NEUROPATHY, WITH LONG-TERM CURRENT USE OF INSULIN: Primary | ICD-10-CM

## 2025-06-24 DIAGNOSIS — H66.002 NON-RECURRENT ACUTE SUPPURATIVE OTITIS MEDIA OF LEFT EAR WITHOUT SPONTANEOUS RUPTURE OF TYMPANIC MEMBRANE: ICD-10-CM

## 2025-06-24 DIAGNOSIS — E78.49 OTHER HYPERLIPIDEMIA: ICD-10-CM

## 2025-06-24 DIAGNOSIS — H61.22 IMPACTED CERUMEN OF LEFT EAR: ICD-10-CM

## 2025-06-24 DIAGNOSIS — M54.17 LUMBOSACRAL RADICULOPATHY: ICD-10-CM

## 2025-06-24 DIAGNOSIS — I10 ESSENTIAL HYPERTENSION: ICD-10-CM

## 2025-06-24 DIAGNOSIS — J30.2 SEASONAL ALLERGIES: Chronic | ICD-10-CM

## 2025-06-24 DIAGNOSIS — K21.9 GASTROESOPHAGEAL REFLUX DISEASE WITHOUT ESOPHAGITIS: Chronic | ICD-10-CM

## 2025-06-24 DIAGNOSIS — E11.40 TYPE 2 DIABETES MELLITUS WITH DIABETIC NEUROPATHY, WITH LONG-TERM CURRENT USE OF INSULIN: Primary | ICD-10-CM

## 2025-06-24 DIAGNOSIS — G47.39 OTHER SLEEP APNEA: Chronic | ICD-10-CM

## 2025-06-24 DIAGNOSIS — E66.01 MORBID OBESITY: Chronic | ICD-10-CM

## 2025-06-24 DIAGNOSIS — F32.89 OTHER DEPRESSION: Chronic | ICD-10-CM

## 2025-06-24 DIAGNOSIS — Z12.31 SCREENING MAMMOGRAM FOR BREAST CANCER: ICD-10-CM

## 2025-06-24 DIAGNOSIS — H91.93 DECREASED HEARING OF BOTH EARS: ICD-10-CM

## 2025-06-24 DIAGNOSIS — H92.03 OTALGIA OF BOTH EARS: ICD-10-CM

## 2025-06-24 PROCEDURE — 3066F NEPHROPATHY DOC TX: CPT | Mod: CPTII,,, | Performed by: FAMILY MEDICINE

## 2025-06-24 PROCEDURE — 3052F HG A1C>EQUAL 8.0%<EQUAL 9.0%: CPT | Mod: CPTII,,, | Performed by: FAMILY MEDICINE

## 2025-06-24 PROCEDURE — 82043 UR ALBUMIN QUANTITATIVE: CPT | Mod: ,,, | Performed by: CLINICAL MEDICAL LABORATORY

## 2025-06-24 PROCEDURE — 80053 COMPREHEN METABOLIC PANEL: CPT | Mod: ,,, | Performed by: CLINICAL MEDICAL LABORATORY

## 2025-06-24 PROCEDURE — 3061F NEG MICROALBUMINURIA REV: CPT | Mod: CPTII,,, | Performed by: FAMILY MEDICINE

## 2025-06-24 PROCEDURE — 85025 COMPLETE CBC W/AUTO DIFF WBC: CPT | Mod: ,,, | Performed by: CLINICAL MEDICAL LABORATORY

## 2025-06-24 PROCEDURE — 3008F BODY MASS INDEX DOCD: CPT | Mod: CPTII,,, | Performed by: FAMILY MEDICINE

## 2025-06-24 PROCEDURE — 83036 HEMOGLOBIN GLYCOSYLATED A1C: CPT | Mod: ,,, | Performed by: CLINICAL MEDICAL LABORATORY

## 2025-06-24 PROCEDURE — 3075F SYST BP GE 130 - 139MM HG: CPT | Mod: CPTII,,, | Performed by: FAMILY MEDICINE

## 2025-06-24 PROCEDURE — 99214 OFFICE O/P EST MOD 30 MIN: CPT | Mod: 25,,, | Performed by: FAMILY MEDICINE

## 2025-06-24 PROCEDURE — 82570 ASSAY OF URINE CREATININE: CPT | Mod: ,,, | Performed by: CLINICAL MEDICAL LABORATORY

## 2025-06-24 PROCEDURE — 96372 THER/PROPH/DIAG INJ SC/IM: CPT | Mod: ,,, | Performed by: FAMILY MEDICINE

## 2025-06-24 PROCEDURE — 1159F MED LIST DOCD IN RCRD: CPT | Mod: CPTII,,, | Performed by: FAMILY MEDICINE

## 2025-06-24 PROCEDURE — 4010F ACE/ARB THERAPY RXD/TAKEN: CPT | Mod: CPTII,,, | Performed by: FAMILY MEDICINE

## 2025-06-24 PROCEDURE — 3079F DIAST BP 80-89 MM HG: CPT | Mod: CPTII,,, | Performed by: FAMILY MEDICINE

## 2025-06-24 PROCEDURE — 80061 LIPID PANEL: CPT | Mod: ,,, | Performed by: CLINICAL MEDICAL LABORATORY

## 2025-06-24 RX ORDER — KETOROLAC TROMETHAMINE 30 MG/ML
30 INJECTION, SOLUTION INTRAMUSCULAR; INTRAVENOUS
Status: COMPLETED | OUTPATIENT
Start: 2025-06-24 | End: 2025-06-24

## 2025-06-24 RX ORDER — FLUCONAZOLE 150 MG/1
TABLET ORAL
Qty: 3 TABLET | Refills: 0 | Status: SHIPPED | OUTPATIENT
Start: 2025-06-24

## 2025-06-24 RX ORDER — AMOXICILLIN 875 MG/1
875 TABLET, COATED ORAL EVERY 12 HOURS
Qty: 20 TABLET | Refills: 0 | Status: SHIPPED | OUTPATIENT
Start: 2025-06-24 | End: 2025-07-04

## 2025-06-24 RX ADMIN — KETOROLAC TROMETHAMINE 30 MG: 30 INJECTION, SOLUTION INTRAMUSCULAR; INTRAVENOUS at 02:06

## 2025-06-24 NOTE — PROGRESS NOTES
Clinic Note    Patient Name: Georgiana Zayas  : 1975  MRN: [id number]    Chief Complaint   Patient presents with    Follow-up     3 month f/u, pt states that her lower back is hurting and and left ear pain. Pt states that she had fluid behind that ear.    Health Maintenance     Hepatitis C Screening denies  TETANUS VACCINE denies  Colorectal Cancer Screening denies  COVID-19 Vaccine(5 - 2024-25 season) due on 2024  Mammogram due on 2024  Diabetic Eye Exam due on 10/23/2024  Shingles Vaccine(1 of 2) denies.  Hemoglobin A1c due on 2025  Foot Exam due on 2025        HPI:    Ms. Georgiana Zayas is a 49 y.o. female who presents to clinic today with CC of follow up on chronic disease processes including Type II DM, HTN, HLD, GERD, obesity, MATT, anxiety/depression, seasonal allergies, and chronic low back pain due to lumbosacral spondylosis/radiculopathy.   Patient reports lower back pain and L ear pain.   Reports she has chronic low back pain.  She reports she has been having some numbness in her R leg X 3 days. Reports it is somewhat better today. She is scheduled to see Neurology Friday for this issue (Dr. Jett). States she believes she is going to have a nerve conduction study.  Reports L ear pain and feeling like it is stopped up X several weeks. Reports dizziness with position changes or turning her head suddenly.   She has a h/o vertigo. Reports meclizine does seem to help with the dizziness. She was prescribed augmentin, ed a-hist DM and given a steroid and rocephin injection at her last visit for sinusitis/fluid behind her ears. She states she does not feel improved. She reports she has seen the ENT previously. Reports she has had tubes in her ears in the past but does not have them currently.  Patient reports chronic issues are well controlled on current medication regimen.  Denies problems or side effects with medications.  Patient is, otherwise, without complaints.      Medications:  Medication List with Changes/Refills   New Medications    AMOXICILLIN (AMOXIL) 875 MG TABLET    Take 1 tablet (875 mg total) by mouth every 12 (twelve) hours. for 10 days   Current Medications    ARIPIPRAZOLE (ABILIFY) 2 MG TAB    Take 1 tablet (2 mg total) by mouth once daily.    ARIPIPRAZOLE (ABILIFY) 5 MG TAB    Take 5 mg by mouth.    BENZTROPINE (COGENTIN) 0.5 MG TABLET    Take 0.5 mg by mouth once daily.    CHLORPHENIRAMINE-PHENYLEPH-DM (ED A-HIST DM) 4-10-10 MG TAB    Take 1 tablet by mouth every 8 (eight) hours as needed (congestion or cough).    CYCLOBENZAPRINE (FLEXERIL) 10 MG TABLET    Take 1 tablet (10 mg total) by mouth 3 (three) times daily as needed for Muscle spasms (may cause drowsiness).    DULOXETINE (CYMBALTA) 60 MG CAPSULE    Take 1 capsule (60 mg total) by mouth 2 (two) times daily.    GABAPENTIN (NEURONTIN) 800 MG TABLET    Take 1 tablet (800 mg total) by mouth 3 (three) times daily.    GLIPIZIDE (GLUCOTROL) 10 MG TABLET    Take 1 tablet (10 mg total) by mouth 2 (two) times daily with meals.    HYDROCHLOROTHIAZIDE (HYDRODIURIL) 25 MG TABLET    Take 1 tablet (25 mg total) by mouth once daily.    HYDROXYZINE PAMOATE (VISTARIL) 25 MG CAP    Take 1 capsule (25 mg total) by mouth 3 (three) times daily.    IBUPROFEN (ADVIL,MOTRIN) 800 MG TABLET    Take 1 tablet (800 mg total) by mouth 3 (three) times daily as needed for Pain.    INSULIN GLARGINE U-100, LANTUS, (LANTUS SOLOSTAR U-100 INSULIN) 100 UNIT/ML (3 ML) INPN PEN    Inject 20 Units into the skin every evening.    LISINOPRIL (PRINIVIL,ZESTRIL) 5 MG TABLET    Take 1 tablet (5 mg total) by mouth once daily.    MECLIZINE (ANTIVERT) 25 MG TABLET    Take 1 tablet (25 mg total) by mouth 3 (three) times daily as needed for Dizziness.    METHOCARBAMOL (ROBAXIN) 500 MG TAB    Take 500 mg by mouth 3 (three) times daily.    MOMETASONE (NASONEX) 50 MCG/ACTUATION NASAL SPRAY    2 sprays by Nasal route once daily.    NAPROXEN (NAPROSYN) 500  MG TABLET    Take 1 tablet (500 mg total) by mouth 2 (two) times daily with meals.    OMEPRAZOLE (PRILOSEC) 40 MG CAPSULE    Take 1 capsule (40 mg total) by mouth Daily.    OXYCODONE-ACETAMINOPHEN (PERCOCET)  MG PER TABLET    Take 1 tablet by mouth every 6 (six) hours as needed.     POLYETHYLENE GLYCOL (GLYCOLAX) 17 GRAM/DOSE POWDER    Mix one capful (17 grams) with 8 ounces of coffee or water and drink daily for constipation.    PRAVASTATIN (PRAVACHOL) 20 MG TABLET    Take 1 tablet (20 mg total) by mouth every evening.    PROMETHAZINE (PHENERGAN) 25 MG TABLET    Take 1 tablet (25 mg total) by mouth every 6 (six) hours as needed for Nausea.    RISPERIDONE (RISPERDAL) 3 MG TAB    Take 1 tablet (3 mg total) by mouth once daily.    SEMAGLUTIDE (OZEMPIC) 2 MG/DOSE (8 MG/3 ML) PNIJ    Inject 2 mg into the skin every 7 days.    SUCRALFATE (CARAFATE) 100 MG/ML SUSPENSION    Take 10 mLs (1 g total) by mouth 2 (two) times daily. For reflux/irritation    TRAZODONE (DESYREL) 100 MG TABLET    Take 2 tablets (200 mg total) by mouth every evening.   Changed and/or Refilled Medications    Modified Medication Previous Medication    FLUCONAZOLE (DIFLUCAN) 150 MG TAB fluconazole (DIFLUCAN) 150 MG Tab       Take one tablet by mouth every 72 hours X 3 doses if needed for yeast infection after completion of antibiotics.    May repeat in 72 hours if needed        Allergies: Codeine      Past Medical History:    Past Medical History:   Diagnosis Date    Arthritis     Back injury     Chronic back pain     sees [facility]    Depression     Diabetes mellitus     Diabetes mellitus, type 2     Difficulty swallowing     Fatigue     GERD (gastroesophageal reflux disease)     Hearing difficulty     Hypertension     Irritable bowel syndrome without diarrhea     Liver disease     Loss of appetite     Sleep apnea        Past Surgical History:    Past Surgical History:   Procedure Laterality Date    CARPAL TUNNEL RELEASE Bilateral       SECTION      INJECTION OF ANESTHETIC AGENT AROUND MEDIAL BRANCH NERVES INNERVATING LUMBAR FACET JOINT Bilateral 2021    Procedure: BLOCK, NERVE, FACET JOINT, LUMBAR, MEDIAL BRANCH;  Surgeon: Amari Razo MD;  Location: [facility];  Service: Pain Management;  Laterality: Bilateral;  Bilateral L3-S1 Facet Injection    INJECTION OF ANESTHETIC AGENT AROUND MEDIAL BRANCH NERVES INNERVATING LUMBAR FACET JOINT Bilateral 2022    Procedure: BLOCK, NERVE, FACET JOINT, LUMBAR, MEDIAL BRANCH;  Surgeon: Amari Razo MD;  Location: [facility];  Service: Pain Management;  Laterality: Bilateral;  Bilateral L3-S1 FI    INJECTION OF ANESTHETIC AGENT AROUND MEDIAL BRANCH NERVES INNERVATING LUMBAR FACET JOINT Bilateral 2023    Procedure: BLOCK, NERVE, FACET JOINT, LUMBAR, MEDIAL BRANCH;  Surgeon: Amari Razo MD;  Location: [facility];  Service: Pain Management;  Laterality: Bilateral;  Bilateral L3-S1 FI    LEFT HEART CATHETERIZATION Left 2021    Procedure: Left heart cath;  Surgeon: Jeremy Rojo DO;  Location: [facility] CATH LAB;  Service: Cardiology;  Laterality: Left;    RADIOFREQUENCY ABLATION OF LUMBAR MEDIAL BRANCH NERVE AT SINGLE LEVEL Bilateral 2023    Procedure: RADIOFREQUENCY ABLATION, NERVE, SPINAL, LUMBAR, MEDIAL BRANCH, 1 LEVEL;  Surgeon: Amari Razo MD;  Location: [facility];  Service: Pain Management;  Laterality: Bilateral;  Bilateral L3-5 RFTC    STAPEDES SURGERY Bilateral     TONSILLECTOMY           Social History:    Tobacco Use History[1]  Social History     Substance and Sexual Activity   Alcohol Use Not Currently     Social History     Substance and Sexual Activity   Drug Use Never         Family History:    Family History   Problem Relation Name Age of Onset    Cancer Mother      Diabetes Mother      Hypertension Mother      Kidney failure Other      Heart disease Other         Review of Systems:    Review of  "Systems   Constitutional:  Negative for appetite change, chills, fatigue, fever and unexpected weight change.   HENT:  Positive for ear pain and hearing loss. Negative for ear discharge.    Eyes:  Negative for visual disturbance.   Respiratory:  Negative for cough and shortness of breath.    Cardiovascular:  Negative for chest pain and leg swelling.   Gastrointestinal:  Negative for abdominal pain, change in bowel habit, constipation, diarrhea, nausea and vomiting.   Musculoskeletal:  Positive for arthralgias and back pain.   Integumentary:  Negative for rash.   Neurological:  Negative for dizziness and headaches.   Psychiatric/Behavioral:  The patient is not nervous/anxious.         Vitals:    Vitals:    06/24/25 1340   BP: 131/81   BP Location: Left arm   Patient Position: Sitting   Pulse: 100   Resp: 17   Temp: 98.3 °F (36.8 °C)   TempSrc: Oral   SpO2: 98%   Weight: 85.3 kg (188 lb)   Height: 5' 4" (1.626 m)       Body mass index is 32.27 kg/m².    Wt Readings from Last 3 Encounters:   06/24/25 1340 85.3 kg (188 lb)   05/29/25 0830 87.1 kg (192 lb)   05/05/25 1356 86.6 kg (191 lb)        Physical Exam:    Physical Exam  Constitutional:       General: She is not in acute distress.     Appearance: Normal appearance. She is obese.   HENT:      Left Ear: There is impacted cerumen.      Ears:      Comments: + mild erythema L TM  Appears to be possible holes in bilateral eardrums perhaps from prior tubes     Nose: Nose normal.      Mouth/Throat:      Mouth: Mucous membranes are moist.      Pharynx: Oropharynx is clear.   Eyes:      Conjunctiva/sclera: Conjunctivae normal.   Cardiovascular:      Rate and Rhythm: Normal rate and regular rhythm.      Heart sounds: Normal heart sounds. No murmur heard.  Pulmonary:      Effort: Pulmonary effort is normal. No respiratory distress.      Breath sounds: Normal breath sounds. No wheezing, rhonchi or rales.   Abdominal:      General: Bowel sounds are normal.      Palpations: " Abdomen is soft.      Tenderness: There is no abdominal tenderness.   Musculoskeletal:      Cervical back: Neck supple.      Right lower leg: No edema.      Left lower leg: No edema.   Skin:     Findings: No rash.   Neurological:      General: No focal deficit present.      Mental Status: She is alert. Mental status is at baseline.   Psychiatric:         Mood and Affect: Mood normal.           Assessment/Plan:   1. Type 2 diabetes mellitus with diabetic neuropathy, with long-term current use of insulin  -     CBC Auto Differential; Future; Expected date: 06/24/2025  -     Comprehensive Metabolic Panel; Future; Expected date: 06/24/2025  -     Lipid Panel; Future; Expected date: 06/24/2025  -     Hemoglobin A1C; Future; Expected date: 06/24/2025  -     Microalbumin/Creatinine Ratio, Urine    2. Essential hypertension  -     CBC Auto Differential; Future; Expected date: 06/24/2025  -     Comprehensive Metabolic Panel; Future; Expected date: 06/24/2025  -     Lipid Panel; Future; Expected date: 06/24/2025  -     Microalbumin/Creatinine Ratio, Urine    3. Gastroesophageal reflux disease without esophagitis  The current medical regimen is effective;  continue present plan and medications.    4. Lumbosacral spondylosis without myelopathy  -     ketorolac injection 30 mg  - Follow up with pain management and neurology as scheduled    5. Other sleep apnea  - CPAP    6. Morbid obesity  - BMI discussed with patient.  - Diet and exercise  - Diet discussed and educational information provided  - Recommend 30 minutes of exercise at least 5 days per week.    7. Other hyperlipidemia  -     CBC Auto Differential; Future; Expected date: 06/24/2025  -     Comprehensive Metabolic Panel; Future; Expected date: 06/24/2025  -     Lipid Panel; Future; Expected date: 06/24/2025    8. Seasonal allergies  The current medical regimen is effective;  continue present plan and medications.    9. Anxiety  The current medical regimen is effective;   continue present plan and medications.    10. Other depression  The current medical regimen is effective;  continue present plan and medications.    11. Lumbosacral radiculopathy  -     ketorolac injection 30 mg    12. Otalgia of both ears  -     Ambulatory referral/consult to ENT; Future; Expected date: 07/01/2025    13. Vertigo  -     Ambulatory referral/consult to ENT; Future; Expected date: 07/01/2025    14. Decreased hearing of both ears  -     Ambulatory referral/consult to ENT; Future; Expected date: 07/01/2025    15. Impacted cerumen of left ear  - Removed by physician with currette - patient tolerated well    16. Non-recurrent acute suppurative otitis media of left ear without spontaneous rupture of tympanic membrane  -     amoxicillin (AMOXIL) 875 MG tablet; Take 1 tablet (875 mg total) by mouth every 12 (twelve) hours. for 10 days  Dispense: 20 tablet; Refill: 0  -     fluconazole (DIFLUCAN) 150 MG Tab; Take one tablet by mouth every 72 hours X 3 doses if needed for yeast infection after completion of antibiotics.  Dispense: 3 tablet; Refill: 0    17. Screening mammogram for breast cancer  -     Mammo Digital Screening Bilat w/ Carlos (XPD); Future; Expected date: 06/24/2025         Active Problem List with Overview Notes    Diagnosis Date Noted    Type 2 diabetes mellitus with diabetic neuropathy, with long-term current use of insulin 08/25/2021    Essential hypertension 08/25/2021    Anxiety 01/23/2023    Other hyperlipidemia 07/07/2022    Morbid obesity 01/17/2022    Depression 01/23/2023    Femoral artery pseudo-aneurysm, right 01/17/2022    Other sleep apnea 11/30/2021    Lumbosacral spondylosis without myelopathy 09/08/2021    Gastroesophageal reflux disease 08/25/2021    Chronic bilateral low back pain with right-sided sciatica 07/07/2022    Seasonal allergies 07/07/2022    Muscle spasm 07/07/2022    Atypical squamous cells of undetermined significance (ASCUS) on Papanicolaou smear of cervix  02/06/2025    Obesity (BMI 30.0-34.9) 02/06/2025    Vaginal odor 02/06/2025    Pelvic pain in female 02/06/2025    Lumbosacral radiculopathy 08/20/2024    Vertigo 08/06/2024        Health Maintenance:  Health Maintenance   Topic Date Due    Hepatitis C Screening  Never done    TETANUS VACCINE  Never done    Colorectal Cancer Screening  Never done    COVID-19 Vaccine (5 - 2024-25 season) 09/01/2024    Mammogram  09/14/2024    Diabetic Eye Exam  10/23/2024    Shingles Vaccine (1 of 2) Never done    Hemoglobin A1c  06/25/2025    Foot Exam  08/05/2025    Diabetes Urine Screening  03/25/2026    Lipid Panel  03/25/2026    Low Dose Statin  04/04/2026    Cervical Cancer Screening  02/04/2030    RSV Vaccine (Age 60+ and Pregnant patients) (1 - 1-dose 75+ series) 12/31/2049    Influenza Vaccine  Completed    HIV Screening  Completed    Pneumococcal Vaccines (Age 50+)  Completed   Patient reports she will scheduled her diabetic eye exam.    RTC in 3 months for chronic follow up.  RTC sooner if needed.   Patient voiced understanding and is agreeable to plan.      Lisandra Mcdaniel MD    Family Medicine           [1]   Social History  Tobacco Use   Smoking Status Never    Passive exposure: Never   Smokeless Tobacco Never

## 2025-06-25 LAB
ALBUMIN SERPL BCP-MCNC: 3.5 G/DL (ref 3.5–5)
ALBUMIN/GLOB SERPL: 0.9 {RATIO}
ALP SERPL-CCNC: 83 U/L (ref 40–150)
ALT SERPL W P-5'-P-CCNC: 22 U/L
ANION GAP SERPL CALCULATED.3IONS-SCNC: 14 MMOL/L (ref 7–16)
AST SERPL W P-5'-P-CCNC: 25 U/L (ref 11–45)
BASOPHILS # BLD AUTO: 0.05 K/UL (ref 0–0.2)
BASOPHILS NFR BLD AUTO: 1 % (ref 0–1)
BILIRUB SERPL-MCNC: 0.3 MG/DL
BUN SERPL-MCNC: 6 MG/DL (ref 10–20)
BUN/CREAT SERPL: 6 (ref 6–20)
CALCIUM SERPL-MCNC: 9.3 MG/DL (ref 8.4–10.2)
CHLORIDE SERPL-SCNC: 95 MMOL/L (ref 98–107)
CHOLEST SERPL-MCNC: 157 MG/DL
CHOLEST/HDLC SERPL: 2.7 {RATIO}
CO2 SERPL-SCNC: 30 MMOL/L (ref 22–29)
CREAT SERPL-MCNC: 0.96 MG/DL (ref 0.55–1.02)
CREAT UR-MCNC: 55 MG/DL (ref 15–325)
DIFFERENTIAL METHOD BLD: ABNORMAL
EGFR (NO RACE VARIABLE) (RUSH/TITUS): 72 ML/MIN/1.73M2
EOSINOPHIL # BLD AUTO: 0.12 K/UL (ref 0–0.5)
EOSINOPHIL NFR BLD AUTO: 2.4 % (ref 1–4)
ERYTHROCYTE [DISTWIDTH] IN BLOOD BY AUTOMATED COUNT: 13.6 % (ref 11.5–14.5)
EST. AVERAGE GLUCOSE BLD GHB EST-MCNC: 255 MG/DL
GLOBULIN SER-MCNC: 3.7 G/DL (ref 2–4)
GLUCOSE SERPL-MCNC: 394 MG/DL (ref 74–100)
HBA1C MFR BLD HPLC: 10.5 %
HCT VFR BLD AUTO: 38 % (ref 38–47)
HDLC SERPL-MCNC: 59 MG/DL (ref 35–60)
HGB BLD-MCNC: 11.8 G/DL (ref 12–16)
IMM GRANULOCYTES # BLD AUTO: 0.01 K/UL (ref 0–0.04)
IMM GRANULOCYTES NFR BLD: 0.2 % (ref 0–0.4)
LDLC SERPL CALC-MCNC: 81 MG/DL
LDLC/HDLC SERPL: 1.4 {RATIO}
LYMPHOCYTES # BLD AUTO: 2.25 K/UL (ref 1–4.8)
LYMPHOCYTES NFR BLD AUTO: 45.7 % (ref 27–41)
MCH RBC QN AUTO: 27.2 PG (ref 27–31)
MCHC RBC AUTO-ENTMCNC: 31.1 G/DL (ref 32–36)
MCV RBC AUTO: 87.6 FL (ref 80–96)
MICROALBUMIN UR-MCNC: <0.5 MG/DL
MICROALBUMIN/CREAT RATIO PNL UR: NORMAL
MONOCYTES # BLD AUTO: 0.41 K/UL (ref 0–0.8)
MONOCYTES NFR BLD AUTO: 8.3 % (ref 2–6)
MPC BLD CALC-MCNC: 11.1 FL (ref 9.4–12.4)
NEUTROPHILS # BLD AUTO: 2.08 K/UL (ref 1.8–7.7)
NEUTROPHILS NFR BLD AUTO: 42.4 % (ref 53–65)
NONHDLC SERPL-MCNC: 98 MG/DL
NRBC # BLD AUTO: 0 X10E3/UL
NRBC, AUTO (.00): 0 %
PLATELET # BLD AUTO: 332 K/UL (ref 150–400)
POTASSIUM SERPL-SCNC: 4.6 MMOL/L (ref 3.5–5.1)
PROT SERPL-MCNC: 7.2 G/DL (ref 6.4–8.3)
RBC # BLD AUTO: 4.34 M/UL (ref 4.2–5.4)
SODIUM SERPL-SCNC: 134 MMOL/L (ref 136–145)
TRIGL SERPL-MCNC: 83 MG/DL (ref 37–140)
VLDLC SERPL-MCNC: 17 MG/DL
WBC # BLD AUTO: 4.92 K/UL (ref 4.5–11)

## 2025-07-02 ENCOUNTER — RESULTS FOLLOW-UP (OUTPATIENT)
Dept: FAMILY MEDICINE | Facility: CLINIC | Age: 50
End: 2025-07-02

## 2025-07-07 ENCOUNTER — OFFICE VISIT (OUTPATIENT)
Dept: OTOLARYNGOLOGY | Facility: CLINIC | Age: 50
End: 2025-07-07
Payer: COMMERCIAL

## 2025-07-07 VITALS — BODY MASS INDEX: 32.1 KG/M2 | WEIGHT: 188 LBS | HEIGHT: 64 IN

## 2025-07-07 DIAGNOSIS — H91.93 DECREASED HEARING OF BOTH EARS: ICD-10-CM

## 2025-07-07 DIAGNOSIS — H92.03 OTALGIA OF BOTH EARS: ICD-10-CM

## 2025-07-07 DIAGNOSIS — R42 VERTIGO: ICD-10-CM

## 2025-07-07 PROCEDURE — 3046F HEMOGLOBIN A1C LEVEL >9.0%: CPT | Mod: CPTII,,, | Performed by: OTOLARYNGOLOGY

## 2025-07-07 PROCEDURE — 99215 OFFICE O/P EST HI 40 MIN: CPT | Mod: PBBFAC | Performed by: OTOLARYNGOLOGY

## 2025-07-07 PROCEDURE — 99999 PR PBB SHADOW E&M-EST. PATIENT-LVL V: CPT | Mod: PBBFAC,,, | Performed by: OTOLARYNGOLOGY

## 2025-07-07 PROCEDURE — 3066F NEPHROPATHY DOC TX: CPT | Mod: CPTII,,, | Performed by: OTOLARYNGOLOGY

## 2025-07-07 PROCEDURE — 1159F MED LIST DOCD IN RCRD: CPT | Mod: CPTII,,, | Performed by: OTOLARYNGOLOGY

## 2025-07-07 PROCEDURE — 3008F BODY MASS INDEX DOCD: CPT | Mod: CPTII,,, | Performed by: OTOLARYNGOLOGY

## 2025-07-07 PROCEDURE — 99214 OFFICE O/P EST MOD 30 MIN: CPT | Mod: S$PBB,,, | Performed by: OTOLARYNGOLOGY

## 2025-07-07 PROCEDURE — 4010F ACE/ARB THERAPY RXD/TAKEN: CPT | Mod: CPTII,,, | Performed by: OTOLARYNGOLOGY

## 2025-07-07 PROCEDURE — 1160F RVW MEDS BY RX/DR IN RCRD: CPT | Mod: CPTII,,, | Performed by: OTOLARYNGOLOGY

## 2025-07-07 PROCEDURE — 3061F NEG MICROALBUMINURIA REV: CPT | Mod: CPTII,,, | Performed by: OTOLARYNGOLOGY

## 2025-07-07 NOTE — PROGRESS NOTES
Subjective:       Patient ID: Georgiana Zayas is a 49 y.o. female.    Chief Complaint: Otalgia (Patient complains of her ears hurting. States she had tubes in the past.)    Otalgia   Associated symptoms include hearing loss.     Review of Systems   HENT:  Positive for ear pain and hearing loss.    Neurological:  Positive for dizziness.   All other systems reviewed and are negative.      Objective:      Physical Exam  General: NAD  Head: Normocephalic, atraumatic, no facial asymmetry/normal strength,  Ears: Both auricules normal in appearance, w/o deformities tympanic membranes large perforations external auditory canals normal  Nose: External nose w/o deformities normal turbinates no drainage or inflammation  Oral Cavity: Lips, gums, floor of mouth, tongue hard palate, and buccal mucosa without mass/lesion  Oropharynx: Mucosa pink and moist, soft palate, posterior pharynx and oropharyngeal wall without mass/lesion  Neck: Supple, symmetric, trachea midline, no palpable mass/lesion, no palpable cervical lymphadenopathy  Skin: Warm and dry, no concerning lesions  Respiratory: Respirations even, unlabored    Assessment:       1. Otalgia of both ears    2. Vertigo    3. Decreased hearing of both ears        Plan:       Discussed tympanoplasty will come back to reschedule

## 2025-08-07 DIAGNOSIS — E11.40 TYPE 2 DIABETES MELLITUS WITH DIABETIC NEUROPATHY, WITH LONG-TERM CURRENT USE OF INSULIN: Primary | Chronic | ICD-10-CM

## 2025-08-07 DIAGNOSIS — Z79.4 TYPE 2 DIABETES MELLITUS WITH DIABETIC NEUROPATHY, WITH LONG-TERM CURRENT USE OF INSULIN: Primary | Chronic | ICD-10-CM

## 2025-08-07 RX ORDER — INSULIN GLARGINE 100 [IU]/ML
20 INJECTION, SOLUTION SUBCUTANEOUS NIGHTLY
Qty: 18 ML | Refills: 3 | Status: SHIPPED | OUTPATIENT
Start: 2025-08-07 | End: 2026-08-07

## 2025-08-25 ENCOUNTER — PATIENT MESSAGE (OUTPATIENT)
Facility: HOSPITAL | Age: 50
End: 2025-08-25
Payer: COMMERCIAL

## 2025-08-28 ENCOUNTER — OFFICE VISIT (OUTPATIENT)
Dept: FAMILY MEDICINE | Facility: CLINIC | Age: 50
End: 2025-08-28
Payer: COMMERCIAL

## 2025-08-28 VITALS
HEART RATE: 102 BPM | RESPIRATION RATE: 18 BRPM | DIASTOLIC BLOOD PRESSURE: 108 MMHG | TEMPERATURE: 98 F | OXYGEN SATURATION: 100 % | WEIGHT: 186 LBS | HEIGHT: 64 IN | BODY MASS INDEX: 31.76 KG/M2 | SYSTOLIC BLOOD PRESSURE: 142 MMHG

## 2025-08-28 DIAGNOSIS — Z79.4 TYPE 2 DIABETES MELLITUS WITH DIABETIC NEUROPATHY, WITH LONG-TERM CURRENT USE OF INSULIN: Chronic | ICD-10-CM

## 2025-08-28 DIAGNOSIS — M54.41 CHRONIC BILATERAL LOW BACK PAIN WITH RIGHT-SIDED SCIATICA: Primary | ICD-10-CM

## 2025-08-28 DIAGNOSIS — E11.40 TYPE 2 DIABETES MELLITUS WITH DIABETIC NEUROPATHY, WITH LONG-TERM CURRENT USE OF INSULIN: Chronic | ICD-10-CM

## 2025-08-28 DIAGNOSIS — G89.29 CHRONIC BILATERAL LOW BACK PAIN WITH RIGHT-SIDED SCIATICA: Primary | ICD-10-CM

## 2025-08-28 DIAGNOSIS — N94.6 MENSTRUAL CRAMPS: ICD-10-CM

## 2025-08-28 DIAGNOSIS — I10 ESSENTIAL HYPERTENSION: Chronic | ICD-10-CM

## 2025-08-28 RX ORDER — KETOROLAC TROMETHAMINE 10 MG/1
10 TABLET, FILM COATED ORAL EVERY 6 HOURS PRN
Qty: 30 TABLET | Refills: 2 | Status: SHIPPED | OUTPATIENT
Start: 2025-08-28

## 2025-08-28 RX ORDER — KETOROLAC TROMETHAMINE 30 MG/ML
30 INJECTION, SOLUTION INTRAMUSCULAR; INTRAVENOUS
Status: COMPLETED | OUTPATIENT
Start: 2025-08-28 | End: 2025-08-28

## 2025-08-28 RX ORDER — KETOROLAC TROMETHAMINE 10 MG/1
10 TABLET, FILM COATED ORAL EVERY 6 HOURS
COMMUNITY
End: 2025-08-28 | Stop reason: SDUPTHER

## 2025-08-28 RX ADMIN — KETOROLAC TROMETHAMINE 30 MG: 30 INJECTION, SOLUTION INTRAMUSCULAR; INTRAVENOUS at 02:08

## 2025-08-29 ENCOUNTER — TELEPHONE (OUTPATIENT)
Dept: PHARMACY | Facility: CLINIC | Age: 50
End: 2025-08-29
Payer: COMMERCIAL

## (undated) DEVICE — NEEDLE SPINAL 22GX3.5 QUINCHE (KC)

## (undated) DEVICE — TUBING CAPNOLINE PLUS SMART 02 CONNECTOR(ORDER 65110)

## (undated) DEVICE — DRESSING IV TEGADERM 10X12CM

## (undated) DEVICE — GLOVE SURGICAL PROTEXIS PI SIZE 6.5

## (undated) DEVICE — TRAY NERVE BLOCK (KC) PMA

## (undated) DEVICE — TRAY NERVE BLOCK UNIV 10/CA

## (undated) DEVICE — SET IV SOL CONTIN-FLOW 10 DROP/ML (PRIMARY)

## (undated) DEVICE — ISOVUE 370 100ML

## (undated) DEVICE — CATH IMPULSE 6FR MULTIPACK

## (undated) DEVICE — GLOVE PROTEXIS PI SYN SURG 6.5

## (undated) DEVICE — KIT IV START 849

## (undated) DEVICE — SHEATH INTRODUCER 6FR 10CM X 0.038 PINNACLE

## (undated) DEVICE — SOL CONTINU-FLO SET 2 LAV

## (undated) DEVICE — CATH IV JELCO 22GX1 IN

## (undated) DEVICE — TOWEL OR STERILE BLUE 4/PK 20PK/CS

## (undated) DEVICE — CATH IV 22G X 1 AUTOGUARD

## (undated) DEVICE — POSITIONER ULNAR NERVE

## (undated) DEVICE — CANNULA VENOM 20G 10X100MM

## (undated) DEVICE — KIT IV START RUSH

## (undated) DEVICE — KIT MICROINTRODUCER 4FR MINI STICK II

## (undated) DEVICE — GLOVE SURGICAL PROTEXIS PI SIZE 7.5

## (undated) DEVICE — GLOVE PROTEXIS PI SYN SURG 7.5

## (undated) DEVICE — KIT ANGIO MANIFOLD CUSTOM RFH LEFT HEART KIT

## (undated) DEVICE — SET IV PRIMARY ALARIS (PRIMARY)

## (undated) DEVICE — GLOVE SURGICAL PROTEXIS PI SIZE 6

## (undated) DEVICE — BAG-A-JET FLUID DISPENSER SYSTEM

## (undated) DEVICE — APPLICATOR CHLORAPREP LITE ORANGE 10.5ML STERILE

## (undated) DEVICE — CATH IV JELCO 20GX1 1/4IN

## (undated) DEVICE — SENSOR PULSE OX ADULT

## (undated) DEVICE — PAD ELECTROSURGICAL SPL W/CORD

## (undated) DEVICE — SEAL ANGIO 6FR VIP - VASCULAR CLOSURE DEVICE BX/10

## (undated) DEVICE — APPLICATOR CHLORAPREP ORN 26ML

## (undated) DEVICE — APPLICATOR CHLORAPREP HI-LITE TINTED ORANGE 26ML

## (undated) DEVICE — SHEATH INTRODUCER 5FR 0.038 PINNACLE

## (undated) DEVICE — NEEDLE PERCUTANEOUS 18GX7CM G00166

## (undated) DEVICE — SET IV EXTENSION 42IN W/2 PORT CLEARLINK

## (undated) DEVICE — NEEDLE SPINAL SPINOCAN 22GX4 3/4IN CS/50

## (undated) DEVICE — NDL QUINCKE S/SU 22GA 5IN

## (undated) DEVICE — DRAPE THREE-QUARTER 53X77IN

## (undated) DEVICE — GLOVE SURGICAL PROTEXIS PI SIZE 8.0

## (undated) DEVICE — CDS ANGIOGRAPHY PACK